# Patient Record
Sex: FEMALE | Race: WHITE | Employment: UNEMPLOYED | URBAN - METROPOLITAN AREA
[De-identification: names, ages, dates, MRNs, and addresses within clinical notes are randomized per-mention and may not be internally consistent; named-entity substitution may affect disease eponyms.]

---

## 2017-01-28 ENCOUNTER — TELEPHONE (OUTPATIENT)
Dept: OTHER | Facility: CLINIC | Age: 57
End: 2017-01-28

## 2017-04-05 DIAGNOSIS — E83.39 HYPOPHOSPHATASIA: Primary | ICD-10-CM

## 2017-04-07 DIAGNOSIS — E83.39 ADULT HYPOPHOSPHATASIA: Primary | ICD-10-CM

## 2018-08-15 ENCOUNTER — TRANSFERRED RECORDS (OUTPATIENT)
Dept: HEALTH INFORMATION MANAGEMENT | Facility: CLINIC | Age: 58
End: 2018-08-15

## 2018-10-26 DIAGNOSIS — R31.9 HEMATURIA: Primary | ICD-10-CM

## 2019-01-07 ENCOUNTER — OFFICE VISIT (OUTPATIENT)
Dept: UROLOGY | Facility: CLINIC | Age: 59
End: 2019-01-07
Payer: COMMERCIAL

## 2019-01-07 VITALS — HEART RATE: 92 BPM | BODY MASS INDEX: 28.34 KG/M2 | OXYGEN SATURATION: 96 % | HEIGHT: 64 IN | WEIGHT: 166 LBS

## 2019-01-07 DIAGNOSIS — R31.29 MICROSCOPIC HEMATURIA: Primary | ICD-10-CM

## 2019-01-07 PROCEDURE — 52000 CYSTOURETHROSCOPY: CPT | Performed by: UROLOGY

## 2019-01-07 PROCEDURE — 99243 OFF/OP CNSLTJ NEW/EST LOW 30: CPT | Mod: 25 | Performed by: UROLOGY

## 2019-01-07 RX ORDER — CIPROFLOXACIN 500 MG/1
500 TABLET, FILM COATED ORAL ONCE
Qty: 1 TABLET | Refills: 0 | Status: SHIPPED | OUTPATIENT
Start: 2019-01-07 | End: 2019-01-07

## 2019-01-07 ASSESSMENT — PAIN SCALES - GENERAL: PAINLEVEL: NO PAIN (1)

## 2019-01-07 ASSESSMENT — MIFFLIN-ST. JEOR: SCORE: 1327.97

## 2019-01-07 NOTE — LETTER
2019       RE: Rowan Leiva  [address]  [facility] [address]     Dear Colleague,    Thank you for referring your patient, Rowan Leiva, to the [facility] [place] at [facility]. Please see a copy of my visit note below.    [facility]  Main Office: [address]  [address]  [place] [address]       CHIEF COMPLAINT:   Microscopic hematuria    HISTORY:   I was asked by Naomi Cedillo to see this 58-year-old woman who presents with microscopic hematuria. For the past 2 years she has been told by her primary care physician that she has microscopic hematuria. Unfortunately, we do not have these results available. She has never had any gross hematuria. She has no urinary symptoms or complaints. She is a current smoker. A hematuria workup has been initiated and she had a CT scan performed at [facility] in August that was negative. She is here today to complete her hematuria workup with a cystoscopy. She has no family history of urologic malignancy.      PAST MEDICAL HISTORY:   Past Medical History:   Diagnosis Date     Cancer of thyroid (H)      Chest pain      Coronary artery disease     -     HX OF OVARIAN MALIGNANCY      Hyperlipidemia      Hypertension      Hypothyroidism      Malignant neoplasm of thyroid gland (H)     papillary carcinoma; resection      Mild intermittent asthma     minimal symptoms, albuterol use 2x/year     Myocardial infarction (H)     anterior     Pulmonary nodule      Thrombocytopenia (H)      Tobacco use disorder      Type II or unspecified type diabetes mellitus without mention of complication, not stated as uncontrolled        PAST SURGICAL HISTORY:     Past Surgical History:   Procedure Laterality Date     BLADDER SURGERY       C ANESTH, SECTION       C LIGATE FALLOPIAN TUBE      Tubal Ligation     C THYROIDECTOMY   2000    papillary thyroid carcinoma - Endocrine     C TOTAL ABDOM HYSTERECTOMY      ovarian cancer (low grade) - Heme/Onc     CHOLECYSTECTOMY       CYSTOSCOPY  2014    Procedure: CYSTOSCOPY;  Surgeon: Sabino Jamil MD;  Location: [facility]     HEART CATH, ANGIOPLASTY  11    2.5 X 18 mm & 2.25 X 18 mm Xience ELLIOT to Mid LAD     HEART CATH, ANGIOPLASTY  11    Staged-3.0 X 23 mm Xience ELLIOT to Mid RAC     SLING TRANSVAGINAL N/A 2014    Procedure: SLING TRANSVAGINAL;  Surgeon: Sabino Jamil MD;  Location: [facility]       FAMILY HISTORY:   Family History   Problem Relation Age of Onset     Blood Disease Sister         Hepatitis B-alive     Cancer Maternal Aunt         bronchial tubes, neck-     Cancer Maternal Uncle         glands in neck-     Cancer Maternal Grandfather         lungs-     Diabetes Father              Heart Disease Father         2 heartattacks-     Diabetes Sister              Diabetes Other         -cousins     Diabetes Paternal Grandmother              Diabetes Paternal Grandfather              Diabetes Paternal Aunt              Hypertension Sister         alive     Thyroid Disease Sister         both sisters-alive     Thyroid Disease Other         niece-alive       SOCIAL HISTORY:   Social History     Tobacco Use     Smoking status: Current Every Day Smoker     Packs/day: 2.00     Years: 24.00     Pack years: 48.00     Types: Cigarettes     Smokeless tobacco: Never Used     Tobacco comment: Strongly recommended quitting at each visit.   Substance Use Topics     Alcohol use: No     Alcohol/week: 0.0 oz        ALLERGIES:  Allergies   Allergen Reactions     No Known Drug Allergies        MEDICATIONS:     Current Outpatient Medications:      ALBUTEROL 90 MCG/ACT IN AERS, 1-2 puffs Q 2-4 hrs prn with spacer - HFA, Disp: 1, Rfl: 1     aspirin 81 MG tablet, Take 81 mg by mouth daily, Disp: , Rfl:       "ciprofloxacin (CIPRO) 500 MG tablet, Take 1 tablet (500 mg) by mouth once for 1 dose, Disp: 1 tablet, Rfl: 0     glipiZIDE (GLUCOTROL) 10 MG tablet, Take 10 mg by mouth daily., Disp: , Rfl:      insulin glargine (LANTUS SOLOSTAR) 100 UNIT/ML injection, Inject 30 Units Subcutaneous daily , Disp: , Rfl:      LANCETS REGULAR MISC, use twice a day for blood sugar, Disp: 2 boxes, Rfl: 0     levothyroxine (SYNTHROID, LEVOTHROID) 112 MCG tablet, Take 112 mcg by mouth every morning (before breakfast). Total dose = 162 mcg , Disp: , Rfl:      levothyroxine (SYNTHROID, LEVOTHROID) 50 MCG tablet, Take 50 mcg by mouth every morning (before breakfast). Total dose 162 mcg , Disp: , Rfl:      metoprolol (TOPROL-XL) 50 MG 24 hr tablet, Take 50 mg by mouth daily, Disp: , Rfl:     PHYSICAL EXAM:  VS: HR: 92    BP: Data Unavailable      WT: 166 lbs 0 oz       HT: 5' 4\"    General appearance: In NAD, conversant  HEENT: normocephalic and atraumatic, anicteric sclera  Lymph Nodes: not examined  Cardiovascular: not examined  Respiratory: normal, non-labored breathing  Abdomen: soft, non-tender, and non-distended  Back/Flank: not examined  Peripheral Vascular/extremity: no peripheral edema  Lymphatic: not examined  Skin: Normal temperature, turgor, and texture. No rash  Psychiatric: Appropriate affect. Alert and Oriented to person, place, and time    Pelvic:    External genitalia: normal   Urethra: normal   Vagina: normal vaginal mucosa      Cystoscopy: I performed flexible cystoscopy and the bladder was normal throughout.    Laboratory Studies: urinalysis today with moderate blood    Imaging Studies: CT scan with contrast at [facility] in August showed no abnormalities.    CLINICAL IMPRESSION:   Microscopic hematuria    PLAN:   Her CT scan and cystoscopy are both negative today. She was provided reassurance. A urine sample from today will be sent for cytology in order to complete her hematuria workup. She is a current smoker. I counseled " her to quit smoking as this is the leading cause of bladder cancer. If she does continue to smoke she should have another urinalysis and cytology checked next year in our clinic.    Jean Marley M.D.

## 2019-01-07 NOTE — NURSING NOTE
Prior to the start of the procedure and with procedural staff participation, I verbally confirmed the patient s identity using two indicators, relevant allergies, that the procedure was appropriate and matched the consent or emergent situation, and that the correct equipment/implants were available. Immediately prior to starting the procedure I conducted the Time Out with the procedural staff and re-confirmed the patient s name, procedure, and site/side. (The Joint Commission universal protocol was followed.)  Yes    Sedation (Moderate or Deep): None  Pt has signed the consent form stating that we will be doing a CYSTOSCOPY (with or without stent removal) today, and that it is the correct procedure. I verbally confirmed the patient s identity using two indicators, relevant allergies, and that the correct equipment was available. Post-op information given to the pt as needed at check-out. I have sent an appropriate antibiotic to the pharmacy in our building as recommended by the MD. JORI Barraza CMA

## 2019-01-07 NOTE — PROGRESS NOTES
[facility]  Main Office: [address]  [address]  [place] [address]       CHIEF COMPLAINT:   Microscopic hematuria    HISTORY:   I was asked by Naomi Cedillo to see this 58-year-old woman who presents with microscopic hematuria. For the past 2 years she has been told by her primary care physician that she has microscopic hematuria. Unfortunately, we do not have these results available. She has never had any gross hematuria. She has no urinary symptoms or complaints. She is a current smoker. A hematuria workup has been initiated and she had a CT scan performed at [facility] in August that was negative. She is here today to complete her hematuria workup with a cystoscopy. She has no family history of urologic malignancy.      PAST MEDICAL HISTORY:   Past Medical History:   Diagnosis Date     Cancer of thyroid (H)      Chest pain      Coronary artery disease     -     HX OF OVARIAN MALIGNANCY      Hyperlipidemia      Hypertension      Hypothyroidism      Malignant neoplasm of thyroid gland (H)     papillary carcinoma; resection      Mild intermittent asthma     minimal symptoms, albuterol use 2x/year     Myocardial infarction (H)     anterior     Pulmonary nodule      Thrombocytopenia (H)      Tobacco use disorder      Type II or unspecified type diabetes mellitus without mention of complication, not stated as uncontrolled        PAST SURGICAL HISTORY:     Past Surgical History:   Procedure Laterality Date     BLADDER SURGERY       C ANESTH, SECTION       C LIGATE FALLOPIAN TUBE      Tubal Ligation     C THYROIDECTOMY  2000    papillary thyroid carcinoma - Endocrine     C TOTAL ABDOM HYSTERECTOMY      ovarian cancer (low grade) - Heme/Onc     CHOLECYSTECTOMY       CYSTOSCOPY  2014    Procedure: CYSTOSCOPY;  Surgeon: Sabino Jamil MD;  Location: [facility]     HEART CATH, ANGIOPLASTY  11    2.5 X 18 mm & 2.25 X 18 mm  Xience ELLIOT to Mid LAD     HEART CATH, ANGIOPLASTY  11    Staged-3.0 X 23 mm Xience ELLIOT to Mid RAC     SLING TRANSVAGINAL N/A 2014    Procedure: SLING TRANSVAGINAL;  Surgeon: Sabino Jamil MD;  Location: [facility]       FAMILY HISTORY:   Family History   Problem Relation Age of Onset     Blood Disease Sister         Hepatitis B-alive     Cancer Maternal Aunt         bronchial tubes, neck-     Cancer Maternal Uncle         glands in neck-     Cancer Maternal Grandfather         lungs-     Diabetes Father              Heart Disease Father         2 heartattacks-     Diabetes Sister              Diabetes Other         -cousins     Diabetes Paternal Grandmother              Diabetes Paternal Grandfather              Diabetes Paternal Aunt              Hypertension Sister         alive     Thyroid Disease Sister         both sisters-alive     Thyroid Disease Other         niece-alive       SOCIAL HISTORY:   Social History     Tobacco Use     Smoking status: Current Every Day Smoker     Packs/day: 2.00     Years: 24.00     Pack years: 48.00     Types: Cigarettes     Smokeless tobacco: Never Used     Tobacco comment: Strongly recommended quitting at each visit.   Substance Use Topics     Alcohol use: No     Alcohol/week: 0.0 oz        ALLERGIES:  Allergies   Allergen Reactions     No Known Drug Allergies        MEDICATIONS:     Current Outpatient Medications:      ALBUTEROL 90 MCG/ACT IN AERS, 1-2 puffs Q 2-4 hrs prn with spacer - HFA, Disp: 1, Rfl: 1     aspirin 81 MG tablet, Take 81 mg by mouth daily, Disp: , Rfl:      ciprofloxacin (CIPRO) 500 MG tablet, Take 1 tablet (500 mg) by mouth once for 1 dose, Disp: 1 tablet, Rfl: 0     glipiZIDE (GLUCOTROL) 10 MG tablet, Take 10 mg by mouth daily., Disp: , Rfl:      insulin glargine (LANTUS SOLOSTAR) 100 UNIT/ML injection, Inject 30 Units Subcutaneous daily , Disp: , Rfl:      LANCETS  "REGULAR MISC, use twice a day for blood sugar, Disp: 2 boxes, Rfl: 0     levothyroxine (SYNTHROID, LEVOTHROID) 112 MCG tablet, Take 112 mcg by mouth every morning (before breakfast). Total dose = 162 mcg , Disp: , Rfl:      levothyroxine (SYNTHROID, LEVOTHROID) 50 MCG tablet, Take 50 mcg by mouth every morning (before breakfast). Total dose 162 mcg , Disp: , Rfl:      metoprolol (TOPROL-XL) 50 MG 24 hr tablet, Take 50 mg by mouth daily, Disp: , Rfl:     REVIEW OF SYSTEMS:  Allergic/Immunologic: negative  Constitutional Symptoms: negative   Fever: none   Weight loss: none   Other: none  Hematologic/Lymphatic: negative  Integumentary: negative   Breast: negative   Hair: negative   Skin: negative   Skin: negative  Eyes: negative  Ears/Nose/Throat: negative  Respiratory: negative  Cardiovascular: negative  Gastrointestinal: negative  Genitourinary: negative  Musculoskeletal: negative  Neurologic: negative  Psychiatric: negative  Reproductive System: negative  Endocrine: negative      PHYSICAL EXAM:  VS: HR: 92    BP: Data Unavailable      WT: 166 lbs 0 oz       HT: 5' 4\"    General appearance: In NAD, conversant  HEENT: normocephalic and atraumatic, anicteric sclera  Lymph Nodes: not examined  Cardiovascular: not examined  Respiratory: normal, non-labored breathing  Abdomen: soft, non-tender, and non-distended  Back/Flank: not examined  Peripheral Vascular/extremity: no peripheral edema  Lymphatic: not examined  Skin: Normal temperature, turgor, and texture. No rash  Psychiatric: Appropriate affect. Alert and Oriented to person, place, and time    Pelvic:    External genitalia: normal   Urethra: normal   Vagina: normal vaginal mucosa      Cystoscopy: I performed flexible cystoscopy and the bladder was normal throughout.    Laboratory Studies: urinalysis today with moderate blood    Imaging Studies: CT scan with contrast at [facility] in August showed no abnormalities.      CLINICAL IMPRESSION:   Microscopic " hematuria    PLAN:   Her CT scan and cystoscopy are both negative today. She was provided reassurance. A urine sample from today will be sent for cytology in order to complete her hematuria workup. She is a current smoker. I counseled her to quit smoking as this is the leading cause of bladder cancer. If she does continue to smoke she should have another urinalysis and cytology checked next year in our clinic.      Jean Marley M.D.

## 2019-01-07 NOTE — PATIENT INSTRUCTIONS
"     AFTER YOUR CYSTOSCOPY         You have just completed a cystoscopy, or \"cysto\", which allowed your physician to learn more about your bladder (or to remove a stent placed after surgery). We suggest that you continue to avoid caffeine, fruit juice, and alcohol for the next 24 hours, however, you are encouraged to return to your normal activities.       A few things that are considered normal after your cystoscopy:    * small amount of bleeding (or spotting) that clears within the next 24 hours    * slight burning sensation with urination    * sensation to of needing to avoid more frequently    * the feeling of \"air\" in your urine    * mild discomfort that is relieved with Tylonol        Please contact our office promptly if you:    * develop a fever above 101 degrees    * are unable to urinate    * develop bright red blood that does not stop    * severe pain or swelling        And of course, please contact our office with any concerns or questions [phone]        "

## 2019-01-08 LAB
RBC #/AREA URNS AUTO: <1 /HPF (ref 0–2)
WBC #/AREA URNS AUTO: 1 /HPF (ref 0–5)

## 2019-01-08 PROCEDURE — 81015 MICROSCOPIC EXAM OF URINE: CPT | Performed by: UROLOGY

## 2019-01-08 PROCEDURE — 88112 CYTOPATH CELL ENHANCE TECH: CPT | Performed by: UROLOGY

## 2019-01-09 LAB — COPATH REPORT: NORMAL

## 2020-04-04 ENCOUNTER — APPOINTMENT (OUTPATIENT)
Dept: CT IMAGING | Facility: CLINIC | Age: 60
End: 2020-04-04
Attending: PHYSICIAN ASSISTANT
Payer: COMMERCIAL

## 2020-04-04 ENCOUNTER — HOSPITAL ENCOUNTER (EMERGENCY)
Facility: CLINIC | Age: 60
Discharge: HOME | End: 2020-04-04
Attending: PHYSICIAN ASSISTANT | Admitting: PHYSICIAN ASSISTANT
Payer: COMMERCIAL

## 2020-04-04 VITALS
TEMPERATURE: 96.5 F | SYSTOLIC BLOOD PRESSURE: 164 MMHG | BODY MASS INDEX: 28.85 KG/M2 | WEIGHT: 169 LBS | OXYGEN SATURATION: 98 % | DIASTOLIC BLOOD PRESSURE: 73 MMHG | HEIGHT: 64 IN | RESPIRATION RATE: 16 BRPM

## 2020-04-04 DIAGNOSIS — N20.0 CALCULUS OF KIDNEY: ICD-10-CM

## 2020-04-04 DIAGNOSIS — R31.9 HEMATURIA: ICD-10-CM

## 2020-04-04 DIAGNOSIS — R10.9 FLANK PAIN: ICD-10-CM

## 2020-04-04 DIAGNOSIS — R79.89 ELEVATED SERUM CREATININE: ICD-10-CM

## 2020-04-04 LAB
ALBUMIN UR-MCNC: 300 MG/DL
AMORPH CRY #/AREA URNS HPF: ABNORMAL /HPF
ANION GAP SERPL CALCULATED.3IONS-SCNC: 6 MMOL/L (ref 3–14)
APPEARANCE UR: CLEAR
BASOPHILS # BLD AUTO: 0.1 10E9/L (ref 0–0.2)
BASOPHILS NFR BLD AUTO: 0.6 %
BILIRUB UR QL STRIP: NEGATIVE
BUN SERPL-MCNC: 31 MG/DL (ref 7–30)
CALCIUM SERPL-MCNC: 8.6 MG/DL (ref 8.5–10.1)
CHLORIDE SERPL-SCNC: 103 MMOL/L (ref 94–109)
CO2 SERPL-SCNC: 28 MMOL/L (ref 20–32)
COLOR UR AUTO: YELLOW
CREAT SERPL-MCNC: 1.33 MG/DL (ref 0.52–1.04)
DIFFERENTIAL METHOD BLD: ABNORMAL
EOSINOPHIL # BLD AUTO: 0.3 10E9/L (ref 0–0.7)
EOSINOPHIL NFR BLD AUTO: 3.1 %
ERYTHROCYTE [DISTWIDTH] IN BLOOD BY AUTOMATED COUNT: 14.5 % (ref 10–15)
GFR SERPL CREATININE-BSD FRML MDRD: 44 ML/MIN/{1.73_M2}
GLUCOSE SERPL-MCNC: 262 MG/DL (ref 70–99)
GLUCOSE UR STRIP-MCNC: 1000 MG/DL
HCT VFR BLD AUTO: 37.6 % (ref 35–47)
HGB BLD-MCNC: 12.1 G/DL (ref 11.7–15.7)
HGB UR QL STRIP: ABNORMAL
HYALINE CASTS #/AREA URNS LPF: 9 /LPF (ref 0–2)
IMM GRANULOCYTES # BLD: 0 10E9/L (ref 0–0.4)
IMM GRANULOCYTES NFR BLD: 0.4 %
KETONES UR STRIP-MCNC: NEGATIVE MG/DL
LEUKOCYTE ESTERASE UR QL STRIP: NEGATIVE
LYMPHOCYTES # BLD AUTO: 2 10E9/L (ref 0.8–5.3)
LYMPHOCYTES NFR BLD AUTO: 21.6 %
MCH RBC QN AUTO: 25.9 PG (ref 26.5–33)
MCHC RBC AUTO-ENTMCNC: 32.2 G/DL (ref 31.5–36.5)
MCV RBC AUTO: 80 FL (ref 78–100)
MONOCYTES # BLD AUTO: 0.5 10E9/L (ref 0–1.3)
MONOCYTES NFR BLD AUTO: 5.1 %
MUCOUS THREADS #/AREA URNS LPF: PRESENT /LPF
NEUTROPHILS # BLD AUTO: 6.2 10E9/L (ref 1.6–8.3)
NEUTROPHILS NFR BLD AUTO: 69.2 %
NITRATE UR QL: NEGATIVE
NRBC # BLD AUTO: 0 10*3/UL
NRBC BLD AUTO-RTO: 0 /100
PH UR STRIP: 6 PH (ref 5–7)
PLATELET # BLD AUTO: 111 10E9/L (ref 150–450)
POTASSIUM SERPL-SCNC: 4 MMOL/L (ref 3.4–5.3)
RBC # BLD AUTO: 4.68 10E12/L (ref 3.8–5.2)
RBC #/AREA URNS AUTO: 4 /HPF (ref 0–2)
SODIUM SERPL-SCNC: 137 MMOL/L (ref 133–144)
SOURCE: ABNORMAL
SP GR UR STRIP: 1.02 (ref 1–1.03)
SQUAMOUS #/AREA URNS AUTO: 1 /HPF (ref 0–1)
UROBILINOGEN UR STRIP-MCNC: NORMAL MG/DL (ref 0–2)
WBC # BLD AUTO: 9 10E9/L (ref 4–11)
WBC #/AREA URNS AUTO: 2 /HPF (ref 0–5)

## 2020-04-04 PROCEDURE — 81001 URINALYSIS AUTO W/SCOPE: CPT | Performed by: PHYSICIAN ASSISTANT

## 2020-04-04 PROCEDURE — 85025 COMPLETE CBC W/AUTO DIFF WBC: CPT | Performed by: PHYSICIAN ASSISTANT

## 2020-04-04 PROCEDURE — 74176 CT ABD & PELVIS W/O CONTRAST: CPT

## 2020-04-04 PROCEDURE — 25800030 ZZH RX IP 258 OP 636: Performed by: PHYSICIAN ASSISTANT

## 2020-04-04 PROCEDURE — 99284 EMERGENCY DEPT VISIT MOD MDM: CPT | Mod: 25

## 2020-04-04 PROCEDURE — 96360 HYDRATION IV INFUSION INIT: CPT

## 2020-04-04 PROCEDURE — 80048 BASIC METABOLIC PNL TOTAL CA: CPT | Performed by: PHYSICIAN ASSISTANT

## 2020-04-04 RX ADMIN — SODIUM CHLORIDE 1000 ML: 9 INJECTION, SOLUTION INTRAVENOUS at 13:59

## 2020-04-04 ASSESSMENT — ENCOUNTER SYMPTOMS
DIARRHEA: 0
BACK PAIN: 1
FEVER: 0
NAUSEA: 0
HEMATURIA: 1
VOMITING: 0
CONSTIPATION: 0
SHORTNESS OF BREATH: 0
ABDOMINAL PAIN: 1

## 2020-04-04 ASSESSMENT — MIFFLIN-ST. JEOR: SCORE: 1331.58

## 2020-04-04 NOTE — ED PROVIDER NOTES
History     Chief Complaint:  Back Pain    HPI   Rowan Leiva is a 60 year old female with a history of Coronary Artery Disease, hypertension, hyperlipidemia, MI, asthma/COPD, ovarian cancer, among others,  who presents with back pain. The patient reports that for the past 4-5 days she has been experiencing intermittent left sided back pain that radiates into the left lower quadrant. Yesterday, she had noticed some hematuria but this has resolved today. However, she notes that she has had foul smelling urine for the past week and she was concerned for a kidney infection. The patient has found the pain to manageable at home if she is not over exerting herself and if she keeps up on fluid intake. The patient denies fever, diarrhea, constipation, vaginal bleeding or discharge, chest pain, shortness of breath, nausea, or vomiting. She does not have a history of kidney stones.     Of note, the patient recently had lab work done at outside facility and been told over the past several months that there kidney function has been slowly declining.     Allergies:  No known drug allergies.       Medications:    Albuterol   Aspirin   Glipizide   Lantus Solostar   Lancets Regular   Synthroid/Levothroid   Toprol XL      Past Medical History:    Cancer of thyroid   Chest pain   Coronary artery disease   Ovarian malignancy    Hyperlipidemia   Hypertension   Hypothyroidism   Malignant neoplasm of thyroid gland   Mild intermittent asthma   Myocardial infarction    COPD  Pulmonary nodule   Thrombocytopenia   Tobacco use disorder   Type II diabetes mellitus     Past Surgical History:    Bladder surgery  Tubal ligation   Ovarian cancer - hysterectomy   Choleystectomy  Cystoscopy   Thyroidectomy   Heart cath, angioplasty  Sling transvaginal     Family History:    Sister: hepatitis B, diabetes, hypertension, thyroid disease   Maternal aunt: cancer  Maternal uncle: cancer  Maternal grandfather: cancer  Father: diabetes, heart disease,  "MI    Social History:  The patient was unaccompanied to the ED.  Smoking Status: Current Every Day Smoker  Smokeless Tobacco: Never Used  Alcohol Use: Negative   Drug Use: Negative  PCP: Pallas, Kenneth G   Marital Status:       Review of Systems   Constitutional: Negative for fever.   Respiratory: Negative for shortness of breath.    Cardiovascular: Negative for chest pain.   Gastrointestinal: Positive for abdominal pain. Negative for constipation, diarrhea, nausea and vomiting.   Genitourinary: Positive for hematuria (resolved). Negative for vaginal bleeding and vaginal discharge.        Foul smelling urine   Musculoskeletal: Positive for back pain.   All other systems reviewed and are negative.    Physical Exam     Patient Vitals for the past 24 hrs:   BP Temp Temp src Heart Rate Resp SpO2 Height Weight   04/04/20 1328 (!) 193/84 96.5  F (35.8  C) Temporal 101 16 100 % 1.626 m (5' 4\") 76.7 kg (169 lb)      Physical Exam  General: Alert and cooperative with exam. Resting comfortably on gurney  Head:  Scalp is NC/AT  Eyes:  No scleral icterus, PERRL  ENT:  The external nose and ears are normal.   Neck:  Normal range of motion without rigidity.  CV:  Regular rate and rhythm    No pathologic murmur, rubs, or gallops.  Resp:  Breath sounds are clear bilaterally.  No crackles, wheezes, rhonchi, stridor.    Non-labored, no retractions or accessory muscle use  GI:  Abdomen is soft, no distension, no tenderness, no masses. No peritoneal signs.  Bowel sounds present in all quadrants  :  No suprapubic or flank tenderness  MS:  No lower extremity edema or asymmetric calf swelling.    No midline cervical, thoracic, or lumbar tenderness  Skin:  Warm and dry, No rash or lesions noted.  Neuro: Oriented. No gross motor deficits.     GCS: 15.  Psych: Awake. Alert. Normal affect. Appropriate interactions.    Emergency Department Course     Imaging:  Radiology findings were communicated with the patient who voiced " understanding of the findings.    Abd/pelvis CT no contrast - Stone Protocol  1.  2 punctate nonobstructive right intrarenal calculi measuring up to  2 mm.  JENNA MONTANEZ MD  Reading per radiology      Laboratory:  Laboratory findings were communicated with the patient who voiced understanding of the findings.    UA: Glucose 1000 (A), Blood moderate (A), Protein Albumin 300 (A), RBC 4 (H), Mucous present (A), Hyaline Casts 9 (H), Amorphous Crystals few (A), o/w WNL.     CBC: WBC 9.0, HGB 12.1,  (L)  BMP: Glucose 262 (H), BUN 31 (H), GFR 44 (L), o/w WNL (Creatinine 1.33 (H))    Interventions:  1359 NS 1000 mL IV      Emergency Department Course:    1339 Nursing notes and vitals reviewed. I performed an exam of the patient as documented above.     1340 The patient provided a urine sample here in the emergency department. This was sent for laboratory testing, findings above.     1358 IV was inserted and blood was drawn for laboratory testing, results above.     1422 The patient was sent for a CT while in the emergency department, results above.      1530 Prior to discharge, I personally reviewed the results with the patient and all related questions were answered. The patient verbalized understanding and is amenable to plan.     Impression & Plan      Medical Decision Making:  Rowan Leiva is a 60 year old female who presents to the emergency department today for evaluation of left low back and flank pain.  History and records reviewed.  CT scan demonstrates no evidence of ureteral stone, but does show 2 small left-sided intrarenal stones which would not likely account for the patient's pain.  Her urine does have a small amount of blood in it and the possibility of recently passed stone seems likely given that she is feeling better at this point and quite comfortable and not requiring any pain medicine.  Her urinalysis is not suggestive of infection at this time however I will send culture.  She has no  vaginal symptoms or evidence of PID, and I doubt ovarian torsion.  She has no chest pain or shortness of breath, and I feel pulmonary embolism, ACS, dissection, or other intrathoracic process is very unlikely.  Her lab work is most notable for elevated BUN and creatinine, and while this is approximately double what her most recent reading available to our system in 2015 was, on further discussion with the patient she notes that this is been an ongoing issue for her recently likely secondary to her underlying diabetes.  There is no evidence of obstructive process on CT.  She appears well-hydrated.  We will have her follow-up with primary care provider for repeat lab work in 3 to 4 days as an outpatient.  I stressed the importance of return to the emergency department if she develops new or worsening symptoms including fever, increased pain, shortness of breath, chest pain, or other new or worsening symptoms.    Diagnosis:    ICD-10-CM    1. Flank pain  R10.9    2. Hematuria  R31.9    3. Calculus of kidney  N20.0    4. Elevated serum creatinine  R79.89      Disposition:   The patient is discharged to home.     Discharge Medications:  No discharge medications.     Scribe Disclosure:  I, Orla Severson, am serving as a scribe at 1:51 PM on 4/4/2020 to document services personally performed by Pete Miguel PA-C based on my observations and the provider's statements to me.    [facility] EMERGENCY DEPARTMENT       Pete Miguel PA-C  04/04/20 1533

## 2020-04-04 NOTE — ED TRIAGE NOTES
Pt presents for left sided back pain that radiates around to the LLQ, that has been intermittent for the last 4-5 days. Pt also noticed blood in her urine yesterday, but has since gone away and has had foul smelling urine for about a week. No hx of kidney stones. Denies any known back issues, bowel complaints, numbness or tingling.

## 2020-04-08 ENCOUNTER — PATIENT OUTREACH (OUTPATIENT)
Dept: CARE COORDINATION | Facility: CLINIC | Age: 60
End: 2020-04-08

## 2020-04-08 NOTE — PROGRESS NOTES
Clinical Product Navigator reviewed chart; patient on payer product coverage.  Review results: Obtaining further information to determine needs and next steps.    Deisy Bolanos Clinical Product Navigator    Clinic Care Coordination Contact  [facility]/Voicemail       Clinical Data: Care Coordinator Outreach  Outreach attempted x 1.  Left message on patient's voicemail with call back information and requested return call.  Plan:  Care Coordinator will try to reach patient again in 1-2 business days.      Deisy Bolanos  Clinical Product Navigator

## 2020-04-09 ENCOUNTER — PATIENT OUTREACH (OUTPATIENT)
Dept: CARE COORDINATION | Facility: CLINIC | Age: 60
End: 2020-04-09

## 2020-04-09 DIAGNOSIS — E11.9 DIABETES MELLITUS (H): Primary | ICD-10-CM

## 2020-04-09 SDOH — HEALTH STABILITY: PHYSICAL HEALTH: ON AVERAGE, HOW MANY MINUTES DO YOU ENGAGE IN EXERCISE AT THIS LEVEL?: 0 MIN

## 2020-04-09 SDOH — SOCIAL STABILITY: SOCIAL NETWORK: HOW OFTEN DO YOU GET TOGETHER WITH FRIENDS OR RELATIVES?: NEVER

## 2020-04-09 SDOH — SOCIAL STABILITY: SOCIAL INSECURITY
WITHIN THE LAST YEAR, HAVE YOU BEEN KICKED, HIT, SLAPPED, OR OTHERWISE PHYSICALLY HURT BY YOUR PARTNER OR EX-PARTNER?: NO

## 2020-04-09 SDOH — SOCIAL STABILITY: SOCIAL INSECURITY: WITHIN THE LAST YEAR, HAVE YOU BEEN HUMILIATED OR EMOTIONALLY ABUSED IN OTHER WAYS BY YOUR PARTNER OR EX-PARTNER?: NO

## 2020-04-09 SDOH — SOCIAL STABILITY: SOCIAL INSECURITY
WITHIN THE LAST YEAR, HAVE TO BEEN RAPED OR FORCED TO HAVE ANY KIND OF SEXUAL ACTIVITY BY YOUR PARTNER OR EX-PARTNER?: NO

## 2020-04-09 SDOH — SOCIAL STABILITY: SOCIAL NETWORK
DO YOU BELONG TO ANY CLUBS OR ORGANIZATIONS SUCH AS CHURCH GROUPS UNIONS, FRATERNAL OR ATHLETIC GROUPS, OR SCHOOL GROUPS?: NO

## 2020-04-09 SDOH — ECONOMIC STABILITY: FOOD INSECURITY: WITHIN THE PAST 12 MONTHS, YOU WORRIED THAT YOUR FOOD WOULD RUN OUT BEFORE YOU GOT MONEY TO BUY MORE.: NEVER TRUE

## 2020-04-09 SDOH — ECONOMIC STABILITY: INCOME INSECURITY: HOW HARD IS IT FOR YOU TO PAY FOR THE VERY BASICS LIKE FOOD, HOUSING, MEDICAL CARE, AND HEATING?: SOMEWHAT HARD

## 2020-04-09 SDOH — HEALTH STABILITY: MENTAL HEALTH
STRESS IS WHEN SOMEONE FEELS TENSE, NERVOUS, ANXIOUS, OR CAN'T SLEEP AT NIGHT BECAUSE THEIR MIND IS TROUBLED. HOW STRESSED ARE YOU?: RATHER MUCH

## 2020-04-09 SDOH — ECONOMIC STABILITY: TRANSPORTATION INSECURITY
IN THE PAST 12 MONTHS, HAS LACK OF TRANSPORTATION KEPT YOU FROM MEETINGS, WORK, OR FROM GETTING THINGS NEEDED FOR DAILY LIVING?: NO

## 2020-04-09 SDOH — HEALTH STABILITY: PHYSICAL HEALTH: ON AVERAGE, HOW MANY DAYS PER WEEK DO YOU ENGAGE IN MODERATE TO STRENUOUS EXERCISE (LIKE A BRISK WALK)?: 0 DAYS

## 2020-04-09 SDOH — SOCIAL STABILITY: SOCIAL INSECURITY: WITHIN THE LAST YEAR, HAVE YOU BEEN AFRAID OF YOUR PARTNER OR EX-PARTNER?: NO

## 2020-04-09 SDOH — SOCIAL STABILITY: SOCIAL NETWORK: HOW OFTEN DO YOU ATTENT MEETINGS OF THE CLUB OR ORGANIZATION YOU BELONG TO?: NEVER

## 2020-04-09 SDOH — SOCIAL STABILITY: SOCIAL NETWORK
IN A TYPICAL WEEK, HOW MANY TIMES DO YOU TALK ON THE PHONE WITH FAMILY, FRIENDS, OR NEIGHBORS?: MORE THAN THREE TIMES A WEEK

## 2020-04-09 SDOH — SOCIAL STABILITY: SOCIAL NETWORK: HOW OFTEN DO YOU ATTEND CHURCH OR RELIGIOUS SERVICES?: NEVER

## 2020-04-09 SDOH — ECONOMIC STABILITY: TRANSPORTATION INSECURITY
IN THE PAST 12 MONTHS, HAS THE LACK OF TRANSPORTATION KEPT YOU FROM MEDICAL APPOINTMENTS OR FROM GETTING MEDICATIONS?: NO

## 2020-04-09 SDOH — ECONOMIC STABILITY: FOOD INSECURITY: WITHIN THE PAST 12 MONTHS, THE FOOD YOU BOUGHT JUST DIDN'T LAST AND YOU DIDN'T HAVE MONEY TO GET MORE.: NEVER TRUE

## 2020-04-09 ASSESSMENT — ACTIVITIES OF DAILY LIVING (ADL): DEPENDENT_IADLS:: INDEPENDENT

## 2020-04-09 NOTE — LETTER
"[place] CARE COORDINATION  [facility]  April 9, 2020    Rowan Leiva  [address]  [facility] [address]      Dear Rowan,    I am a clinical product navigator that works on behalf of [employer] [facility]; I wanted to introduce myself and role to you, as I can help you establish care with recommended providers for ongoing care within our health care system.     Also, our Primary Care Clinics are all supported by Clinic Care Coordination:     \"The clinic care coordinator is a registered nurse and/or  who understand the health care system. The goal of clinic care coordination is to help you manage your health and improve access to the [facility] system in the most efficient manner. The registered nurse can assist you in meeting your health care goals by providing education, coordinating services, and strengthening the communication among your providers. The  can assist you with financial, behavioral, psychosocial, chemical dependency, counseling, and/or psychiatric resources.\"    I look forward to helping you connect with providers within our health care system; please let me know if you have any questions.     Sincerely,    eDisy Bolanos  Clinical Product Navigator  PH: [phone]  "

## 2020-04-09 NOTE — PROGRESS NOTES
Clinical Product Navigatorreviewed chart; patient on payer product coverage.  Review results: Met referral criteria for Care Coordinator; referral to be sent.    Deisy Bolanos, Clinical Product Navigator    Clinic Care Coordination Contact    Clinic Care Coordination Contact  OUTREACH    Referral Information:  Referral Source: Health Plan    Primary Diagnosis: Diabetes    Care Coordinator has reviewed patient's Social Determinants of Health (SDoH) on this date. Upon review, changes  were  made.   Chief Complaint   Patient presents with     Clinic Care Coordination - Initial     Clinical Product Navigator        Universal Utilization: Patient would like to establish with [facility]. CPN to assist patient to make follow up visit from her ED visit on 4/4/2020 with kidney stones.   Clinic Utilization: Will establish with [facility]   Difficulty keeping appointments:: No  Compliance Concerns: Yes  No-Show Concerns: No  No PCP office visit in Past Year: Yes  Utilization    Last refreshed: 4/9/2020  3:16 PM:  Hospital Admissions 0           Last refreshed: 4/9/2020  3:16 PM:  ED Visits 1           Last refreshed: 4/9/2020  3:16 PM:  No Show Count (past year) 0              Current as of: 4/9/2020  3:16 PM              Clinical Concerns:  Current Medical Concerns: Patient was recently in the ED for a kidney stone; she feels that she has passed one, but that one has potentially moved. She said the pain is felt in a different spot, but her kidney pain has gotten better overall. She will discuss with at her appointment with the doctor. Patient is going to call her Endocrinologist.      Patient Active Problem List   Diagnosis     Malignant neoplasm of thyroid gland (H)     Personal history of malignant neoplasm of ovary     Diabetes mellitus, type 2 (H)     Tobacco use disorder     Mixed hyperlipidemia     Mild intermittent asthma     Thrombocytopenia (H)     Advanced directives,  counseling/discussion     Hyperlipidemia     Coronary artery disease     Myocardial infarction (H)     Hypertension     Hypothyroidism     Patient is very worried about her health and getting COVID, she knows that her health puts her at greater risk of janet COVID. CPN and patient discussed the desire to quit smoking, as this put her at higher risk, patient stated she has the patches and she would like to quit,she just hasn't started yet.     Writer educated patient to [facility] and that support she would receive through a tobacco cessation program with utilizing the [facility]; patient would like to enroll once established with [facility].     Current Behavioral Concerns: Patient stated she feels stressed as she is very worried about herself,  and her mom getting COVID; they are all high risk and she is worried about their health. They are all doing their best to stay isolated; their groceries are being delivered. Patient is able to work from home and she is busy with work. She said this is adding to her stress.     Patient is having a family zoom today to help her and her family stay connected; they are all decorating cookies via zoom. Writer and patient discussed ways to decrease stress, She knows staying connected to family will help her stress so she will be scheduling more time with them.     Education Provided to patient: CPN role, CCC role in the clinics.   Pain  Pain (GOAL):: No  Health Maintenance Reviewed: Due/Overdue : Patients needs a physical.     Medication Management:  Medication reviewed and reconciled.     Functional Status:  Dependent ADLs:: Independent  Dependent IADLs:: Independent  Mobility Status: Independent  Fallen 2 or more times in the past year?: No  Any fall with injury in the past year?: No    Living Situation:  Current living arrangement:: I live in a private home with spouse  Type of residence:: Private home - stairs    Lifestyle & Psychosocial Needs:  Lifestyle     Physical activity      Days per week: 0 days     Minutes per session: 0 min     Stress: Rather much     Social Needs     Financial resource strain: Somewhat hard     Food insecurity     Worry: Never true     Inability: Never true     Transportation needs     Medical: No     Non-medical: No     Diet:: Regular  Inadequate nutrition (GOAL):: No  Tube Feeding: No  Inadequate activity/exercise (GOAL):: No  Significant changes in sleep pattern (GOAL): No  Transportation means:: Regular car     Jehovah's witness or spiritual beliefs that impact treatment:: No  Mental health DX:: No  Mental health management concern (GOAL):: No  Informal Support system:: Partner   Socioeconomic History     Marital status:      Spouse name: Clif     Number of children: 2     Years of education: 13     Highest education level: Not on file   Occupational History     Comment: [employer]   Relationships     Social connections     Talks on phone: More than three times a week     Gets together: Never     Attends [employer] service: Never     Active member of club or organization: No     Attends meetings of clubs or organizations: Never     Relationship status: Not on file     Intimate partner violence     Fear of current or ex partner: No     Emotionally abused: No     Physically abused: No     Forced sexual activity: No     Tobacco Use     Smoking status: Current Every Day Smoker     Packs/day: 2.00     Years: 24.00     Pack years: 48.00     Types: Cigarettes     Smokeless tobacco: Never Used     Tobacco comment: Strongly recommended quitting at each visit.   Substance and Sexual Activity     Alcohol use: No     Alcohol/week: 0.0 standard drinks     Drug use: No     Sexual activity: Yes     Partners: Male     Birth control/protection: Female Surgical     Comment: Hysterectomy and tubal ligation     Community Resources: None  Supplies used at home:: None  Equipment Currently Used at Home: none    Advance Care Plan/Directive  Advanced Care Plans/Directives on file::  Yes    Referrals Placed: None     Goals: Patient would like to establish with a [facility] PCP.       Patient/Caregiver understanding: Patient verbalized understanding, engaged in AIDET communication during patient encounter.    Outreach Frequency: monthly  Future Appointments              Tomorrow Lorraine Woodward MD [place], [place]          Plan: CPN will assist patient in scheduling a follow up visit per her ED discharge orders at a [facility] Location.     Patient to establish with a PCP at the  [facility].       UPDATE: 3:20 PM    CPN called Scheduling and scheduled an appointment with Dr. Peters at [facility]. CPN called patient and let her know when her appointment would be and that it will be a phone visit. Once established CPN will call patient to get her enrolled with [facility] to assist her tobacco cessation.       Deisy Bolanos  Clinical Product Navigator

## 2020-04-10 ENCOUNTER — VIRTUAL VISIT (OUTPATIENT)
Dept: INTERNAL MEDICINE | Facility: CLINIC | Age: 60
End: 2020-04-10
Payer: COMMERCIAL

## 2020-04-10 DIAGNOSIS — R82.90 FOUL SMELLING URINE: ICD-10-CM

## 2020-04-10 DIAGNOSIS — Z79.4 TYPE 2 DIABETES MELLITUS WITH HYPERGLYCEMIA, WITH LONG-TERM CURRENT USE OF INSULIN (H): ICD-10-CM

## 2020-04-10 DIAGNOSIS — N17.9 AKI (ACUTE KIDNEY INJURY) (H): ICD-10-CM

## 2020-04-10 DIAGNOSIS — Z09 HOSPITAL DISCHARGE FOLLOW-UP: Primary | ICD-10-CM

## 2020-04-10 DIAGNOSIS — R30.0 DYSURIA: ICD-10-CM

## 2020-04-10 DIAGNOSIS — N20.0 KIDNEY STONE: ICD-10-CM

## 2020-04-10 DIAGNOSIS — E11.65 TYPE 2 DIABETES MELLITUS WITH HYPERGLYCEMIA, WITH LONG-TERM CURRENT USE OF INSULIN (H): ICD-10-CM

## 2020-04-10 PROCEDURE — 99204 OFFICE O/P NEW MOD 45 MIN: CPT | Mod: 95 | Performed by: INTERNAL MEDICINE

## 2020-04-10 RX ORDER — LISINOPRIL 20 MG/1
20 TABLET ORAL DAILY
COMMUNITY
End: 2021-05-30

## 2020-04-10 RX ORDER — NITROFURANTOIN 25; 75 MG/1; MG/1
100 CAPSULE ORAL 2 TIMES DAILY
Qty: 14 CAPSULE | Refills: 0 | Status: SHIPPED | OUTPATIENT
Start: 2020-04-10 | End: 2020-06-09

## 2020-04-10 RX ORDER — ATORVASTATIN CALCIUM 20 MG/1
20 TABLET, FILM COATED ORAL DAILY
COMMUNITY
End: 2021-05-30

## 2020-04-10 RX ORDER — HYDROCODONE BITARTRATE AND ACETAMINOPHEN 5; 325 MG/1; MG/1
1 TABLET ORAL EVERY 8 HOURS PRN
Qty: 15 TABLET | Refills: 0 | Status: SHIPPED | OUTPATIENT
Start: 2020-04-10 | End: 2020-06-09

## 2020-04-10 ASSESSMENT — ENCOUNTER SYMPTOMS
LIGHT-HEADEDNESS: 0
ARTHRALGIAS: 0
COUGH: 0
HEMATURIA: 1
CHILLS: 0
NAUSEA: 0
DIZZINESS: 0
SLEEP DISTURBANCE: 0
FEVER: 0
ABDOMINAL PAIN: 1
DYSURIA: 1

## 2020-04-10 NOTE — PROGRESS NOTES
"Rowan Leiva is a 60 year old female who is being evaluated via a billable video visit.      The patient has been notified of following:     \"This video visit will be conducted via a call between you and your physician/provider. We have found that certain health care needs can be provided without the need for an in-person physical exam.  This service lets us provide the care you need with a video conversation.  If a prescription is necessary we can send it directly to your pharmacy.  If lab work is needed we can place an order for that and you can then stop by our lab to have the test done at a later time.    Video visits are billed at different rates depending on your insurance coverage.  Please reach out to your insurance provider with any questions.    If during the course of the call the physician/provider feels a video visit is not appropriate, you will not be charged for this service.\"    Patient has given verbal consent for Video visit? Yes    How would you like to obtain your AVS? E-Mail (inform patient AVS not encrypted)    Patient would like the video invitation sent by: Text to cell phone: [phone]     Subjective     Rowan Leiva is a 60 year old female who presents to clinic today for the following health issues:    HPI     Video Start Time: 10:12 AM    Patient had sudden onset of left sided low back pain.  Patient also had some urine symptoms.  Never had history of kidney stone.  Labs showed mildly elevated kidney function.  Urine showed blood in it.  CAT scan showed 2 mm kidney stones.  Advised her to follow-up with PCP.  Patient has not been taking any pain medications.  2 days ago patient had severe pain in the left lower back which radiated to the groin and patient thought she passed a stone.  But still has significant pain with any activity.  Also urine is still foul-smelling with a burning sensation.  Patient also noticed blood in the urine.  Denies fever, chills.  Denies nausea, vomiting. "  Patient is a known diabetic and her blood sugar was elevated in the lab.  Patient takes insulin and glipizide and has been following with endocrinologist.    Patient Active Problem List   Diagnosis     Malignant neoplasm of thyroid gland (H)     Personal history of malignant neoplasm of ovary     Diabetes mellitus, type 2 (H)     Tobacco use disorder     Mixed hyperlipidemia     Mild intermittent asthma     Thrombocytopenia (H)     Advanced directives, counseling/discussion     Hyperlipidemia     Coronary artery disease     Myocardial infarction (H)     Hypertension     Hypothyroidism     Type 2 diabetes mellitus with hyperglycemia, with long-term current use of insulin (H)     Past Surgical History:   Procedure Laterality Date     BLADDER SURGERY       C ANESTH, SECTION       C LIGATE FALLOPIAN TUBE      Tubal Ligation     C TOTAL ABDOM HYSTERECTOMY      ovarian cancer (low grade) - Heme/Onc     CHOLECYSTECTOMY       CYSTOSCOPY  2014    Procedure: CYSTOSCOPY;  Surgeon: Sabino Jamil MD;  Location: [facility] THYROIDECTOMY  2000    papillary thyroid carcinoma - Endocrine     HEART CATH, ANGIOPLASTY  11    2.5 X 18 mm & 2.25 X 18 mm Xience ELLIOT to Mid LAD     HEART CATH, ANGIOPLASTY  11    Staged-3.0 X 23 mm Xience ELLIOT to Mid RAC     SLING TRANSVAGINAL N/A 2014    Procedure: SLING TRANSVAGINAL;  Surgeon: Sabino Jamil MD;  [facility]       Social History     Tobacco Use     Smoking status: Current Every Day Smoker     Packs/day: 2.00     Years: 24.00     Pack years: 48.00     Types: Cigarettes     Smokeless tobacco: Never Used     Tobacco comment: Strongly recommended quitting at each visit.   Substance Use Topics     Alcohol use: No     Alcohol/week: 0.0 standard drinks     Family History   Problem Relation Age of Onset     Blood Disease Sister         Hepatitis B-alive     Cancer Maternal Aunt         bronchial tubes, neck-     Cancer Maternal Uncle          glands in neck-     Cancer Maternal Grandfather         lungs-     Diabetes Father              Heart Disease Father         2 heartattacks-     Diabetes Sister              Diabetes Other         -cousins     Diabetes Paternal Grandmother              Diabetes Paternal Grandfather              Diabetes Paternal Aunt              Hypertension Sister         alive     Thyroid Disease Sister         both sisters-alive     Thyroid Disease Other         niece-alive         Current Outpatient Medications   Medication Sig Dispense Refill     ALBUTEROL 90 MCG/ACT IN AERS 1-2 puffs Q 2-4 hrs prn with spacer - HFA 1 1     glipiZIDE (GLUCOTROL) 10 MG tablet Take 10 mg by mouth daily.       HYDROcodone-acetaminophen (NORCO) 5-325 MG tablet Take 1 tablet by mouth every 8 hours as needed for severe pain 15 tablet 0     insulin glargine (LANTUS SOLOSTAR) 100 UNIT/ML injection Inject 42 Units Subcutaneous daily        LANCETS REGULAR MISC use twice a day for blood sugar 2 boxes 0     levothyroxine (SYNTHROID, LEVOTHROID) 50 MCG tablet Take 150 mcg by mouth every morning (before breakfast) Total dose 162 mcg        nitroFURantoin macrocrystal-monohydrate (MACROBID) 100 MG capsule Take 1 capsule (100 mg) by mouth 2 times daily for 7 days 14 capsule 0     aspirin 81 MG tablet Take 81 mg by mouth daily       atorvastatin (LIPITOR) 20 MG tablet Take 20 mg by mouth daily       lisinopril (ZESTRIL) 20 MG tablet Take 20 mg by mouth daily       Allergies   Allergen Reactions     No Known Drug Allergies        Reviewed and updated as needed this visit by Provider       Review of Systems   Constitutional: Negative for chills and fever.   HENT: Negative for congestion.    Eyes: Negative for visual disturbance.   Respiratory: Negative for cough.    Gastrointestinal: Positive for abdominal pain. Negative for nausea.   Endocrine: Negative for cold intolerance.  "  Genitourinary: Positive for dysuria and hematuria.   Musculoskeletal: Negative for arthralgias.   Skin: Negative for rash.   Neurological: Negative for dizziness and light-headedness.   Psychiatric/Behavioral: Negative for sleep disturbance.          Objective    LMP 05/01/2000   Estimated body mass index is 29.01 kg/m  as calculated from the following:    Height as of 4/4/20: 1.626 m (5' 4\").    Weight as of 4/4/20: 76.7 kg (169 lb).  Physical Exam  Constitutional:       Appearance: She is obese.   Abdominal:      Comments: On inspection, patient pointed left lower back as the area of significant discomfort.  No obvious swelling or redness or fullness noted in the back.  Patient did not have pain on touching the left side of the lower back.   Neurological:      General: No focal deficit present.      Mental Status: She is alert.   Psychiatric:         Mood and Affect: Mood normal.        Assessment & Plan     Rowan was seen today for establish care.    Diagnoses and all orders for this visit:    Hospital discharge follow-up    Kidney stone  -     HYDROcodone-acetaminophen (NORCO) 5-325 MG tablet; Take 1 tablet by mouth every 8 hours as needed for severe pain  -     nitroFURantoin macrocrystal-monohydrate (MACROBID) 100 MG capsule; Take 1 capsule (100 mg) by mouth 2 times daily for 7 days    HERNESTO (acute kidney injury) (H)  -     HYDROcodone-acetaminophen (NORCO) 5-325 MG tablet; Take 1 tablet by mouth every 8 hours as needed for severe pain  -     nitroFURantoin macrocrystal-monohydrate (MACROBID) 100 MG capsule; Take 1 capsule (100 mg) by mouth 2 times daily for 7 days    Foul smelling urine  -     nitroFURantoin macrocrystal-monohydrate (MACROBID) 100 MG capsule; Take 1 capsule (100 mg) by mouth 2 times daily for 7 days    Dysuria  -     nitroFURantoin macrocrystal-monohydrate (MACROBID) 100 MG capsule; Take 1 capsule (100 mg) by mouth 2 times daily for 7 days    Type 2 diabetes mellitus with hyperglycemia, with " long-term current use of insulin (H)    Will give hydrocodone for pain and Macrobid to cover urine infection.  Advised the patient to continue to monitor her blood sugar and discuss it with her endocrinologist.    FUTURE APPOINTMENTS:       - Follow-up visit in 2 months    Lorraine Woodward MD  [facility]      Video-Visit Details    Type of service:  Video Visit    Video End Time (time video stopped): 10.24AM    Originating Location (pt. Location): Home    Distant Location (provider location):  [facility]     Mode of Communication:  Video Conference via [employer]    Lorraine Woodward MD

## 2020-04-10 NOTE — PATIENT INSTRUCTIONS
Patient Education     Kidney Stone (Urine)  Does this test have other names?  Urine stone risk profile, 24-hour collection  What is this test?  This test checks your urine for chemicals that might cause your body to form kidney stones. The test also looks for blood in your urine, which can be a symptom of kidney stones.  Kidney stones are hard masses of minerals and salts that can form in your kidneys. They can be as small as a grain of sand or more than an inch in diameter. Usually theses stones or crystals pass through your body when you urinate. But sometimes they can get stuck in your urinary tract and cause a lot of pain.  Why do I need this test?  You may need this test if your healthcare provider suspects that you have kidney stones. Symptoms of kidney stones include:    Pain in your lower belly or side    Nausea and vomiting    Sudden, strong urge to urinate    Pain when urinating    Blood in your urine  You may also have this test if you had a kidney stone or you are being treated for kidney stones. If you have had a kidney stone or any treatments for a kidney stone, you should wait 1 to 2 months, or until you have completely recovered, before having this test.  You will need to repeat the test at least twice so your healthcare provider can compare the results.  What other tests might I have along with this test?  Your healthcare provider may also order imaging tests. These include an ultrasound, CT scan and, a special type of X-ray (pyelogram) that uses a dye to look for kidney stones.  Your provider is also likely to order blood tests, to look for calcium, phosphate, uric acid, oxalate, and citrate. These are some of the chemicals that are most likely to cause your body to form kidney stones.  What do my test results mean?  Test results may vary depending on your age, gender, health history, the method used for the test, and other things. Your test results may not mean you have a problem. Ask your  healthcare provider what your test results mean for you.   The results will show whether your urine has high or low levels of the chemicals that are most likely to cause stones to form. These chemicals are calcium, phosphate, uric acid, oxalate, and citrate.  If your levels are not normal, it may mean that you have a kidney stone or stones.  Abnormal levels may also mean that you have another kidney disorder, such as a urinary tract infection.  How is this test done?  This test is done with a 24-hour urine sample. For this sample, you must collect all of your urine for 24 hours. Empty your bladder completely first in the morning without collecting it. Note the time. Then collect your urine every time you go to the bathroom over the next 24 hours.  Does this test pose any risks?  This test does not pose any known risks.  What might affect my test results?  Having this test too soon after treatment for a previous kidney stone can affect your results. You should wait several months after treatment before having this test.  How do I get ready for this test?  You don't need to prepare for this test. Be sure your healthcare provider knows about all medicines, herbs, vitamins, and supplements you are taking. This includes medicines that don't need a prescription and any illicit drugs you may use.     3392-2312 [employer]. [address] [place] [address], [place] [address]. All rights reserved. This information is not intended as a substitute for professional medical care. Always follow your healthcare professional's instructions.

## 2020-04-10 NOTE — PROGRESS NOTES
".  Subjective     Rowan Leiva is a 60 year old female who is being evaluated via a billable telephone visit.      The patient has been notified of following:     \"This telephone visit will be conducted via a call between you and your physician/provider. We have found that certain health care needs can be provided without the need for a physical exam.  This service lets us provide the care you need with a short phone conversation.  If a prescription is necessary we can send it directly to your pharmacy.  If lab work is needed we can place an order for that and you can then stop by our lab to have the test done at a later time.    Telephone visits are billed at different rates depending on your insurance coverage. During this emergency period, for some insurers they may be billed the same as an in-person visit.  Please reach out to your insurance provider with any questions.    If during the course of the call the physician/provider feels a telephone visit is not appropriate, you will not be charged for this service.\"    Patient has given verbal consent for Telephone visit?  Yes    How would you like to obtain your AVS? E-Mail (inform patient AVS not encrypted)    Rowan Leiva complains of   Chief Complaint   Patient presents with     Establish Care     pt passed a kidney stone with blood in her urine and pain and pt thinks she passed the other 2 stones        ALLERGIES  No known drug allergies    {SUPERLIST (Optional):083396}  {PEDS Chronic and Acute Problems (Optional):269427}     {additonal problems for provider to add (Optional):981942}    {HIST REVIEW/ LINKS 2 (Optional):823642}    Reviewed and updated as needed this visit by Provider         Review of Systems   {ROS COMP (Optional):549528}       Objective   Reported vitals:  LMP 05/01/2000    {GENERAL APPEARANCE:50::\"healthy\",\"alert\",\"no distress\"}  Psych: Alert and oriented times 3; coherent speech, normal   rate and volume, able to articulate logical " "thoughts, able   to abstract reason, no tangential thoughts, no hallucinations   or delusions  Her affect is ***     {Diagnostic Test Results (Optional):705593::\"Diagnostic Test Results:\",\"Labs reviewed in Epic\"}        Assessment/Plan:  {Diagnosis, Associated Orders and Comment:229832}    No follow-ups on file.      Phone call duration:  *** minutes    {signature options:848888}      "

## 2020-06-09 ENCOUNTER — VIRTUAL VISIT (OUTPATIENT)
Dept: INTERNAL MEDICINE | Facility: CLINIC | Age: 60
End: 2020-06-09
Payer: COMMERCIAL

## 2020-06-09 DIAGNOSIS — K59.00 CONSTIPATION, UNSPECIFIED CONSTIPATION TYPE: ICD-10-CM

## 2020-06-09 DIAGNOSIS — N20.0 RIGHT KIDNEY STONE: Primary | ICD-10-CM

## 2020-06-09 PROCEDURE — 99214 OFFICE O/P EST MOD 30 MIN: CPT | Mod: GT | Performed by: INTERNAL MEDICINE

## 2020-06-09 ASSESSMENT — ENCOUNTER SYMPTOMS
DYSURIA: 0
DIARRHEA: 0
HEMATOCHEZIA: 0
DIFFICULTY URINATING: 0
HEMATURIA: 0
FREQUENCY: 1
CONSTIPATION: 1

## 2020-06-09 NOTE — PROGRESS NOTES
".Rowan Leiva is a 60 year old female who is being evaluated via a billable video visit.      The patient has been notified of following:     \"This video visit will be conducted via a call between you and your physician/provider. We have found that certain health care needs can be provided without the need for an in-person physical exam.  This service lets us provide the care you need with a video conversation.  If a prescription is necessary we can send it directly to your pharmacy.  If lab work is needed we can place an order for that and you can then stop by our lab to have the test done at a later time.    Video visits are billed at different rates depending on your insurance coverage.  Please reach out to your insurance provider with any questions.    If during the course of the call the physician/provider feels a video visit is not appropriate, you will not be charged for this service.\"    Patient has given verbal consent for Video visit? Yes    How would you like to obtain your AVS? Mail a copy    Patient would like the video invitation sent by: Text to cell phone: [phone]    Will anyone else be joining your video visit? No     Subjective     Rowan Leiva is a 60 year old female who presents today via video visit for the following health issues:    Hospitals in [place]    Video Start Time: 9:12 AM    Recent ER visit for kidney stones in 4/4/2020.   Since then has ongoing pain, difficulty sleeping on the side. Less severity but constant pain. Bladder is sore. Felt like she passed one stone but still has some good and bad days.  2mm kidney stone noted in right side in CT scan on 4/4/2020.  Constipation makes the symptoms worse. Takes milk of magnesia. Any bending movement makes it feel like something fall off the place in her tummy.  Denies fever, chills.    Patient Active Problem List   Diagnosis     Malignant neoplasm of thyroid gland (H)     Personal history of malignant neoplasm of ovary     Diabetes mellitus, " type 2 (H)     Tobacco use disorder     Mixed hyperlipidemia     Mild intermittent asthma     Thrombocytopenia (H)     Advanced directives, counseling/discussion     Hyperlipidemia     Coronary artery disease     Myocardial infarction (H)     Hypertension     Hypothyroidism     Type 2 diabetes mellitus with hyperglycemia, with long-term current use of insulin (H)     Past Surgical History:   Procedure Laterality Date     BLADDER SURGERY       C ANESTH, SECTION       C LIGATE FALLOPIAN TUBE      Tubal Ligation     C TOTAL ABDOM HYSTERECTOMY      ovarian cancer (low grade) - Heme/Onc     CHOLECYSTECTOMY       CYSTOSCOPY  2014    Procedure: CYSTOSCOPY;  Surgeon: Sabino Jamil MD;  Location: [facility] THYROIDECTOMY  2000    papillary thyroid carcinoma - Endocrine     HEART CATH, ANGIOPLASTY  11    2.5 X 18 mm & 2.25 X 18 mm Xience ELLIOT to Mid LAD     HEART CATH, ANGIOPLASTY  11    Staged-3.0 X 23 mm Xience ELLIOT to Mid RAC     SLING TRANSVAGINAL N/A 2014    Procedure: SLING TRANSVAGINAL;  Surgeon: Sabino Jamil MD;  Location: [facility]       Social History     Tobacco Use     Smoking status: Current Every Day Smoker     Packs/day: 2.00     Years: 24.00     Pack years: 48.00     Types: Cigarettes     Smokeless tobacco: Never Used     Tobacco comment: Strongly recommended quitting at each visit.   Substance Use Topics     Alcohol use: No     Alcohol/week: 0.0 standard drinks     Family History   Problem Relation Age of Onset     Blood Disease Sister         Hepatitis B-alive     Cancer Maternal Aunt         bronchial tubes, neck-     Cancer Maternal Uncle         glands in neck-     Cancer Maternal Grandfather         lungs-     Diabetes Father              Heart Disease Father         2 heartattacks-     Diabetes Sister              Diabetes Other         -cousins     Diabetes Paternal Grandmother               "Diabetes Paternal Grandfather              Diabetes Paternal Aunt              Hypertension Sister         alive     Thyroid Disease Sister         both sisters-alive     Thyroid Disease Other         niece-alive         Current Outpatient Medications   Medication Sig Dispense Refill     ALBUTEROL 90 MCG/ACT IN AERS 1-2 puffs Q 2-4 hrs prn with spacer - HFA 1 1     atorvastatin (LIPITOR) 20 MG tablet Take 20 mg by mouth daily       glipiZIDE (GLUCOTROL) 10 MG tablet Take 10 mg by mouth daily.       HYDROcodone-acetaminophen (NORCO) 5-325 MG tablet Take 1 tablet by mouth every 8 hours as needed for severe pain 15 tablet 0     insulin glargine (LANTUS SOLOSTAR) 100 UNIT/ML injection Inject 42 Units Subcutaneous daily        LANCETS REGULAR MISC use twice a day for blood sugar (Patient taking differently: 3 times daily ) 2 boxes 0     levothyroxine (SYNTHROID, LEVOTHROID) 50 MCG tablet Take 150 mcg by mouth every morning (before breakfast) Total dose 162 mcg        lisinopril (ZESTRIL) 20 MG tablet Take 20 mg by mouth daily       Allergies   Allergen Reactions     No Known Drug Allergies        Reviewed and updated as needed this visit by Provider      Review of Systems   Gastrointestinal: Positive for constipation. Negative for diarrhea and hematochezia.   Genitourinary: Positive for frequency. Negative for difficulty urinating, dysuria and hematuria.          Objective    LMP 2000   Estimated body mass index is 29.01 kg/m  as calculated from the following:    Height as of 20: 1.626 m (5' 4\").    Weight as of 20: 76.7 kg (169 lb).  Physical Exam     \"GENERAL: Healthy, alert and no distress\",\"EYES: Eyes grossly normal to inspection.  No discharge or erythema, or obvious scleral/conjunctival abnormalities.\",\"RESP: No audible wheeze, cough, or visible cyanosis.  No visible retractions or increased work of breathing.  \",\"SKIN: Visible skin clear. No significant rash, abnormal pigmentation or " "lesions.\",\"NEURO: Cranial nerves grossly intact.  Mentation and speech appropriate for age.\",\"PSYCH: Mentation appears normal, affect normal/bright, judgement and insight intact, normal speech and appearance well-groomed.\"        Assessment & Plan     Rowan was seen today for kidney problem.    Diagnoses and all orders for this visit:    Right kidney stone  -     UROLOGY ADULT REFERRAL  -     **Basic metabolic panel FUTURE anytime; Future  -     UA with Microscopic reflex to Culture; Future  -     CBC with platelets; Future    Constipation, unspecified constipation type  -     UA with Microscopic reflex to Culture; Future  -     CBC with platelets; Future    Refer to urology.   Talked about constipation worsening symptoms. Advised to use MOM q3-4 days.  Will recheck labs.    Return in about 4 weeks (around 7/7/2020) for Physical Exam.    Lorraine Woodward MD  [facility]      Video-Visit Details    Type of service:  Video Visit    Video End Time:9:27 AM    Originating Location (pt. Location): Home    Distant Location (provider location):  HOME     Platform used for Video Visit: Doximity    Return in about 4 weeks (around 7/7/2020) for Physical Exam.       Lorraine Woodward MD      "

## 2020-06-09 NOTE — NURSING NOTE
"Chief Complaint   Patient presents with     Kidney Problem     pt still has pain everyday. pt unsure of what to do next.     initial LMP 05/01/2000  Estimated body mass index is 29.01 kg/m  as calculated from the following:    Height as of 4/4/20: 1.626 m (5' 4\").    Weight as of 4/4/20: 76.7 kg (169 lb)..  bp completed using cuff size NA (Not Taken)  ESA PALMER LPN  "

## 2020-06-10 ASSESSMENT — ASTHMA QUESTIONNAIRES: ACT_TOTALSCORE: 22

## 2021-05-30 ENCOUNTER — HOSPITAL ENCOUNTER (EMERGENCY)
Facility: CLINIC | Age: 61
Discharge: ANOTHER HOSPITAL | End: 2021-05-30
Attending: EMERGENCY MEDICINE | Admitting: EMERGENCY MEDICINE
Payer: COMMERCIAL

## 2021-05-30 ENCOUNTER — APPOINTMENT (OUTPATIENT)
Dept: GENERAL RADIOLOGY | Facility: CLINIC | Age: 61
End: 2021-05-30
Attending: EMERGENCY MEDICINE
Payer: COMMERCIAL

## 2021-05-30 ENCOUNTER — HOSPITAL ENCOUNTER (INPATIENT)
Facility: CLINIC | Age: 61
LOS: 5 days | Discharge: HOME | DRG: 246 | End: 2021-06-04
Attending: HOSPITALIST | Admitting: INTERNAL MEDICINE
Payer: COMMERCIAL

## 2021-05-30 VITALS
OXYGEN SATURATION: 98 % | BODY MASS INDEX: 34.4 KG/M2 | TEMPERATURE: 98.9 F | DIASTOLIC BLOOD PRESSURE: 92 MMHG | RESPIRATION RATE: 18 BRPM | WEIGHT: 200.4 LBS | SYSTOLIC BLOOD PRESSURE: 184 MMHG | HEART RATE: 85 BPM

## 2021-05-30 DIAGNOSIS — I21.4 NSTEMI (NON-ST ELEVATED MYOCARDIAL INFARCTION) (H): ICD-10-CM

## 2021-05-30 DIAGNOSIS — I82.469 ACUTE DEEP VEIN THROMBOSIS (DVT) OF CALF MUSCLE VEIN, UNSPECIFIED LATERALITY (H): ICD-10-CM

## 2021-05-30 DIAGNOSIS — E11.9 DIABETES MELLITUS, TYPE 2 (H): ICD-10-CM

## 2021-05-30 DIAGNOSIS — I21.4 NSTEMI (NON-ST ELEVATED MYOCARDIAL INFARCTION) (H): Primary | ICD-10-CM

## 2021-05-30 LAB
ANION GAP SERPL CALCULATED.3IONS-SCNC: 6 MMOL/L (ref 3–14)
BASOPHILS # BLD AUTO: 0.1 10E9/L (ref 0–0.2)
BASOPHILS NFR BLD AUTO: 0.6 %
BUN SERPL-MCNC: 31 MG/DL (ref 7–30)
CALCIUM SERPL-MCNC: 8.6 MG/DL (ref 8.5–10.1)
CHLORIDE SERPL-SCNC: 104 MMOL/L (ref 94–109)
CO2 SERPL-SCNC: 27 MMOL/L (ref 20–32)
CREAT SERPL-MCNC: 1.87 MG/DL (ref 0.52–1.04)
D DIMER PPP FEU-MCNC: 0.8 UG/ML FEU (ref 0–0.5)
DIFFERENTIAL METHOD BLD: ABNORMAL
EOSINOPHIL # BLD AUTO: 0.4 10E9/L (ref 0–0.7)
EOSINOPHIL NFR BLD AUTO: 2.8 %
ERYTHROCYTE [DISTWIDTH] IN BLOOD BY AUTOMATED COUNT: 14.5 % (ref 10–15)
GFR SERPL CREATININE-BSD FRML MDRD: 29 ML/MIN/{1.73_M2}
GLUCOSE SERPL-MCNC: 131 MG/DL (ref 70–99)
HCT VFR BLD AUTO: 37 % (ref 35–47)
HGB BLD-MCNC: 11.7 G/DL (ref 11.7–15.7)
IMM GRANULOCYTES # BLD: 0.1 10E9/L (ref 0–0.4)
IMM GRANULOCYTES NFR BLD: 0.7 %
LABORATORY COMMENT REPORT: NORMAL
LYMPHOCYTES # BLD AUTO: 2 10E9/L (ref 0.8–5.3)
LYMPHOCYTES NFR BLD AUTO: 14.6 %
MCH RBC QN AUTO: 25.1 PG (ref 26.5–33)
MCHC RBC AUTO-ENTMCNC: 31.6 G/DL (ref 31.5–36.5)
MCV RBC AUTO: 79 FL (ref 78–100)
MONOCYTES # BLD AUTO: 0.9 10E9/L (ref 0–1.3)
MONOCYTES NFR BLD AUTO: 6.6 %
NEUTROPHILS # BLD AUTO: 10.1 10E9/L (ref 1.6–8.3)
NEUTROPHILS NFR BLD AUTO: 74.7 %
NRBC # BLD AUTO: 0 10*3/UL
NRBC BLD AUTO-RTO: 0 /100
PLATELET # BLD AUTO: 90 10E9/L (ref 150–450)
POTASSIUM SERPL-SCNC: 3.4 MMOL/L (ref 3.4–5.3)
RBC # BLD AUTO: 4.66 10E12/L (ref 3.8–5.2)
SARS-COV-2 RNA RESP QL NAA+PROBE: NEGATIVE
SODIUM SERPL-SCNC: 137 MMOL/L (ref 133–144)
SPECIMEN SOURCE: NORMAL
TROPONIN I SERPL-MCNC: 10.98 UG/L (ref 0–0.04)
TROPONIN I SERPL-MCNC: 13.87 UG/L (ref 0–0.04)
WBC # BLD AUTO: 13.5 10E9/L (ref 4–11)

## 2021-05-30 PROCEDURE — 85379 FIBRIN DEGRADATION QUANT: CPT | Performed by: EMERGENCY MEDICINE

## 2021-05-30 PROCEDURE — 96366 THER/PROPH/DIAG IV INF ADDON: CPT

## 2021-05-30 PROCEDURE — 250N000011 HC RX IP 250 OP 636: Performed by: INTERNAL MEDICINE

## 2021-05-30 PROCEDURE — 96376 TX/PRO/DX INJ SAME DRUG ADON: CPT

## 2021-05-30 PROCEDURE — 99223 1ST HOSP IP/OBS HIGH 75: CPT | Mod: AI | Performed by: INTERNAL MEDICINE

## 2021-05-30 PROCEDURE — 250N000011 HC RX IP 250 OP 636: Performed by: EMERGENCY MEDICINE

## 2021-05-30 PROCEDURE — 96365 THER/PROPH/DIAG IV INF INIT: CPT

## 2021-05-30 PROCEDURE — 250N000013 HC RX MED GY IP 250 OP 250 PS 637: Performed by: EMERGENCY MEDICINE

## 2021-05-30 PROCEDURE — 36415 COLL VENOUS BLD VENIPUNCTURE: CPT | Performed by: INTERNAL MEDICINE

## 2021-05-30 PROCEDURE — 85025 COMPLETE CBC W/AUTO DIFF WBC: CPT | Performed by: EMERGENCY MEDICINE

## 2021-05-30 PROCEDURE — 99285 EMERGENCY DEPT VISIT HI MDM: CPT | Mod: 25

## 2021-05-30 PROCEDURE — 84484 ASSAY OF TROPONIN QUANT: CPT | Performed by: INTERNAL MEDICINE

## 2021-05-30 PROCEDURE — 71046 X-RAY EXAM CHEST 2 VIEWS: CPT

## 2021-05-30 PROCEDURE — 93005 ELECTROCARDIOGRAM TRACING: CPT

## 2021-05-30 PROCEDURE — 87635 SARS-COV-2 COVID-19 AMP PRB: CPT | Performed by: EMERGENCY MEDICINE

## 2021-05-30 PROCEDURE — 84484 ASSAY OF TROPONIN QUANT: CPT | Performed by: EMERGENCY MEDICINE

## 2021-05-30 PROCEDURE — 80048 BASIC METABOLIC PNL TOTAL CA: CPT | Performed by: EMERGENCY MEDICINE

## 2021-05-30 PROCEDURE — C9803 HOPD COVID-19 SPEC COLLECT: HCPCS

## 2021-05-30 PROCEDURE — 250N000013 HC RX MED GY IP 250 OP 250 PS 637: Performed by: INTERNAL MEDICINE

## 2021-05-30 PROCEDURE — 81001 URINALYSIS AUTO W/SCOPE: CPT | Performed by: INTERNAL MEDICINE

## 2021-05-30 PROCEDURE — 93005 ELECTROCARDIOGRAM TRACING: CPT | Mod: 76

## 2021-05-30 PROCEDURE — 210N000002 HC R&B HEART CARE

## 2021-05-30 RX ORDER — NALOXONE HYDROCHLORIDE 0.4 MG/ML
0.4 INJECTION, SOLUTION INTRAMUSCULAR; INTRAVENOUS; SUBCUTANEOUS
Status: DISCONTINUED | OUTPATIENT
Start: 2021-05-30 | End: 2021-06-04 | Stop reason: HOSPADM

## 2021-05-30 RX ORDER — BISACODYL 10 MG
10 SUPPOSITORY, RECTAL RECTAL DAILY PRN
Status: DISCONTINUED | OUTPATIENT
Start: 2021-05-30 | End: 2021-06-04 | Stop reason: HOSPADM

## 2021-05-30 RX ORDER — AMOXICILLIN 250 MG
2 CAPSULE ORAL 2 TIMES DAILY PRN
Status: DISCONTINUED | OUTPATIENT
Start: 2021-05-30 | End: 2021-06-04 | Stop reason: HOSPADM

## 2021-05-30 RX ORDER — OXYCODONE HYDROCHLORIDE 5 MG/1
5-10 TABLET ORAL
Status: DISCONTINUED | OUTPATIENT
Start: 2021-05-30 | End: 2021-06-04 | Stop reason: HOSPADM

## 2021-05-30 RX ORDER — HEPARIN SODIUM 10000 [USP'U]/100ML
0-5000 INJECTION, SOLUTION INTRAVENOUS CONTINUOUS
Status: DISCONTINUED | OUTPATIENT
Start: 2021-05-30 | End: 2021-05-31

## 2021-05-30 RX ORDER — ATORVASTATIN CALCIUM 40 MG/1
40 TABLET, FILM COATED ORAL DAILY
Status: DISCONTINUED | OUTPATIENT
Start: 2021-05-31 | End: 2021-06-04 | Stop reason: HOSPADM

## 2021-05-30 RX ORDER — AMOXICILLIN 250 MG
1 CAPSULE ORAL 2 TIMES DAILY PRN
Status: DISCONTINUED | OUTPATIENT
Start: 2021-05-30 | End: 2021-06-04 | Stop reason: HOSPADM

## 2021-05-30 RX ORDER — ASPIRIN 81 MG/1
81 TABLET ORAL DAILY
Status: DISCONTINUED | OUTPATIENT
Start: 2021-05-31 | End: 2021-06-02

## 2021-05-30 RX ORDER — ONDANSETRON 2 MG/ML
4 INJECTION INTRAMUSCULAR; INTRAVENOUS EVERY 6 HOURS PRN
Status: DISCONTINUED | OUTPATIENT
Start: 2021-05-30 | End: 2021-06-04 | Stop reason: HOSPADM

## 2021-05-30 RX ORDER — POLYETHYLENE GLYCOL 3350 17 G/17G
17 POWDER, FOR SOLUTION ORAL DAILY PRN
Status: DISCONTINUED | OUTPATIENT
Start: 2021-05-30 | End: 2021-06-04 | Stop reason: HOSPADM

## 2021-05-30 RX ORDER — LIDOCAINE 40 MG/G
CREAM TOPICAL
Status: DISCONTINUED | OUTPATIENT
Start: 2021-05-30 | End: 2021-06-04 | Stop reason: HOSPADM

## 2021-05-30 RX ORDER — ONDANSETRON 4 MG/1
4 TABLET, ORALLY DISINTEGRATING ORAL EVERY 6 HOURS PRN
Status: DISCONTINUED | OUTPATIENT
Start: 2021-05-30 | End: 2021-06-04 | Stop reason: HOSPADM

## 2021-05-30 RX ORDER — PROCHLORPERAZINE 25 MG
25 SUPPOSITORY, RECTAL RECTAL EVERY 12 HOURS PRN
Status: DISCONTINUED | OUTPATIENT
Start: 2021-05-30 | End: 2021-06-04 | Stop reason: HOSPADM

## 2021-05-30 RX ORDER — INSULIN LISPRO 100 [IU]/ML
5 INJECTION, SOLUTION INTRAVENOUS; SUBCUTANEOUS
Status: ON HOLD | COMMUNITY
End: 2021-06-06

## 2021-05-30 RX ORDER — HYDROMORPHONE HYDROCHLORIDE 1 MG/ML
.3-.5 INJECTION, SOLUTION INTRAMUSCULAR; INTRAVENOUS; SUBCUTANEOUS
Status: DISCONTINUED | OUTPATIENT
Start: 2021-05-30 | End: 2021-06-04 | Stop reason: HOSPADM

## 2021-05-30 RX ORDER — NALOXONE HYDROCHLORIDE 0.4 MG/ML
0.2 INJECTION, SOLUTION INTRAMUSCULAR; INTRAVENOUS; SUBCUTANEOUS
Status: DISCONTINUED | OUTPATIENT
Start: 2021-05-30 | End: 2021-06-04 | Stop reason: HOSPADM

## 2021-05-30 RX ORDER — CARVEDILOL 6.25 MG/1
6.25 TABLET ORAL 2 TIMES DAILY
Status: DISCONTINUED | OUTPATIENT
Start: 2021-05-30 | End: 2021-06-04

## 2021-05-30 RX ORDER — ACETAMINOPHEN 325 MG/1
650 TABLET ORAL EVERY 4 HOURS PRN
Status: DISCONTINUED | OUTPATIENT
Start: 2021-05-30 | End: 2021-05-31

## 2021-05-30 RX ORDER — HEPARIN SODIUM 10000 [USP'U]/100ML
0-5000 INJECTION, SOLUTION INTRAVENOUS CONTINUOUS
Status: DISCONTINUED | OUTPATIENT
Start: 2021-05-30 | End: 2021-05-30 | Stop reason: HOSPADM

## 2021-05-30 RX ORDER — ASPIRIN 81 MG/1
162 TABLET, CHEWABLE ORAL ONCE
Status: COMPLETED | OUTPATIENT
Start: 2021-05-30 | End: 2021-05-30

## 2021-05-30 RX ORDER — PROCHLORPERAZINE MALEATE 5 MG
10 TABLET ORAL EVERY 6 HOURS PRN
Status: DISCONTINUED | OUTPATIENT
Start: 2021-05-30 | End: 2021-06-04 | Stop reason: HOSPADM

## 2021-05-30 RX ORDER — INSULIN LISPRO 100 [IU]/ML
18 INJECTION, SOLUTION INTRAVENOUS; SUBCUTANEOUS
Status: ON HOLD | COMMUNITY
End: 2021-06-06

## 2021-05-30 RX ADMIN — ASPIRIN 81 MG CHEWABLE TABLET 162 MG: 81 TABLET CHEWABLE at 19:22

## 2021-05-30 RX ADMIN — HEPARIN SODIUM 1100 UNITS/HR: 10000 INJECTION, SOLUTION INTRAVENOUS at 19:35

## 2021-05-30 RX ADMIN — HEPARIN SODIUM 1100 UNITS/HR: 10000 INJECTION, SOLUTION INTRAVENOUS at 22:56

## 2021-05-30 RX ADMIN — CARVEDILOL 6.25 MG: 6.25 TABLET, FILM COATED ORAL at 23:24

## 2021-05-30 ASSESSMENT — ENCOUNTER SYMPTOMS
VOMITING: 1
COUGH: 1
FEVER: 0
SHORTNESS OF BREATH: 1

## 2021-05-30 NOTE — Clinical Note
The first balloon was inserted into the left anterior descending and distal left anterior descending.Max pressure = 14 marlys. Total duration = 20 seconds.     Max pressure = 18 marlys. Total duration = 15 seconds.    Balloon reinflated a second time: Max pressure = 18 marlys. Total duration = 15 seconds.

## 2021-05-30 NOTE — ED TRIAGE NOTES
Patient presents with complaints of cough and SOB for the past week which has been intermittent. Patient has now developed chest pain and jaw pain which started last night, stopped at 0800 this morning and has been intermittent since. Patient states she is a smoker and history of MI.  ABC intact without need for intervention at this time.

## 2021-05-30 NOTE — Clinical Note
The first balloon was inserted into the left anterior descending.Max pressure = 12 marlys. Total duration = 12 seconds.     Max pressure = 12 marlys. Total duration = 10 seconds.    Balloon reinflated a second time: Max pressure = 12 marlys. Total duration = 10 seconds.

## 2021-05-30 NOTE — Clinical Note
Stent deployed in the distal left anterior descending. Max pressure = 14 marlys. Total duration = 20 seconds.

## 2021-05-30 NOTE — ED PROVIDER NOTES
History   Chief Complaint:  Chest Pain     HPI   Rowan Leiva is a 61 year old female with history of a MI, hypertension, CAD, thyroid cancer, a pulmonary nodule, and asthma who presents with chest pain. A week ago the patient developed lung pain, shortness of breath and a cough that produces bloody phlegm. This lung pain becomes worse when she coughs, and she will occasionally experience emesis during a coughing episode. Two nights ago, she began to experience upper jaw pain with intermittent chest pain that feels like a spasm. She states she has this jaw pain when she is laying down, and it goes away when she sits up. Last night, she was unable to sleep due to this jaw pain and chest pain. Today, she experienced 15 minutes of constant chest pain following a coughing episode. She last experienced chest pain and shortness of breath today at 1630 and took two baby Aspirin. She denies any fever. Of note, she had an MI a few years ago but states this jaw pain feels different. She does smoke, and has had her COVID-19 vaccine.    Review of Systems   Constitutional: Negative for fever.   HENT:        Jaw pain   Respiratory: Positive for cough (with bloody phlegm) and shortness of breath.    Cardiovascular: Positive for chest pain.   Gastrointestinal: Positive for vomiting.   All other systems reviewed and are negative.    Allergies:  The patient has no known allergies.     Medications:  Albuterol  Lipitor  Glucotrol  Insulin glargine  Levothyroxine  Zestril    Past Medical History:    Thyroid cancer  CAD  Ovarian malignancy  Hyperlipidemia  Hypertension  Hypothyroidism  Malignant neoplasm of thyroid gland  Mild intermittent asthma  MI  Pulmonary nodule  Thrombocytopenia  Tobacco use disorder  Type 2 diabetes  Asthma    Past Surgical History:    Bladder surgery  C section  Tubal ligation  Total abdominal hysterectomy  Cholecystectomy  Cystoscopy  Thyroidectomy  Heart cath, angioplasty  Sling transvaginal    Family  History:    Father: Diabetes, Heart disease, Hyperlipidemia   Sister: Diabetes, Hypertension, Thyroid disease, Hepatitis B, Hyperlipidemia  Brother: Diabetes, Hyperlipidemia    Social History:  The patient was accompanied to the ED by her .  The patient does smoke.    Physical Exam     Patient Vitals for the past 24 hrs:   BP Temp Pulse Resp SpO2 Weight   05/30/21 1918 -- -- -- -- -- 90.9 kg (200 lb 6.4 oz)   05/30/21 1809 (!) 191/96 98.9  F (37.2  C) 98 18 100 % --       Physical Exam  General: Patient is alert and interactive when I enter the room  Head:  The scalp, face, and head appear normal  Eyes:  Conjunctivae are normal  ENT:    The nose is normal    Pinnae are normal    External acoustic canals are normal  Neck:  Trachea midline  CV:  Pulses are normal, RRR  Resp:  No respiratory distress, CTAB   Abdomen:      Soft, non-tender, non-distended  Musc:  Normal muscular tone    No major joint effusions    No asymmetric leg swelling  Skin:  No rash or lesions noted  Neuro:  Speech is normal and fluent. Face is symmetric.     Moving all extremities well.   Psych: Awake. Alert.  Normal affect.  Appropriate interactions.      Emergency Department Course   ECG:  ECG taken at 1818, ECG read at 1829  Normal sinus rhythm  Anteroseptal infarct, age undetermined  Abnormal ECG  Rate 96 bpm. OR interval 136 ms. QRS duration 78 ms. QT/QTc 372/469 ms. P-R-T axes 62 39 87.    ECG #2:  ECG taken at 1908  Sinus rhythm  Anteroseptal infarct   T wave abnormality, consider lateral ischemia  Abnormal ECG  Rate 88 bpm. OR interval 150 ms. QRS duration 70 ms. QT/QTc 410/496 ms. P-R-T axes 54 15 30.    Imaging:  XR Chest 2 Views  Heart size and pulmonary vascularity normal. The lungs are clear. Degenerative changes thoracic spine.  Reading per radiology    Laboratory:  CBC: WBC 13.5 (H), HGB 11.7, PLT 90 (L)  BMP: Glucose 131 (H), BUN 31 (H), GFR 29 (L) o/w WNL (Creatinine 1.87 (H))    Troponin (Collected 1820): 10.981 (HH)    D  Dimer (Collected 1820): 0.8 (H)    Symptomatic COVID-19 Virus (Coronavirus) by PCR Nasopharyngeal swab: Negative    Emergency Department Course:  Reviewed:  I reviewed nursing notes, vitals, past medical history and care everywhere    Assessments:  1814 I obtained history and examined the patient as noted above.     1956 I rechecked and updated the patient regarding the plan for care.    Consults:   1927 I spoke with Dr. Landeros from cardiology regarding the patient.    1941 I spoke with Dr. Carlin regarding placement for the patient.    Interventions:  1922 Aspirin 162mg PO  1934 Heparin 5,450Units IV  1935 Heparin 1,100Units/hr IV    Disposition:  The patient was transferred to [facility] via ambulance. Dr. Carlin accepted the patient for transfer.     Impression & Plan   Covid-19  Rowan Leiva was evaluated during a global COVID-19 pandemic, which necessitated consideration that the patient might be at risk for infection with the SARS-CoV-2 virus that causes COVID-19.   Applicable protocols for evaluation were followed during the patient's care.   COVID-19 was considered as part of the patient's evaluation. The plan for testing is:  a test was obtained during this visit.    Medical Decision Making:  Rowan Leiav is a 61 year old female with a history of DM, HLD and CAD s/p PCI who presents jaw pain and chest pain.  Patient is fairly asymptomatic currently.  Her EKG reveals Q waves in the anterior leads and very mild ST elevation however nothing diagnostic for STEMI.  She has a history of coronary disease status post PCI and had jaw pain with her prior MI in 2011.  Unfortunately her troponin was quite elevated at 10.  I suspect she had a cardiac event last night.  She does have low platelets at 90 however she has a history of ITP and this is improved from when she was hospitalized for ITP.  At this point I think the risk-benefit dictates starting her heparin drip.  We did complete her full dose  aspirin.  D-dimer was mildly elevated.  Chest x-ray was unremarkable.  Discussed case cardiology and recommended transfer to [facility] in case she needs a emergent cath however likely will need a cath in the following days as she likely had a cardiac event last night.  We will have them do a VQ scan over there as she had an elevated creatinine and bloody sputum.  Patient transfer to [facility] in stable condition.    Diagnosis:    ICD-10-CM    1. NSTEMI (non-ST elevated myocardial infarction) (H)  I21.4 Symptomatic SARS-CoV-2 COVID-19 Virus (Coronavirus) by PCR     D dimer quantitative       Scribe Disclosure:  Naga MATTHEWS, am serving as a scribe at 6:14 PM on 5/30/2021 to document services personally performed by Lisa Meza MD based on my observations and the provider's statements to me.      Lisa Meza MD  05/30/21 4418

## 2021-05-31 ENCOUNTER — APPOINTMENT (OUTPATIENT)
Dept: ULTRASOUND IMAGING | Facility: CLINIC | Age: 61
DRG: 246 | End: 2021-05-31
Attending: HOSPITALIST
Payer: COMMERCIAL

## 2021-05-31 ENCOUNTER — APPOINTMENT (OUTPATIENT)
Dept: CT IMAGING | Facility: CLINIC | Age: 61
DRG: 246 | End: 2021-05-31
Attending: INTERNAL MEDICINE
Payer: COMMERCIAL

## 2021-05-31 ENCOUNTER — APPOINTMENT (OUTPATIENT)
Dept: CARDIOLOGY | Facility: CLINIC | Age: 61
DRG: 246 | End: 2021-05-31
Attending: INTERNAL MEDICINE
Payer: COMMERCIAL

## 2021-05-31 LAB
ALBUMIN SERPL-MCNC: 2.5 G/DL (ref 3.4–5)
ALBUMIN UR-MCNC: 300 MG/DL
ALP SERPL-CCNC: 111 U/L (ref 40–150)
ALT SERPL W P-5'-P-CCNC: 18 U/L (ref 0–50)
ANION GAP SERPL CALCULATED.3IONS-SCNC: 4 MMOL/L (ref 3–14)
APPEARANCE UR: CLEAR
AST SERPL W P-5'-P-CCNC: 67 U/L (ref 0–45)
BASOPHILS # BLD AUTO: 0.1 10E9/L (ref 0–0.2)
BASOPHILS NFR BLD AUTO: 0.5 %
BILIRUB SERPL-MCNC: 0.2 MG/DL (ref 0.2–1.3)
BILIRUB UR QL STRIP: NEGATIVE
BUN SERPL-MCNC: 29 MG/DL (ref 7–30)
CALCIUM SERPL-MCNC: 8.3 MG/DL (ref 8.5–10.1)
CHLORIDE SERPL-SCNC: 109 MMOL/L (ref 94–109)
CHOLEST SERPL-MCNC: 191 MG/DL
CK SERPL-CCNC: 375 U/L (ref 30–225)
CO2 SERPL-SCNC: 27 MMOL/L (ref 20–32)
COLOR UR AUTO: ABNORMAL
CREAT SERPL-MCNC: 1.96 MG/DL (ref 0.52–1.04)
DIFFERENTIAL METHOD BLD: ABNORMAL
EOSINOPHIL # BLD AUTO: 0.3 10E9/L (ref 0–0.7)
EOSINOPHIL NFR BLD AUTO: 2.7 %
ERYTHROCYTE [DISTWIDTH] IN BLOOD BY AUTOMATED COUNT: 14.5 % (ref 10–15)
FERRITIN SERPL-MCNC: 233 NG/ML (ref 8–252)
GFR SERPL CREATININE-BSD FRML MDRD: 27 ML/MIN/{1.73_M2}
GLUCOSE SERPL-MCNC: 120 MG/DL (ref 70–99)
GLUCOSE UR STRIP-MCNC: 150 MG/DL
HBA1C MFR BLD: 7.9 % (ref 0–5.6)
HCT VFR BLD AUTO: 31.6 % (ref 35–47)
HDLC SERPL-MCNC: 29 MG/DL
HGB BLD-MCNC: 10.4 G/DL (ref 11.7–15.7)
HGB UR QL STRIP: ABNORMAL
IMM GRANULOCYTES # BLD: 0.1 10E9/L (ref 0–0.4)
IMM GRANULOCYTES NFR BLD: 0.6 %
IRON SATN MFR SERPL: 21 % (ref 15–46)
IRON SERPL-MCNC: 37 UG/DL (ref 35–180)
KCT BLD-ACNC: 286 SEC (ref 75–150)
KETONES UR STRIP-MCNC: NEGATIVE MG/DL
LDLC SERPL CALC-MCNC: 105 MG/DL
LEUKOCYTE ESTERASE UR QL STRIP: NEGATIVE
LYMPHOCYTES # BLD AUTO: 2.3 10E9/L (ref 0.8–5.3)
LYMPHOCYTES NFR BLD AUTO: 18.4 %
MCH RBC QN AUTO: 25.5 PG (ref 26.5–33)
MCHC RBC AUTO-ENTMCNC: 32.9 G/DL (ref 31.5–36.5)
MCV RBC AUTO: 78 FL (ref 78–100)
MONOCYTES # BLD AUTO: 0.7 10E9/L (ref 0–1.3)
MONOCYTES NFR BLD AUTO: 5.6 %
MUCOUS THREADS #/AREA URNS LPF: PRESENT /LPF
NEUTROPHILS # BLD AUTO: 9 10E9/L (ref 1.6–8.3)
NEUTROPHILS NFR BLD AUTO: 72.2 %
NITRATE UR QL: NEGATIVE
NONHDLC SERPL-MCNC: 162 MG/DL
NRBC # BLD AUTO: 0 10*3/UL
NRBC BLD AUTO-RTO: 0 /100
PH UR STRIP: 6 PH (ref 5–7)
PHOSPHATE SERPL-MCNC: 3.7 MG/DL (ref 2.5–4.5)
PLATELET # BLD AUTO: 90 10E9/L (ref 150–450)
POTASSIUM SERPL-SCNC: 3.8 MMOL/L (ref 3.4–5.3)
PROT SERPL-MCNC: 6.6 G/DL (ref 6.8–8.8)
PTH-INTACT SERPL-MCNC: 148 PG/ML (ref 12–64)
RBC # BLD AUTO: 4.08 10E12/L (ref 3.8–5.2)
RBC #/AREA URNS AUTO: 6 /HPF (ref 0–2)
SODIUM SERPL-SCNC: 140 MMOL/L (ref 133–144)
SOURCE: ABNORMAL
SP GR UR STRIP: 1.02 (ref 1–1.03)
SQUAMOUS #/AREA URNS AUTO: 1 /HPF (ref 0–1)
TIBC SERPL-MCNC: 178 UG/DL (ref 240–430)
TRIGL SERPL-MCNC: 285 MG/DL
TROPONIN I SERPL-MCNC: 11.69 UG/L (ref 0–0.04)
TROPONIN I SERPL-MCNC: 9.32 UG/L (ref 0–0.04)
TSH SERPL DL<=0.005 MIU/L-ACNC: 0.41 MU/L (ref 0.4–4)
UFH PPP CHRO-ACNC: 0.16 IU/ML
UFH PPP CHRO-ACNC: 0.23 IU/ML
UFH PPP CHRO-ACNC: 0.38 IU/ML
UFH PPP CHRO-ACNC: NORMAL IU/ML
UROBILINOGEN UR STRIP-MCNC: 0 MG/DL (ref 0–2)
WBC # BLD AUTO: 12.4 10E9/L (ref 4–11)
WBC #/AREA URNS AUTO: 1 /HPF (ref 0–5)

## 2021-05-31 PROCEDURE — C1874 STENT, COATED/COV W/DEL SYS: HCPCS | Performed by: INTERNAL MEDICINE

## 2021-05-31 PROCEDURE — 258N000003 HC RX IP 258 OP 636: Performed by: INTERNAL MEDICINE

## 2021-05-31 PROCEDURE — 85520 HEPARIN ASSAY: CPT | Performed by: HOSPITALIST

## 2021-05-31 PROCEDURE — 99152 MOD SED SAME PHYS/QHP 5/>YRS: CPT | Performed by: INTERNAL MEDICINE

## 2021-05-31 PROCEDURE — 85347 COAGULATION TIME ACTIVATED: CPT

## 2021-05-31 PROCEDURE — 999N000208 ECHOCARDIOGRAM COMPLETE

## 2021-05-31 PROCEDURE — 93970 EXTREMITY STUDY: CPT

## 2021-05-31 PROCEDURE — 36415 COLL VENOUS BLD VENIPUNCTURE: CPT | Performed by: HOSPITALIST

## 2021-05-31 PROCEDURE — C1725 CATH, TRANSLUMIN NON-LASER: HCPCS | Performed by: INTERNAL MEDICINE

## 2021-05-31 PROCEDURE — 99223 1ST HOSP IP/OBS HIGH 75: CPT | Performed by: INTERNAL MEDICINE

## 2021-05-31 PROCEDURE — 250N000011 HC RX IP 250 OP 636: Performed by: INTERNAL MEDICINE

## 2021-05-31 PROCEDURE — 93306 TTE W/DOPPLER COMPLETE: CPT | Mod: 26 | Performed by: INTERNAL MEDICINE

## 2021-05-31 PROCEDURE — 93005 ELECTROCARDIOGRAM TRACING: CPT

## 2021-05-31 PROCEDURE — 82306 VITAMIN D 25 HYDROXY: CPT | Performed by: INTERNAL MEDICINE

## 2021-05-31 PROCEDURE — 83036 HEMOGLOBIN GLYCOSYLATED A1C: CPT | Performed by: INTERNAL MEDICINE

## 2021-05-31 PROCEDURE — 80061 LIPID PANEL: CPT | Performed by: INTERNAL MEDICINE

## 2021-05-31 PROCEDURE — 82728 ASSAY OF FERRITIN: CPT | Performed by: INTERNAL MEDICINE

## 2021-05-31 PROCEDURE — C1887 CATHETER, GUIDING: HCPCS | Performed by: INTERNAL MEDICINE

## 2021-05-31 PROCEDURE — 83970 ASSAY OF PARATHORMONE: CPT | Performed by: INTERNAL MEDICINE

## 2021-05-31 PROCEDURE — 272N000001 HC OR GENERAL SUPPLY STERILE: Performed by: INTERNAL MEDICINE

## 2021-05-31 PROCEDURE — 250N000012 HC RX MED GY IP 250 OP 636 PS 637: Performed by: INTERNAL MEDICINE

## 2021-05-31 PROCEDURE — 93454 CORONARY ARTERY ANGIO S&I: CPT | Performed by: INTERNAL MEDICINE

## 2021-05-31 PROCEDURE — B2111ZZ FLUOROSCOPY OF MULTIPLE CORONARY ARTERIES USING LOW OSMOLAR CONTRAST: ICD-10-PCS | Performed by: INTERNAL MEDICINE

## 2021-05-31 PROCEDURE — C9600 PERC DRUG-EL COR STENT SING: HCPCS | Mod: LD | Performed by: INTERNAL MEDICINE

## 2021-05-31 PROCEDURE — 250N000009 HC RX 250: Performed by: INTERNAL MEDICINE

## 2021-05-31 PROCEDURE — C1769 GUIDE WIRE: HCPCS | Performed by: INTERNAL MEDICINE

## 2021-05-31 PROCEDURE — 255N000002 HC RX 255 OP 636: Performed by: HOSPITALIST

## 2021-05-31 PROCEDURE — 84484 ASSAY OF TROPONIN QUANT: CPT | Performed by: INTERNAL MEDICINE

## 2021-05-31 PROCEDURE — 85025 COMPLETE CBC W/AUTO DIFF WBC: CPT | Performed by: INTERNAL MEDICINE

## 2021-05-31 PROCEDURE — 99233 SBSQ HOSP IP/OBS HIGH 50: CPT | Performed by: HOSPITALIST

## 2021-05-31 PROCEDURE — 74176 CT ABD & PELVIS W/O CONTRAST: CPT

## 2021-05-31 PROCEDURE — 83540 ASSAY OF IRON: CPT | Performed by: INTERNAL MEDICINE

## 2021-05-31 PROCEDURE — 210N000002 HC R&B HEART CARE

## 2021-05-31 PROCEDURE — 80053 COMPREHEN METABOLIC PANEL: CPT | Performed by: INTERNAL MEDICINE

## 2021-05-31 PROCEDURE — 36415 COLL VENOUS BLD VENIPUNCTURE: CPT | Performed by: INTERNAL MEDICINE

## 2021-05-31 PROCEDURE — 250N000013 HC RX MED GY IP 250 OP 250 PS 637: Performed by: INTERNAL MEDICINE

## 2021-05-31 PROCEDURE — 250N000013 HC RX MED GY IP 250 OP 250 PS 637: Performed by: STUDENT IN AN ORGANIZED HEALTH CARE EDUCATION/TRAINING PROGRAM

## 2021-05-31 PROCEDURE — C9606 PERC D-E COR REVASC W AMI S: HCPCS | Performed by: INTERNAL MEDICINE

## 2021-05-31 PROCEDURE — 250N000013 HC RX MED GY IP 250 OP 250 PS 637: Performed by: HOSPITALIST

## 2021-05-31 PROCEDURE — 84443 ASSAY THYROID STIM HORMONE: CPT | Performed by: INTERNAL MEDICINE

## 2021-05-31 PROCEDURE — 027034Z DILATION OF CORONARY ARTERY, ONE ARTERY WITH DRUG-ELUTING INTRALUMINAL DEVICE, PERCUTANEOUS APPROACH: ICD-10-PCS | Performed by: INTERNAL MEDICINE

## 2021-05-31 PROCEDURE — 83550 IRON BINDING TEST: CPT | Performed by: INTERNAL MEDICINE

## 2021-05-31 PROCEDURE — 84100 ASSAY OF PHOSPHORUS: CPT | Performed by: INTERNAL MEDICINE

## 2021-05-31 PROCEDURE — C1894 INTRO/SHEATH, NON-LASER: HCPCS | Performed by: INTERNAL MEDICINE

## 2021-05-31 PROCEDURE — 99153 MOD SED SAME PHYS/QHP EA: CPT | Performed by: INTERNAL MEDICINE

## 2021-05-31 PROCEDURE — 82550 ASSAY OF CK (CPK): CPT | Performed by: INTERNAL MEDICINE

## 2021-05-31 DEVICE — STENT SYNERGY DRUG ELUTING 2.25X32MM  H7493926032220: Type: IMPLANTABLE DEVICE | Status: FUNCTIONAL

## 2021-05-31 RX ORDER — NITROGLYCERIN 5 MG/ML
VIAL (ML) INTRAVENOUS
Status: DISCONTINUED | OUTPATIENT
Start: 2021-05-31 | End: 2021-05-31 | Stop reason: HOSPADM

## 2021-05-31 RX ORDER — SODIUM CHLORIDE 9 MG/ML
INJECTION, SOLUTION INTRAVENOUS CONTINUOUS
Status: DISCONTINUED | OUTPATIENT
Start: 2021-05-31 | End: 2021-05-31 | Stop reason: HOSPADM

## 2021-05-31 RX ORDER — ACETAMINOPHEN 325 MG/1
650 TABLET ORAL EVERY 4 HOURS PRN
Status: DISCONTINUED | OUTPATIENT
Start: 2021-05-31 | End: 2021-06-04 | Stop reason: HOSPADM

## 2021-05-31 RX ORDER — NALOXONE HYDROCHLORIDE 0.4 MG/ML
0.2 INJECTION, SOLUTION INTRAMUSCULAR; INTRAVENOUS; SUBCUTANEOUS
Status: DISCONTINUED | OUTPATIENT
Start: 2021-05-31 | End: 2021-05-31

## 2021-05-31 RX ORDER — NITROGLYCERIN 0.4 MG/1
0.4 TABLET SUBLINGUAL EVERY 5 MIN PRN
Status: DISCONTINUED | OUTPATIENT
Start: 2021-05-31 | End: 2021-06-04 | Stop reason: HOSPADM

## 2021-05-31 RX ORDER — LIDOCAINE 40 MG/G
CREAM TOPICAL
Status: DISCONTINUED | OUTPATIENT
Start: 2021-05-31 | End: 2021-05-31 | Stop reason: HOSPADM

## 2021-05-31 RX ORDER — NALOXONE HYDROCHLORIDE 0.4 MG/ML
0.4 INJECTION, SOLUTION INTRAMUSCULAR; INTRAVENOUS; SUBCUTANEOUS
Status: DISCONTINUED | OUTPATIENT
Start: 2021-05-31 | End: 2021-05-31

## 2021-05-31 RX ORDER — LEVOTHYROXINE SODIUM 75 UG/1
150 TABLET ORAL
Status: DISCONTINUED | OUTPATIENT
Start: 2021-05-31 | End: 2021-06-04 | Stop reason: HOSPADM

## 2021-05-31 RX ORDER — OXYCODONE HYDROCHLORIDE 5 MG/1
5 TABLET ORAL EVERY 4 HOURS PRN
Status: DISCONTINUED | OUTPATIENT
Start: 2021-05-31 | End: 2021-05-31

## 2021-05-31 RX ORDER — PANTOPRAZOLE SODIUM 40 MG/1
40 TABLET, DELAYED RELEASE ORAL
Status: DISCONTINUED | OUTPATIENT
Start: 2021-05-31 | End: 2021-06-04 | Stop reason: HOSPADM

## 2021-05-31 RX ORDER — FENTANYL CITRATE 50 UG/ML
INJECTION, SOLUTION INTRAMUSCULAR; INTRAVENOUS
Status: DISCONTINUED | OUTPATIENT
Start: 2021-05-31 | End: 2021-05-31 | Stop reason: HOSPADM

## 2021-05-31 RX ORDER — DEXTROSE MONOHYDRATE 25 G/50ML
25-50 INJECTION, SOLUTION INTRAVENOUS
Status: DISCONTINUED | OUTPATIENT
Start: 2021-05-31 | End: 2021-06-04 | Stop reason: HOSPADM

## 2021-05-31 RX ORDER — ONDANSETRON 2 MG/ML
4 INJECTION INTRAMUSCULAR; INTRAVENOUS EVERY 6 HOURS PRN
Status: DISCONTINUED | OUTPATIENT
Start: 2021-05-31 | End: 2021-05-31

## 2021-05-31 RX ORDER — HEPARIN SODIUM 1000 [USP'U]/ML
INJECTION, SOLUTION INTRAVENOUS; SUBCUTANEOUS
Status: DISCONTINUED | OUTPATIENT
Start: 2021-05-31 | End: 2021-05-31 | Stop reason: HOSPADM

## 2021-05-31 RX ORDER — ALBUTEROL SULFATE 90 UG/1
2 AEROSOL, METERED RESPIRATORY (INHALATION) EVERY 6 HOURS PRN
Status: DISCONTINUED | OUTPATIENT
Start: 2021-05-31 | End: 2021-06-04 | Stop reason: HOSPADM

## 2021-05-31 RX ORDER — FENTANYL CITRATE 50 UG/ML
25-50 INJECTION, SOLUTION INTRAMUSCULAR; INTRAVENOUS
Status: ACTIVE | OUTPATIENT
Start: 2021-05-31 | End: 2021-05-31

## 2021-05-31 RX ORDER — FLUMAZENIL 0.1 MG/ML
0.2 INJECTION, SOLUTION INTRAVENOUS
Status: ACTIVE | OUTPATIENT
Start: 2021-05-31 | End: 2021-05-31

## 2021-05-31 RX ORDER — NICOTINE POLACRILEX 4 MG
15-30 LOZENGE BUCCAL
Status: DISCONTINUED | OUTPATIENT
Start: 2021-05-31 | End: 2021-06-04 | Stop reason: HOSPADM

## 2021-05-31 RX ORDER — PROCHLORPERAZINE MALEATE 5 MG
5 TABLET ORAL
Status: DISCONTINUED | OUTPATIENT
Start: 2021-05-31 | End: 2021-06-04 | Stop reason: HOSPADM

## 2021-05-31 RX ORDER — HEPARIN SODIUM 10000 [USP'U]/100ML
0-5000 INJECTION, SOLUTION INTRAVENOUS CONTINUOUS
Status: DISCONTINUED | OUTPATIENT
Start: 2021-05-31 | End: 2021-06-01

## 2021-05-31 RX ORDER — SODIUM CHLORIDE 9 MG/ML
INJECTION, SOLUTION INTRAVENOUS CONTINUOUS
Status: ACTIVE | OUTPATIENT
Start: 2021-05-31 | End: 2021-06-01

## 2021-05-31 RX ORDER — NICOTINE 21 MG/24HR
1 PATCH, TRANSDERMAL 24 HOURS TRANSDERMAL DAILY
Status: DISCONTINUED | OUTPATIENT
Start: 2021-05-31 | End: 2021-06-04 | Stop reason: HOSPADM

## 2021-05-31 RX ORDER — LORAZEPAM 2 MG/ML
0.5 INJECTION INTRAMUSCULAR
Status: DISCONTINUED | OUTPATIENT
Start: 2021-05-31 | End: 2021-05-31 | Stop reason: HOSPADM

## 2021-05-31 RX ORDER — IOPAMIDOL 755 MG/ML
INJECTION, SOLUTION INTRAVASCULAR
Status: DISCONTINUED | OUTPATIENT
Start: 2021-05-31 | End: 2021-05-31 | Stop reason: HOSPADM

## 2021-05-31 RX ORDER — SODIUM CHLORIDE 9 MG/ML
INJECTION, SOLUTION INTRAVENOUS CONTINUOUS
Status: ACTIVE | OUTPATIENT
Start: 2021-05-31 | End: 2021-05-31

## 2021-05-31 RX ORDER — VERAPAMIL HYDROCHLORIDE 2.5 MG/ML
INJECTION, SOLUTION INTRAVENOUS
Status: DISCONTINUED | OUTPATIENT
Start: 2021-05-31 | End: 2021-05-31 | Stop reason: HOSPADM

## 2021-05-31 RX ORDER — LORAZEPAM 0.5 MG/1
0.5 TABLET ORAL
Status: DISCONTINUED | OUTPATIENT
Start: 2021-05-31 | End: 2021-05-31 | Stop reason: HOSPADM

## 2021-05-31 RX ORDER — ATROPINE SULFATE 0.1 MG/ML
0.5 INJECTION INTRAVENOUS
Status: ACTIVE | OUTPATIENT
Start: 2021-05-31 | End: 2021-05-31

## 2021-05-31 RX ORDER — METOPROLOL TARTRATE 1 MG/ML
5-10 INJECTION, SOLUTION INTRAVENOUS
Status: DISCONTINUED | OUTPATIENT
Start: 2021-05-31 | End: 2021-06-04 | Stop reason: HOSPADM

## 2021-05-31 RX ORDER — POTASSIUM CHLORIDE 1500 MG/1
20 TABLET, EXTENDED RELEASE ORAL
Status: COMPLETED | OUTPATIENT
Start: 2021-05-31 | End: 2021-05-31

## 2021-05-31 RX ORDER — DIPHENHYDRAMINE HCL 25 MG
25 CAPSULE ORAL
Status: DISCONTINUED | OUTPATIENT
Start: 2021-05-31 | End: 2021-06-04 | Stop reason: HOSPADM

## 2021-05-31 RX ORDER — OXYCODONE HYDROCHLORIDE 5 MG/1
10 TABLET ORAL EVERY 4 HOURS PRN
Status: DISCONTINUED | OUTPATIENT
Start: 2021-05-31 | End: 2021-05-31

## 2021-05-31 RX ORDER — ONDANSETRON 4 MG/1
4 TABLET, ORALLY DISINTEGRATING ORAL EVERY 6 HOURS PRN
Status: DISCONTINUED | OUTPATIENT
Start: 2021-05-31 | End: 2021-05-31

## 2021-05-31 RX ORDER — HYDRALAZINE HYDROCHLORIDE 20 MG/ML
10 INJECTION INTRAMUSCULAR; INTRAVENOUS EVERY 4 HOURS PRN
Status: DISCONTINUED | OUTPATIENT
Start: 2021-05-31 | End: 2021-06-04 | Stop reason: HOSPADM

## 2021-05-31 RX ADMIN — CARVEDILOL 6.25 MG: 6.25 TABLET, FILM COATED ORAL at 08:03

## 2021-05-31 RX ADMIN — TICAGRELOR 90 MG: 90 TABLET ORAL at 21:36

## 2021-05-31 RX ADMIN — POTASSIUM CHLORIDE 20 MEQ: 1500 TABLET, EXTENDED RELEASE ORAL at 13:16

## 2021-05-31 RX ADMIN — ATORVASTATIN CALCIUM 40 MG: 40 TABLET, FILM COATED ORAL at 08:03

## 2021-05-31 RX ADMIN — LEVOTHYROXINE SODIUM 150 MCG: 75 TABLET ORAL at 10:03

## 2021-05-31 RX ADMIN — HUMAN ALBUMIN MICROSPHERES AND PERFLUTREN 3 ML: 10; .22 INJECTION, SOLUTION INTRAVENOUS at 12:16

## 2021-05-31 RX ADMIN — HEPARIN SODIUM 1250 UNITS/HR: 10000 INJECTION, SOLUTION INTRAVENOUS at 10:04

## 2021-05-31 RX ADMIN — PANTOPRAZOLE SODIUM 40 MG: 40 TABLET, DELAYED RELEASE ORAL at 17:48

## 2021-05-31 RX ADMIN — ASPIRIN 81 MG: 81 TABLET, COATED ORAL at 08:03

## 2021-05-31 RX ADMIN — SODIUM CHLORIDE: 9 INJECTION, SOLUTION INTRAVENOUS at 21:40

## 2021-05-31 RX ADMIN — CARVEDILOL 6.25 MG: 6.25 TABLET, FILM COATED ORAL at 21:36

## 2021-05-31 RX ADMIN — INSULIN GLARGINE 15 UNITS: 100 INJECTION, SOLUTION SUBCUTANEOUS at 08:03

## 2021-05-31 RX ADMIN — ACETAMINOPHEN 650 MG: 325 TABLET, FILM COATED ORAL at 14:54

## 2021-05-31 RX ADMIN — NICOTINE 1 PATCH: 21 PATCH, EXTENDED RELEASE TRANSDERMAL at 04:00

## 2021-05-31 RX ADMIN — ASPIRIN 325 MG: 325 TABLET, COATED ORAL at 13:16

## 2021-05-31 RX ADMIN — SODIUM CHLORIDE 1000 ML: 9 INJECTION, SOLUTION INTRAVENOUS at 04:31

## 2021-05-31 ASSESSMENT — ACTIVITIES OF DAILY LIVING (ADL)
ADLS_ACUITY_SCORE: 11
ADLS_ACUITY_SCORE: 12

## 2021-05-31 NOTE — PROGRESS NOTES
"MD Notification    Notified Person: MD    Notified Person Name: Ashli Barros MD    Notification Date/Time: 5/31/21 @5482    Notification Interaction: paged    Purpose of Notification: \"Writer was informed that pt will not get VQ scan today d/t the holiday. Did you want to place a diet order & order additional long acting insulin at this time? Thanks.\"    Orders Received: Will keep pt NPO for cath lab today.      "

## 2021-05-31 NOTE — CONSULTS
Consult Date: 05/31/2021    IDENTIFICATION:  A 61-year-old female who presented to the [facility] Emergency Room with chest pain.  Subsequently, transferred to [facility] for further evaluation and assessment.    REASON FOR CONSULTATION:  Evaluation and assessment with respect to apparent acute kidney injury with proteinuria.    IMPRESSION:    1.  Longstanding diabetes type 2 with poor control.  Documented A1c's greater than 10.  Probable history of diabetic retinopathy upon discussion with the patient.  2.  Significant proteinuria on urine dipstick.  3.  History of hematuria with previous negative cystoscopy per patient.  4.  Underlying chronic kidney disease (CKD).  Baseline creatinine likely 1.5 range.  Estimated GFR 35-40 mL per minute.  This represents stage IIIB CKD.  This is almost certainly secondary to diabetes.  5.  Likely chronic progressive kidney disease secondary to diabetic nephropathy.  6.  Concern for possible acute on chronic renal insufficiency versus chronic progression.  7.  Hyperlipidemia.  8.  Non-ST elevation myocardial infarction.    9.  Hypertension, moderate control.  Will require ACE inhibitor once renal baseline is established.  10.  Ongoing tobacco.    11.  Anemia with low MCV.    DISCUSSION:  A middle-aged woman with advanced chronic kidney disease and proteinuria in the setting of diabetes.  Her urinalysis shows proteinuria, hematuria and RBCs.  This is presumably consistent with diabetic nephropathy.  She needs evaluation and treatment for the complications of chronic kidney disease.  Depending on degree of proteinuria, may want to consider a value serologic evaluation for glomeruli disease.     RECOMMENDATIONS:    1.  Provide IV saline infusion preangiography and pulse.  2.  No diuretic.  3.  Document vitamin D, phosphorus, and PTH levels.  4.  Document iron studies.  5.  Quantification of urine protein.  6.  SPEP and U Pap in the setting of proteinuria, chronic kidney disease  and anemia.  7.  She will need renal followup post-discharge.      HISTORY OF PRESENT ILLNESS:  A 61-year-old female who presented to [facility] ER with chest pain.  Transferred to [facility] for ongoing cardiac evaluation and assessment.    She is a longstanding diabetic, which she dates to approximately .  Her control has been marginal at best.    COMPLICATIONS:  Appear to include nephropathy, retinopathy.  She has previously seen Ophthalmology and been told she had some issues with the blood vessels in her eyes.  She does not have a formal diagnosis of diabetic retinopathy, but this sounds consistent with said diagnosis.    Creatinines have been abnormal, dating back to probably .  Limited data available.  No data dated  through .    In 2020, 1.33 mg/dL.  Most recent value subsequent to that is on admission here at 1.87.  This is likely progressive disease.    CT scan was performed, which was nondiagnostic for obstruction.    She has seen a urologist previously in the setting of hematuria and had cystoscopy, which was nondiagnostic.    No headache, chest pain, shortness of breath at this time.  I and O reviewed, demonstrating her to be essentially nonoliguric.  Blood pressure is moderate.    PAST MEDICAL HISTORY:  Diabetes, hypertension, hyperlipidemia, hypothyroidism, atherosclerotic coronary artery disease, pulmonary nodule, tobacco abuse, coronary artery disease.    SURGICAL HISTORY:  Bladder repair, cystoscopy, , tubal ligation, hysterectomy, cholecystectomy, thyroidectomy, previous heart catheterization.    MEDICATIONS:  Albuterol, Lipitor, Glucotrol, insulin, levothyroxine and Zestril, which is lisinopril.     FAMILY AND SOCIAL HISTORY:  Reviewed in detail.    REVIEW OF SYSTEMS:  Complete review of systems undertaken.  Pertinent positives and negatives are as I noted above.    PHYSICAL EXAMINATION:    VITAL SIGNS:  She is afebrile.  Her blood pressure is in the range of 150.   Sats are adequate on room air.  GENERAL:  No acute distress, appropriate and interactive.  HEENT:  Normocephalic, atraumatic.  Pupils equal, round, reactive to light and accommodation.  Extraocular muscles are intact.  Sclerae nonicteric.  Conjunctivae not injected.  External auditory canals and nares appear to be patent.  Pharynx without lesion.  CHEST:  Symmetric and clear.  CARDIOVASCULAR:  Regular.  ABDOMEN:  Soft.  EXTREMITIES:  No edema.  NEUROLOGIC:  Grossly nonfocal.  PSYCHIATRIC:  Pleasant.    LABORATORY DATA:  Current and historical reviewed in detail.     CORRINE Hastings MD        D: 2021   T: 2021   MT: GORDON/SINCERE    Name:     EDVIN MERLOS  MRN:      [id number]        Account:      [id number]   :      1960           Consult Date: 2021     Document: [id number]    cc:  Lisa Meza MD

## 2021-05-31 NOTE — PHARMACY-ADMISSION MEDICATION HISTORY
Admission medication history interview status for this patient is complete. See [facility] admission navigator for allergy information, prior to admission medications and immunization status.     Medication history interview done, indicate source(s): Patient  Medication history resources (including written lists, pill bottles, clinic record):None  Pharmacy: -    Changes made to PTA medication list:  Added: humalog  Deleted: lipitor, lisinopril  Changed: lantus to 30 units at bedtime,     Actions taken by pharmacist (provider contacted, etc):None     Additional medication history information:None    Medication reconciliation/reorder completed by provider prior to medication history?  no   (Y/N)     For patients on insulin therapy:   Do you use sliding scale insulin based on blood sugars? no  What is your pre-meal insulin coverage?  as above  Do you typically eat three meals a day?   How many times do you check your blood glucose per day?   How many episodes of hypoglycemia do you typically have per month?   Do you have a Continuous Glucose Monitor (CGM)?      Prior to Admission medications    Medication Sig Last Dose Taking? Auth Provider   ALBUTEROL 90 MCG/ACT IN AERS 1-2 puffs Q 2-4 hrs prn with spacer - HFA  Yes Krystian Herbert MD   glipiZIDE (GLUCOTROL) 10 MG tablet Take 10 mg by mouth daily. 5/30/2021 at Unknown time Yes Reported, Patient   insulin glargine (LANTUS SOLOSTAR) 100 UNIT/ML injection Inject 30 Units Subcutaneous At Bedtime  5/29/2021 at Unknown time Yes Reported, Patient   insulin lispro (HUMALOG KWIKPEN) 100 UNIT/ML (1 unit dial) KWIKPEN Inject 18 Units Subcutaneous 2 times daily (before meals) Breakfast and Dinner 5/30/2021 at x1 Yes Unknown, Entered By History   insulin lispro (HUMALOG KWIKPEN) 100 UNIT/ML (1 unit dial) KWIKPEN Inject 5 Units Subcutaneous daily (before lunch) 5/29/2021 at Unknown time Yes Unknown, Entered By History   levothyroxine (SYNTHROID, LEVOTHROID) 50 MCG tablet Take 150 mcg by  mouth every morning (before breakfast)  5/30/2021 at Unknown time Yes Reported, Patient   LANCETS REGULAR MISC use twice a day for blood sugar

## 2021-05-31 NOTE — PLAN OF CARE
A&Ox4. VSS ex elevated BP, on RA. Tele: NSR. Denies pain. Up SBA w/IV pole. Currently NPO, BG checks. PIV infusing heparin gtt @1250, on protocol, next lab check at 1500. Trop: 11.693. Nicotine patch to R arm. Lower extremity US completed, see results. VQ scan ordered. See cardiology & nephrology notes. Hem/onc will see pt tomorrow. Need urine sample. Pt currently at cath lab. Discharge pending.

## 2021-05-31 NOTE — CONSULTS
Cardiology Consultation      Rowan Leiva MRN# [id number]          Assessment and Plan:     1. NSTEMI   Ms. Leiva has ongoing chest discomfort despite being on intravenous heparin and receiving intravenous nitroglycerin.  Echocardiography has demonstrated a hypokinetic distal septal wall motion abnormality as well as a severely hypokinetic basal inferior/inferior lateral wall motion abnormality.  Overall left ventricular systolic performance remains only mildly depressed.  Nonetheless, the patient's progressive symptoms, the evidence of anterior ischemia are consistent with significant risk in the absence of revascularization.  Coronary angiography and percutaneous intervention (including dual antiplatelet therapy) were discussed and were recommended.  The risks, benefits and alternatives were discussed and she has agreed to proceed.  The risks include (but are not limited to) renal failure or worsening of baseline renal function, stroke, heart attack, death.  She has a history of lower gastrointestinal bleeding on aspirin and well not bleeding now, she understands that there is a future risk of abnormal bleeding.  All of this was discussed and she prefers to proceed given the concerns noted above  .  2.  Kidney disease, rapidly progressive insufficiency over the last year  Renal disease has been progressive, particularly over the last year.  She has been seen by Dr. Mosher of nephrology today.  This was discussed with him and as he notes, given the urgency for angiography, intravenous saline can be infused prior to, during and following the procedure.  It was discussed with Ms. Hardwick that renal injury and even renal failure could follow the procedure.  It was further discussed that renal function would need to be followed and if renal function rapidly worsens, she would need to remain hospitalized but if renal dysfunction is mild, she could be discharged to follow-up as an outpatient.    3.  Diabetes  "mellitus.    4.  Peripheral arterial disease    5.  Tobacco use.           History of Present Illness:     Ms. Rowan Leiva is 61 years old presents with chest discomfort.  She has diabetes mellitus, renal disease which has worsened significantly over the last year, tobacco use, peripheral arterial disease and a history of prior coronary stent placement.    Over the last week, she has noted \"lung pain\".  She had severe discomfort last evening in the hospital while lying on her right side.  She presumed this was \"lung discomfort\".  It radiated to her neck.  She does not have dyspnea.  She has persistent chest discomfort.  She sought medical attention because she thought she had a \"lung issue\" and was told that was not the case in the emergency department on admission.    She denies a history of current bleeding but is not on aspirin or any other antiplatelet therapy.  Several years ago, she experienced rectal bleeding and was evaluated at [facility].  Antiplatelet therapy was discontinued and she never restarted it.  She had some gastrointestinal intolerance to aspirin.  Again, she denies specifically any gastrointestinal or urinary bleeding or other abnormal bleeding at present.    Her troponins have risen to a peak of 14 and were decreasing.  Her EKG demonstrates anterior T wave changes consistent with ischemia in the LAD territory.  She also has inverted T waves in the inferior leads.  Her echocardiogram was preliminarily reviewed by myself during performance and left ventricular systolic performance is probably overall only mildly decreased but with a distal hypokinetic septal and apical wall motion abnormality and a basal inferior lateral wall motion abnormality.      In 2011 at [place], successful drug-eluting stent placement in mid right coronary artery   3.0 x 23-mm Xience V stent dilated to 3.36 mm.   Patent previously stented area in mid left anterior descending artery.Successful drug-eluting stent " placement in mid left anterior  descending artery using a 2.5 x 18 mm (dilated to 2.75 mm) and 2.25 x  18 mm (dilated to 2.5 mm) drug-eluting Xience V stents.    EKG shows anterior T wave changes (extending to inferior leads) which are new compared to 2015.  Been seen by nephrology and the case was discussed with Dr. Eleuterio Dean.  Recommended intravenous saline prior to, during and for a period of hours after the procedure.              Past Medical History:     Past Medical History:   Diagnosis Date     Cancer of thyroid (H)      Chest pain      Coronary artery disease     -     HX OF OVARIAN MALIGNANCY      Hyperlipidemia      Hypertension      Hypothyroidism      Malignant neoplasm of thyroid gland (H)     papillary carcinoma; resection      Mild intermittent asthma     minimal symptoms, albuterol use 2x/year     Myocardial infarction (H)     anterior     Pulmonary nodule      Thrombocytopenia (H)      Tobacco use disorder      Type II or unspecified type diabetes mellitus without mention of complication, not stated as uncontrolled              Past Surgical History:     Past Surgical History:   Procedure Laterality Date     BLADDER SURGERY       C ANESTH, SECTION       C LIGATE FALLOPIAN TUBE      Tubal Ligation     C TOTAL ABDOM HYSTERECTOMY      ovarian cancer (low grade) - Heme/Onc     CHOLECYSTECTOMY       CYSTOSCOPY  2014    Procedure: CYSTOSCOPY;  Surgeon: Sabino Jamil MD;  Location: [facility] THYROIDECTOMY  2000    papillary thyroid carcinoma - Endocrine     HEART CATH, ANGIOPLASTY  11    2.5 X 18 mm & 2.25 X 18 mm Xience ELLIOT to Mid LAD     HEART CATH, ANGIOPLASTY  11    Staged-3.0 X 23 mm Xience ELLIOT to Mid RAC     SLING TRANSVAGINAL N/A 2014    Procedure: SLING TRANSVAGINAL;  Surgeon: Sabino Jamil MD;  Location: [facility]               Social History:     Social History     Tobacco Use     Smoking status: Current  Every Day Smoker     Packs/day: 2.00     Years: 24.00     Pack years: 48.00     Types: Cigarettes     Smokeless tobacco: Never Used     Tobacco comment: Strongly recommended quitting at each visit.   Substance Use Topics     Alcohol use: No     Alcohol/week: 0.0 standard drinks             Family History:     Family History   Problem Relation Age of Onset     Blood Disease Sister         Hepatitis B-alive     Cancer Maternal Aunt         bronchial tubes, neck-     Cancer Maternal Uncle         glands in neck-     Cancer Maternal Grandfather         lungs-     Diabetes Father              Heart Disease Father         2 heartattacks-     Diabetes Sister              Diabetes Other         -cousins     Diabetes Paternal Grandmother              Diabetes Paternal Grandfather              Diabetes Paternal Aunt              Hypertension Sister         alive     Thyroid Disease Sister         both sisters-alive     Thyroid Disease Other         niece-alive             Allergies:     Allergies   Allergen Reactions     No Known Drug Allergies              Medications:     Medications Prior to Admission   Medication Sig Dispense Refill Last Dose     ALBUTEROL 90 MCG/ACT IN AERS 1-2 puffs Q 2-4 hrs prn with spacer - HFA 1 1      glipiZIDE (GLUCOTROL) 10 MG tablet Take 10 mg by mouth daily.   2021 at Unknown time     insulin glargine (LANTUS SOLOSTAR) 100 UNIT/ML injection Inject 30 Units Subcutaneous At Bedtime    2021 at Unknown time     insulin lispro (HUMALOG KWIKPEN) 100 UNIT/ML (1 unit dial) KWIKPEN Inject 18 Units Subcutaneous 2 times daily (before meals) Breakfast and Dinner   2021 at am     insulin lispro (HUMALOG KWIKPEN) 100 UNIT/ML (1 unit dial) KWIKPEN Inject 5 Units Subcutaneous daily (before lunch)   2021 at Unknown time     levothyroxine (SYNTHROID, LEVOTHROID) 50 MCG tablet Take 150 mcg by mouth every morning  (before breakfast)    5/30/2021 at Unknown time     LANCETS REGULAR MISC use twice a day for blood sugar 2 boxes 0              Review of Systems:   The 10 point Review of Systems is negative other than noted in the HPI            Physical Exam:   Vitals were reviewed  Blood pressure (!) 144/75, pulse 81, temperature 98.4  F (36.9  C), temperature source Oral, resp. rate 17, weight 84.9 kg (187 lb 3.2 oz), last menstrual period 05/01/2000, SpO2 92 %, not currently breastfeeding.  187 lbs 3.2 oz    Constitutional: awake, alert, cooperative, no apparent distress, and appears stated age  Eyes: Nonicteric.  Neck: No thyromegaly.  Lungs: No increased work of breathing, good air exchange, clear to auscultation bilaterally, no crackles or wheezing but there is a diffuse decrease in breath sounds.  Cardiovascular: Soft S1 and S2 without extra sounds or murmur.  The rhythm was regular.  There is no rub.  Musculoskeletal: no lower extremity pitting edema present  Neurologic: Facies were symmetric and she moves all extremities without focal limitation.  Skin: normal skin color, texture, turgor and no jaundice         Data:        Lab Results   Component Value Date     05/31/2021    Lab Results   Component Value Date    CHLORIDE 109 05/31/2021    Lab Results   Component Value Date    BUN 29 05/31/2021      Lab Results   Component Value Date    POTASSIUM 3.8 05/31/2021    Lab Results   Component Value Date    CO2 27 05/31/2021    Lab Results   Component Value Date    CR 1.96 05/31/2021        Lab Results   Component Value Date    WBC 12.4 (H) 05/31/2021    HGB 10.4 (L) 05/31/2021    HCT 31.6 (L) 05/31/2021    MCV 78 05/31/2021    PLT 90 (L) 05/31/2021     Lab Results   Component Value Date    INR 0.93 04/10/2015     Lab Results   Component Value Date    TSH 0.41 05/31/2021     Lab Results   Component Value Date    TROPI 9.323 (HH) 05/31/2021     Lab Results   Component Value Date    TROPI 9.323 (HH) 05/31/2021    TROPI  11.693 () 05/31/2021    TROPI 13.871 () 05/30/2021    TROPI 10.981 () 05/30/2021

## 2021-05-31 NOTE — PLAN OF CARE
Tx from [facility] @ 3419, VSS occasional HTN, on RA, A/Ox4, up SBA, on heparin protocol infusing through PIV, low sat fat low sodium diet, NPO @ 5am d/t any potential procedure, trops 10.981, 13.871, 11.693, plan for cardiology to see, Lexiscan in AM, continue to monitor

## 2021-05-31 NOTE — PROCEDURES
[facility]    Procedure: *Cath with PCI    Date/Time: 5/31/2021 2:13 PM  Performed by: Wolf Brumfield MD  Authorized by: Wolf Brumfield MD     UNIVERSAL PROTOCOL   Site Marked: Yes  Prior Images Obtained and Reviewed:  Yes  Required items: Required blood products, implants, devices and special equipment available    Patient identity confirmed:  Verbally with patient  Patient was reevaluated immediately before administering moderate or deep sedation or anesthesia  Confirmation Checklist:  Patient's identity using two indicators  Time out: Immediately prior to the procedure a time out was called    Universal Protocol: the [facility] Universal Protocol was followed           ANESTHESIA    Local Anesthetic: Lidocaine 1% without epinephrine      SEDATION    Patient Sedated: Yes    Sedation:  Fentanyl and midazolam  Vital signs: Vital signs monitored during sedation    PROCEDURE   Patient Tolerance:  Patient tolerated the procedure well with no immediate complications  Describe Procedure: Procedure  1 CAG  2) PCI distal LAD : 32 x 2.25 mm everolimus eluting Synergy stent, dilated to  2.5 mm proximally    Approach RTR  Complications none  Indication post infarction unstable angina, NSTEMI   CK DZ    Findings   Length of time physician/provider present for 1:1 monitoring during sedation: 30    Findings    LMCA no significant stenosis  LAD proximal vessel atheromatous changes with no focal high grade stenosis. Mid LAD previously stented with mild diffuse in stent restenosis. Focal distal subtotal stenosis before apex. Very diffuse distal disease.  CX proximal and mid vessel athermatous changes with no focal stenosis. Distal CX M3 small and diffusely diseased. M2 focal 50 to 50% lesion.  RCA previously stented proximal into mid vessel. Very diffuse in stent restenosis 90% the distal RCA is extremely small and diffusely diseased    WE placed a 32 x 2.25 mm everolimus-eluting  Synergy stent, post dilated to 2.5 mm proximally with good angiographic results.    Unfortunately she has very diffuse distal disease in all vessels, consistent with longstanding diabetes. I am doubtful the RCA would warrant PCI given small caliber of distal vessel. CX does not have high grade focal disease distally although vessels small caliber.     We used 125 ml contrast total, limiting views as much as possible to spare contrast.     [place]

## 2021-05-31 NOTE — PROGRESS NOTES
"[facility]  Medicine Progress Note - Hospitalist Service       Date of Admission:  5/30/2021  Assessment & Plan       Rowan Leiva is a 61 year old female admitted on 5/30/2021. She presents as a direct admission from [facility] emergency department with several days of chest pain, and what she describes as 2 weeks of \"lung pain\"     Non-ST elevation myocardial infarction: Patient with known coronary artery disease status post stenting x3 approximately 2 years ago in [place].  She is not on aspirin or Plavix.  She had bleeding historically as well as GI upset with aspirin.  2 to 3 days of central chest discomfort radiating to her jaw.  Worse with laying flat, improves with sitting up.  Troponin elevated at 10.981.  Not on aspirin, statin, or antihypertensive therapy, ongoing tobacco use.  -Heparin drip started on admission, cardiology consulted, patient getting coronary angiogram currently (report noted, Coronary angiogram report reviewed, shows 90% occluded distal LAD, treated with ELLIOT x1.  Non flow-limiting otherwise in other vessels  -Aspirin 81 mg enteric-coated re initiated, Brillinta added after angiogram, monitor for bleeding  -Troponin peaked at 13, and downtrending now, TTE report pending, per cardiology, has hypokinetic distal septal wall motion abnormality as well as severely hypokinetic basal inferior/inferior lateral wall motion abnormality.  LV systolic function mildly depressed.  -Atorvastatin 40 mg daily initiated, fasting lipid panel noted.  -Carvedilol 6.25 mg twice daily initiated.  -No ACEI given CKD with possible HERNESTO     Cough, hemoptysis, suspected PE  Bilateral posterior tibial vein occlusive DVT   : Patient describes cough with sputum production. Has a chronic cough associated with her ongoing tobacco use, though she believes in the past 2 weeks this is worsened. She finds herself coughing violently at times and this has led to  some streaks of blood on " mucus. No gross hemoptysis despite starting heparin. ? bronchitis, pulmonary edema from cardiac disease resulting in cough. Mildly elevated D-dimer.   - denies leg pain or swelling. Sedentary habit and reports she sits in chair for prolonged period of time.   - US showed bilateral DVT as above.  -On heparin drip as above for NSTEMI.  -Given thrombocytopenia and anemia, I will leave her on heparin drip (change to high intensity) if cardiology is okay after angiogram.  Will complete VQ scan as well to look for possible PE, will be done tomorrow only. If negative, and unable to explain hemoptysis, plan non contrast CT chest at some point to rule out any lung mass (CXR is negative).  If tolerates anticoagulation and stable DAPT as well, transition to DOAC in next 24 hours.  If does not tolerate it, possible IVC filter.  Chronic thrombocytopenia, hematology as well consulted.      Uncontrolled type 2 diabetes: Blood glucose in the 300 range at presentation.  Takes glipizide, Metformin, and insulin at home  -Lantus 15 units given this morning( PTA is on 30 units at bedtime)   -Will resume her PTA Lantus starting tonight once no longer n.p.o.  -Will add Premeal NovoLog 1 unit per 10 g CHO  -Change sliding scale to high insulin resistance once diet resumed  -Continue to hold Metformin, discontinue at discharge given CKD and her elevated baseline creatinine  -Resume glipizide from morning if tolerates p.o. and no further procedure planned  -Hemoglobin A1c 7.9     Hypertension: Systolic blood pressures in the 180, 190s range at presentation.  Historically on metoprolol following her heart disease and stenting 2 years ago, though was no longer on any antihypertensives  -Initiating carvedilol 6.25 mg twice daily on admission, titrate as tolerated, BP better now but not optimum  -Holding on initiation of ACE inhibitor given elevated creatinine at 1.8     Stage III chronic kidney disease  Microscopic hematuria and  proteinuria  Possible acute kidney injury, though this is unclear currently given no recent baseline: Likely some chronic kidney disease associated with uncontrolled hypertension and diabetes.  Creatinine of 1.87, was 1.33 4/4/2020. UA with microscopy shows proteinuria and microscopic hematuria.  -Noncontrast CT of abdomen and pelvis given elevated creatinine, microscopic hematuria, left flank pain and left lower quadrant pain which might represent nephrolithiasis, potentially obstructive.  Patient does have a history of nephrolithiasis in the past  -Intake and output, daily weights  - Received IV fluid bolus at presentation.  Continue with maintenance IVF since patient is getting contrast with angiogram  -Nephrology consulted, appreciate assistance.  -Patient reports that she had cystoscopy as part of work-up for hematuria and was normal.  -Daily BMP     Anemia and thrombocytopenia  Mild leukocytosis  Noted platelet count of 32 and 40 4K in 2012 and was evaluated by oncology, suspected it was due to acute infection.  Recovered to 159 in 2015 but since then it is running slightly lower than normal, 1 .  Presented with a platelet count of 90 K and is a stable.  -Also hemoglobin 11.7 at presentation, trended down to 10.4 this AM.  Patient has only streaks of blood with sputum.  Prior history of GI bleeding but denies any hematochezia or melena currently.  -Iron panel, B12 folate and peripheral smear ordered  -PPI twice daily  -Monitor for signs of bleeding  -Hematology consulted, discussed with Dr. Miller.  -Leukocytosis could be due to stress.  Afebrile.  CXR negative.  Monitor.    Hyperlipidemia: Untreated.  -Atorvastatin, lipid panel for a.m.     Ongoing tobacco use disorder with nicotine dependence: Early contemplative in terms of cessation.  Previously a 2 pack/day smoker since age 15, over the past year has been smoking 1 pack/day.  -Nicotine patch in place  -Continue to encourage tobacco cessation.   Discussed importance of this with patient      Hypothyroidism:  -TSH normal, PTA levothyroxine resumed       Diet: NPO per Anesthesia Guidelines for Procedure/Surgery Except for: Meds    DVT Prophylaxis: Heparin drip  Perez Catheter: not present  Code Status: Full Code           Disposition Plan   Expected discharge: 2-3 days, recommended to prior living arrangement once Work-up as above complete, and creatinine stable/improves  Entered: Ashli Barros MD 05/31/2021, 9:01 AM     The patient's care was discussed with the Bedside Nurse and Patient.    Ashli Barros MD  Hospitalist Service  [facility]  Contact information available via [facility] Paging/Directory    ______________________________________________________________________    Interval History   Sitting up in the chair, reports worsening pleuritic chest pain on lying down on sides, central retrosternal to slightly to the left of the chest.  Ongoing cough reports streaks of blood in the sputum.    Data reviewed today: I reviewed all medications, new labs and imaging results over the last 24 hours. I personally reviewed US legs as above.  Coronary angiogram report reviewed, shows 90% occluded distal LAD, treated with ELLIOT x1.  Residual non flow-limiting lesions otherwise in other vessels.    Physical Exam   Vital Signs: Temp: 98.4  F (36.9  C) Temp src: Oral BP: (!) 144/75 Pulse: 81   Resp: 17 SpO2: 92 % O2 Device: None (Room air)    Weight: 187 lbs 3.2 oz    General: AAOx3, appears comfortable.  HEENT: PERRLA EOMI. Mucosa moist.   Lungs: Bilateral equal air entry. Clear to auscultation, normal work of breathing.   CVS: S1S2 regular, no tachycardia or murmur.   Abdomen: Soft, NT, ND. BS heard.  MSK: No edema or deformities.  Neuro: AAOX3. CN 2-12 normal. Strength symmetrical.  Skin: No rash.       Data   Recent Labs   Lab 05/31/21  0626 05/31/21  0326 05/30/21  2315 05/30/21  1820   WBC  --  12.4*  --  13.5*   HGB  --  10.4*  --   11.7   MCV  --  78  --  79   PLT  --  90*  --  90*     --   --  137   POTASSIUM 3.8  --   --  3.4   CHLORIDE 109  --   --  104   CO2 27  --   --  27   BUN 29  --   --  31*   CR 1.96*  --   --  1.87*   ANIONGAP 4  --   --  6   BERNY 8.3*  --   --  8.6   *  --   --  131*   ALBUMIN 2.5*  --   --   --    PROTTOTAL 6.6*  --   --   --    BILITOTAL 0.2  --   --   --    ALKPHOS 111  --   --   --    ALT 18  --   --   --    AST 67*  --   --   --    TROPI 9.323* 11.693* 13.871* 10.981*     Recent Results (from the past 24 hour(s))   XR Chest 2 Views    Narrative    EXAM: XR CHEST 2 VW  LOCATION: [facility]  DATE/TIME: 5/30/2021 6:38 PM    INDICATION: Chest pain  COMPARISON: 01/17/2017      Impression    IMPRESSION: Heart size and pulmonary vascularity normal. The lungs are clear. Degenerative changes thoracic spine.   CT Abdomen Pelvis w/o Contrast    Narrative    EXAM: CT ABDOMEN PELVIS W/O CONTRAST  LOCATION: [facility]  DATE/TIME: 5/31/2021 1:36 AM    INDICATION: Flank pain, kidney stone suspected  COMPARISON: 04/04/2020  TECHNIQUE: CT scan of the abdomen and pelvis was performed without IV contrast. Multiplanar reformats were obtained. Dose reduction techniques were used.  CONTRAST: None.    FINDINGS:   LOWER CHEST: No basilar infiltrates.    HEPATOBILIARY: Cholecystectomy.    PANCREAS: Unremarkable    SPLEEN: Unremarkable    ADRENAL GLANDS: Unremarkable    KIDNEYS/BLADDER: Numerous tiny bilateral calcifications in the 1 mm size range. The majority are favored to represent vascular calcifications. No evidence of ureterolithiasis or bladder stones. Multiple pelvic phleboliths.    BOWEL: No bowel obstruction.    LYMPH NODES: 1.2 x 1.0 cm aortocaval node, image 108, unchanged..    VASCULATURE: Extensive vascular calcification. No abdominal aortic aneurysm    PELVIC ORGANS: Hysterectomy. No free fluid    MUSCULOSKELETAL: Subcutaneous stranding in the lower abdomen bilaterally, unchanged.  No fluid collections. Advanced lower lumbar spine facet arthropathy.      Impression    IMPRESSION:   1.  Extensive bilateral renal vascular calcifications without evidence of obstruction, ureterolithiasis, or bladder stones.     US Lower Extremity Venous Duplex Bilateral    Narrative    US LOWER EXTREMITY VENOUS DUPLEX BILATERAL 5/31/2021 9:34 AM    CLINICAL HISTORY: Rule out deep venous thrombosis. Elevated D-dimer.     TECHNIQUE: Venous Duplex ultrasound of bilateral lower extremities  with and without compression, augmentation and duplex. Color flow and  spectral Doppler with waveform analysis performed.    COMPARISON: None.    FINDINGS: Exam includes the common femoral, femoral, popliteal veins  as well as segmentally visualized deep calf veins and greater  saphenous vein.     RIGHT: Occlusive deep venous thrombosis posterior tibial vein is  noted. Remaining deep veins right lower extremity are free of  intraluminal thrombus. No superficial thrombophlebitis. No popliteal  cyst.    LEFT: Occlusive deep venous thrombosis posterior tibial vein is noted.  Remaining deep veins in the left lower extremity are free of  intraluminal thrombus. No superficial thrombophlebitis. No popliteal  cyst.      Impression    IMPRESSION:  1.  Bilateral posterior tibial vein deep venous thromboses. No  evidence of superficial thrombophlebitis.    PAULINE RAMIREZ MD   Cardiac Catheterization    Narrative      3rd Mrg lesion is 55% stenosed.    Dist Cx lesion is 50% stenosed.    Ost Cx to Prox Cx lesion is 30% stenosed.    Mid LAD to Dist LAD lesion is 20% stenosed.    Dist LAD-1 lesion is 90% stenosed.    Dist LAD-2 lesion is 60% stenosed.    Prox LAD lesion is 30% stenosed.    Prox RCA lesion is 50% stenosed.     1. NSTEMI due to severe distal LAD disease s/p treatment with 1 drug   eluting stent (Synergy [id number], postdilated with 2.5mm NC)  2. Moderate diffuse coronary atherosclerosis involving the distal LAD,   distal LCx, and  entire RCA  3. Patent overlapping stents in the mid-distal LAD with mild ISR  4. Patent stent in the proximal RCA with moderate ISR       Medications     - MEDICATION INSTRUCTIONS -       - MEDICATION INSTRUCTIONS -       - MEDICATION INSTRUCTIONS -       heparin Stopped (05/31/21 1330)     - MEDICATION INSTRUCTIONS -       - MEDICATION INSTRUCTIONS -       Percutaneous Coronary Intervention orders placed (this is information for BPA alerting)       ACE/ARB/ARNI NOT PRESCRIBED       ACE/ARB/ARNI NOT PRESCRIBED       ASPIRIN NOT PRESCRIBED       sodium chloride         aspirin  81 mg Oral Daily     atorvastatin  40 mg Oral Daily     carvedilol  6.25 mg Oral BID     insulin aspart  1-6 Units Subcutaneous Q4H     insulin glargine  15 Units Subcutaneous QAM AC     levothyroxine  150 mcg Oral QAM AC     nicotine  1 patch Transdermal Daily     nicotine   Transdermal Q8H     sodium chloride (PF)  3 mL Intracatheter Q8H     ticagrelor  90 mg Oral Q12H

## 2021-05-31 NOTE — H&P
"[facility]    History and Physical - Hospitalist Service       Date of Admission:  5/30/2021    Assessment & Plan   Rowan Leiva is a 61 year old female admitted on 5/30/2021. She presents as a direct admission from [facility] emergency department with several days of chest pain, and what she describes as 2 weeks of \"lung pain\"      Non-ST elevation myocardial infarction: Patient with known coronary artery disease status post stenting x3 approximately 2 years ago in [place].  She is not on aspirin or Plavix.  She had bleeding historically as well as GI upset with aspirin.  2 to 3 days of central chest discomfort radiating to her jaw.  Worse with laying flat, improves with sitting up.  Troponin elevated at 10.981.  Not on aspirin, statin, or antihypertensive therapy, ongoing tobacco use.  -Heparin drip  -Reinitiating aspirin 81 mg enteric-coated; monitor for bleeding  -TTE  -Cardiology consulted, anticipate need for coronary angiogram either over the holiday or Tuesday pending ability to control discomfort  -Atorvastatin 40 mg daily with lipid panel pending for a.m.  -Initiating carvedilol 6.25 mg twice daily.  Holding on lisinopril given elevated creatinine with unclear current baseline    Cough, chest pain: Patient describes cough with sputum production.  Has a chronic cough associated with her ongoing tobacco use, though she believes in the past 2 weeks this is worsened.  Tells me that she finds herself coughing violently, and with this, has started to note some streaks of blood on mucus, though not gross hemoptysis.  Consider bronchitis, pulmonary edema from cardiac disease resulting in cough or worsened cough associated with decreased tobacco use, though this has been over the past year or so.  Mildly elevated D-dimer.  No lower extremity swelling or clear precipitant for possible DVT or PE such as immobilization or recent surgery, trauma.  -Nuclear medicine VQ scan in a.m. " low suspicion for pulmonary embolism  -Low-dose heparin for NSTEMI as above    Uncontrolled type 2 diabetes: Blood glucose in the 300 range currently.  Takes glipizide, Metformin, and insulin at home  -Medium dose sliding scale insulin every 4 hours while n.p.o.  -Lantus 15 units every morning; typically on 30 units at bedtime and will likely need to increase dose  -Hemoglobin A1c pending    Hypertension: Systolic blood pressures in the 180, 190s range.  Historically on metoprolol following her heart disease and stenting 2 years ago, though was no longer on any antihypertensives  -Initiating carvedilol 6.25 mg twice daily; heart rates are in the 80s and blood pressures are in the 170s.  Likely will tolerate additional beta-blockade  -Holding on initiation of ACE inhibitor given elevated creatinine at 1.8    Stage III chronic kidney disease.  Possible acute kidney injury, though this is unclear currently given no recent baseline: Likely some chronic kidney disease associated with uncontrolled hypertension and diabetes.  Creatinine of 1.87, was 1.33 4/4/2020  -UA with microscopy pending  -Noncontrast CT of abdomen and pelvis given elevated creatinine, microscopic hematuria, left flank pain and left lower quadrant pain which might represent nephrolithiasis, potentially obstructive.  Patient does have a history of nephrolithiasis in the past  -Intake and output, daily weights  -1 L normal saline bolus over 2 hours to reassess for improvement in creatinine in a.m; anticipate patient will require coronary angiography for NSTEMI as above    Hyperlipidemia: Untreated.  -Atorvastatin, lipid panel for a.m.    Ongoing tobacco use disorder with nicotine dependence: Early contemplative in terms of cessation.  Previously a 2 pack/day smoker since age 15, over the past year has been smoking 1 pack/day.  -Nicotine patch in place  -Continue to encourage tobacco cessation.  Discussed importance of this with patient as well as   at bedside    Hypothyroidism:  -TSH pending, anticipate resumption of prior to admission levothyroxine       Diet:   regular adult diet, NPO in AM  DVT Prophylaxis: heparin ggt  Perez Catheter: in place, indication:    Code Status:  Full code         Disposition Plan   Expected discharge: 2 - 3 days, recommended to prior living arrangement once Cardiac work-up complete.  Entered: Michael Xiao MD 05/30/2021, 10:39 PM     The patient's care was discussed with the Patient and Patient's  at bedside, accepting physician, Dr. Carlin, bedside nurse.    Michael Xiao MD  [facility]  Contact information available via [facility] Paging/Directory      ______________________________________________________________________    Chief Complaint   Cough, jaw pain    History is obtained from patient, chart review, review of outside records including Cardiology report from 2011 where patient had a mid right RCA stent placed at [facility] in [place], prior to this in a staged intervention with LAD stenting    History of Present Illness   Rowan Leiva is a 61 year old female who presents as a direct admission from [facility]note 3[facility] emergency department with 2 weeks of cough, chest pain radiating to her jaw when laying flat, and is found to have an elevated troponin.    Patient has a history of type 2 diabetes, longstanding tobacco use history, history of early coronary artery disease with her father having a heart attack in his early 50s.  She has a history of coronary artery disease with RCA and mid LAD stenting in 2011 in [place] in a staged procedure.  She is on medications for her diabetes, hypothyroidism, though is not on any antihypertensive agents, statin therapies, or aspirin or Plavix.  She tells me that historically she had poor follow-up and was on aspirin and Plavix for 1 year after her stent.  Discussed that this is actually typical treatment, though she was under the  "impression that it should have been stopped sooner and her failure to follow-up led her to remain on these medications.  Patient subsequently was on aspirin alone, she tells me that she was having stomach upset, was switched to enteric-coated aspirin and subsequently had issues with diarrhea.  She also had an episode of rectal bleeding for which she was seen at [facility].  She tells me that she stopped aspirin after this.  It appears that in 2015, she had an episode of abdominal pain and hematochezia.  CT at that time with bowel wall thickening including transverse, descending, and sigmoid colon.  It was thought most likely infectious or inflammatory etiology, though with patient's vascular calcifications and CAD history, some question that this might have actually represented ischemic colitis.  Patient tells me that she was not recommended to have a colonoscopy, and when her care team later suggested that she have one, she did not receive a referral for colonoscopy so never had evaluation of her GI bleeding, and did not resume aspirin.  Patient tells me that she is not on a statin because she has friends who have had poor outcomes or side effects associated with statin therapy in the past.  She tells me that historically she was on metoprolol, though it is unclear as to why she is no longer on blood pressure or rate controlling medications    Patient tells me that for the past 2 weeks, she has had a cough.  She has a chronic cough associated with her smoking, though has had a more pronounced cough which she describes as more harsh and forced.  Is productive for some yellow sputum, though recently has had some streaks of blood in this.  She does not report any gross hemoptysis, only blood with mucus which is likely epithelial/small vessel injury associated with her coughing.  With this, she tells me that she has \"lung pain.\"  She describes pain deep paraspinal left flank.  With CVA percussion she had some " tenderness, though not significant.  Not reproducible with palpation of chest wall.  Some pleuritic component to her deep inspiration.  D-dimer was mildly elevated at outside emergency department.    Cardiology was made aware of patient at outside emergency department.  In the setting of elevated D-dimer and report of cough with chest/back discomfort in the weeks prior to onset of chest pain, a stat VQ scan was requested as patient's creatinine is elevated in the 1.8 range.  Unfortunately, these are not available overnight, and will need to be obtained in the morning.    Patient has a history of microscopic hematuria in the past for which she tells me she has undergone cystoscopy, it appears at least in 2014.  She also has a history of nephrolithiasis.  She does not recall passing any stones recently, though has creatinine elevated at 1.8.  No clear baseline, though most recently was 1.33 1-year ago.  Does have diabetes and hypertension which do not appear adequately controlled.  Does have some left lower quadrant abdominal tenderness which might be related to obstructive uropathy/renal stone is also with some left flank discomfort.    For the past 2 to 3 days, patient tells me that when she lays down to sleep at night, she has had some anterior chest pain into her jaw.  Similar discomfort associated with her coronary artery disease resulting in stenting of RCA and mid LAD in 2011.  EKG has some biphasic T waves in V3-5 which is concerning for recurrence of LAD disease, especially with troponins elevated in the 10 range.  Possible that patient has some heart failure from late presentation myocardial infarction resulting in orthopnea or hypoxia associated with her laying flat as this appears to be the primary precipitant for her onset of jaw pain episodes.     Patient has no recent viral illness other than her cough for 2 weeks, nothing preceding this to suggest pleuritis or myopericarditis, though possible.  Lives  with her , he is also not had a recent URI or viral illness.  No fevers or chills.    Review of Systems    The 10 point Review of Systems is negative other than noted in the HPI or here.  No fevers or chills  No gross hematuria  No diarrhea    Past Medical History    I have reviewed this patient's medical history and updated it with pertinent information if needed.   Past Medical History:   Diagnosis Date     Cancer of thyroid (H)      Chest pain      Coronary artery disease     Stents-     HX OF OVARIAN MALIGNANCY      Hyperlipidemia      Hypertension      Hypothyroidism      Malignant neoplasm of thyroid gland (H)     papillary carcinoma; resection      Mild intermittent asthma     minimal symptoms, albuterol use 2x/year     Myocardial infarction (H)     anterior     Pulmonary nodule      Thrombocytopenia (H)      Tobacco use disorder      Type II or unspecified type diabetes mellitus without mention of complication, not stated as uncontrolled        Past Surgical History   I have reviewed this patient's surgical history and updated it with pertinent information if needed.  Past Surgical History:   Procedure Laterality Date     BLADDER SURGERY       C ANESTH, SECTION       C LIGATE FALLOPIAN TUBE      Tubal Ligation     C TOTAL ABDOM HYSTERECTOMY      ovarian cancer (low grade) - Heme/Onc     CHOLECYSTECTOMY       CYSTOSCOPY  2014    Procedure: CYSTOSCOPY;  Surgeon: Sabino Jamil MD;  Location: [facility] THYROIDECTOMY  2000    papillary thyroid carcinoma - Endocrine     HEART CATH, ANGIOPLASTY  11    2.5 X 18 mm & 2.25 X 18 mm Xience ELLIOT to Mid LAD     HEART CATH, ANGIOPLASTY  11    Staged-3.0 X 23 mm Xience ELLIOT to Mid RAC     SLING TRANSVAGINAL N/A 2014    Procedure: SLING TRANSVAGINAL;  Surgeon: Sabino Jamil MD;  Location: [facility]       Social History   I have reviewed this patient's social history and updated it with  pertinent information if needed.  Social History     Tobacco Use     Smoking status: Current Every Day Smoker     Packs/day: 2.00     Years: 24.00     Pack years: 48.00     Types: Cigarettes     Smokeless tobacco: Never Used     Tobacco comment: Strongly recommended quitting at each visit.   Substance Use Topics     Alcohol use: No     Alcohol/week: 0.0 standard drinks     Drug use: No       Family History   I have reviewed this patient's family history and updated it with pertinent information if needed.  Family History   Problem Relation Age of Onset     Blood Disease Sister         Hepatitis B-alive     Cancer Maternal Aunt         bronchial tubes, neck-     Cancer Maternal Uncle         glands in neck-     Cancer Maternal Grandfather         lungs-     Diabetes Father              Heart Disease Father         2 heartattacks-     Diabetes Sister              Diabetes Other         -cousins     Diabetes Paternal Grandmother              Diabetes Paternal Grandfather              Diabetes Paternal Aunt              Hypertension Sister         alive     Thyroid Disease Sister         both sisters-alive     Thyroid Disease Other         niece-alive       Prior to Admission Medications   Prior to Admission Medications   Prescriptions Last Dose Informant Patient Reported? Taking?   ALBUTEROL 90 MCG/ACT IN AERS   No No   Si-2 puffs Q 2-4 hrs prn with spacer - HFA   LANCETS REGULAR MISC   No No   Sig: use twice a day for blood sugar   glipiZIDE (GLUCOTROL) 10 MG tablet   Yes No   Sig: Take 10 mg by mouth daily.   insulin glargine (LANTUS SOLOSTAR) 100 UNIT/ML injection   Yes No   Sig: Inject 30 Units Subcutaneous At Bedtime    insulin lispro (HUMALOG KWIKPEN) 100 UNIT/ML (1 unit dial) KWIKPEN   Yes No   Sig: Inject 18 Units Subcutaneous 2 times daily (before meals) Breakfast and Dinner   insulin lispro (HUMALOG KWIKPEN) 100 UNIT/ML (1  unit dial) KWIKPEN   Yes No   Sig: Inject 5 Units Subcutaneous daily (before lunch)   levothyroxine (SYNTHROID, LEVOTHROID) 50 MCG tablet   Yes No   Sig: Take 150 mcg by mouth every morning (before breakfast)       Facility-Administered Medications: None     Allergies   Allergies   Allergen Reactions     No Known Drug Allergies        Physical Exam   Vital Signs: Temp: 98.7  F (37.1  C) Temp src: Oral BP: (!) 170/94 Pulse: 87   Resp: 16 SpO2: 99 % O2 Device: None (Room air)      General Appearance: Obese 61-year-old female resting comfortably in bed.  She does not appear toxic or in acute distress  Eyes: No scleral icterus or injection  HEENT: Raspy voice, normocephalic, atraumatic  Respiratory: Breath sounds are clear bilaterally to auscultation.  No wheezes, no rub, no crackles.  No overt splinting with good effort  Cardiovascular: Regular rate and rhythm.  Patient does not have a murmur that is appreciable, either diastolic or systolic  GI: Abdomen obese, soft, nontender to palpation other than mild discomfort to palpation of left lower quadrant.  No guarding, no palpable mass  Lymph/Hematologic: No lower extremity edema  Genitourinary: Patient with mild tenderness to percussion of left CVA  Skin: No rashes or petechiae.  Several tattoos  Musculoskeletal: Muscular tone bulk intact and appropriate for age  Neurologic: Alert, conversant, appropriate in conversation.  Mental status grossly intact.  Psychiatric: Pleasant, normal affect    Data   Data reviewed today: I reviewed all medications, new labs and imaging results over the last 24 hours. I personally reviewed the Abdomen/pelvis CT, EKG image(s) showing No evidence of hydronephrosis or renal stone on abdomen pelvis CT, prior cholecystectomy clips.  EKG with biphasic T waves in V3, 4, 5..    Recent Labs   Lab 05/31/21  0326 05/30/21  2315 05/30/21  1820   WBC  --   --  13.5*   HGB  --   --  11.7   MCV  --   --  79   PLT  --   --  90*   NA  --   --  137    POTASSIUM  --   --  3.4   CHLORIDE  --   --  104   CO2  --   --  27   BUN  --   --  31*   CR  --   --  1.87*   ANIONGAP  --   --  6   BERNY  --   --  8.6   GLC  --   --  131*   TROPI 11.693* 13.871* 10.981*

## 2021-06-01 ENCOUNTER — APPOINTMENT (OUTPATIENT)
Dept: OCCUPATIONAL THERAPY | Facility: CLINIC | Age: 61
DRG: 246 | End: 2021-06-01
Attending: INTERNAL MEDICINE
Payer: COMMERCIAL

## 2021-06-01 ENCOUNTER — APPOINTMENT (OUTPATIENT)
Dept: NUCLEAR MEDICINE | Facility: CLINIC | Age: 61
DRG: 246 | End: 2021-06-01
Attending: INTERNAL MEDICINE
Payer: COMMERCIAL

## 2021-06-01 ENCOUNTER — APPOINTMENT (OUTPATIENT)
Dept: GENERAL RADIOLOGY | Facility: CLINIC | Age: 61
DRG: 246 | End: 2021-06-01
Attending: INTERNAL MEDICINE
Payer: COMMERCIAL

## 2021-06-01 LAB
ALBUMIN SERPL-MCNC: 2.5 G/DL (ref 3.4–5)
ALP SERPL-CCNC: 113 U/L (ref 40–150)
ALT SERPL W P-5'-P-CCNC: 16 U/L (ref 0–50)
ANION GAP SERPL CALCULATED.3IONS-SCNC: 6 MMOL/L (ref 3–14)
APTT PPP: 170 SEC (ref 22–37)
APTT PPP: 86 SEC (ref 22–37)
APTT PPP: 97 SEC (ref 22–37)
AST SERPL W P-5'-P-CCNC: 54 U/L (ref 0–45)
BASOPHILS # BLD AUTO: 0 10E9/L (ref 0–0.2)
BASOPHILS # BLD AUTO: 0 10E9/L (ref 0–0.2)
BASOPHILS NFR BLD AUTO: 0.3 %
BASOPHILS NFR BLD AUTO: 0.3 %
BILIRUB DIRECT SERPL-MCNC: <0.1 MG/DL (ref 0–0.2)
BILIRUB SERPL-MCNC: 0.3 MG/DL (ref 0.2–1.3)
BUN SERPL-MCNC: 30 MG/DL (ref 7–30)
CALCIUM SERPL-MCNC: 7.9 MG/DL (ref 8.5–10.1)
CHLORIDE SERPL-SCNC: 107 MMOL/L (ref 94–109)
CO2 SERPL-SCNC: 24 MMOL/L (ref 20–32)
CREAT SERPL-MCNC: 2.03 MG/DL (ref 0.52–1.04)
CREAT UR-MCNC: 60 MG/DL
DEPRECATED CALCIDIOL+CALCIFEROL SERPL-MC: 12 UG/L (ref 20–75)
DIFFERENTIAL METHOD BLD: ABNORMAL
DIFFERENTIAL METHOD BLD: ABNORMAL
EOSINOPHIL # BLD AUTO: 0.2 10E9/L (ref 0–0.7)
EOSINOPHIL # BLD AUTO: 0.2 10E9/L (ref 0–0.7)
EOSINOPHIL NFR BLD AUTO: 1.8 %
EOSINOPHIL NFR BLD AUTO: 1.8 %
ERYTHROCYTE [DISTWIDTH] IN BLOOD BY AUTOMATED COUNT: 14.6 % (ref 10–15)
ERYTHROCYTE [DISTWIDTH] IN BLOOD BY AUTOMATED COUNT: 14.7 % (ref 10–15)
FOLATE SERPL-MCNC: 7.1 NG/ML
GFR SERPL CREATININE-BSD FRML MDRD: 26 ML/MIN/{1.73_M2}
GLUCOSE BLDC GLUCOMTR-MCNC: 144 MG/DL (ref 70–99)
GLUCOSE BLDC GLUCOMTR-MCNC: 213 MG/DL (ref 70–99)
GLUCOSE SERPL-MCNC: 166 MG/DL (ref 70–99)
HCT VFR BLD AUTO: 30.2 % (ref 35–47)
HCT VFR BLD AUTO: 30.3 % (ref 35–47)
HGB BLD-MCNC: 9.7 G/DL (ref 11.7–15.7)
HGB BLD-MCNC: 9.8 G/DL (ref 11.7–15.7)
IMM GRANULOCYTES # BLD: 0.1 10E9/L (ref 0–0.4)
IMM GRANULOCYTES # BLD: 0.1 10E9/L (ref 0–0.4)
IMM GRANULOCYTES NFR BLD: 0.6 %
IMM GRANULOCYTES NFR BLD: 0.7 %
INTERPRETATION ECG - MUSE: NORMAL
INTERPRETATION ECG - MUSE: NORMAL
LYMPHOCYTES # BLD AUTO: 1.2 10E9/L (ref 0.8–5.3)
LYMPHOCYTES # BLD AUTO: 1.5 10E9/L (ref 0.8–5.3)
LYMPHOCYTES NFR BLD AUTO: 10.5 %
LYMPHOCYTES NFR BLD AUTO: 12.3 %
MCH RBC QN AUTO: 25.3 PG (ref 26.5–33)
MCH RBC QN AUTO: 25.3 PG (ref 26.5–33)
MCHC RBC AUTO-ENTMCNC: 32.1 G/DL (ref 31.5–36.5)
MCHC RBC AUTO-ENTMCNC: 32.3 G/DL (ref 31.5–36.5)
MCV RBC AUTO: 78 FL (ref 78–100)
MCV RBC AUTO: 79 FL (ref 78–100)
MONOCYTES # BLD AUTO: 0.6 10E9/L (ref 0–1.3)
MONOCYTES # BLD AUTO: 0.6 10E9/L (ref 0–1.3)
MONOCYTES NFR BLD AUTO: 5.2 %
MONOCYTES NFR BLD AUTO: 5.3 %
NEUTROPHILS # BLD AUTO: 9.3 10E9/L (ref 1.6–8.3)
NEUTROPHILS # BLD AUTO: 9.5 10E9/L (ref 1.6–8.3)
NEUTROPHILS NFR BLD AUTO: 79.7 %
NEUTROPHILS NFR BLD AUTO: 81.5 %
NRBC # BLD AUTO: 0 10*3/UL
NRBC # BLD AUTO: 0 10*3/UL
NRBC BLD AUTO-RTO: 0 /100
NRBC BLD AUTO-RTO: 0 /100
PLATELET # BLD AUTO: 55 10E9/L (ref 150–450)
PLATELET # BLD AUTO: 71 10E9/L (ref 150–450)
POTASSIUM SERPL-SCNC: 3.9 MMOL/L (ref 3.4–5.3)
PROT SERPL-MCNC: 6.7 G/DL (ref 6.8–8.8)
PROT UR-MCNC: 2.35 G/L
PROT/CREAT 24H UR: 3.9 G/G CR (ref 0–0.2)
RBC # BLD AUTO: 3.84 10E12/L (ref 3.8–5.2)
RBC # BLD AUTO: 3.87 10E12/L (ref 3.8–5.2)
RETICS # AUTO: 78.3 10E9/L (ref 25–95)
RETICS/RBC NFR AUTO: 2 % (ref 0.5–2)
SODIUM SERPL-SCNC: 137 MMOL/L (ref 133–144)
UFH PPP CHRO-ACNC: 0.44 IU/ML
UFH PPP CHRO-ACNC: 0.63 IU/ML
UFH PPP CHRO-ACNC: NORMAL IU/ML
VIT B12 SERPL-MCNC: 499 PG/ML (ref 193–986)
WBC # BLD AUTO: 11.4 10E9/L (ref 4–11)
WBC # BLD AUTO: 12 10E9/L (ref 4–11)

## 2021-06-01 PROCEDURE — 85730 THROMBOPLASTIN TIME PARTIAL: CPT | Performed by: HOSPITALIST

## 2021-06-01 PROCEDURE — 93005 ELECTROCARDIOGRAM TRACING: CPT

## 2021-06-01 PROCEDURE — 36415 COLL VENOUS BLD VENIPUNCTURE: CPT | Performed by: HOSPITALIST

## 2021-06-01 PROCEDURE — 85045 AUTOMATED RETICULOCYTE COUNT: CPT | Performed by: INTERNAL MEDICINE

## 2021-06-01 PROCEDURE — 84165 PROTEIN E-PHORESIS SERUM: CPT | Mod: 26 | Performed by: PATHOLOGY

## 2021-06-01 PROCEDURE — 99223 1ST HOSP IP/OBS HIGH 75: CPT | Performed by: INTERNAL MEDICINE

## 2021-06-01 PROCEDURE — 85730 THROMBOPLASTIN TIME PARTIAL: CPT | Performed by: INTERNAL MEDICINE

## 2021-06-01 PROCEDURE — 99233 SBSQ HOSP IP/OBS HIGH 50: CPT | Performed by: HOSPITALIST

## 2021-06-01 PROCEDURE — 86335 IMMUNFIX E-PHORSIS/URINE/CSF: CPT | Mod: TC | Performed by: INTERNAL MEDICINE

## 2021-06-01 PROCEDURE — 97165 OT EVAL LOW COMPLEX 30 MIN: CPT | Mod: GO | Performed by: OCCUPATIONAL THERAPIST

## 2021-06-01 PROCEDURE — 36415 COLL VENOUS BLD VENIPUNCTURE: CPT | Performed by: INTERNAL MEDICINE

## 2021-06-01 PROCEDURE — 84166 PROTEIN E-PHORESIS/URINE/CSF: CPT | Mod: 26 | Performed by: PATHOLOGY

## 2021-06-01 PROCEDURE — 250N000011 HC RX IP 250 OP 636: Performed by: INTERNAL MEDICINE

## 2021-06-01 PROCEDURE — 82784 ASSAY IGA/IGD/IGG/IGM EACH: CPT | Performed by: INTERNAL MEDICINE

## 2021-06-01 PROCEDURE — 250N000012 HC RX MED GY IP 250 OP 636 PS 637: Performed by: HOSPITALIST

## 2021-06-01 PROCEDURE — 99232 SBSQ HOSP IP/OBS MODERATE 35: CPT | Performed by: INTERNAL MEDICINE

## 2021-06-01 PROCEDURE — 84166 PROTEIN E-PHORESIS/URINE/CSF: CPT | Mod: TC | Performed by: INTERNAL MEDICINE

## 2021-06-01 PROCEDURE — 80076 HEPATIC FUNCTION PANEL: CPT | Performed by: INTERNAL MEDICINE

## 2021-06-01 PROCEDURE — 999N001109 HC STATISTIC MORPHOLOGY W/INTERP HISTOLOGY TC 85060: Performed by: INTERNAL MEDICINE

## 2021-06-01 PROCEDURE — 99232 SBSQ HOSP IP/OBS MODERATE 35: CPT | Mod: 25 | Performed by: INTERNAL MEDICINE

## 2021-06-01 PROCEDURE — 85025 COMPLETE CBC W/AUTO DIFF WBC: CPT | Performed by: INTERNAL MEDICINE

## 2021-06-01 PROCEDURE — 210N000002 HC R&B HEART CARE

## 2021-06-01 PROCEDURE — 343N000001 HC RX 343: Performed by: HOSPITALIST

## 2021-06-01 PROCEDURE — 250N000013 HC RX MED GY IP 250 OP 250 PS 637: Performed by: STUDENT IN AN ORGANIZED HEALTH CARE EDUCATION/TRAINING PROGRAM

## 2021-06-01 PROCEDURE — 86334 IMMUNOFIX E-PHORESIS SERUM: CPT | Mod: TC | Performed by: INTERNAL MEDICINE

## 2021-06-01 PROCEDURE — 85520 HEPARIN ASSAY: CPT | Performed by: INTERNAL MEDICINE

## 2021-06-01 PROCEDURE — 250N000013 HC RX MED GY IP 250 OP 250 PS 637: Performed by: HOSPITALIST

## 2021-06-01 PROCEDURE — 80048 BASIC METABOLIC PNL TOTAL CA: CPT | Performed by: INTERNAL MEDICINE

## 2021-06-01 PROCEDURE — 86335 IMMUNFIX E-PHORSIS/URINE/CSF: CPT | Mod: 26 | Performed by: PATHOLOGY

## 2021-06-01 PROCEDURE — 86334 IMMUNOFIX E-PHORESIS SERUM: CPT | Mod: 26 | Performed by: PATHOLOGY

## 2021-06-01 PROCEDURE — 71046 X-RAY EXAM CHEST 2 VIEWS: CPT

## 2021-06-01 PROCEDURE — 250N000013 HC RX MED GY IP 250 OP 250 PS 637: Performed by: INTERNAL MEDICINE

## 2021-06-01 PROCEDURE — 97535 SELF CARE MNGMENT TRAINING: CPT | Mod: GO | Performed by: OCCUPATIONAL THERAPIST

## 2021-06-01 PROCEDURE — 82746 ASSAY OF FOLIC ACID SERUM: CPT | Performed by: HOSPITALIST

## 2021-06-01 PROCEDURE — 78580 LUNG PERFUSION IMAGING: CPT

## 2021-06-01 PROCEDURE — 82607 VITAMIN B-12: CPT | Performed by: INTERNAL MEDICINE

## 2021-06-01 PROCEDURE — 999N001017 HC STATISTIC GLUCOSE BY METER IP

## 2021-06-01 PROCEDURE — A9540 TC99M MAA: HCPCS | Performed by: HOSPITALIST

## 2021-06-01 PROCEDURE — 999N001036 HC STATISTIC TOTAL PROTEIN: Performed by: INTERNAL MEDICINE

## 2021-06-01 PROCEDURE — 84156 ASSAY OF PROTEIN URINE: CPT | Performed by: INTERNAL MEDICINE

## 2021-06-01 PROCEDURE — 84165 PROTEIN E-PHORESIS SERUM: CPT | Mod: TC | Performed by: INTERNAL MEDICINE

## 2021-06-01 PROCEDURE — 97110 THERAPEUTIC EXERCISES: CPT | Mod: GO | Performed by: OCCUPATIONAL THERAPIST

## 2021-06-01 PROCEDURE — 86022 PLATELET ANTIBODIES: CPT | Performed by: INTERNAL MEDICINE

## 2021-06-01 PROCEDURE — 85060 BLOOD SMEAR INTERPRETATION: CPT | Performed by: PATHOLOGY

## 2021-06-01 RX ORDER — CLOPIDOGREL BISULFATE 75 MG/1
75 TABLET ORAL DAILY
Status: DISCONTINUED | OUTPATIENT
Start: 2021-06-03 | End: 2021-06-04 | Stop reason: HOSPADM

## 2021-06-01 RX ORDER — CLOPIDOGREL BISULFATE 75 MG/1
300 TABLET ORAL ONCE
Status: COMPLETED | OUTPATIENT
Start: 2021-06-02 | End: 2021-06-02

## 2021-06-01 RX ORDER — CALCIUM CARBONATE 500 MG/1
500 TABLET, CHEWABLE ORAL 2 TIMES DAILY PRN
Status: DISCONTINUED | OUTPATIENT
Start: 2021-06-01 | End: 2021-06-04 | Stop reason: HOSPADM

## 2021-06-01 RX ADMIN — ATORVASTATIN CALCIUM 40 MG: 40 TABLET, FILM COATED ORAL at 08:47

## 2021-06-01 RX ADMIN — ARGATROBAN 1 MCG/KG/MIN: 50 INJECTION INTRAVENOUS at 13:43

## 2021-06-01 RX ADMIN — PANTOPRAZOLE SODIUM 40 MG: 40 TABLET, DELAYED RELEASE ORAL at 16:10

## 2021-06-01 RX ADMIN — PANTOPRAZOLE SODIUM 40 MG: 40 TABLET, DELAYED RELEASE ORAL at 08:47

## 2021-06-01 RX ADMIN — ASPIRIN 81 MG: 81 TABLET, COATED ORAL at 08:47

## 2021-06-01 RX ADMIN — CARVEDILOL 6.25 MG: 6.25 TABLET, FILM COATED ORAL at 08:47

## 2021-06-01 RX ADMIN — TICAGRELOR 90 MG: 90 TABLET ORAL at 08:47

## 2021-06-01 RX ADMIN — INSULIN ASPART 1 UNITS: 100 INJECTION, SOLUTION INTRAVENOUS; SUBCUTANEOUS at 12:42

## 2021-06-01 RX ADMIN — ONDANSETRON 4 MG: 4 TABLET, ORALLY DISINTEGRATING ORAL at 22:38

## 2021-06-01 RX ADMIN — INSULIN ASPART 1 UNITS: 100 INJECTION, SOLUTION INTRAVENOUS; SUBCUTANEOUS at 17:31

## 2021-06-01 RX ADMIN — NICOTINE 1 PATCH: 21 PATCH, EXTENDED RELEASE TRANSDERMAL at 08:47

## 2021-06-01 RX ADMIN — INSULIN GLARGINE 30 UNITS: 100 INJECTION, SOLUTION SUBCUTANEOUS at 22:00

## 2021-06-01 RX ADMIN — LEVOTHYROXINE SODIUM 150 MCG: 75 TABLET ORAL at 08:47

## 2021-06-01 RX ADMIN — KIT FOR THE PREPARATION OF TECHNETIUM TC 99M ALBUMIN AGGREGATED 3.5 MILLICURIE: 2.5 INJECTION, POWDER, FOR SOLUTION INTRAVENOUS at 10:41

## 2021-06-01 RX ADMIN — CARVEDILOL 6.25 MG: 6.25 TABLET, FILM COATED ORAL at 22:00

## 2021-06-01 ASSESSMENT — ACTIVITIES OF DAILY LIVING (ADL)
ADLS_ACUITY_SCORE: 11

## 2021-06-01 NOTE — PROVIDER NOTIFICATION
MD Notification    Notified Person: MD    Notified Person Name:  Salliejai    Notification Date/Time:2135    Notification Interaction:amcom web page    Purpose of Notification:pt room 246-1 L.V.: can pts fluids resume, pt does not have adequate intake post cath procedure. please advise. thank you     Orders Received:    Comments:

## 2021-06-01 NOTE — PLAN OF CARE
Neuro- A/O x4 CMS intact, numbness in thumb resolved   Most Recent Vitals- Temp: 97.8  F (36.6  C) Temp src: Oral BP: (!) 152/76 Pulse: 80   Resp: 16 SpO2: 95 % O2 Device: Nasal cannula Oxygen Delivery: 2 LPM  Tele/Cardiac- SR w/ inverted T waves   Resp- room air, but 2L NC applied for C/o SOB when after coughing   Activity- SBA, independent   Pain- C/o mild pain to chest, but able to sleep, PRN pain meds offered for comfort but declined   Drips- IV infusing NS @75 until 0700 this am to assist w/ Cr improvement, also heparin restarted @2100 infusing 1550 units, next hep10A 1045  Skin- WDL ex R. Wrist puncture, CDI w/ bandaid covering   GI/- voiding via BR adequately   Aggression Color- Green  Plan- possible VQ scan this am, monitor BG, and Hemoc.Onc consult today   Misc- scant blood tinged sputum intermittently throughout night.     NATHAN JACOBO, RN

## 2021-06-01 NOTE — PLAN OF CARE
OT/CR: attempted x2, pt declined initially as getting break fast soon and would like to wait, 2nd attempt, pt out of room for a scan.

## 2021-06-01 NOTE — PROGRESS NOTES
Consult dictated. Patient with CAD, DVT and thrombocytopenia. Patient oh heparin.    Plan:  -HIT screen.  -Stop heparin.  -Start argatroban.

## 2021-06-01 NOTE — PROGRESS NOTES
"[facility]    Medicine Progress Note - Hospitalist Service       Date of Admission:  5/30/2021  Assessment & Plan       Rowan Leiva is a 61 year old female admitted on 5/30/2021. She presents as a direct admission from [facility] emergency department with several days of chest pain, and what she describes as 2 weeks of \"lung pain\"     NSTEMI s/p stenting to LAD 5/31/21  Patient with known coronary artery disease status post stenting x3 approximately 2 years ago in [place].  She is not on aspirin or Plavix.  She had bleeding historically as well as GI upset with aspirin.  2 to 3 days of central chest discomfort radiating to her jaw.  Worse with laying flat, improves with sitting up.  Troponin elevated at 10.981.  Not on aspirin, statin, or antihypertensive therapy, ongoing tobacco use.  - Heparin drip started on admission, cardiology consulted, patient getting coronary angiogram currently (report noted, Coronary angiogram report reviewed, shows 90% occluded distal LAD, treated with ELLIOT x1.  Non flow-limiting otherwise in other vessels  - Aspirin 81 mg enteric-coated re initiated, Brillinta added after angiogram, monitor for bleeding  - Troponin peaked at 13, and downtrending now, TTE report pending, per cardiology, has hypokinetic distal septal wall motion abnormality as well as severely hypokinetic basal inferior/inferior lateral wall motion abnormality.  LV systolic function mildly depressed.  - Atorvastatin 40 mg daily initiated, fasting lipid panel noted.  - Carvedilol 6.25 mg twice daily initiated.  - No ACEI given CKD with possible HERNESTO  - Cardiology and Hem/Onc determining ideal antiplatelet/anticoag regimen given the complex case with DVT, likely PE, and probable HIT      Cough, hemoptysis, suspected PE  Bilateral posterior tibial vein occlusive DVT   Patient describes cough with sputum production. Has a chronic cough associated with her ongoing tobacco use, though she " believes in the past 2 weeks this is worsened. She finds herself coughing violently at times and this has led to  some streaks of blood on mucus. No gross hemoptysis despite starting heparin. ? bronchitis, pulmonary edema from cardiac disease resulting in cough. Mildly elevated D-dimer.   - denies leg pain or swelling. Sedentary habit and reports she sits in chair for prolonged period of time.   - US showed bilateral DVT as above.  - Hematology following and appreciated - HIT panel pending, plt 55k down from 90k and has been on heparin.   - Per Hem, transitioned from heparin to argatroban.      Uncontrolled type 2 diabetes  Blood glucose in the 300 range at presentation.  Takes glipizide, Metformin, and insulin at home  - continue PTA Lantus 30 units at bedtime)   - Will add Premeal NovoLog 1 unit per 10 g CHO  - Change sliding scale to high insulin resistance once diet resumed  - Continue to hold Metformin, discontinue at discharge given CKD and her elevated baseline creatinine  - Resume glipizide from morning if tolerates p.o. and no further procedure planned  - Hemoglobin A1c 7.9%     Hypertension  Systolic blood pressures in the 180, 190s range at presentation.  Historically on metoprolol following her heart disease and stenting 2 years ago, though was no longer on any antihypertensives  - Initiating carvedilol 6.25 mg twice daily on admission, titrate as tolerated, BP better now but not optimal.  - Holding on initiation of ACE inhibitor given elevated creatinine at 1.8 - 2.0     CKD III  Microscopic hematuria and proteinuria  Possible acute kidney injury, though this is unclear currently given no recent baseline: Likely some chronic kidney disease associated with uncontrolled hypertension and diabetes.  Creatinine of 1.87, was 1.33 4/4/2020. UA with microscopy shows proteinuria and microscopic hematuria.  - Noncontrast CT of abdomen and pelvis given elevated creatinine, microscopic hematuria, left flank pain  and left lower quadrant pain which might represent nephrolithiasis, potentially obstructive.  Patient does have a history of nephrolithiasis in the past  - Intake and output, daily weights  - Received IV fluid bolus at presentation.  Continue with maintenance IVF since patient is getting contrast with angiogram  - Nephrology consulted, appreciate assistance.  - Patient reports that she had cystoscopy as part of work-up for hematuria and was normal.  - Daily BMP     Anemia and thrombocytopenia  Mild leukocytosis  Noted platelet count of 32 and 40 4K in 2012 and was evaluated by oncology, suspected it was due to acute infection.  Recovered to 159 in 2015 but since then it is running slightly lower than normal, 1 .  Presented with a platelet count of 90 K and is a stable.  - Also hemoglobin 11.7 at presentation, trended down to 10.4 this AM.  Patient has only streaks of blood with sputum.  Prior history of GI bleeding but denies any hematochezia or melena currently.  - Iron panel, B12 folate and peripheral smear ordered  - PPI twice daily  - Monitor for signs of bleeding  - Hematology consulted, discussed with Dr. Miller - HIT pending  - Leukocytosis could be due to stress.  Afebrile.  CXR negative.   Monitor.     Hyperlipidemia  Previously not on statin.  - Atorvastatin, lipid panel for a.m.     Ongoing tobacco use disorder with nicotine dependence  Early contemplative in terms of cessation.  Previously a 2 pack/day smoker since age 15, over the past year has been smoking 1 pack/day.  - Nicotine patch in place  - Continue to encourage tobacco cessation.  Discussed importance of this with patient      Hypothyroidism  - TSH normal, PTA levothyroxine resumed    COVID 19 screening   - PCR negative 5/30      Diet: Moderate Consistent CHO Diet    DVT Prophylaxis: Pneumatic Compression Devices  Perez Catheter: not present  Code Status: Full Code           Disposition Plan   Expected discharge: likely another couple days  to monitor Plt, and solidify plans with antiplatelet/anticoag per Hematology and Cardiology  Entered: Kemar Alvarez MD 06/01/2021, 12:26 PM       The patient's care was discussed with the Bedside Nurse, Patient, Patient's Family and Hem and Cardio Consultant.    Kemar Alvarez MD  Hospitalist Service  [facility]  Contact information available via [facility] Paging/Directory    ______________________________________________________________________    Interval History   Care assumed today.  Patient seen and examined.  Spouse at bedside.  Overall no new complaint-no new shortness of breath or chest pain.  Still with some coughing overnight with some red tinge on paper.  Hematology and cardiology appreciated.  Possible HIT and now switched to argatroban.  We will continue to monitor closely.    Data reviewed today: I reviewed all medications, new labs and imaging results over the last 24 hours. I personally reviewed no images or EKG's today.    Physical Exam   Vital Signs: Temp: 98.3  F (36.8  C) Temp src: Oral BP: (!) 153/75 Pulse: 80   Resp: 16 SpO2: 92 % O2 Device: None (Room air) Oxygen Delivery: 2 LPM  Weight: 187 lbs 3.2 oz    Gen: NAD, pleasant  HEENT: Normocephalic, EOMI, MMM  Resp: no crackles,  no wheezes, no increased work of resp  CV: S1S2 heard, reg rhythm, reg rate, no pedal edema  Abdo: soft, nontender, nondistended, bowel sounds present  Ext: calves nontender, well perfused  Neuro: AAOx3, CN grossly intact, no facial asymmetry      Data   Recent Labs   Lab 06/01/21  1230 06/01/21  1227 06/01/21  0345 05/31/21  0626 05/31/21  0326 05/30/21  2315 05/30/21  1820   WBC 12.0*  --  11.4*  --  12.4*  --  13.5*   HGB 9.7*  --  9.8*  --  10.4*  --  11.7   MCV 79  --  78  --  78  --  79   PLT 71*  --  55*  --  90*  --  90*   NA  --   --  137 140  --   --  137   POTASSIUM  --   --  3.9 3.8  --   --  3.4   CHLORIDE  --   --  107 109  --   --  104   CO2  --   --  24 27  --   --  27   BUN   --   --  30 29  --   --  31*   CR  --   --  2.03* 1.96*  --   --  1.87*   ANIONGAP  --   --  6 4  --   --  6   BERNY  --   --  7.9* 8.3*  --   --  8.6   GLC  --   --  166* 120*  --   --  131*   ALBUMIN  --  2.5*  --  2.5*  --   --   --    PROTTOTAL  --  6.7*  --  6.6*  --   --   --    BILITOTAL  --  0.3  --  0.2  --   --   --    ALKPHOS  --  113  --  111  --   --   --    ALT  --  16  --  18  --   --   --    AST  --  54*  --  67*  --   --   --    TROPI  --   --   --  9.323* 11.693* 13.871* 10.981*     Recent Results (from the past 24 hour(s))   XR Chest 2 Views    Narrative    XR CHEST 2 VW 6/1/2021 10:25 AM    HISTORY: PE suspected, low/intermediate prob, positive D-dimer; chest  pain, sob    COMPARISON: 5/30/2021      Impression    IMPRESSION: Slight increase in linear and patchy opacities in the left  upper lobe and linear interstitial opacities in the lung bases which  could represent infection, edema or scarring. No pleural effusion or  pneumothorax. Normal heart size. Tortuous aorta with atherosclerotic  calcifications. Right upper quadrant surgical clips.    KERRY NOWAK MD   NM Lung Scan Perfusion Particulate    Narrative    EXAM: NM LUNG SCAN PERFUSION PARTICULATE  LOCATION: [facility]  DATE/TIME: 6/1/2021 10:38 AM    INDICATION: PE suspected, low/intermediate prob, positive D-dimer. DVT.  COMPARISON: CXR 06/01/2021.  TECHNIQUE: 3.5 mCi technetium-99m MAA, IV. Standard lung perfusion imaging.    FINDINGS: Few small-to-moderate size bilateral perfusion defects in the peripheral mid lungs without radiographic correlate. Findings meet nondiagnostic perfusion-only criteria for assessment of pulmonary embolism.      Impression    IMPRESSION:   Study nondiagnostic for pulmonary embolism.

## 2021-06-01 NOTE — PROGRESS NOTES
06/01/21 1342   Quick Adds   Type of Visit Initial Occupational Therapy Evaluation   Living Environment   People in home spouse   Current Living Arrangements house   Home Accessibility stairs to enter home;stairs within home   Number of Stairs, Main Entrance 8   Number of Stairs, Within Home, Primary   (12-15 stairs to basement doesn't gp dpwm/ )   Transportation Anticipated car, drives self   Self-Care   Regular Exercise No   General Information   Onset of Illness/Injury or Date of Surgery 05/30/21   Referring Physician Himanshu Lui MD   Patient/Family Therapy Goal Statement (OT) home   Additional Occupational Profile Info/Pertinent History of Current Problem Ms. Rowna Leiva is a 61-year-old female with multiple medical problems including hypertension, hyperlipidemia and coronary artery disease.  The patient presented to the Emergency Room on 05/30/2021 because of chest pain, shortness of breath, cough and mild hemoptysis, which has been going on for about 10-14 days.  NSTEMI, s/p PCI to severe distal LAD lesion on 5/31/21. pt has Bilateral DVT, (-) for PE's   Existing Precautions/Restrictions cardiac  (MI precautions, B LE DVT's, pt on anticougulants)   Heart Disease Risk Factors Smoking;Diabetes;High blood pressure;Lack of physical activity;Dislipidemia;Overweight;Stress;Family history;Medical history   Cognitive Status Examination   Orientation Status orientation to person, place and time   Affect/Mental Status (Cognitive) WNL   Follows Commands WNL   Sensory   Sensory Quick Adds No deficits were identified   Pain Assessment   Patient Currently in Pain No   Transfers   Transfer Comments Pt independent with ADl's and functional mobility   Instrumental Activities of Daily Living (IADL)   Previous Responsibilities work;driving;meal prep;housekeeping;shopping;yardwork;medication management;finances   IADL Comments pt has been working at home since COVID, was supposed to be back into work starting today, pt  reports work is stressful, 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   Clinical Impression   Criteria for Skilled Therapeutic Interventions Met (OT) yes   OT Diagnosis impaired safety with strenuous IADL's.    OT Problem List-Impairments impacting ADL problems related to;activity tolerance impaired;post-surgical precautions   Assessment of Occupational Performance 1-3 Performance Deficits   Identified Performance Deficits impaired activity tolerance and MI precautions.   Planned Therapy Interventions (OT) risk factor education;progressive activity/exercise;home program guidelines   Clinical Decision Making Complexity (OT) low complexity   Therapy Frequency (OT) 2x/day   Predicted Duration of Therapy 3 days   Risk & Benefits of therapy have been explained evaluation/treatment results reviewed;care plan/treatment goals reviewed;risks/benefits reviewed;current/potential barriers reviewed;participants voiced agreement with care plan;participants included;patient   OT Discharge Planning    OT Discharge Recommendation (DC Rec) Home with assist;home with outpatient cardiac rehab   OT Rationale for DC Rec REcommend A with strenuous IADl's, ie yard work, vaccuming, heavier laundry, etc and OP CR at [facility] for monitored progressive exercise and risk factor education and modification.    Total Evaluation Time (Minutes)   Total Evaluation Time (Minutes) 10

## 2021-06-01 NOTE — CONSULTS
Consult Date: 06/01/2021    This consult has been requested by Dr. Barros for thrombocytopenia.      Ms. Rowan Leiva is a 61-year-old female with multiple medical problems including hypertension, hyperlipidemia and coronary artery disease.  The patient presented to the Emergency Room on 05/30/2021 with chest pain, shortness of breath, cough and mild hemoptysis, which has been going on for about 10-14 days.  The patient had multiple investigations done.  -WBC of 13.5, hemoglobin of 11.7 and platelet of 90.  -Creatinine of 1.87.  -Elevated troponin.  -Elevated D-dimer.  -COVID-19 negative.  -Chest x-ray was normal.    The patient admitted to the hospital.  The patient was evaluated by Cardiology.  The patient had coronary angiogram done on 05/31/2021.  There was a stenosis of multiple vessels.  Stents were placed.    Multiple other imaging studies done in the hospital.  CT abdomen and pelvis reveals extensive bilateral renal vascular calcification.  Lower extremity ultrasound on 05/31/2021 reveals bilateral posterior tibial vein DVT.  Because of chest pain and hemoptysis, CT chest angiogram was planned, but could not be done because of elevated creatinine.  The patient had a V/Q scan done today, which is nondiagnostic.    The patient denies any previous history of blood clot.  The patient did mention that in the past she had been on blood thinners for cardiac indication.    I discussed regarding thrombocytopenia.  She denies any history of blood disorder.  Labs through Care Everywhere reviewed.  In November 2012 she had severe thrombocytopenia.  She likely had some cardiac procedure done at that time.  On 04/10/2015, platelet mildly low at 139.  On 04/04/2020 it was 111.    Overall, she does not feel good.  She feels weak.  No severe headache.  No severe dizziness.  No visual problem.  No sore throat.  No neck pain.  She has pain in the chest space mainly on the left side.  Pain has improved only mildly after stent  placement.  Some abdominal discomfort.  Some nausea.  No vomiting.  No urinary complaints.  No bowel problem.  Denies blood in the urine or stool.    I discussed regarding hemoptysis.  The patient says that for about last 2 weeks, intermittently she coughs up tiny amount of blood. It happens 2-3 times a day.  She is a chronic smoker.    All other review of systems negative.    ALLERGIES:  REVIEWED.    MEDICATIONS:  Reviewed.    PAST MEDICAL HISTORY:   1.  Thyroid cancer, status post thyroidectomy in .  She also had radioiodine treatment.  Details not available.  2.  History of ovarian cancer. ( ?The patient said that she was told to have precancerous condition).  3.  Coronary artery disease, status post stent placement.  4.  Hypertension.  5.  Hyperlipidemia.  6.  Hypothyroidism.  7.  Asthma.  8.  Lung nodule.  9.  Thrombocytopenia.  10.  Diabetes mellitus.  11.  .   12.  Hysterectomy.  13.  Cholecystectomy.  14.  Total thyroidectomy.  15.  Bladder surgery.    SOCIAL HISTORY:    -She is a smoker of 1-1/2 a pack per day for almost 45 years.  -No alcohol abuse.    PHYSICAL EXAMINATION:    GENERAL:  The patient is alert and oriented x3.  VITAL SIGNS:  Reviewed.  Not in acute distress.  The rest of the systems not examined.    LABORATORY DATA:  Reviewed.    ASSESSMENT:   1.  A 61-year-old female with thrombocytopenia.  Platelet have decreased by about 50% during this hospitalization.  2.  Bilateral lower extremity deep venous thrombosis.  3.  Coronary artery disease, status post NSTEMI, status post stent placement.  4.  Chronic kidney disease.  5.  History of thyroid cancer.  6.  Normocytic anemia.  7.  Multiple other medical problems including hypertension, diabetes mellitus and hypothyroidism.    RECOMMENDATIONS:   1.  Discussed with the patient regarding her thrombocytopenia. She has chronic thrombocytopenia.  In 2020, platelet was 111.  On admission, it was 90.    I discussed with her regarding  different causes of thrombocytopenia.  It can be due to decreased production or increased destruction.  In last 24 hours, platelet has decreased from 90 to 55.  This is typically seen with consumption or increased destruction. For workup of thrombocytopenia, multiple workups has been done.  Vitamin B12 and folate are normal.  SPEP is pending.    I explained to the patient that I am concerned regarding heparin-induced thrombocytopenia.  The patient previously has been exposed to heparin.  Also, she was started on heparin on admission on 2021.  We need to rule out heparin-induced thrombocytopenia.    Labs for heparin-induced thrombocytopenia will be sent.      2. I am going to treat her for heparin-induced thrombocytopenia and thrombosis for now. The patient does have confirmed bilateral lower extremity DVT.  She likely has pulmonary embolism.  She is on heparin drip.  We will discontinue that we will start her on argatroban drip.  She is agreeable for it.    3.  The patient has anemia.  Hemoglobin has decreased since admission.  She has multiple reasons for anemia including chronic kidney disease, anemia of chronic disease and mild hemoptysis.  We will monitor her. PRBC will be transfused if hemoglobin goes below 8.  She had iron studies checked.  She is not iron deficient.    4.  The patient had few questions, which were all answered.  Hematology/Oncology will continue to follow. Case discussed with Dr. Kemar Alvarez.    Thanks for the consult.    Total time spent 90 minutes.    Lexa Miller MD        D: 2021   T: 2021   MT: [id number]    Name:     EDVIN MERLOS  MRN:      [id number]        Account:      [id number]   :      1960           Consult Date: 2021     Document: [id number]

## 2021-06-01 NOTE — PROGRESS NOTES
Nephrology Progress Note  06/01/2021         Assessment & Recommendations:   Adm 5/30 with NSTEMI     1 CKD 3B - baseline S.Cr o f~1.5 . Presumed diabetic nephropathy / HTN. 3.9 gm/g on UPCR.  UA with mod blood and 6 RBC / hpf, which is chronic. CT abd without suspicious lesions other than vascular calcifications.    - urologic w/u with Dr Dos Santos in 2019 for hematuria was neg. Passed some kidney stones last year.   - low suspicion for active GN. Given proteinuria, with longstanding hematuria - will get limited vasculitis panel (GABE, ANCA, C3, C4, hepatitis serology, SIFE) .   - will need to establish nephrology follow up on a routine basis post discharge for CKD care. Will benefit from RASi and SGLT2i therapy down the line once acute issues are over.     2 T2 Dm with poor control   3 HPL  4 NSTEMI . EF 55%, mod LVH  5 HTN   6 Presumed HERNESTO - adm with Cr of 1.87  7 CAD/ NSTEMI - s/p PCI with severe distal LAD lesion on 5/31/21. On DAPT, betablocker, statin  8 B/L LE DVT - V/Q scan results pending. Hem/onc on board.   9 Thrombocytopenia  - chronic. Now worse on heparin/ DAPT      Lauren Anglin MD  [facility] - Nephrology   [phone]      Interval History :   Seen / examined.   Feels well. Eager to go home  On heparin drip   No chest pain, dyspnea. Comfortable.     Review of Systems:   A 4 point review of systems was negative except as noted above.  Notably: fair appetite.  no nausea or vomiting or diarrhea.  no confusion,  no fever or chills    Physical Exam:   I/O last 3 completed shifts:  In: 1090 [P.O.:240; I.V.:850]  Out: 200 [Urine:200]    GENERAL APPEARANCE: alert and no distress  EYES:  no scleral icterus, pupils equal  PULM: lungs clear to auscultation, equal air movement, no cyanosis or clubbing  CV: regular rhythm, normal rate, no rub     -JVP -     -edema -   GI: soft, non tender,   NEURO: mentation intact and speech normal, no asterixis       Labs:   All labs reviewed by me  Electrolytes/Renal  -   Recent Labs   Lab Test 06/01/21  0345 05/31/21  1446 05/31/21  0626 05/30/21  1820     --  140 137   POTASSIUM 3.9  --  3.8 3.4   CHLORIDE 107  --  109 104   CO2 24  --  27 27   BUN 30  --  29 31*   CR 2.03*  --  1.96* 1.87*   *  --  120* 131*   BERNY 7.9*  --  8.3* 8.6   PHOS  --  3.7  --   --        CBC -   Recent Labs   Lab Test 06/01/21  0345 05/31/21  0326 05/30/21  1820   WBC 11.4* 12.4* 13.5*   HGB 9.8* 10.4* 11.7   PLT 55* 90* 90*       LFTs -   Recent Labs   Lab Test 05/31/21  0626 04/10/15  1023 01/08/15   ALKPHOS 111 198* 169   BILITOTAL 0.2 0.2 0.28   ALT 18 14 19   AST 67* 16 11   PROTTOTAL 6.6* 7.4 7.8   ALBUMIN 2.5* 3.2* 3.2       Iron Panel -   Recent Labs   Lab Test 05/31/21  1446   IRON 37   IRONSAT 21   NORBERTO 233         Current Medications:    aspirin  81 mg Oral Daily     atorvastatin  40 mg Oral Daily     carvedilol  6.25 mg Oral BID     insulin aspart   Subcutaneous TID AC     insulin aspart  1-10 Units Subcutaneous TID AC     insulin aspart  1-7 Units Subcutaneous At Bedtime     insulin glargine  30 Units Subcutaneous At Bedtime     levothyroxine  150 mcg Oral QAM AC     nicotine  1 patch Transdermal Daily     nicotine   Transdermal Q8H     pantoprazole  40 mg Oral BID AC     sodium chloride (PF)  3 mL Intracatheter Q8H     ticagrelor  90 mg Oral Q12H       - MEDICATION INSTRUCTIONS -       - MEDICATION INSTRUCTIONS -       - MEDICATION INSTRUCTIONS -       heparin 1,550 Units/hr (06/01/21 0728)     - MEDICATION INSTRUCTIONS -       Percutaneous Coronary Intervention orders placed (this is information for BPA alerting)       ACE/ARB/ARNI NOT PRESCRIBED       ACE/ARB/ARNI NOT PRESCRIBED       ASPIRIN NOT PRESCRIBED       Lauren Anglin MD

## 2021-06-01 NOTE — PROGRESS NOTES
"[facility] Cardiology Progress Note  Date of Service: 06/01/2021  Primary Cardiologist: Was Dr. Bradford    Rowan Leiva is a 61 year old female with CAD s/p PCI, progressive diabetic renal disease, poorly-controlled DMII, PAD and ongoing tobacco use, who was admitted on 5/30/2021 with NSTEMI.    Subjective: Worsening hemoptysis and \"lung pain.\"     Assessment:  1. NSTEMI, s/p PCI to severe distal LAD lesion on 5/31/21. Moderate diffuse coronary atherosclerosis involving the distal LAD, distal LCx, and entire RCA, Patent overlapping stents in the mid-distal LAD with mild ISR, and Patent stent in the proximal RCA with moderate ISR.   -- Placed on DAPT with Brilinta.    -- Statin, beta blocker initiated.   2. Bilateral DVT, suspected PE, Chronic thrombocytopenia. Hematology consulted. V/Q scan planned for today. On heparin.   3. Advanced renal disease/diabetic nephropathy, creat fairly stable today at 2. Dr. Dean is following.  4. Vasculopathy, poorly controlled diabetes.   5. Ongoing tobacco use.   6. HLP,  on no meds.     Plan:   1. Will need to discuss with hematology which anticoagulation/antiplatelet regimen will be most appropriate for Rowan. Very likely, we will need to switch her from Brilinta to Plavix and stop aspirin.   2. Continue statin, beta blocker. Ultimate ACE-I at nephrology's direction.  3. Cardiac rehab.   4. Smoking cessation advised.  5. Will arrange cardiology follow-up in [place].     Viktoria Mcdonough PA-C  [facility]  Pager: [phone]  Text Page  (7:30am - 4pm M-F)   __________________________________________________________________________    Physical Exam   Temp: 98.3  F (36.8  C) Temp src: Oral BP: (!) 153/75 Pulse: 80   Resp: 16 SpO2: 92 % O2 Device: Nasal cannula Oxygen Delivery: 2 LPM  Vitals:    05/31/21 0600   Weight: 84.9 kg (187 lb 3.2 oz)       GENERAL:  The patient is in no apparent distress.   NECK: CVP " appears normal, no masses or thyromegaly.  PULMONARY:  There is a normal respiratory effort. Clear lungs to auscultation bilaterally.   CARDIOVASCULAR:  RRR, normal S1 S2, no m/r/g.  GI:  Non tender abdomen with normoactive bowel sounds and no hepatosplenomegaly. There are no masses palpable.   EXTREMITIES:  No clubbing, cyanosis or edema.  VASCULAR: 2+ Pulses bilaterally in upper and lower extremities.    Medications     - MEDICATION INSTRUCTIONS -       - MEDICATION INSTRUCTIONS -       - MEDICATION INSTRUCTIONS -       heparin 1,550 Units/hr (06/01/21 0728)     - MEDICATION INSTRUCTIONS -       Percutaneous Coronary Intervention orders placed (this is information for BPA alerting)       ACE/ARB/ARNI NOT PRESCRIBED       ACE/ARB/ARNI NOT PRESCRIBED       ASPIRIN NOT PRESCRIBED         aspirin  81 mg Oral Daily     atorvastatin  40 mg Oral Daily     carvedilol  6.25 mg Oral BID     insulin aspart   Subcutaneous TID AC     insulin aspart  1-10 Units Subcutaneous TID AC     insulin aspart  1-7 Units Subcutaneous At Bedtime     insulin glargine  30 Units Subcutaneous At Bedtime     levothyroxine  150 mcg Oral QAM AC     nicotine  1 patch Transdermal Daily     nicotine   Transdermal Q8H     pantoprazole  40 mg Oral BID AC     sodium chloride (PF)  3 mL Intracatheter Q8H     ticagrelor  90 mg Oral Q12H       Data   Most Recent 3 CBC's:  Recent Labs   Lab Test 06/01/21  0345 05/31/21  0326 05/30/21  1820   WBC 11.4* 12.4* 13.5*   HGB 9.8* 10.4* 11.7   MCV 78 78 79   PLT 55* 90* 90*     Most Recent 3 BMP's:  Recent Labs   Lab Test 06/01/21  0345 05/31/21  0626 05/30/21  1820    140 137   POTASSIUM 3.9 3.8 3.4   CHLORIDE 107 109 104   CO2 24 27 27   BUN 30 29 31*   CR 2.03* 1.96* 1.87*   ANIONGAP 6 4 6   BERNY 7.9* 8.3* 8.6   * 120* 131*     Most Recent 3 Troponin's:  Recent Labs   Lab Test 05/31/21  0626 05/31/21  0326 05/30/21  2315   TROPI 9.323* 11.693* 13.871*

## 2021-06-01 NOTE — CONSULTS
"NUTRITION EDUCATION    REASON FOR ASSESSMENT:  Nutrition education on American Heart Association (AHA) Heart Healthy Diet.    NUTRITION HISTORY:  Information obtained from patient.   - Rowan attempts to count carbs, then dose insulin accordingly. She she had not followed a specifically \"heart healthy\" diet in the past. Notes that she cares for her mother who is going through chemo, and her  who has a respiratory illness. She finds it is difficult to manage the nuances involved in cooking for all three of them. She states she would like to attend a nutrition class with her mother and .     CURRENT DIET ORDER:  Moderate consistent CHO diet     NUTRITION DIAGNOSIS:  Food- and nutrition-related knowledge deficit R/t heart healthy diet + diabetic diet AEB patient states she tries to count carbs, but does not actively follow a heart healthy diet.      INTERVENTIONS:  Nutrition Prescription:    Recommended AHA Heart Healthy Diet    Implementation:     Nutrition Education (Content):  a) reviewed Heart Healthy Diet guidelines  b) provided heart healthy diet handout    Nutrition Education (Application):  a) Discussed current eating habits and recommended alternative food choices    Anticipate good compliance    Diet Education - refer to Education flowsheet    Goals:    Patient verbalizes understanding of diet    All of the above goals met during education session    Follow Up/Monitoring:    Provided RD contact information for future questions    Entered OP referral to Certified Diabetic Educator at Rowan's request.       Randa Ray RD,   Heart [place], 68, 57, [facility]   Pager: [phone]  Weekend Pager: [phone]    "

## 2021-06-01 NOTE — CONSULTS
Antiplatelet coverage check.  Patient has CRAVE through an employer.    Brilinta $309.  Patient appears to have a deductible but details are unclear.  Upon receipt of RX, [facility] can provide 1 mo free, and copay savings card to decrease copay by $200 per month.   Prasugrel $9/mo.   Clopidogrel $3/mo.       -ANN High, Pharmacy Technician/Liaison, [facility] [phone]

## 2021-06-01 NOTE — PLAN OF CARE
A&O, VSS on room air. Tele: SR, HR 70's. Denies CP and SOB. Pt taken to cath lab today, x1 stent to LAD. R radial site, TR band removed, good pulses. C/o numbness in left thumb, improving with removal of TR band and IV switched to LUE. IVF running, pt tolerating oral intake. Due to void. Bilateral lower extremity DVTs found on ultrasound today, plan to restart heparin gtt @ 2115, 4 hours after hemostasis achieved per cardiology. No bolus. Possible still plan for VQ scan to r/o PE tomorrow. Hematology/Oncology to see patient tomorrow.

## 2021-06-01 NOTE — PROGRESS NOTES
MD Notification    Notified Person: MD    Notified Person Name: Dr. Walker     Notification Date/Time: 6/1/2021 6:01 PM    Notification Interaction: Paged     Purpose of Notification: Pt requesting TUMs if able, thank you.     Orders Received:     Comments:

## 2021-06-02 ENCOUNTER — APPOINTMENT (OUTPATIENT)
Dept: OCCUPATIONAL THERAPY | Facility: CLINIC | Age: 61
DRG: 246 | End: 2021-06-02
Attending: HOSPITALIST
Payer: COMMERCIAL

## 2021-06-02 LAB
ALBUMIN MFR UR ELPH: 68.3 %
ALBUMIN SERPL ELPH-MCNC: 2.7 G/DL (ref 3.7–5.1)
ALBUMIN SERPL-MCNC: 2.6 G/DL (ref 3.4–5)
ALPHA1 GLOB MFR UR ELPH: 6.2 %
ALPHA1 GLOB SERPL ELPH-MCNC: 0.4 G/DL (ref 0.2–0.4)
ALPHA2 GLOB MFR UR ELPH: 5.4 %
ALPHA2 GLOB SERPL ELPH-MCNC: 1 G/DL (ref 0.5–0.9)
ANION GAP SERPL CALCULATED.3IONS-SCNC: 8 MMOL/L (ref 3–14)
APTT PPP: 75 SEC (ref 22–37)
APTT PPP: 90 SEC (ref 22–37)
B-GLOBULIN MFR UR ELPH: 4.6 %
B-GLOBULIN SERPL ELPH-MCNC: 0.7 G/DL (ref 0.6–1)
BUN SERPL-MCNC: 30 MG/DL (ref 7–30)
CALCIUM SERPL-MCNC: 8.5 MG/DL (ref 8.5–10.1)
CHLORIDE SERPL-SCNC: 106 MMOL/L (ref 94–109)
CO2 SERPL-SCNC: 23 MMOL/L (ref 20–32)
CREAT SERPL-MCNC: 2.51 MG/DL (ref 0.52–1.04)
ERYTHROCYTE [DISTWIDTH] IN BLOOD BY AUTOMATED COUNT: 14.7 % (ref 10–15)
GAMMA GLOB MFR UR ELPH: 15.5 %
GAMMA GLOB SERPL ELPH-MCNC: 1 G/DL (ref 0.7–1.6)
GFR SERPL CREATININE-BSD FRML MDRD: 20 ML/MIN/{1.73_M2}
GLUCOSE BLDC GLUCOMTR-MCNC: 127 MG/DL (ref 70–99)
GLUCOSE BLDC GLUCOMTR-MCNC: 142 MG/DL (ref 70–99)
GLUCOSE BLDC GLUCOMTR-MCNC: 166 MG/DL (ref 70–99)
GLUCOSE BLDC GLUCOMTR-MCNC: 190 MG/DL (ref 70–99)
GLUCOSE SERPL-MCNC: 129 MG/DL (ref 70–99)
HCT VFR BLD AUTO: 28.6 % (ref 35–47)
HGB BLD-MCNC: 8.7 G/DL (ref 11.7–15.7)
HGB BLD-MCNC: 9 G/DL (ref 11.7–15.7)
IGA SERPL-MCNC: 283 MG/DL (ref 84–499)
IGG SERPL-MCNC: 905 MG/DL (ref 610–1616)
IGM SERPL-MCNC: 287 MG/DL (ref 35–242)
M PROTEIN MFR UR ELPH: 0 %
M PROTEIN SERPL ELPH-MCNC: 0 G/DL
MCH RBC QN AUTO: 24.9 PG (ref 26.5–33)
MCHC RBC AUTO-ENTMCNC: 31.5 G/DL (ref 31.5–36.5)
MCV RBC AUTO: 79 FL (ref 78–100)
PF4 HEPARIN CMPLX AB SER QL: NEGATIVE
PHOSPHATE SERPL-MCNC: 4.3 MG/DL (ref 2.5–4.5)
PLATELET # BLD AUTO: 61 10E9/L (ref 150–450)
POTASSIUM SERPL-SCNC: 3.9 MMOL/L (ref 3.4–5.3)
PROT ELPH PNL UR ELPH: NORMAL
PROT PATTERN SERPL ELPH-IMP: ABNORMAL
PROT PATTERN SERPL IFE-IMP: ABNORMAL
PROT PATTERN UR ELPH-IMP: ABNORMAL
RBC # BLD AUTO: 3.62 10E12/L (ref 3.8–5.2)
SODIUM SERPL-SCNC: 137 MMOL/L (ref 133–144)
WBC # BLD AUTO: 11.1 10E9/L (ref 4–11)

## 2021-06-02 PROCEDURE — 250N000013 HC RX MED GY IP 250 OP 250 PS 637: Performed by: INTERNAL MEDICINE

## 2021-06-02 PROCEDURE — 86255 FLUORESCENT ANTIBODY SCREEN: CPT | Performed by: INTERNAL MEDICINE

## 2021-06-02 PROCEDURE — 250N000012 HC RX MED GY IP 250 OP 636 PS 637: Performed by: HOSPITALIST

## 2021-06-02 PROCEDURE — 85027 COMPLETE CBC AUTOMATED: CPT | Performed by: INTERNAL MEDICINE

## 2021-06-02 PROCEDURE — 99232 SBSQ HOSP IP/OBS MODERATE 35: CPT | Performed by: INTERNAL MEDICINE

## 2021-06-02 PROCEDURE — 250N000013 HC RX MED GY IP 250 OP 250 PS 637: Performed by: PHYSICIAN ASSISTANT

## 2021-06-02 PROCEDURE — 999N001017 HC STATISTIC GLUCOSE BY METER IP

## 2021-06-02 PROCEDURE — 250N000011 HC RX IP 250 OP 636: Performed by: HOSPITALIST

## 2021-06-02 PROCEDURE — 86803 HEPATITIS C AB TEST: CPT | Performed by: INTERNAL MEDICINE

## 2021-06-02 PROCEDURE — 87340 HEPATITIS B SURFACE AG IA: CPT | Performed by: INTERNAL MEDICINE

## 2021-06-02 PROCEDURE — 80069 RENAL FUNCTION PANEL: CPT | Performed by: INTERNAL MEDICINE

## 2021-06-02 PROCEDURE — 86160 COMPLEMENT ANTIGEN: CPT | Performed by: INTERNAL MEDICINE

## 2021-06-02 PROCEDURE — 85730 THROMBOPLASTIN TIME PARTIAL: CPT | Performed by: INTERNAL MEDICINE

## 2021-06-02 PROCEDURE — 86038 ANTINUCLEAR ANTIBODIES: CPT | Performed by: INTERNAL MEDICINE

## 2021-06-02 PROCEDURE — 85730 THROMBOPLASTIN TIME PARTIAL: CPT | Performed by: HOSPITALIST

## 2021-06-02 PROCEDURE — 99233 SBSQ HOSP IP/OBS HIGH 50: CPT | Performed by: HOSPITALIST

## 2021-06-02 PROCEDURE — 85018 HEMOGLOBIN: CPT | Performed by: HOSPITALIST

## 2021-06-02 PROCEDURE — 36415 COLL VENOUS BLD VENIPUNCTURE: CPT | Performed by: INTERNAL MEDICINE

## 2021-06-02 PROCEDURE — 36415 COLL VENOUS BLD VENIPUNCTURE: CPT | Performed by: HOSPITALIST

## 2021-06-02 PROCEDURE — 97110 THERAPEUTIC EXERCISES: CPT | Mod: GO | Performed by: OCCUPATIONAL THERAPIST

## 2021-06-02 PROCEDURE — 250N000013 HC RX MED GY IP 250 OP 250 PS 637: Performed by: HOSPITALIST

## 2021-06-02 PROCEDURE — 99231 SBSQ HOSP IP/OBS SF/LOW 25: CPT | Performed by: INTERNAL MEDICINE

## 2021-06-02 PROCEDURE — 210N000002 HC R&B HEART CARE

## 2021-06-02 RX ADMIN — LEVOTHYROXINE SODIUM 150 MCG: 75 TABLET ORAL at 07:12

## 2021-06-02 RX ADMIN — INSULIN ASPART 3 UNITS: 100 INJECTION, SOLUTION INTRAVENOUS; SUBCUTANEOUS at 13:41

## 2021-06-02 RX ADMIN — PANTOPRAZOLE SODIUM 40 MG: 40 TABLET, DELAYED RELEASE ORAL at 16:20

## 2021-06-02 RX ADMIN — CLOPIDOGREL BISULFATE 300 MG: 75 TABLET ORAL at 08:22

## 2021-06-02 RX ADMIN — INSULIN GLARGINE 30 UNITS: 100 INJECTION, SOLUTION SUBCUTANEOUS at 09:47

## 2021-06-02 RX ADMIN — PANTOPRAZOLE SODIUM 40 MG: 40 TABLET, DELAYED RELEASE ORAL at 07:12

## 2021-06-02 RX ADMIN — ARGATROBAN 0.75 MCG/KG/MIN: 50 INJECTION INTRAVENOUS at 01:02

## 2021-06-02 RX ADMIN — INSULIN ASPART 1 UNITS: 100 INJECTION, SOLUTION INTRAVENOUS; SUBCUTANEOUS at 09:48

## 2021-06-02 RX ADMIN — CARVEDILOL 6.25 MG: 6.25 TABLET, FILM COATED ORAL at 08:21

## 2021-06-02 RX ADMIN — NICOTINE 1 PATCH: 21 PATCH, EXTENDED RELEASE TRANSDERMAL at 08:23

## 2021-06-02 RX ADMIN — ASPIRIN 81 MG: 81 TABLET, COATED ORAL at 08:20

## 2021-06-02 RX ADMIN — ATORVASTATIN CALCIUM 40 MG: 40 TABLET, FILM COATED ORAL at 08:20

## 2021-06-02 RX ADMIN — ARGATROBAN 0.5 MCG/KG/MIN: 50 INJECTION INTRAVENOUS at 17:11

## 2021-06-02 RX ADMIN — ALBUTEROL SULFATE 2 PUFF: 90 AEROSOL, METERED RESPIRATORY (INHALATION) at 04:12

## 2021-06-02 RX ADMIN — INSULIN ASPART 2 UNITS: 100 INJECTION, SOLUTION INTRAVENOUS; SUBCUTANEOUS at 16:49

## 2021-06-02 RX ADMIN — INSULIN GLARGINE 30 UNITS: 100 INJECTION, SOLUTION SUBCUTANEOUS at 21:32

## 2021-06-02 RX ADMIN — CARVEDILOL 6.25 MG: 6.25 TABLET, FILM COATED ORAL at 21:32

## 2021-06-02 ASSESSMENT — ACTIVITIES OF DAILY LIVING (ADL)
ADLS_ACUITY_SCORE: 11
ADLS_ACUITY_SCORE: 11
DEPENDENT_IADLS:: INDEPENDENT
ADLS_ACUITY_SCORE: 11

## 2021-06-02 ASSESSMENT — MIFFLIN-ST. JEOR: SCORE: 1396.23

## 2021-06-02 NOTE — PROGRESS NOTES
Nephrology Progress Note  06/02/2021         Assessment & Recommendations:   Adm 5/30 with NSTEMI     1 CKD 3B - baseline S.Cr o f~1.5 . Presumed diabetic nephropathy / HTN. 3.9 gm/g on UPCR.  UA with mod blood and 6 RBC / hpf, which is chronic. CT abd without suspicious lesions other than vascular calcifications.    - urologic w/u with Dr Dos Santos in 2019 for hematuria was neg. Passed some kidney stones last year.   - low suspicion for active GN. Given proteinuria, with longstanding hematuria - will get limited vasculitis panel (GABE, ANCA, C3, C4, hepatitis serology, SIFE) .   - will need to establish nephrology follow up on a routine basis post discharge for CKD care. Will benefit from RASi and SGLT2i therapy down the line once acute issues are over.     2 acute kidney injury-adm with Cr of 1.  Now worse, likely secondary to contrast nephropathy.  No clinical features of atheroembolic disease.  Nonoliguric.    2 T2 Dm with poor control   3 HPL  4 NSTEMI . EF 55%, mod LVH  5 HTN   7 CAD/ NSTEMI - s/p PCI with severe distal LAD lesion on 5/31/21. On DAPT, betablocker, statin  8 B/L LE DVT - V/Q scan results pending. Hem/onc on board.   9 Thrombocytopenia  - chronic. Now worse on heparin.  Heme-onc on board.  On argatroban drip.  Work-up for HIT.      Lauren Anglin MD  [facility] - Nephrology   [phone]      Interval History :   Seen / examined.   Denies any new complaints.  Frustrated that she has to stay longer.  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  Spotty epistaxis.  Creatinine bumped up to 2.5.  Nonoliguric.  Electrolytes okay.    Review of Systems:   A 4 point review of systems was negative except as noted above.  Notably: fair appetite.  no nausea or vomiting or diarrhea.  no confusion,  no fever or chills    Physical Exam:   I/O last 3 completed shifts:  In: 177.66 [I.V.:177.66]  Out: 250 [Urine:250]    GENERAL APPEARANCE: alert and no distress  EYES:   no scleral icterus, pupils equal  PULM: lungs clear to auscultation, equal air movement, no cyanosis or clubbing  CV: regular rhythm, normal rate, no rub     -JVP -     -edema -   GI: soft, non tender,   NEURO: mentation intact and speech normal, no asterixis       Labs:   All labs reviewed by me  Electrolytes/Renal -   Recent Labs   Lab Test 06/02/21  0919 06/01/21  0345 05/31/21  1446 05/31/21  0626    137  --  140   POTASSIUM 3.9 3.9  --  3.8   CHLORIDE 106 107  --  109   CO2 23 24  --  27   BUN 30 30  --  29   CR 2.51* 2.03*  --  1.96*   * 166*  --  120*   BERNY 8.5 7.9*  --  8.3*   PHOS 4.3  --  3.7  --        CBC -   Recent Labs   Lab Test 06/02/21  0530 06/01/21  1230 06/01/21  0345   WBC 11.1* 12.0* 11.4*   HGB 9.0* 9.7* 9.8*   PLT 61* 71* 55*       LFTs -   Recent Labs   Lab Test 06/02/21  0919 06/01/21  1227 05/31/21  0626 04/10/15  1023   ALKPHOS  --  113 111 198*   BILITOTAL  --  0.3 0.2 0.2   ALT  --  16 18 14   AST  --  54* 67* 16   PROTTOTAL  --  6.7* 6.6* 7.4   ALBUMIN 2.6* 2.5* 2.5* 3.2*       Iron Panel -   Recent Labs   Lab Test 05/31/21  1446   IRON 37   IRONSAT 21   NORBERTO 233         Current Medications:    atorvastatin  40 mg Oral Daily     carvedilol  6.25 mg Oral BID     [START ON 6/3/2021] clopidogrel  75 mg Oral Daily     insulin aspart   Subcutaneous TID AC     insulin aspart  1-10 Units Subcutaneous TID AC     insulin aspart  1-7 Units Subcutaneous At Bedtime     insulin glargine  30 Units Subcutaneous At Bedtime     levothyroxine  150 mcg Oral QAM AC     nicotine  1 patch Transdermal Daily     nicotine   Transdermal Q8H     pantoprazole  40 mg Oral BID AC     sodium chloride (PF)  3 mL Intracatheter Q8H       argatroban 0.5 mcg/kg/min (06/02/21 0812)     - MEDICATION INSTRUCTIONS -       - MEDICATION INSTRUCTIONS -       - MEDICATION INSTRUCTIONS -       - MEDICATION INSTRUCTIONS -       Percutaneous Coronary Intervention orders placed (this is information for BPA alerting)        ACE/ARB/ARNI NOT PRESCRIBED       ACE/ARB/ARNI NOT PRESCRIBED       ASPIRIN NOT PRESCRIBED       Lauren Anglin MD

## 2021-06-02 NOTE — PLAN OF CARE
[id number]: Patient alert and oriented, VSS on room air, denies pain. Up SBA, ambulated fuentes this evening. Argatroban infusing. Tolerating mod CHO diet, BG corrected with sliding scale insulin. Pt still having bloody nose/ bloody sputum, but not increasing. Discharge plan pending.

## 2021-06-02 NOTE — PLAN OF CARE
"Neuro- A&Ox4. Flat affect.   Most Recent Vitals- BP (!) 169/76 (BP Location: Left arm)   Pulse 82   Temp 98.7  F (37.1  C) (Oral)   Resp 16   Ht 1.626 m (5' 4.02\")   Wt 83.6 kg (184 lb 4.8 oz)   LMP 05/01/2000   SpO2 96%   BMI 31.62 kg/m      Tele/Cardiac- SR  Resp- 2L NC. Patient desat to 86% on room air. PRN albuterol inhaler given for shortness of breath. Coughing intermittently. Bloody phlegm coughed up.   Activity- Up with SBA due to IV pole. Steady  Pain- Chest discomfort associated with cough. Declined Tylenol this AM  Drips- Agatroban infuisng at 0.75 mcg/kg/min  Drains/Tubes- PIV infusing  Skin- R radial site from angio CDI  GI/- Voiding. No BM this shift.   Aggression Color- green  Plan- Plan for continued anticoagulation.      Jammie Encarnacion RN   "

## 2021-06-02 NOTE — PLAN OF CARE
Rowan is alert, oriented, pleasant. She denies chest pain, but does reports some discomfort with her cough and deep breathing. Small amount of bloody sputum. She had one episode of emesis, received Zofran. She vomited about 30 minutes after Coreg administration, but has PRN meds available for hypertension. Up independently. Argatroban infusing.

## 2021-06-02 NOTE — CONSULTS
Care Management Initial Consult    General Information  Assessment completed with: Patient.  Patient resides in a house with her spouse and her mother who has lived with them for a couple years and is currently receiving chemotherapy.   Patient has 5-6 steps to enter home and works full time.  Patient requesting assistance with establishing with new PCP due to her's leaving clinic.  Hospital follow up and new PCP scheduled:    Jun14 SHORT  with Brian Ruiz MD  Monday Jun 14, 2021 5:00 PM [facility] [place] [address] [place] [id number] [phone]     2:56 PM  Patient aware of above appointment and agreeable.    Type of CM/SW Visit: Initial Assessment    Primary Care Provider verified and updated as needed: Yes   Readmission within the last 30 days: no previous admission in last 30 days      Reason for Consult: discharge planning  Advance Care Planning: Advance Care Planning Reviewed: no concerns identified          Communication Assessment  Patient's communication style: spoken language (English or Bilingual)    Hearing Difficulty or Deaf: no   Wear Glasses or Blind: no    Cognitive  Cognitive/Neuro/Behavioral: WDL  Level of Consciousness: alert, lethargic  Arousal Level: opens eyes spontaneously  Orientation: oriented x 4  Mood/Behavior: calm, cooperative  Best Language: 0 - No aphasia  Speech: clear    Living Environment:   People in home: parent(s), spouse     Current living Arrangements: house      Able to return to prior arrangements: yes       Family/Social Support:  Care provided by: self  Provides care for: parent(s)  Marital Status:             Description of Support System: Supportive, Involved         Current Resources:   Patient receiving home care services: No     Community Resources: None  Equipment currently used at home: none  Supplies currently used at home: Diabetic Supplies    Employment/Financial:  Employment Status: employed  full-time        Financial Concerns: No concerns identified   Referral to Financial Counselor: No       Lifestyle & Psychosocial Needs:  Lifestyle     Physical activity     Days per week: 0 days     Minutes per session: 0 min     Stress: Rather much     Social Needs     Financial resource strain: Somewhat hard     Food insecurity     Worry: Never true     Inability: Never true     Transportation needs     Medical: No     Non-medical: No     Socioeconomic History     Marital status:      Spouse name: Clif     Number of children: 2     Years of education: 13     Highest education level: Not on file   Occupational History     Comment: [employer]   Relationships     Social connections     Talks on phone: More than three times a week     Gets together: Never     Attends Restorationist service: Never     Active member of club or organization: No     Attends meetings of clubs or organizations: Never     Relationship status: Not on file     Intimate partner violence     Fear of current or ex partner: No     Emotionally abused: No     Physically abused: No     Forced sexual activity: No     Tobacco Use     Smoking status: Current Every Day Smoker     Packs/day: 2.00     Years: 24.00     Pack years: 48.00     Types: Cigarettes     Smokeless tobacco: Never Used     Tobacco comment: Strongly recommended quitting at each visit.   Substance and Sexual Activity     Alcohol use: No     Alcohol/week: 0.0 standard drinks     Drug use: No     Sexual activity: Yes     Partners: Male     Birth control/protection: Female Surgical     Comment: Hysterectomy and tubal ligation       Functional Status:  Prior to admission patient needed assistance:   Dependent ADLs:: Independent  Dependent IADLs:: Independent       Mental Health Status:  Mental Health Status: No Current Concerns       Chemical Dependency Status:  Chemical Dependency Status: No Current Concerns             Values/Beliefs:  Spiritual, Cultural Beliefs, Moravian  Practices, Values that affect care: mayo Lopez RN  Care Coordinator  [facility]  [phone] (text or call)

## 2021-06-02 NOTE — PROGRESS NOTES
Cards quick note / chart-check:  Heme/onc managing thrombocytopenia and DVT/probable PE.  Pt is s/p PCI to distal LAD on 5/31. Switched from Brilinta to Plavix (with 1/2 loading dose) on 6/1 due to bleeding risk. From our standpoint, would be OK to stop aspirin now and continue Plavix + anticoagulation going forward.  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   Cardiology will sign off. Please call with questions/concerns.   Follow up has been arranged on 6/29 in [place].     Viktoria Mcdonough PA-C  [facility]  Pager: [phone]  Text Page  (7:30am - 4pm M-F)    Discussed with Dr. Alston.

## 2021-06-02 NOTE — PROGRESS NOTES
"[facility]    Medicine Progress Note - Hospitalist Service       Date of Admission:  5/30/2021  Assessment & Plan       Rowan Leiva is a 61 year old female admitted on 5/30/2021. She presents as a direct admission from [facility] emergency department with several days of chest pain, and what she describes as 2 weeks of \"lung pain\"     NSTEMI s/p stenting to LAD 5/31/21  Patient with known coronary artery disease status post stenting x3 approximately 2 years ago in [place].  She is not on aspirin or Plavix.  She had bleeding historically as well as GI upset with aspirin.  2 to 3 days of central chest discomfort radiating to her jaw.  Worse with laying flat, improves with sitting up.  Troponin elevated at 10.981.  Not on aspirin, statin, or antihypertensive therapy, ongoing tobacco use.  - Heparin drip started on admission, cardiology consulted, patient getting coronary angiogram currently (report noted, Coronary angiogram report reviewed, shows 90% occluded distal LAD, treated with ELLIOT x1.  Non flow-limiting otherwise in other vessels  - Aspirin 81 mg enteric-coated re initiated, Brillinta added after angiogram, monitor for bleeding  - Troponin peaked at 13, and downtrending now, TTE report pending, per cardiology, has hypokinetic distal septal wall motion abnormality as well as severely hypokinetic basal inferior/inferior lateral wall motion abnormality.  LV systolic function mildly depressed.  - Atorvastatin 40 mg daily initiated, fasting lipid panel noted.  - Carvedilol 6.25 mg twice daily initiated.  - No ACEI given CKD with possible HERNESTO  - Cardiology and Hem/Onc determining ideal antiplatelet/anticoag regimen given the complex case with DVT, likely PE, and probable HIT - now on Clopidogrel and argatroban  - Cardiology ok with stopping ASA (done)     Cough, hemoptysis, suspected PE  Bilateral posterior tibial vein occlusive DVT   Patient describes cough with sputum " production. Has a chronic cough associated with her ongoing tobacco use, though she believes in the past 2 weeks this is worsened. She finds herself coughing violently at times and this has led to  some streaks of blood on mucus. No gross hemoptysis despite starting heparin. ? bronchitis, pulmonary edema from cardiac disease resulting in cough. Mildly elevated D-dimer.   - denies leg pain or swelling. Sedentary habit and reports she sits in chair for prolonged period of time.   - US showed bilateral DVT as above.  - Hematology following and appreciated - HIT panel pending, plt 55k down from 90k and has been on heparin.   - Per Hem, transitioned from heparin to argatroban.      Anemia and thrombocytopenia  Mild leukocytosis  Noted platelet count of 32 and 40 4K in 2012 and was evaluated by oncology, suspected it was due to acute infection.  Recovered to 159 in 2015 but since then it is running slightly lower than normal, 1 .  Presented with a platelet count of 90 K and is a stable.  - Also hemoglobin 11.7 at presentation, trended down to 10.4 this AM.  Patient has only streaks of blood with sputum.  Prior history of GI bleeding but denies any hematochezia or melena currently.  - Iron panel, B12 folate and peripheral smear ordered  - PPI twice daily  - Monitor for signs of bleeding  - Hematology consulted, discussed with Dr. Miller - HIT pending  - Leukocytosis could be due to stress.  Afebrile.  CXR negative.   Monitor.  - some anemia of acute blood loss suspected with mild epistaxis and some flecks of BRB in sputum   - follow hgb this afternoon and in AM    Uncontrolled type 2 diabetes  Blood glucose in the 300 range at presentation.  Takes glipizide, Metformin, and insulin at home  - continue PTA Lantus 30 units at bedtime)   - Will add Premeal NovoLog 1 unit per 10 g CHO  - Change sliding scale to high insulin resistance once diet resumed  - Continue to hold Metformin, discontinue at discharge given CKD and  her elevated baseline creatinine  - Resume glipizide from morning if tolerates p.o. and no further procedure planned  - Hemoglobin A1c 7.9%     Hypertension  Systolic blood pressures in the 180, 190s range at presentation.  Historically on metoprolol following her heart disease and stenting 2 years ago, though was no longer on any antihypertensives  - Initiating carvedilol 6.25 mg twice daily on admission, titrate as tolerated, BP better now but not optimal.  - Holding on initiation of ACE inhibitor given elevated creatinine at 1.8 - 2.0     CKD III with HERNESTO, possibly ROCIO  Microscopic hematuria and proteinuria  Possible acute kidney injury, though this is unclear currently given no recent baseline: Likely some chronic kidney disease associated with uncontrolled hypertension and diabetes.  Creatinine of 1.87, was 1.33 4/4/2020. UA with microscopy shows proteinuria and microscopic hematuria.  - Noncontrast CT of abdomen and pelvis given elevated creatinine, microscopic hematuria, left flank pain and left lower quadrant pain which might represent nephrolithiasis, potentially obstructive.  Patient does have a history of nephrolithiasis in the past  - Intake and output, daily weights  - Received IV fluid bolus at presentation.  Continue with maintenance IVF since patient is getting contrast with angiogram  - Nephrology consulted, appreciate assistance.  - Patient reports that she had cystoscopy as part of work-up for hematuria and was normal.  - 6/2 creat up to 2.5, felt to be likely ROCIO related, nephrology following, good urine out put noted    Hyperlipidemia  Previously not on statin.  - Atorvastatin, lipid panel for a.m.     Ongoing tobacco use disorder with nicotine dependence  Early contemplative in terms of cessation.  Previously a 2 pack/day smoker since age 15, over the past year has been smoking 1 pack/day.  - Nicotine patch in place  - Continue to encourage tobacco cessation.  Discussed importance of this with  patient      Hypothyroidism  - TSH normal, PTA levothyroxine resumed    COVID 19 screening   - PCR negative 5/30      Diet: Moderate Consistent CHO Diet    DVT Prophylaxis: argatroban  Perez Catheter: not present  Code Status: Full Code           Disposition Plan   Expected discharge: another 2 days or so likely, awaiting stable hgb, plt, improved renal function  Entered: Kemar Alvarez MD 06/02/2021, 12:01 PM       Greater than 35 min spent with >50% of time spent in direct patient care and coordination of care.      The patient's care was discussed with the Bedside Nurse, Patient and hematology Consultant.    Kemar Alvarez MD  Hospitalist Service  [facility]  Contact information available via [facility] Paging/Directory    ______________________________________________________________________    Interval History   Patient seen and examined.  No new complaints, however she continues to have some flecks of blood in sputum when coughing and some mild epistaxis.  Denies fevers chills or shortness of breath.  No chest pain.  Continue to monitor closely with hemoglobin, platelet, and creatinine level.  Continues on Plavix and argatroban.  HIT pending    Data reviewed today: I reviewed all medications, new labs and imaging results over the last 24 hours. I personally reviewed no images or EKG's today.    Physical Exam   Vital Signs: Temp: 98.7  F (37.1  C) Temp src: Oral BP: 136/59 Pulse: 82   Resp: 18 SpO2: 97 % O2 Device: Nasal cannula Oxygen Delivery: 2 LPM  Weight: 184 lbs 4.8 oz    Gen: NAD, pleasant  HEENT: Normocephalic, EOMI, MMM  Resp: no focal crackles,  no wheezes, no increased work of resp  CV: S1S2 heard, reg rhythm, reg rate, no pedal edema  Abdo: soft, nontender, nondistended, bowel sounds present  Ext: calves nontender, well perfused  Neuro: AAOx3, CN grossly intact, no facial asymmetry      Data   Recent Labs   Lab 06/02/21  0919 06/02/21  0530 06/01/21  1230 06/01/21  1221  06/01/21 0345 05/31/21 0626 05/31/21 0326 05/31/21 0326 05/30/21  2315   WBC  --  11.1* 12.0*  --  11.4*  --   --  12.4*  --    HGB  --  9.0* 9.7*  --  9.8*  --   --  10.4*  --    MCV  --  79 79  --  78  --   --  78  --    PLT  --  61* 71*  --  55*  --   --  90*  --      --   --   --  137 140  --   --   --    POTASSIUM 3.9  --   --   --  3.9 3.8  --   --   --    CHLORIDE 106  --   --   --  107 109  --   --   --    CO2 23  --   --   --  24 27  --   --   --    BUN 30  --   --   --  30 29  --   --   --    CR 2.51*  --   --   --  2.03* 1.96*  --   --   --    ANIONGAP 8  --   --   --  6 4  --   --   --    BERNY 8.5  --   --   --  7.9* 8.3*  --   --   --    *  --   --   --  166* 120*  --   --   --    ALBUMIN 2.6*  --   --  2.5*  --  2.5*   < >  --   --    PROTTOTAL  --   --   --  6.7*  --  6.6*  --   --   --    BILITOTAL  --   --   --  0.3  --  0.2  --   --   --    ALKPHOS  --   --   --  113  --  111  --   --   --    ALT  --   --   --  16  --  18  --   --   --    AST  --   --   --  54*  --  67*  --   --   --    TROPI  --   --   --   --   --  9.323*  --  11.693* 13.871*    < > = values in this interval not displayed.     No results found for this or any previous visit (from the past 24 hour(s)).

## 2021-06-02 NOTE — PROGRESS NOTES
Service Date: 06/02/2021    SUBJECTIVE:  Ms. Leiva is a 61-year-old female with multiple medical problems including coronary artery disease.  The patient admitted with NSTEMI.  The patient had coronary angiogram and stent placement.    The patient has thrombocytopenia.  In the hospital, her platelet has decreased by almost 50%.  The patient also has bilateral lower extremity DVT.  She likely has pulmonary embolism.  On and off, she has been having hemoptysis.  CT chest angiogram could not be done because of elevated creatinine.    Her thrombocytopenia is likely from heparin-induced thrombocytopenia.  Heparin was discontinued.  She is on argatroban.  She is tolerating it well.    The patient had minor epistaxis. She continues to have some hemoptysis.  The patient says that sometimes she coughs up tiny amount of bright red blood.  No jennie hemoptysis.    Some headache.  No dizziness.  She still complains of pain in the chest. This has not resolved after angiogram and stent placement.  No abdominal pain, nausea or vomiting.  Denies any blood in the urine or stool.  No vomiting blood.  All other review of systems negative.    PHYSICAL EXAMINATION:    GENERAL:  She is alert and oriented x 3.  VITAL SIGNS:  Reviewed.  Not in acute distress.  Rest of the system not examined.    LABS:  Reviewed.    ASSESSMENT:   1.  A 61-year-old female with thrombocytopenia.  This is suspicious for heparin-induced thrombocytopenia.  2.  Normocytic anemia.  3.  Bilateral lower extremity deep venous thrombosis.  4.  Coronary artery disease.  5.  Chronic kidney disease.    PLAN:   1.  CBC was reviewed with the patient.  I explained to her that her platelet is low, but slightly better than yesterday. HIT screen is pending.  It should be back later today.  2.  The patient has bilateral lower extremity DVT.  She likely has pulmonary embolism.  She needs anticoagulation.  She is currently on argatroban. If HIT is negative, she can be  transitioned to Eliquis or Xarelto (depending on renal function).  If creatinine continues to get worse, she might need to be on warfarin.  3.  The patient is anemic.  Anemia decreased in the hospital.  This is due to chronic kidney disease, anemia of chronic disease and some bleeding (hemoptysis and epistaxis).  She should be transfused for hemoglobin below 8.  4. She had few questions, which were all answered.  Hematology/Oncology will continue to follow.  Case discussed with Dr. Kemar Alvarez.    ADDENDUM:  HIT screen has come back negative.  The patient can be transitioned to oral anticoagulant.  If creatinine improves, she can be on Eliquis or Xarelto. Otherwise, warfarin.    TOTAL TIME SPENT:  25 minutes.    Lexa Miller MD        D: 2021   T: 2021   MT: roge    Name:     EDVIN MERLOSElia  MRN:      [id number]        Account:      [id number]   :      1960           Service Date: 2021       Document: [id number]

## 2021-06-02 NOTE — PLAN OF CARE
OT/CR: pt reports has had nose bleed and coughing up some blood, declined to walk at this time. Nursing notified and reports pt can walk.

## 2021-06-02 NOTE — PLAN OF CARE
Vital signs stable, lung sounds diminished and crackles, denies any chest pain small amount of hemoptysis. Cough infrequently , hemoglobin 9.0, thrombocytopenia , oncologist aware.  Bilateral dvt on lower extremities she is on Plavix and argatroban. Diabetic, blood sugar 142 and 190. Telemetry NSR

## 2021-06-03 ENCOUNTER — TELEPHONE (OUTPATIENT)
Facility: CLINIC | Age: 61
End: 2021-06-03

## 2021-06-03 LAB
ANA SER QL IF: NEGATIVE
ANCA AB PATTERN SER IF-IMP: NORMAL
ANION GAP SERPL CALCULATED.3IONS-SCNC: 9 MMOL/L (ref 3–14)
APTT PPP: 81 SEC (ref 22–37)
BUN SERPL-MCNC: 35 MG/DL (ref 7–30)
C-ANCA TITR SER IF: NORMAL {TITER}
C3 SERPL-MCNC: 122 MG/DL (ref 81–157)
C4 SERPL-MCNC: 27 MG/DL (ref 13–39)
CALCIUM SERPL-MCNC: 8.1 MG/DL (ref 8.5–10.1)
CHLORIDE SERPL-SCNC: 107 MMOL/L (ref 94–109)
CO2 SERPL-SCNC: 21 MMOL/L (ref 20–32)
COPATH REPORT: NORMAL
CREAT SERPL-MCNC: 2.66 MG/DL (ref 0.52–1.04)
ERYTHROCYTE [DISTWIDTH] IN BLOOD BY AUTOMATED COUNT: 14.6 % (ref 10–15)
GFR SERPL CREATININE-BSD FRML MDRD: 19 ML/MIN/{1.73_M2}
GLUCOSE BLDC GLUCOMTR-MCNC: 104 MG/DL (ref 70–99)
GLUCOSE BLDC GLUCOMTR-MCNC: 141 MG/DL (ref 70–99)
GLUCOSE BLDC GLUCOMTR-MCNC: 260 MG/DL (ref 70–99)
GLUCOSE SERPL-MCNC: 138 MG/DL (ref 70–99)
HBV SURFACE AG SERPL QL IA: NONREACTIVE
HCT VFR BLD AUTO: 26.3 % (ref 35–47)
HCV AB SERPL QL IA: NONREACTIVE
HGB BLD-MCNC: 8.4 G/DL (ref 11.7–15.7)
INTERPRETATION ECG - MUSE: NORMAL
MCH RBC QN AUTO: 25.3 PG (ref 26.5–33)
MCHC RBC AUTO-ENTMCNC: 31.9 G/DL (ref 31.5–36.5)
MCV RBC AUTO: 79 FL (ref 78–100)
PLATELET # BLD AUTO: 48 10E9/L (ref 150–450)
POTASSIUM SERPL-SCNC: 3.6 MMOL/L (ref 3.4–5.3)
RBC # BLD AUTO: 3.32 10E12/L (ref 3.8–5.2)
SODIUM SERPL-SCNC: 137 MMOL/L (ref 133–144)
WBC # BLD AUTO: 9.4 10E9/L (ref 4–11)

## 2021-06-03 PROCEDURE — 99232 SBSQ HOSP IP/OBS MODERATE 35: CPT | Performed by: INTERNAL MEDICINE

## 2021-06-03 PROCEDURE — 250N000011 HC RX IP 250 OP 636: Performed by: HOSPITALIST

## 2021-06-03 PROCEDURE — 250N000013 HC RX MED GY IP 250 OP 250 PS 637: Performed by: INTERNAL MEDICINE

## 2021-06-03 PROCEDURE — 250N000013 HC RX MED GY IP 250 OP 250 PS 637: Performed by: PHYSICIAN ASSISTANT

## 2021-06-03 PROCEDURE — 250N000012 HC RX MED GY IP 250 OP 636 PS 637: Performed by: HOSPITALIST

## 2021-06-03 PROCEDURE — 36415 COLL VENOUS BLD VENIPUNCTURE: CPT | Performed by: INTERNAL MEDICINE

## 2021-06-03 PROCEDURE — 250N000013 HC RX MED GY IP 250 OP 250 PS 637: Performed by: HOSPITALIST

## 2021-06-03 PROCEDURE — 85730 THROMBOPLASTIN TIME PARTIAL: CPT | Performed by: INTERNAL MEDICINE

## 2021-06-03 PROCEDURE — 85027 COMPLETE CBC AUTOMATED: CPT | Performed by: INTERNAL MEDICINE

## 2021-06-03 PROCEDURE — 80048 BASIC METABOLIC PNL TOTAL CA: CPT | Performed by: INTERNAL MEDICINE

## 2021-06-03 PROCEDURE — 99232 SBSQ HOSP IP/OBS MODERATE 35: CPT | Performed by: HOSPITALIST

## 2021-06-03 PROCEDURE — 210N000002 HC R&B HEART CARE

## 2021-06-03 PROCEDURE — 999N001017 HC STATISTIC GLUCOSE BY METER IP

## 2021-06-03 RX ADMIN — PANTOPRAZOLE SODIUM 40 MG: 40 TABLET, DELAYED RELEASE ORAL at 08:30

## 2021-06-03 RX ADMIN — CLOPIDOGREL BISULFATE 75 MG: 75 TABLET ORAL at 08:30

## 2021-06-03 RX ADMIN — ATORVASTATIN CALCIUM 40 MG: 40 TABLET, FILM COATED ORAL at 08:30

## 2021-06-03 RX ADMIN — NICOTINE 1 PATCH: 21 PATCH, EXTENDED RELEASE TRANSDERMAL at 08:32

## 2021-06-03 RX ADMIN — PANTOPRAZOLE SODIUM 40 MG: 40 TABLET, DELAYED RELEASE ORAL at 18:30

## 2021-06-03 RX ADMIN — LEVOTHYROXINE SODIUM 150 MCG: 75 TABLET ORAL at 08:30

## 2021-06-03 RX ADMIN — CARVEDILOL 6.25 MG: 6.25 TABLET, FILM COATED ORAL at 08:30

## 2021-06-03 RX ADMIN — INSULIN GLARGINE 30 UNITS: 100 INJECTION, SOLUTION SUBCUTANEOUS at 22:09

## 2021-06-03 RX ADMIN — INSULIN ASPART 1 UNITS: 100 INJECTION, SOLUTION INTRAVENOUS; SUBCUTANEOUS at 13:13

## 2021-06-03 RX ADMIN — ARGATROBAN 0.5 MCG/KG/MIN: 50 INJECTION INTRAVENOUS at 11:47

## 2021-06-03 RX ADMIN — CARVEDILOL 6.25 MG: 6.25 TABLET, FILM COATED ORAL at 22:09

## 2021-06-03 ASSESSMENT — ACTIVITIES OF DAILY LIVING (ADL)
ADLS_ACUITY_SCORE: 11

## 2021-06-03 ASSESSMENT — MIFFLIN-ST. JEOR: SCORE: 1394.87

## 2021-06-03 NOTE — PLAN OF CARE
No events overnight. Argatroban infusing. VSS on room air. Small amount of hemoptysis, no jennie blood. Denies discomfort, dyspnea. Up independently or calls for assist as needed.

## 2021-06-03 NOTE — PROGRESS NOTES
[facility]    Hematology/Oncology Follow-up Note      Today's Date: 06/03/21  Date of Admission:  5/30/2021  Reason for Consult: thrombocytopenia      ASSESSMENT/PLAN:  Rowan Leiva is a 61 year old female with:    1) Thrombocytopenia: She has had chronic thrombocytopenia.  HIT Ab was negative.  Spleen and liver looked okay on CT abdomen without contrast.  Could be from medications, consumption from acute illness.  -monitor CBC  -we will continue to follow    2) DVT: on argatroban drip due to concern for HIT, now has come back negative.    -can transition to oral anticoagulant.  If creatinine improves, can use DOAC.  Otherwise, she may need to be no warfarin.      3) Normocytic anemia: may be due to renal disease, anemia of chronic disease, some degree of bleeding (hemoptysis).    -transfuse as needed for hemoglobin <8, with recent NSTEMI    4) CKD:  -nephrology following    5) DM, HTN: as per hospitalist    6) NSTEMI: s/p LAD stenting on 5/31/21  -as per cardiology      INTERIM HISTORY: Patient was seen in her room.  She says that she feels fine, hoping to go home soon.       MEDICATIONS:  Current Facility-Administered Medications   Medication     acetaminophen (TYLENOL) tablet 650 mg     albuterol (PROAIR HFA/PROVENTIL HFA/VENTOLIN HFA) 108 (90 Base) MCG/ACT inhaler 2 puff     argatroban (ACOVA) 1 mg/mL ANTICOAGULANT infusion     atorvastatin (LIPITOR) tablet 40 mg     bisacodyl (DULCOLAX) Suppository 10 mg     calcium carbonate (TUMS) chewable tablet 500 mg     carvedilol (COREG) tablet 6.25 mg     clopidogrel (PLAVIX) tablet 75 mg     Continuing aspirin from home medication list OR daily aspirin order already placed during this visit     Continuing beta blocker from home medication list OR beta blocker order already placed during this visit     Continuing statin from home medication list OR statin order already placed during this visit     glucose gel 15-30 g    Or     dextrose 50 %  injection 25-50 mL    Or     glucagon injection 1 mg     diphenhydrAMINE (BENADRYL) capsule 25 mg     HOLD: Metformin and metformin containing medications on day of procedure and 48 hours after IV contrast given for patients with acute kidney injury or severe chronic kidney disease (stage IV or stage V; i.e., eGFR less than 30)     hydrALAZINE (APRESOLINE) injection 10 mg     HYDROmorphone (PF) (DILAUDID) injection 0.3-0.5 mg     insulin aspart (NovoLOG) injection (RAPID ACTING)     insulin aspart (NovoLOG) injection (RAPID ACTING)     insulin aspart (NovoLOG) injection (RAPID ACTING)     insulin glargine (LANTUS PEN) injection 30 Units     levothyroxine (SYNTHROID/LEVOTHROID) tablet 150 mcg     lidocaine (LMX4) cream     lidocaine 1 % 0.1-1 mL     medication instruction     melatonin tablet 3 mg     metoprolol (LOPRESSOR) injection 5-10 mg     midazolam (VERSED) injection 0.5-1 mg     naloxone (NARCAN) injection 0.2 mg    Or     naloxone (NARCAN) injection 0.4 mg    Or     naloxone (NARCAN) injection 0.2 mg    Or     naloxone (NARCAN) injection 0.4 mg     nicotine (NICODERM CQ) 21 MG/24HR 24 hr patch 1 patch     nicotine Patch in Place     nitroGLYcerin (NITROSTAT) sublingual tablet 0.4 mg     ondansetron (ZOFRAN-ODT) ODT tab 4 mg    Or     ondansetron (ZOFRAN) injection 4 mg     oxyCODONE (ROXICODONE) tablet 5-10 mg     pantoprazole (PROTONIX) EC tablet 40 mg     Percutaneous Coronary Intervention orders placed (this is information for BPA alerting)     polyethylene glycol (MIRALAX) Packet 17 g     prochlorperazine (COMPAZINE) injection 10 mg    Or     prochlorperazine (COMPAZINE) tablet 10 mg    Or     prochlorperazine (COMPAZINE) suppository 25 mg     prochlorperazine (COMPAZINE) tablet 5 mg     Reason ACE/ARB/ARNI order not selected     Reason ACE/ARB/ARNI order not selected     reason aspirin not prescribed (intentional)     senna-docusate (SENOKOT-S/PERICOLACE) 8.6-50 MG per tablet 1 tablet    Or      "senna-docusate (SENOKOT-S/PERICOLACE) 8.6-50 MG per tablet 2 tablet     sodium chloride (PF) 0.9% PF flush 3 mL     sodium chloride (PF) 0.9% PF flush 3 mL           ALLERGIES:  Allergies   Allergen Reactions     Heparin Heparin Induced Thrombocytopenia     HIT panel pending     No Known Drug Allergies          PHYSICAL EXAM:  Vital signs:  Temp: 98.3  F (36.8  C) Temp src: Oral BP: (!) 146/74 Pulse: 74   Resp: 16 SpO2: 95 % O2 Device: None (Room air) Oxygen Delivery: 2 LPM Height: 162.6 cm (5' 4.02\") Weight: 83.5 kg (184 lb)  Estimated body mass index is 31.57 kg/m  as calculated from the following:    Height as of this encounter: 1.626 m (5' 4.02\").    Weight as of this encounter: 83.5 kg (184 lb).    GENERAL/CONSTITUTIONAL: No acute distress.  EYES: No scleral icterus.  NEUROLOGIC: Alert, oriented, answers questions appropriately.  INTEGUMENTARY: No jaundice.      LABS:  CBC RESULTS:   Recent Labs   Lab Test 06/03/21  0530   WBC 9.4   RBC 3.32*   HGB 8.4*   HCT 26.3*   MCV 79   MCH 25.3*   MCHC 31.9   RDW 14.6   PLT 48*       Recent Labs   Lab Test 06/03/21  0530 06/02/21  0919    137   POTASSIUM 3.6 3.9   CHLORIDE 107 106   CO2 21 23   ANIONGAP 9 8   * 129*   BUN 35* 30   CR 2.66* 2.51*   BERNY 8.1* 8.5         Hanh Silver MD  Hematology/Oncology  [facility]    Total time spent on day of visit, including review of tests, obtaining/reviewing separately obtained history, ordering medications/tests/procedures, communicating with PCP/consultants, and documenting in electronic medical record: 25 minutes      "

## 2021-06-03 NOTE — PLAN OF CARE
A&O x4. Pt denied pain. VSS, on ra. Up IND. Tele: RA. Agatroban @ 2.55mls an hour. Bg corrected w/ sliding scale and carb count.  Nicotine patch in place. Plan for continued monitoring of creatinine, Hgb and platelets tomorrow. Continue to monitor.

## 2021-06-03 NOTE — PROGRESS NOTES
[facility] Cardiology Progress Note          Assessment and Plan:     1. NSTEMI   S/P mid-distal LAD stenting.  On clopidogrel, argatroban (ASA stopped after 3 days).  On statin, metoprolol.  Echocardiography has demonstrated a hypokinetic distal septal wall motion abnormality as well as a severely hypokinetic basal inferior/inferior lateral wall motion abnormality.  Overall left ventricular systolic performance was only mildly depressed.   .  2.  Kidney disease/diabetic nephropathy  Renal disease has been progressive, particularly over the last year.    Cr therese again today but rate of rise is slowing; discussed with Ms Leiva and her .  Urine output is good.    3. DVT's, bilateral/suspected PE(s)  HIT antibodies pending, on argatroban and will need AC on discharge.  V/Q inconclusive but had hemoptysis.  Low platelets precede this presentation.  Appreciate Dr. Miller's assistance.     5. Diabetes mellitus.     6. Peripheral arterial disease     7. Tobacco use.         Significant Problems:     Patient Active Problem List    Diagnosis Date Noted     NSTEMI (non-ST elevated myocardial infarction) (H) 05/30/2021     Priority: Medium     Hyperlipidemia      Priority: Medium     Coronary artery disease      Priority: Medium     Stents-2011       Myocardial infarction (H)      Priority: Medium     anterior       Hypertension      Priority: Medium     Hypothyroidism      Priority: Medium     Advanced directives, counseling/discussion 11/05/2012     Priority: Medium     Patient states has Advance Directive and will bring in a copy to clinic. 11/5/2012        Thrombocytopenia (H) 11/04/2012     Priority: Medium     Mild intermittent asthma      Priority: Medium     minimal symptoms, albuterol use 2x/year       Mixed hyperlipidemia 06/04/2003     Priority: Medium     Tobacco use disorder 08/22/2002     Priority: Medium     Personal history of malignant neoplasm of ovary      Priority: Medium     Malignant  neoplasm of thyroid gland (H)      Priority: Medium     papillary carcinoma; resection 6/00       Diabetes mellitus, type 2 (H) 01/01/2000     Priority: Medium     Problem list name updated by automated process. Provider to review       Type 2 diabetes mellitus with hyperglycemia, with long-term current use of insulin (H) 2000     Priority: Medium             Subjective:     No chest pain; feels much better today.          Medications:   Scheduled:    atorvastatin  40 mg Oral Daily     carvedilol  6.25 mg Oral BID     clopidogrel  75 mg Oral Daily     insulin aspart   Subcutaneous TID AC     insulin aspart  1-10 Units Subcutaneous TID AC     insulin aspart  1-7 Units Subcutaneous At Bedtime     insulin glargine  30 Units Subcutaneous At Bedtime     levothyroxine  150 mcg Oral QAM AC     nicotine  1 patch Transdermal Daily     nicotine   Transdermal Q8H     pantoprazole  40 mg Oral BID AC     sodium chloride (PF)  3 mL Intracatheter Q8H             Physical Exam:   All vitals have been reviewed  Temp:  [98.1  F (36.7  C)-98.3  F (36.8  C)] 98.3  F (36.8  C)  Pulse:  [73-79] 74  Resp:  [16-18] 16  BP: (125-146)/(61-74) 146/74  SpO2:  [95 %] 95 %  Vitals:    05/31/21 0600 06/02/21 0400 06/03/21 0411   Weight: 84.9 kg (187 lb 3.2 oz) 83.6 kg (184 lb 4.8 oz) 83.5 kg (184 lb)     I/O last 3 completed shifts:  In: -   Out: 450 [Urine:450]  NAD, alert and oriented.    Ext's: no edema.          Data:   Last Basic Metabolic Panel:  Lab Results   Component Value Date     06/03/2021      Lab Results   Component Value Date    POTASSIUM 3.6 06/03/2021     Lab Results   Component Value Date    CHLORIDE 107 06/03/2021     Lab Results   Component Value Date    BERNY 8.1 06/03/2021     Lab Results   Component Value Date    CO2 21 06/03/2021     Lab Results   Component Value Date    BUN 35 06/03/2021     Lab Results   Component Value Date    CR 2.66 06/03/2021     Lab Results   Component Value Date     06/03/2021           Lab Results   Component Value Date    WBC 9.4 06/03/2021    HGB 8.4 (L) 06/03/2021    HCT 26.3 (L) 06/03/2021    MCV 79 06/03/2021    PLT 48 (LL) 06/03/2021     Lab Results   Component Value Date    INR 0.93 04/10/2015     Lab Results   Component Value Date    TROPI 9.323 () 05/31/2021    TROPI 11.693 () 05/31/2021    TROPI 13.871 () 05/30/2021        Erin Alston MD  6/3/2021

## 2021-06-03 NOTE — PLAN OF CARE
OT/CR: attempted OT/CR, pt reports she just woke up and would like to wake up more before walking.   Attempted pm OT/CR, pt sleeping, woke pt and encouraged pt to participate in therapy, pt declined and reports later, educated that this therapist will be leaving at 4pm and encouraged to walk in approx 15 mins. Pt cont'd to decline, stating she is too tired. Pt educated in walking 1-2 times today, pt reports she will.

## 2021-06-03 NOTE — PROGRESS NOTES
"SPIRITUAL HEALTH SERVICES  SPIRITUAL ASSESSMENT Progress Note  [facility]     REFERRAL SOURCE: Length of Stay    Reviewed documentation. Reflective conversation shared with Rowan which integrated elements of illness and family narratives.     Rowan was tearful as she shared regarding her fear about her current health situation which  and the isolation of not being able to see her two adult children.  Rowan expressed her gratitude that she \"feels close to God in moments like this\" but also named that it's \"hard to hold onto hope.\" Reflected with her about what it means for others to carry hope on our behalf.  Rowan named her frustration around \"trying to keep everyone updated about what's happening.\" Encouraged her decision to name boundaries and to ask support from a family member regarding communication. \"I'm grateful for the messages of support, but it's hard to keep up.\"    I offered spiritual and emotional support through reflective listening that affirmed emotions, experience, and meaning. Offered assurance with scripture (Ps 46) and through prayer which incorporated conversational themes.    PLAN: I will follow up in 1-2 days. [facility] remains available for support as requested.    Stephanie Tello  Associate   Pager [phone]  Phone [phone]    "

## 2021-06-03 NOTE — PROGRESS NOTES
"[facility]    Medicine Progress Note - Hospitalist Service       Date of Admission:  5/30/2021  Assessment & Plan       Rowan Leiva is a 61 year old female admitted on 5/30/2021. She presents as a direct admission from [facility] emergency department with several days of chest pain, and what she describes as 2 weeks of \"lung pain\"     NSTEMI s/p stenting to LAD 5/31/21  Patient with known coronary artery disease status post stenting x3 approximately 2 years ago in [place].  She is not on aspirin or Plavix.  She had bleeding historically as well as GI upset with aspirin.  2 to 3 days of central chest discomfort radiating to her jaw.  Worse with laying flat, improves with sitting up.  Troponin elevated at 10.981.  Not on aspirin, statin, or antihypertensive therapy, ongoing tobacco use.  - Heparin drip started on admission, cardiology consulted, patient getting coronary angiogram currently (report noted, Coronary angiogram report reviewed, shows 90% occluded distal LAD, treated with ELLIOT x1.  Non flow-limiting otherwise in other vessels  - Aspirin 81 mg enteric-coated re initiated, Brillinta added after angiogram, monitor for bleeding  - Troponin peaked at 13, and downtrending now, TTE report pending, per cardiology, has hypokinetic distal septal wall motion abnormality as well as severely hypokinetic basal inferior/inferior lateral wall motion abnormality.  LV systolic function mildly depressed.  - Atorvastatin 40 mg daily initiated, fasting lipid panel noted.  - Carvedilol 6.25 mg twice daily initiated.  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 Has a chronic cough associated with her ongoing tobacco use, though she believes in the past 2 weeks this is worsened. She finds herself coughing violently at times and this has led to  some streaks of blood on mucus. No gross hemoptysis despite starting heparin. ? bronchitis, pulmonary edema from cardiac disease resulting in cough. Mildly elevated D-dimer.   - denies leg pain or swelling. Sedentary habit and reports she sits in chair for prolonged period of time.   - US showed bilateral DVT as above.  - Hematology following and appreciated - HIT panel negative, plt 55k down from 90k and has been on heparin.   - Per Hem, transitioned from heparin to argatroban.   - still with some cough/epistaxis though improving     Anemia and thrombocytopenia  Mild leukocytosis  Noted platelet count of 32 and 40 4K in 2012 and was evaluated by oncology, suspected it was due to acute infection.  Recovered to 159 in 2015 but since then it is running slightly lower than normal, 1 .  Presented with a platelet count of 90 K and is a stable.  - Also hemoglobin 11.7 at presentation, trended down to 10.4 this AM.  Patient has only streaks of blood with sputum.  Prior history of GI bleeding but denies any hematochezia or melena currently.  - Iron panel, B12 folate and peripheral smear ordered  - PPI twice daily  - Monitor for signs of bleeding  - Hematology consulted, discussed with Dr. Miller - HIT negative  - Leukocytosis could be due to stress.  Afebrile.  CXR negative.   Monitor.  - some anemia of acute blood loss suspected with mild epistaxis and some flecks of BRB in sputum   - hgb and platelets slightly lower - Hematology following - pending creat trend, DOAC vs warfarin in the coming days    CKD III with HERNESTO, possibly ROCIO  Microscopic hematuria and proteinuria  Possible acute kidney injury, though this is unclear currently given no recent baseline: Likely some chronic kidney disease associated with uncontrolled  hypertension and diabetes.  Creatinine of 1.87, was 1.33 4/4/2020. UA with microscopy shows proteinuria and microscopic hematuria.  - Noncontrast CT of abdomen and pelvis given elevated creatinine, microscopic hematuria, left flank pain and left lower quadrant pain which might represent nephrolithiasis, potentially obstructive.  Patient does have a history of nephrolithiasis in the past  - Intake and output, daily weights  - Received IV fluid bolus at presentation.  Continue with maintenance IVF since patient is getting contrast with angiogram  - Nephrology consulted, appreciate assistance.  - Patient reports that she had cystoscopy as part of work-up for hematuria and was normal.  - 6/2 creat up to 2.5, felt to be likely ROCIO related, nephrology following, good urine out put noted  - 6/3 creat 2.6, rate of rise decreasing, urine OP still good. Continue following.    Uncontrolled type 2 diabetes  Blood glucose in the 300 range at presentation.  Takes glipizide, Metformin, and insulin at home  - continue PTA Lantus 30 units at bedtime  - Will add Premeal NovoLog 1 unit per 10 g CHO  - Change sliding scale to high insulin resistance once diet resumed  - Continue to hold Metformin, discontinue at discharge given CKD and her elevated baseline creatinine  - Resume glipizide from morning if tolerates p.o. and no further procedure planned  - Hemoglobin A1c 7.9%     Hypertension  Systolic blood pressures in the 180, 190s range at presentation.  Historically on metoprolol following her heart disease and stenting 2 years ago, though was no longer on any antihypertensives  - Initiating carvedilol 6.25 mg twice daily on admission, titrate as tolerated, BP better now but not optimal.  - Holding on initiation of ACE inhibitor given elevated creatinine     Hyperlipidemia  Previously not on statin.  - Atorvastatin, lipid panel for a.m.     Ongoing tobacco use disorder with nicotine dependence  Early contemplative in terms of  cessation.  Previously a 2 pack/day smoker since age 15, over the past year has been smoking 1 pack/day.  - Nicotine patch in place  - Continue to encourage tobacco cessation.  Discussed importance of this with patient      Hypothyroidism  - TSH normal, PTA levothyroxine resumed    COVID 19 screening   - PCR negative 5/30      Diet: Moderate Consistent CHO Diet    DVT Prophylaxis: argatroban  Perez Catheter: not present  Code Status: Full Code                Diet: Moderate Consistent CHO Diet    DVT Prophylaxis: argatroban  Perez Catheter: not present  Code Status: Full Code           Disposition Plan   Expected discharge: 2+ days pending creat, CBC, plan for anticoag/antiplatlt  Entered: Kemar Alvarez MD 06/03/2021, 10:23 AM       The patient's care was discussed with the Bedside Nurse and Patient.    Kemar Alvarez MD  Hospitalist Service  [facility]  Contact information available via [facility] Paging/Directory    ______________________________________________________________________    Interval History   Patient seen and examined this afternoon.  Overall about the same as yesterday-somewhat less epistaxis/blood flecks on coughing.  Hemoglobin and platelets slightly lower.  Creatinine increased slightly but rate of rise is decreasing.  Good urine output.  Cardiology and hematology following for now continuing with previous plan of Plavix and argatroban.  Pending creatinine course DOAC versus warfarin.  Encourage patient to remain optimistic and hopeful that creatinine will likely plateau and improve in coming days.    Data reviewed today: I reviewed all medications, new labs and imaging results over the last 24 hours. I personally reviewed no images or EKG's today.    Physical Exam   Vital Signs: Temp: 98.3  F (36.8  C) Temp src: Oral BP: (!) 146/74 Pulse: 74   Resp: 16 SpO2: 95 % O2 Device: None (Room air)    Weight: 184 lbs 0 oz    Gen: NAD, pleasant  HEENT: Normocephalic, EOMI,  MMM  Resp: no crackles,  no wheezes, no increased work of resp  CV: S1S2 heard, reg rhythm, reg rate, no pedal edema  Abdo: soft, nontender, nondistended, bowel sounds present  Ext: calves nontender, well perfused  Neuro: AAOx3, CN grossly intact, no facial asymmetry      Data   Recent Labs   Lab 06/03/21  0530 06/02/21  1614 06/02/21  0919 06/02/21  0530 06/01/21  1230 06/01/21  1227 06/01/21  0345 05/31/21  0626 05/31/21  0326 05/31/21  0326 05/30/21  2315   WBC 9.4  --   --  11.1* 12.0*  --  11.4*  --   --  12.4*  --    HGB 8.4* 8.7*  --  9.0* 9.7*  --  9.8*  --   --  10.4*  --    MCV 79  --   --  79 79  --  78  --   --  78  --    PLT 48*  --   --  61* 71*  --  55*  --   --  90*  --      --  137  --   --   --  137 140  --   --   --    POTASSIUM 3.6  --  3.9  --   --   --  3.9 3.8  --   --   --    CHLORIDE 107  --  106  --   --   --  107 109  --   --   --    CO2 21  --  23  --   --   --  24 27  --   --   --    BUN 35*  --  30  --   --   --  30 29  --   --   --    CR 2.66*  --  2.51*  --   --   --  2.03* 1.96*  --   --   --    ANIONGAP 9  --  8  --   --   --  6 4  --   --   --    BERNY 8.1*  --  8.5  --   --   --  7.9* 8.3*  --   --   --    *  --  129*  --   --   --  166* 120*  --   --   --    ALBUMIN  --   --  2.6*  --   --  2.5*  --  2.5*   < >  --   --    PROTTOTAL  --   --   --   --   --  6.7*  --  6.6*  --   --   --    BILITOTAL  --   --   --   --   --  0.3  --  0.2  --   --   --    ALKPHOS  --   --   --   --   --  113  --  111  --   --   --    ALT  --   --   --   --   --  16  --  18  --   --   --    AST  --   --   --   --   --  54*  --  67*  --   --   --    TROPI  --   --   --   --   --   --   --  9.323*  --  11.693* 13.871*    < > = values in this interval not displayed.     No results found for this or any previous visit (from the past 24 hour(s)).

## 2021-06-03 NOTE — TELEPHONE ENCOUNTER
Nutrition Education Scheduling Outreach #1:    Call to patient to schedule. Voicemail full.    Plan for 2nd outreach attempt within 1 week.    Ezio Pedersen OnCall  Diabetes and Nutrition Scheduling

## 2021-06-03 NOTE — PROGRESS NOTES
Nephrology Progress Note  06/03/2021         Assessment & Recommendations:   Adm 5/30 with NSTEMI     1 CKD 3B - baseline S.Cr o f~1.5 . Presumed diabetic nephropathy / HTN. 3.9 gm/g on UPCR.  UA with mod blood and 6 RBC / hpf, which is chronic. CT abd without suspicious lesions other than vascular calcifications.    - urologic w/u with Dr Dos Santos in 2019 for hematuria was neg. Passed some kidney stones last year.   - low suspicion for active GN. Given proteinuria, with longstanding hematuria - will get limited vasculitis panel (GABE, ANCA, C3, C4, hepatitis serology, SIFE) .   - Possible very small monoclonal IgM immunoglobulin of kappa light   chain type on UIFE. SIFE neg. C3,C4 normal. GAEB, ANCA pending.   - will need to establish nephrology follow up on a routine basis post discharge for CKD care. Will benefit from RASi and SGLT2i therapy down the line once acute issues are over.     2 acute kidney injury-adm with Cr of 1.  Now worse, likely secondary to contrast nephropathy.  No clinical features of atheroembolic disease.  Nonoliguric.    2 T2 Dm with poor control   3 HPL  4 NSTEMI . EF 55%, mod LVH  5 HTN   7 CAD/ NSTEMI - s/p PCI with severe distal LAD lesion on 5/31/21. On DAPT, betablocker, statin  8 B/L LE DVT - V/Q scan results pending. Hem/onc on board.   9 Thrombocytopenia  - chronic. Now worse on heparin.  Heme-onc on board.  On argatroban drip.  Work-up for HIT.    Discussion -   Cr seems to be leveling off. Hope to see it start to improve in next day or so.   Daily RFP.     Lauren Anglin MD  [facility] - Nephrology   [phone]      Interval History :   Seen / examined.   Denies any new complaints. On argatroban drip for suspicion of HIT. HIT screening negative??  Platelets down to 48K .   Cr seems to be leveling off. 2.5 -> 2.6    Nonoliguric.  Electrolytes okay.    Review of Systems:   A 4 point review of systems was negative except as noted above.  Notably: fair appetite.  no nausea or  vomiting or diarrhea.  no confusion,  no fever or chills    Physical Exam:   I/O last 3 completed shifts:  In: 360 [P.O.:360]  Out: -     GENERAL APPEARANCE: alert and no distress  EYES:  no scleral icterus, pupils equal  PULM: lungs clear to auscultation, equal air movement, no cyanosis or clubbing  CV: regular rhythm, normal rate, no rub     -JVP -     -edema -   GI: soft, non tender,   NEURO: mentation intact and speech normal, no asterixis       Labs:   All labs reviewed by me  Electrolytes/Renal -   Recent Labs   Lab Test 06/03/21  0530 06/02/21  0919 06/01/21  0345 05/31/21  1446    137 137  --    POTASSIUM 3.6 3.9 3.9  --    CHLORIDE 107 106 107  --    CO2 21 23 24  --    BUN 35* 30 30  --    CR 2.66* 2.51* 2.03*  --    * 129* 166*  --    BERNY 8.1* 8.5 7.9*  --    PHOS  --  4.3  --  3.7       CBC -   Recent Labs   Lab Test 06/03/21  0530 06/02/21  1614 06/02/21  0530 06/01/21  1230   WBC 9.4  --  11.1* 12.0*   HGB 8.4* 8.7* 9.0* 9.7*   PLT 48*  --  61* 71*       LFTs -   Recent Labs   Lab Test 06/02/21  0919 06/01/21  1227 05/31/21  0626 04/10/15  1023   ALKPHOS  --  113 111 198*   BILITOTAL  --  0.3 0.2 0.2   ALT  --  16 18 14   AST  --  54* 67* 16   PROTTOTAL  --  6.7* 6.6* 7.4   ALBUMIN 2.6* 2.5* 2.5* 3.2*       Iron Panel -   Recent Labs   Lab Test 05/31/21  1446   IRON 37   IRONSAT 21   NORBERTO 233         Current Medications:    atorvastatin  40 mg Oral Daily     carvedilol  6.25 mg Oral BID     clopidogrel  75 mg Oral Daily     insulin aspart   Subcutaneous TID AC     insulin aspart  1-10 Units Subcutaneous TID AC     insulin aspart  1-7 Units Subcutaneous At Bedtime     insulin glargine  30 Units Subcutaneous At Bedtime     levothyroxine  150 mcg Oral QAM AC     nicotine  1 patch Transdermal Daily     nicotine   Transdermal Q8H     pantoprazole  40 mg Oral BID AC     sodium chloride (PF)  3 mL Intracatheter Q8H       argatroban 0.5 mcg/kg/min (06/03/21 3697)     - MEDICATION INSTRUCTIONS -        - MEDICATION INSTRUCTIONS -       - MEDICATION INSTRUCTIONS -       - MEDICATION INSTRUCTIONS -       Percutaneous Coronary Intervention orders placed (this is information for [facility]PA alerting)       ACE/ARB/ARNI NOT PRESCRIBED       ACE/ARB/ARNI NOT PRESCRIBED       ASPIRIN NOT PRESCRIBED       Lauren Anglin MD

## 2021-06-03 NOTE — PROVIDER NOTIFICATION
"The following text message was sent to the [facility] hospitalist at 0618 on 6/3/21:      \"FYI: critical lab value: platelets are 48 this morning. 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 On argatroban for DVT. Scant hemoptysis, no other bleeding. ADRIANE.    Yulissa maxwell RN\"  "

## 2021-06-04 ENCOUNTER — APPOINTMENT (OUTPATIENT)
Dept: OCCUPATIONAL THERAPY | Facility: CLINIC | Age: 61
DRG: 246 | End: 2021-06-04
Attending: HOSPITALIST
Payer: COMMERCIAL

## 2021-06-04 VITALS
WEIGHT: 185.2 LBS | BODY MASS INDEX: 31.62 KG/M2 | TEMPERATURE: 98.5 F | SYSTOLIC BLOOD PRESSURE: 150 MMHG | RESPIRATION RATE: 16 BRPM | OXYGEN SATURATION: 95 % | HEIGHT: 64 IN | HEART RATE: 66 BPM | DIASTOLIC BLOOD PRESSURE: 63 MMHG

## 2021-06-04 LAB
ALBUMIN SERPL-MCNC: 2.6 G/DL (ref 3.4–5)
ANION GAP SERPL CALCULATED.3IONS-SCNC: 11 MMOL/L (ref 3–14)
APTT PPP: 81 SEC (ref 22–37)
BUN SERPL-MCNC: 37 MG/DL (ref 7–30)
CALCIUM SERPL-MCNC: 8.3 MG/DL (ref 8.5–10.1)
CHLORIDE SERPL-SCNC: 106 MMOL/L (ref 94–109)
CO2 SERPL-SCNC: 19 MMOL/L (ref 20–32)
CREAT SERPL-MCNC: 2.47 MG/DL (ref 0.52–1.04)
ERYTHROCYTE [DISTWIDTH] IN BLOOD BY AUTOMATED COUNT: 14.8 % (ref 10–15)
GFR SERPL CREATININE-BSD FRML MDRD: 21 ML/MIN/{1.73_M2}
GLUCOSE SERPL-MCNC: 174 MG/DL (ref 70–99)
HCT VFR BLD AUTO: 25.9 % (ref 35–47)
HGB BLD-MCNC: 8.3 G/DL (ref 11.7–15.7)
MCH RBC QN AUTO: 25.3 PG (ref 26.5–33)
MCHC RBC AUTO-ENTMCNC: 32 G/DL (ref 31.5–36.5)
MCV RBC AUTO: 79 FL (ref 78–100)
PHOSPHATE SERPL-MCNC: 4.6 MG/DL (ref 2.5–4.5)
PLATELET # BLD AUTO: 60 10E9/L (ref 150–450)
POTASSIUM SERPL-SCNC: 3.8 MMOL/L (ref 3.4–5.3)
RBC # BLD AUTO: 3.28 10E12/L (ref 3.8–5.2)
SODIUM SERPL-SCNC: 136 MMOL/L (ref 133–144)
WBC # BLD AUTO: 9.3 10E9/L (ref 4–11)

## 2021-06-04 PROCEDURE — 85027 COMPLETE CBC AUTOMATED: CPT | Performed by: INTERNAL MEDICINE

## 2021-06-04 PROCEDURE — 250N000013 HC RX MED GY IP 250 OP 250 PS 637: Performed by: PHYSICIAN ASSISTANT

## 2021-06-04 PROCEDURE — 99239 HOSP IP/OBS DSCHRG MGMT >30: CPT | Performed by: HOSPITALIST

## 2021-06-04 PROCEDURE — 36415 COLL VENOUS BLD VENIPUNCTURE: CPT | Performed by: INTERNAL MEDICINE

## 2021-06-04 PROCEDURE — 250N000013 HC RX MED GY IP 250 OP 250 PS 637: Performed by: HOSPITALIST

## 2021-06-04 PROCEDURE — 99231 SBSQ HOSP IP/OBS SF/LOW 25: CPT | Performed by: INTERNAL MEDICINE

## 2021-06-04 PROCEDURE — 250N000011 HC RX IP 250 OP 636: Performed by: HOSPITALIST

## 2021-06-04 PROCEDURE — 80069 RENAL FUNCTION PANEL: CPT | Performed by: INTERNAL MEDICINE

## 2021-06-04 PROCEDURE — 85730 THROMBOPLASTIN TIME PARTIAL: CPT | Performed by: INTERNAL MEDICINE

## 2021-06-04 PROCEDURE — 97110 THERAPEUTIC EXERCISES: CPT | Mod: GO | Performed by: OCCUPATIONAL THERAPIST

## 2021-06-04 PROCEDURE — 250N000013 HC RX MED GY IP 250 OP 250 PS 637: Performed by: INTERNAL MEDICINE

## 2021-06-04 RX ORDER — ATORVASTATIN CALCIUM 40 MG/1
40 TABLET, FILM COATED ORAL DAILY
Qty: 30 TABLET | Refills: 0 | Status: SHIPPED | OUTPATIENT
Start: 2021-06-05 | End: 2021-06-28

## 2021-06-04 RX ORDER — CARVEDILOL 12.5 MG/1
12.5 TABLET ORAL 2 TIMES DAILY
Status: DISCONTINUED | OUTPATIENT
Start: 2021-06-04 | End: 2021-06-04 | Stop reason: HOSPADM

## 2021-06-04 RX ORDER — CLOPIDOGREL BISULFATE 75 MG/1
75 TABLET ORAL DAILY
Qty: 30 TABLET | Refills: 0 | Status: SHIPPED | OUTPATIENT
Start: 2021-06-05 | End: 2021-06-28

## 2021-06-04 RX ORDER — CARVEDILOL 12.5 MG/1
12.5 TABLET ORAL 2 TIMES DAILY
Qty: 60 TABLET | Refills: 0 | Status: SHIPPED | OUTPATIENT
Start: 2021-06-04 | End: 2021-06-28

## 2021-06-04 RX ADMIN — ATORVASTATIN CALCIUM 40 MG: 40 TABLET, FILM COATED ORAL at 08:25

## 2021-06-04 RX ADMIN — ARGATROBAN 0.5 MCG/KG/MIN: 50 INJECTION INTRAVENOUS at 08:24

## 2021-06-04 RX ADMIN — CLOPIDOGREL BISULFATE 75 MG: 75 TABLET ORAL at 08:25

## 2021-06-04 RX ADMIN — LEVOTHYROXINE SODIUM 150 MCG: 75 TABLET ORAL at 08:25

## 2021-06-04 RX ADMIN — PANTOPRAZOLE SODIUM 40 MG: 40 TABLET, DELAYED RELEASE ORAL at 08:25

## 2021-06-04 RX ADMIN — CARVEDILOL 6.25 MG: 6.25 TABLET, FILM COATED ORAL at 08:25

## 2021-06-04 RX ADMIN — APIXABAN 10 MG: 5 TABLET, FILM COATED ORAL at 12:59

## 2021-06-04 RX ADMIN — NICOTINE 1 PATCH: 21 PATCH, EXTENDED RELEASE TRANSDERMAL at 08:25

## 2021-06-04 ASSESSMENT — ACTIVITIES OF DAILY LIVING (ADL)
ADLS_ACUITY_SCORE: 11

## 2021-06-04 ASSESSMENT — MIFFLIN-ST. JEOR: SCORE: 1400.31

## 2021-06-04 NOTE — DISCHARGE SUMMARY
"Discharge Summary  Hospitalist    Date of Admission:  5/30/2021  Date of Discharge:  6/4/2021  Discharging Provider: Hanh Mccurdy MD  Date of Service (when I saw the patient): 06/04/21    Discharge Diagnoses   NSTEMI s/p stenting to LAD 5/31/21  Ischemic cardiomyopathy  Cough, hemoptysis, suspected PE  Bilateral posterior tibial vein occlusive DVT   Anemia and thrombocytopenia  Mild leukocytosis  CKD III with HERNESTO, possibly ROCIO  Microscopic hematuria and proteinuria  Uncontrolled type 2 diabetes with hyperglycemia    History of Present Illness   Please refer H & P for details.      Hospital Course   Rowan Leiva is a 61 year old female admitted on 5/30/2021. She presents as a direct admission from [facility] emergency department with several days of chest pain, and what she describes as 2 weeks of \"lung pain\"     NSTEMI s/p stenting to LAD 5/31/21  Ischemic cardiomyopathy  Patient with known coronary artery disease status post stenting x3 approximately 2 years ago in [place].  She is not on aspirin or Plavix.  She had bleeding historically as well as GI upset with aspirin.  2 to 3 days of central chest discomfort radiating to her jaw.  Worse with laying flat, improves with sitting up.  Troponin elevated at 10.981.  Not on aspirin, statin, or antihypertensive therapy, ongoing tobacco use.  - Heparin drip started on admission, cardiology consulted, patient underwent angiogram.  Coronary angiogram report reviewed, shows 90% occluded distal LAD, treated with ELLIOT x1.  Non flow-limiting otherwise in other vessels  - Aspirin 81 mg enteric-coated re initiated, Brillinta added after angiogram, monitor for bleeding  - Troponin peaked at 13, and downtrending now, TTE  per cardiology, has hypokinetic distal septal wall motion abnormality as well as severely hypokinetic basal inferior/inferior lateral wall motion abnormality.  LV systolic function mildly depressed.  - Atorvastatin 40 mg daily initiated, fasting " lipid panel noted.  - Carvedilol 6.25 mg twice daily initiated.  - No ACEI given CKD with possible HERNESTO  - Cardiology and Hem/Onc assisted in determining ideal antiplatelet/anticoag regimen given the complex case with DVT, likely PE, and probable HIT -was placed on argatroban and Plavix.  HIT ruled out.  - Cardiology ok with stopping ASA (done) 6/2  -After HIT was ruled out and with improvement in kidney function [creatinine downtrending to 2.4 on day of discharge], patient was transitioned from argatroban infusion to apixaban twice daily for acute DVT/PE treatment.     Cough, hemoptysis, suspected PE  Bilateral posterior tibial vein occlusive DVT   Patient describes cough with sputum production. Has a chronic cough associated with her ongoing tobacco use, though she believes in the past 2 weeks this is worsened. She finds herself coughing violently at times and this has led to  some streaks of blood on mucus. No gross hemoptysis despite starting heparin. ? bronchitis, pulmonary edema from cardiac disease resulting in cough. Mildly elevated D-dimer.   - denies leg pain or swelling. Sedentary habit and reports she sits in chair for prolonged period of time.   - US showed bilateral DVT as above.  - Hematology following and appreciated - HIT panel negative, plt 55k down from 90k and has been on heparin.   - Per Hem, transitioned from heparin to argatroban.   - still with some cough/epistaxis though improving  -Argatroban stopped and initiated on Eliquis on day of discharge.  Creatinine had improved to 2.4.  [Per up-to-date, no dose adjustment needed for acute PE/DVT treatment with apixaban with creatinine less than 2.5.  No data for creatinine above 2.5.]     Anemia and thrombocytopenia  Mild leukocytosis  Noted platelet count of 32 and 40 4K in 2012 and was evaluated by oncology, suspected it was due to acute infection.  Recovered to 159 in 2015 but since then it is running slightly lower than normal, 1  .  Presented with a platelet count of 90 K and is a stable.  - Also hemoglobin 11.7 at presentation, trended down to 8.3 on day of discharge.  Stable in this range over last few days.  Patient has only streaks of blood with sputum.  Prior history of GI bleeding but denies any hematochezia or melena currently.  - Iron panel, B12 folate appeared adequate.  - PPI twice daily while hospitalized.  Discontinued at discharge.  - Hematology consulted, discussed with Dr. Miller - HIT negative  - Leukocytosis could be due to stress.  Afebrile.  CXR negative.   Monitor.  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  Creatinine of 1.87, was 1.33 4/4/2020. UA with microscopy shows proteinuria and microscopic hematuria.  - Noncontrast CT of abdomen and pelvis given elevated creatinine, microscopic hematuria, left flank pain and left lower quadrant pain which might represent nephrolithiasis, potentially obstructive.  Patient does have a history of nephrolithiasis in the past  - Nephrology consulted, appreciate assistance.  - Patient reports that she had cystoscopy as part of work-up for hematuria and was normal.  - 6/2 creat up to 2.5, felt to be likely ROCIO related, nephrology following, good urine out put noted  -Creatinine peaked at 2.6 and trended down to 2.4 on day of discharge.  Seen by nephrology who cleared her for discharge.  Recommended outpatient nephrology follow-up.     Uncontrolled type 2 diabetes with hyperglycemia  Blood glucose in the 300 range at presentation.  Takes glipizide, Metformin, and insulin at home  - continue PTA Lantus 30 units at bedtime  - Will add Premeal NovoLog 1 unit per 10 g CHO  - Change  sliding scale to high insulin resistance once diet resumed  -Metformin discontinued at discharge, rest of home regimen continued at discharge.  - Hemoglobin A1c 7.9%     Hypertension  Systolic blood pressures in the 180, 190s range at presentation.  Historically on metoprolol following her heart disease and stenting 2 years ago, though was no longer on any antihypertensives  - Initiating carvedilol 6.25 mg twice daily on admission, increased to 12.5 mg twice daily at discharge.  - Holding on initiation of ACE inhibitor given elevated creatinine      Hyperlipidemia  Previously not on statin.  - Atorvastatin started.     Ongoing tobacco use disorder with nicotine dependence  Early contemplative in terms of cessation.  Previously a 2 pack/day smoker since age 15, over the past year has been smoking 1 pack/day.  - Nicotine patch in place  - Continue to encourage tobacco cessation.  Discussed importance of this with patient      Hypothyroidism  - TSH normal, PTA levothyroxine resumed     COVID 19 screening   - PCR negative 5/30     Patient stable at discharge home.    Hanh Mccurdy MD, MD      Pending Results   These results will be followed up by Hospitalist team.  Unresulted Labs Ordered in the Past 30 Days of this Admission     Date and Time Order Name Status Description    6/1/2021 1230 Partial thromboplastin time In process           Code Status   Full Code       Primary Care Physician   [facility]    Follow-ups Needed After Discharge   Follow-up Appointments     Follow-up and recommended labs and tests       Follow up with primary care provider, [facility], within   7 days for hospital follow- up.  The following labs/tests are recommended:   BMP in a week prior to PCP visit.  Follow-up with nephrology in 2 to 3 weeks in the clinic with CHRISTOPHER.  Follow-up with cardiac rehab.  Follow-up with hematology oncology in few weeks.             Physical Exam   Temp: 98.5  F (36.9  C) Temp src:  Oral BP: (!) 150/63 Pulse: 66   Resp: 16 SpO2: 95 % O2 Device: None (Room air)    Vitals:    06/02/21 0400 06/03/21 0411 06/04/21 0558   Weight: 83.6 kg (184 lb 4.8 oz) 83.5 kg (184 lb) 84 kg (185 lb 3.2 oz)     Vital Signs with Ranges  Temp:  [97.7  F (36.5  C)-98.5  F (36.9  C)] 98.5  F (36.9  C)  Pulse:  [66-79] 66  Resp:  [16-18] 16  BP: (126-150)/(56-71) 150/63  SpO2:  [92 %-100 %] 95 %  I/O last 3 completed shifts:  In: 129.22 [I.V.:129.22]  Out: -     Constitutional: Alert, cooperative, no apparent distress  Respiratory: Non labored breathing, clear to auscultation bilaterally, no crackles or wheezing  Cardiovascular: Regular rate and rhythm, no murmurs, no edema  GI: Normal bowel sounds, soft, non-distended, non-tender  Skin: No obvious rash, RLE more swollen than LLE  Neuro: Alert, engages in appropriate conversation, fluent speech, moving all extremities, no facial asymmetry  Psych: Calm and pleasant, no obvious anxiety/ depression      Discharge Disposition   Discharged to home  Condition at discharge: Stable    Consultations This Hospital Stay   CARDIAC REHAB IP CONSULT  CARDIOLOGY IP CONSULT  PHARMACY IP CONSULT  PHARMACY IP CONSULT  NEPHROLOGY IP CONSULT  HEMATOLOGY & ONCOLOGY IP CONSULT  NUTRITION SERVICES ADULT IP CONSULT  CARDIAC REHAB IP CONSULT  PHARMACY IP CONSULT  PHARMACY IP CONSULT  PHARMACY IP CONSULT  PHARMACY IP CONSULT  PHARMACY LIAISON FOR MEDICATION COVERAGE CONSULT  PHARMACY TO DOSE ARGATROBAN  PHARMACY IP CONSULT  CARE MANAGEMENT / SOCIAL WORK IP CONSULT  PHARMACY LIAISON FOR MEDICATION COVERAGE CONSULT  SMOKING CESSATION PROGRAM IP CONSULT  SMOKING CESSATION PROGRAM IP CONSULT    Time Spent on this Encounter   IHanh MD, personally saw the patient today and spent greater than 30 minutes discharging this patient.    Discharge Orders      CARDIAC REHAB REFERRAL      Discharge Order: F/U with Cardiac  [facility]STOP[facility]      NUTRITION REFERRAL      Reason for your hospital stay    You were  hospitalized with     Follow-up and recommended labs and tests     Follow up with primary care provider, [facility], within 7 days for hospital follow- up.  The following labs/tests are recommended: BMP in a week prior to PCP visit.  Follow-up with nephrology in 2 to 3 weeks in the clinic with CHRISTOPHER.  Follow-up with cardiac rehab.  Follow-up with hematology oncology in few weeks.     Activity    Your activity upon discharge: activity as tolerated     When to contact your care team    Call your primary doctor if you have any of the following: Worsening chest pain, shortness of breath, leg swelling, bleeding issues from anywhere     Full Code     Diet    Follow this diet upon discharge: Orders Placed This Encounter      Moderate Consistent CHO Diet     Discharge Medications   Discharge Medication List as of 6/4/2021  1:07 PM      START taking these medications    Details   !! apixaban ANTICOAGULANT (ELIQUIS) 5 MG tablet Take 2 tablets (10 mg) by mouth 2 times daily for 13 doses, Disp-26 tablet, R-0, E-Prescribe      !! apixaban ANTICOAGULANT (ELIQUIS) 5 MG tablet Take 1 tablet (5 mg) by mouth 2 times daily, Disp-60 tablet, R-0, E-Prescribe      atorvastatin (LIPITOR) 40 MG tablet Take 1 tablet (40 mg) by mouth daily, Disp-30 tablet, R-0, E-Prescribe      carvedilol (COREG) 12.5 MG tablet Take 1 tablet (12.5 mg) by mouth 2 times daily, Disp-60 tablet, R-0, E-Prescribe      clopidogrel (PLAVIX) 75 MG tablet Take 1 tablet (75 mg) by mouth daily, Disp-30 tablet, R-0, E-Prescribe       !! - Potential duplicate medications found. Please discuss with provider.      CONTINUE these medications which have NOT CHANGED    Details   ALBUTEROL 90 MCG/ACT IN AERS 1-2 puffs Q 2-4 hrs prn with spacer - HFA, Disp-1, R-1, Fax      glipiZIDE (GLUCOTROL) 10 MG tablet Take 10 mg by mouth daily., Historical      insulin glargine (LANTUS SOLOSTAR) 100 UNIT/ML injection Inject 30 Units Subcutaneous At Bedtime , Historical       !! insulin lispro (HUMALOG KWIKPEN) 100 UNIT/ML (1 unit dial) KWIKPEN Inject 18 Units Subcutaneous 2 times daily (before meals) Breakfast and Dinner, Historical      !! insulin lispro (HUMALOG KWIKPEN) 100 UNIT/ML (1 unit dial) KWIKPEN Inject 5 Units Subcutaneous daily (before lunch), Historical      levothyroxine (SYNTHROID, LEVOTHROID) 50 MCG tablet Take 150 mcg by mouth every morning (before breakfast) , Historical      LANCETS REGULAR MISC use twice a day for blood sugar, Disp-2 boxes, R-0, Fax       !! - Potential duplicate medications found. Please discuss with provider.        Allergies   Allergies   Allergen Reactions     Heparin Heparin Induced Thrombocytopenia     HIT panel pending     No Known Drug Allergies      Data   Most Recent 3 CBC's:  Recent Labs   Lab Test 06/04/21  0544 06/03/21  0530 06/02/21  1614 06/02/21  0530   WBC 9.3 9.4  --  11.1*   HGB 8.3* 8.4* 8.7* 9.0*   MCV 79 79  --  79   PLT 60* 48*  --  61*      Most Recent 3 BMP's:  Recent Labs   Lab Test 06/04/21  0858 06/03/21  0530 06/02/21  0919    137 137   POTASSIUM 3.8 3.6 3.9   CHLORIDE 106 107 106   CO2 19* 21 23   BUN 37* 35* 30   CR 2.47* 2.66* 2.51*   ANIONGAP 11 9 8   EBRNY 8.3* 8.1* 8.5   * 138* 129*     Most Recent 2 LFT's:  Recent Labs   Lab Test 06/01/21  1227 05/31/21  0626   AST 54* 67*   ALT 16 18   ALKPHOS 113 111   BILITOTAL 0.3 0.2     Most Recent INR's and Anticoagulation Dosing History:  Anticoagulation Dose History     Recent Dosing and Labs Latest Ref Rng & Units 11/4/2012 4/10/2015    INR 0.86 - 1.14 1.10 0.93        Most Recent 3 Troponin's:  Recent Labs   Lab Test 05/31/21  0626 05/31/21  0326 05/30/21  2315   TROPI 9.323* 11.693* 13.871*     Most Recent Cholesterol Panel:  Recent Labs   Lab Test 05/31/21  0626   CHOL 191   *   HDL 29*   TRIG 285*     Most Recent 6 Bacteria Isolates From Any Culture (See EPIC Reports for Culture Details):  Recent Labs   Lab Test 04/10/15  1402 04/10/15  1108    CULT No Salmonella, Shigella, Campylobacter, E. coli O157, Aeromonas, or Plesiomonas   isolated.   Charge credited Quantity not sufficient     Most Recent TSH, T4 and A1c Labs:  Recent Labs   Lab Test 05/31/21  0326 01/08/15   TSH 0.41 0.020   T4  --  2.04   A1C 7.9*  --        Results for orders placed or performed during the hospital encounter of 05/30/21   CT Abdomen Pelvis w/o Contrast    Narrative    EXAM: CT ABDOMEN PELVIS W/O CONTRAST  LOCATION: [facility]  DATE/TIME: 5/31/2021 1:36 AM    INDICATION: Flank pain, kidney stone suspected  COMPARISON: 04/04/2020  TECHNIQUE: CT scan of the abdomen and pelvis was performed without IV contrast. Multiplanar reformats were obtained. Dose reduction techniques were used.  CONTRAST: None.    FINDINGS:   LOWER CHEST: No basilar infiltrates.    HEPATOBILIARY: Cholecystectomy.    PANCREAS: Unremarkable    SPLEEN: Unremarkable    ADRENAL GLANDS: Unremarkable    KIDNEYS/BLADDER: Numerous tiny bilateral calcifications in the 1 mm size range. The majority are favored to represent vascular calcifications. No evidence of ureterolithiasis or bladder stones. Multiple pelvic phleboliths.    BOWEL: No bowel obstruction.    LYMPH NODES: 1.2 x 1.0 cm aortocaval node, image 108, unchanged..    VASCULATURE: Extensive vascular calcification. No abdominal aortic aneurysm    PELVIC ORGANS: Hysterectomy. No free fluid    MUSCULOSKELETAL: Subcutaneous stranding in the lower abdomen bilaterally, unchanged. No fluid collections. Advanced lower lumbar spine facet arthropathy.      Impression    IMPRESSION:   1.  Extensive bilateral renal vascular calcifications without evidence of obstruction, ureterolithiasis, or bladder stones.     US Lower Extremity Venous Duplex Bilateral    Narrative    US LOWER EXTREMITY VENOUS DUPLEX BILATERAL 5/31/2021 9:34 AM    CLINICAL HISTORY: Rule out deep venous thrombosis. Elevated D-dimer.     TECHNIQUE: Venous Duplex ultrasound of bilateral lower  extremities  with and without compression, augmentation and duplex. Color flow and  spectral Doppler with waveform analysis performed.    COMPARISON: None.    FINDINGS: Exam includes the common femoral, femoral, popliteal veins  as well as segmentally visualized deep calf veins and greater  saphenous vein.     RIGHT: Occlusive deep venous thrombosis posterior tibial vein is  noted. Remaining deep veins right lower extremity are free of  intraluminal thrombus. No superficial thrombophlebitis. No popliteal  cyst.    LEFT: Occlusive deep venous thrombosis posterior tibial vein is noted.  Remaining deep veins in the left lower extremity are free of  intraluminal thrombus. No superficial thrombophlebitis. No popliteal  cyst.      Impression    IMPRESSION:  1.  Bilateral posterior tibial vein deep venous thromboses. No  evidence of superficial thrombophlebitis.    PAULINE RAMIREZ MD   XR Chest 2 Views    Narrative    XR CHEST 2 VW 6/1/2021 10:25 AM    HISTORY: PE suspected, low/intermediate prob, positive D-dimer; chest  pain, sob    COMPARISON: 5/30/2021      Impression    IMPRESSION: Slight increase in linear and patchy opacities in the left  upper lobe and linear interstitial opacities in the lung bases which  could represent infection, edema or scarring. No pleural effusion or  pneumothorax. Normal heart size. Tortuous aorta with atherosclerotic  calcifications. Right upper quadrant surgical clips.    KERRY NOWAK MD   NM Lung Scan Perfusion Particulate    Narrative    EXAM: NM LUNG SCAN PERFUSION PARTICULATE  LOCATION: [facility]  DATE/TIME: 6/1/2021 10:38 AM    INDICATION: PE suspected, low/intermediate prob, positive D-dimer. DVT.  COMPARISON: CXR 06/01/2021.  TECHNIQUE: 3.5 mCi technetium-99m MAA, IV. Standard lung perfusion imaging.    FINDINGS: Few small-to-moderate size bilateral perfusion defects in the peripheral mid lungs without radiographic correlate. Findings meet nondiagnostic perfusion-only  criteria for assessment of pulmonary embolism.      Impression    IMPRESSION:   Study nondiagnostic for pulmonary embolism.   Echocardiogram Complete    Narrative    [id number]  [id number]  [id number]^SUZY^AMBER^BRITTANY     [facility]  Echocardiography Laboratory  [address] [place] [id number]     Name: EDVIN MERLOS  MRN: [id number]  : 1960  Study Date: 2021 11:48 AM  Age: 61 yrs  Gender: Female  Patient Location: [facility]  Reason For Study: Chest Pain, Chest Tightness, Chest Pressure  Ordering Physician: AMBER ALMONTE  Referring Physician: MARCUS PARKS [facility]  Performed By: Aleah Irwin RDCS     BSA: 1.9 m2  Height: 64 in  Weight: 187 lb  HR: 74  BP: 144/75 mmHg  ______________________________________________________________________________  Procedure  Complete Portable Echo Adult. Optison (NDC #[id number]) given intravenously.  ______________________________________________________________________________  Interpretation Summary     1. The left ventricle is normal in size. The visual ejection fraction is  estimated at 55%. Very distal anterior and apical hypokinesis.  2. There is mild to moderate concentric left ventricular hypertrophy.  3. The right ventricle is normal in structure, function and size.  4. No valve disease.     Echo from  at [facility] reported EF 65%, no mention of WMA.  ______________________________________________________________________________  Left Ventricle  The left ventricle is normal in size. There is mild to moderate concentric  left ventricular hypertrophy. The visual ejection fraction is estimated at  55%. Grade I or early diastolic dysfunction. Very distal anterior and apical  hypokinesis.     Right Ventricle  The right ventricle is normal in structure, function and size.     Atria  Normal left atrial size. Right atrial size is normal. There is no atrial shunt  seen.     Mitral Valve  There is mild (1+) mitral  regurgitation.     Tricuspid Valve  No tricuspid regurgitation. Right ventricular systolic pressure could not be  approximated due to inadequate tricuspid regurgitation.     Aortic Valve  The aortic valve is normal in structure and function.     Pulmonic Valve  The pulmonic valve is not well seen, but is grossly normal.     Vessels  Normal ascending, transverse (arch), and descending aorta. The inferior vena  cava was normal in size with preserved respiratory variability.     Pericardium  There is no pericardial effusion.     Rhythm  Sinus rhythm was noted.  ______________________________________________________________________________  MMode/2D Measurements & Calculations  IVSd: 1.4 cm     LVIDd: 5.1 cm  LVIDs: 3.3 cm  LVPWd: 1.5 cm  FS: 34.3 %  LV mass(C)d: 316.4 grams  LV mass(C)dI: 166.4 grams/m2  Ao root diam: 2.7 cm  LA dimension: 4.0 cm  asc Aorta Diam: 2.9 cm  LA/Ao: 1.5  LA Volume (BP): 29.8 ml  LA Volume Index (BP): 15.7 ml/m2  RWT: 0.59     Doppler Measurements & Calculations  MV E max edis: 103.0 cm/sec  MV A max edis: 122.2 cm/sec  MV E/A: 0.84  MV dec time: 0.23 sec  PA acc time: 0.10 sec  E/E' av.4  Lateral E/e': 21.8  Medial E/e': 25.0     ______________________________________________________________________________  Report approved by: Bebe العلي 2021 02:37 PM         Cardiac Catheterization    Narrative      3rd Mrg lesion is 55% stenosed.    Dist Cx lesion is 50% stenosed.    Ost Cx to Prox Cx lesion is 30% stenosed.    Mid LAD to Dist LAD lesion is 20% stenosed.    Dist LAD-1 lesion is 90% stenosed.    Dist LAD-2 lesion is 60% stenosed.    Prox LAD lesion is 30% stenosed.    Prox RCA lesion is 50% stenosed.     1. NSTEMI due to severe distal LAD disease s/p treatment with 1 drug   eluting stent (Synergy [id number], postdilated with 2.5mm NC)  2. Moderate diffuse coronary atherosclerosis involving the distal LAD,   distal LCx, and entire RCA  3. Patent overlapping stents in  the mid-distal LAD with mild ISR  4. Patent stent in the proximal RCA with moderate ISR

## 2021-06-04 NOTE — PROGRESS NOTES
Pt is alert and oriented x 4. VSS on RA. Denied pain/SOB. Up an independent. Nicotine patch on left deltoid. Tele SR, Argatroban running at 2.55 ml/hr for DVT. No sign of bleeding noted. Call light with in reach. Continue to monitor.

## 2021-06-04 NOTE — PLAN OF CARE
Occupational Therapy Discharge Summary    Reason for therapy discharge:    Discharged to home with outpatient therapy.    Progress towards therapy goal(s). See goals on Care Plan in [facility] electronic health record for goal details.  Goals partially met.  Barriers to achieving goals:   discharge from facility.    Therapy recommendation(s):    Continued therapy is recommended.  Rationale/Recommendations:  REcommend A with strenuous IADl's, ie yard work, vaccuming, heavier laundry, etc and OP CR at [facility] for monitored progressive exercise and risk factor education and modification. .

## 2021-06-04 NOTE — PROGRESS NOTES
Nephrology Progress Note  06/04/2021         Assessment & Recommendations:   Adm 5/30 with NSTEMI     1 CKD 3B - baseline S.Cr o f~1.5 . Presumed diabetic nephropathy / HTN. 3.9 gm/g on UPCR.  UA with mod blood and 6 RBC / hpf, which is chronic. CT abd without suspicious lesions other than vascular calcifications.    - urologic w/u with Dr Dos Santos in 2019 for hematuria was neg. Passed some kidney stones last year.   - low suspicion for active GN. Given proteinuria, with longstanding hematuria - vasculitis panel (GABE, ANCA, C3, C4, hepatitis serology, SIFE - > all neg .   - Possible very small monoclonal IgM immunoglobulin of kappa light   chain type on UIFE. SIFE neg. C3,C4 normal. GABE, ANCA pending.   - will need to establish nephrology follow up on a routine basis post discharge for CKD care. Will benefit from RASi and SGLT2i therapy down the line once acute issues are over.     2 acute kidney injury-adm with Cr of 1.  Now worse, likely secondary to contrast nephropathy.  No clinical features of atheroembolic disease.  Nonoliguric.  - improving     2 T2 Dm with poor control   3 HPL  4 NSTEMI . EF 55%, mod LVH  5 HTN   7 CAD/ NSTEMI - s/p PCI with severe distal LAD lesion on 5/31/21. On DAPT, betablocker, statin  8 B/L LE DVT - V/Q scan results pending. Hem/onc on board.   9 Thrombocytopenia  - chronic. Now worse on heparin.  Heme-onc on board.  On argatroban drip. HIT screen neg    Discussion -   Cr plateaued and improving. Non oliguric. Expect Cr to continue to improve. She is eager to go home.   D/w Dr Mccurdy. OKay to discharge from renal standpoint after her anticoag plans are defined. Labs and visit with PCP early next week.   F/u in our clinic in 2-3 wks with CHRISTOPHER for renal care.     Lauren Anglin MD  [facility] - Nephrology   [phone]      Interval History :   Seen / examined.   Denies any new complaints. Continues on Argatroban. HIT test neg.   Platelets improved to 60 k.   Cr improving  2.4  Reports good urine output.   Nonoliguric.  Electrolytes okay.    Review of Systems:   A 4 point review of systems was negative except as noted above.  Notably: fair appetite.  no nausea or vomiting or diarrhea.  no confusion,  no fever or chills    Physical Exam:   I/O last 3 completed shifts:  In: 740 [P.O.:740]  Out: 450 [Urine:450]    GENERAL APPEARANCE: alert and no distress  EYES:  no scleral icterus, pupils equal  PULM: diminished at bases   CV: regular rhythm, normal rate, no rub     -JVP -     -edema +, trace   GI: soft, non tender,   NEURO: mentation intact and speech normal, no asterixis       Labs:   All labs reviewed by me  Electrolytes/Renal -   Recent Labs   Lab Test 06/04/21  0858 06/03/21  0530 06/02/21  0919 05/31/21  1446 05/31/21  1446    137 137   < >  --    POTASSIUM 3.8 3.6 3.9   < >  --    CHLORIDE 106 107 106   < >  --    CO2 19* 21 23   < >  --    BUN 37* 35* 30   < >  --    CR 2.47* 2.66* 2.51*   < >  --    * 138* 129*   < >  --    BERNY 8.3* 8.1* 8.5   < >  --    PHOS 4.6*  --  4.3  --  3.7    < > = values in this interval not displayed.       CBC -   Recent Labs   Lab Test 06/04/21  0544 06/03/21  0530 06/02/21  1614 06/02/21  0530   WBC 9.3 9.4  --  11.1*   HGB 8.3* 8.4* 8.7* 9.0*   PLT 60* 48*  --  61*       LFTs -   Recent Labs   Lab Test 06/04/21  0858 06/02/21  0919 06/01/21  1227 05/31/21  0626 04/10/15  1023   ALKPHOS  --   --  113 111 198*   BILITOTAL  --   --  0.3 0.2 0.2   ALT  --   --  16 18 14   AST  --   --  54* 67* 16   PROTTOTAL  --   --  6.7* 6.6* 7.4   ALBUMIN 2.6* 2.6* 2.5* 2.5* 3.2*       Iron Panel -   Recent Labs   Lab Test 05/31/21  1446   IRON 37   IRONSAT 21   NORBERTO 233         Current Medications:    atorvastatin  40 mg Oral Daily     carvedilol  12.5 mg Oral BID     clopidogrel  75 mg Oral Daily     insulin aspart   Subcutaneous TID AC     insulin aspart  1-10 Units Subcutaneous TID AC     insulin aspart  1-7 Units Subcutaneous At Bedtime      insulin glargine  30 Units Subcutaneous At Bedtime     levothyroxine  150 mcg Oral QAM AC     nicotine  1 patch Transdermal Daily     nicotine   Transdermal Q8H     pantoprazole  40 mg Oral BID AC     sodium chloride (PF)  3 mL Intracatheter Q8H       argatroban 0.5 mcg/kg/min (06/04/21 0900)     - MEDICATION INSTRUCTIONS -       - MEDICATION INSTRUCTIONS -       - MEDICATION INSTRUCTIONS -       - MEDICATION INSTRUCTIONS -       Percutaneous Coronary Intervention orders placed (this is information for BPA alerting)       ACE/ARB/ARNI NOT PRESCRIBED       ASPIRIN NOT PRESCRIBED       Lauren Anglin MD

## 2021-06-04 NOTE — PROGRESS NOTES
Pt VSS on RA. Tele SR. A&Ox4.. Up ad nicholas, using bathroom. Argatroban discontinued and po eloquis started. Denies pain. Discharge instructions reviewed with pt, all questions answered. Medications filled at [facility] pharmacy and sent home with pt. Pt sent home with all of belongings in ,  to drive home. Pt discharged safely.

## 2021-06-04 NOTE — PROGRESS NOTES
"SPIRITUAL HEALTH SERVICES  SPIRITUAL ASSESSMENT Progress Note  [facility]     REFERRAL SOURCE: Follow up visit    Rowan reflected on \"what I've been learning from all that's happened in the last year\" with health issues in her family and the days she's spent in the hospital. She's named her plans to collaborate with her  and her mom, to eat a healthier diet, get exercise, and stop smoking.  She shared that today she's \"feeling better and more hopeful\" and expressing her gratitude for the \"amazingly caring staff\" that she has encountered during her hospitalization.     I offered spiritual and emotional support through reflective listening that affirmed emotions, experience, and meaning. Offered assurance through prayer which incorporated conversational themes.    PLAN: [facility] remains available for support.    Stephanie Tello  Associate   Pager [phone]  Phone [phone]    "

## 2021-06-04 NOTE — CONSULTS
Anticoagulation coverage check.  Patient has [employer] Re[employer].  It appears there may be an unmet deductible but it is unclear.     Xarelto:  $277. Upon receipt of RX Discharge Pharmacy can provide copay savings card to reduce this to $10/mo for the first two fills, and then $77/mo thereafter.       Eliquis:  $340. Upon receipt of RX Discharge Pharmacy can provide copay savings card to reduce this to $10 per month.  The card covers a maximum of $3,800 per year.       --ANN High, Pharmacy Technician/Liaison, Discharge Pharmacy [phone]

## 2021-06-04 NOTE — PROGRESS NOTES
Note renal function improving now with creatinine of 2.47.  Have increased carvedilol to 12.5 mg BID as blood pressures are still mildly elevated.  Will sign off.  Cardiology follow-up ordered at [facility].  Please call if questions.  Thank you.

## 2021-06-05 ENCOUNTER — APPOINTMENT (OUTPATIENT)
Dept: CT IMAGING | Facility: CLINIC | Age: 61
DRG: 064 | End: 2021-06-05
Attending: EMERGENCY MEDICINE
Payer: COMMERCIAL

## 2021-06-05 ENCOUNTER — HOSPITAL ENCOUNTER (INPATIENT)
Facility: CLINIC | Age: 61
LOS: 1 days | Discharge: HOME | DRG: 064 | End: 2021-06-06
Attending: EMERGENCY MEDICINE | Admitting: INTERNAL MEDICINE
Payer: COMMERCIAL

## 2021-06-05 ENCOUNTER — APPOINTMENT (OUTPATIENT)
Dept: MRI IMAGING | Facility: CLINIC | Age: 61
DRG: 064 | End: 2021-06-05
Attending: EMERGENCY MEDICINE
Payer: COMMERCIAL

## 2021-06-05 DIAGNOSIS — I63.9 CEREBELLAR STROKE, ACUTE (H): ICD-10-CM

## 2021-06-05 DIAGNOSIS — H53.8 BLURRED VISION: ICD-10-CM

## 2021-06-05 DIAGNOSIS — I82.469 ACUTE DEEP VEIN THROMBOSIS (DVT) OF CALF MUSCLE VEIN, UNSPECIFIED LATERALITY (H): ICD-10-CM

## 2021-06-05 DIAGNOSIS — E11.65 TYPE 2 DIABETES MELLITUS WITH HYPERGLYCEMIA, WITH LONG-TERM CURRENT USE OF INSULIN (H): ICD-10-CM

## 2021-06-05 DIAGNOSIS — Z72.0 TOBACCO ABUSE: Primary | ICD-10-CM

## 2021-06-05 DIAGNOSIS — Z79.4 TYPE 2 DIABETES MELLITUS WITH HYPERGLYCEMIA, WITH LONG-TERM CURRENT USE OF INSULIN (H): ICD-10-CM

## 2021-06-05 LAB
ANION GAP SERPL CALCULATED.3IONS-SCNC: 9 MMOL/L (ref 3–14)
APTT PPP: 74 SEC (ref 22–37)
BASOPHILS # BLD AUTO: 0.1 10E9/L (ref 0–0.2)
BASOPHILS NFR BLD AUTO: 1 %
BUN SERPL-MCNC: 40 MG/DL (ref 7–30)
CALCIUM SERPL-MCNC: 8.3 MG/DL (ref 8.5–10.1)
CHLORIDE SERPL-SCNC: 109 MMOL/L (ref 94–109)
CO2 SERPL-SCNC: 22 MMOL/L (ref 20–32)
CREAT SERPL-MCNC: 2.38 MG/DL (ref 0.52–1.04)
DIFFERENTIAL METHOD BLD: ABNORMAL
EOSINOPHIL # BLD AUTO: 0.3 10E9/L (ref 0–0.7)
EOSINOPHIL NFR BLD AUTO: 3 %
ERYTHROCYTE [DISTWIDTH] IN BLOOD BY AUTOMATED COUNT: 14.8 % (ref 10–15)
GFR SERPL CREATININE-BSD FRML MDRD: 22 ML/MIN/{1.73_M2}
GLUCOSE BLDC GLUCOMTR-MCNC: 80 MG/DL (ref 70–99)
GLUCOSE BLDC GLUCOMTR-MCNC: 83 MG/DL (ref 70–99)
GLUCOSE SERPL-MCNC: 90 MG/DL (ref 70–99)
HCT VFR BLD AUTO: 26.2 % (ref 35–47)
HGB BLD-MCNC: 8.5 G/DL (ref 11.7–15.7)
INR PPP: 1.8 (ref 0.86–1.14)
LABORATORY COMMENT REPORT: NORMAL
LYMPHOCYTES # BLD AUTO: 1 10E9/L (ref 0.8–5.3)
LYMPHOCYTES NFR BLD AUTO: 10 %
MCH RBC QN AUTO: 25.5 PG (ref 26.5–33)
MCHC RBC AUTO-ENTMCNC: 32.4 G/DL (ref 31.5–36.5)
MCV RBC AUTO: 79 FL (ref 78–100)
MONOCYTES # BLD AUTO: 0.5 10E9/L (ref 0–1.3)
MONOCYTES NFR BLD AUTO: 5 %
NEUTROPHILS # BLD AUTO: 8.3 10E9/L (ref 1.6–8.3)
NEUTROPHILS NFR BLD AUTO: 81 %
PLATELET # BLD AUTO: 53 10E9/L (ref 150–450)
PLATELET # BLD EST: ABNORMAL 10*3/UL
POTASSIUM SERPL-SCNC: 3.8 MMOL/L (ref 3.4–5.3)
RBC # BLD AUTO: 3.33 10E12/L (ref 3.8–5.2)
RBC MORPH BLD: ABNORMAL
SARS-COV-2 RNA RESP QL NAA+PROBE: NEGATIVE
SODIUM SERPL-SCNC: 140 MMOL/L (ref 133–144)
SPECIMEN SOURCE: NORMAL
WBC # BLD AUTO: 10.3 10E9/L (ref 4–11)

## 2021-06-05 PROCEDURE — 250N000013 HC RX MED GY IP 250 OP 250 PS 637: Performed by: EMERGENCY MEDICINE

## 2021-06-05 PROCEDURE — 120N000001 HC R&B MED SURG/OB

## 2021-06-05 PROCEDURE — 93005 ELECTROCARDIOGRAM TRACING: CPT

## 2021-06-05 PROCEDURE — 70450 CT HEAD/BRAIN W/O DYE: CPT

## 2021-06-05 PROCEDURE — 80048 BASIC METABOLIC PNL TOTAL CA: CPT | Performed by: EMERGENCY MEDICINE

## 2021-06-05 PROCEDURE — 87635 SARS-COV-2 COVID-19 AMP PRB: CPT | Performed by: EMERGENCY MEDICINE

## 2021-06-05 PROCEDURE — 99285 EMERGENCY DEPT VISIT HI MDM: CPT | Mod: 25

## 2021-06-05 PROCEDURE — 999N001017 HC STATISTIC GLUCOSE BY METER IP

## 2021-06-05 PROCEDURE — 250N000013 HC RX MED GY IP 250 OP 250 PS 637: Performed by: INTERNAL MEDICINE

## 2021-06-05 PROCEDURE — 85610 PROTHROMBIN TIME: CPT | Performed by: EMERGENCY MEDICINE

## 2021-06-05 PROCEDURE — 99223 1ST HOSP IP/OBS HIGH 75: CPT | Mod: AI | Performed by: INTERNAL MEDICINE

## 2021-06-05 PROCEDURE — 70551 MRI BRAIN STEM W/O DYE: CPT

## 2021-06-05 PROCEDURE — 250N000012 HC RX MED GY IP 250 OP 636 PS 637: Performed by: INTERNAL MEDICINE

## 2021-06-05 PROCEDURE — C9803 HOPD COVID-19 SPEC COLLECT: HCPCS

## 2021-06-05 PROCEDURE — 85025 COMPLETE CBC W/AUTO DIFF WBC: CPT | Performed by: EMERGENCY MEDICINE

## 2021-06-05 PROCEDURE — 85730 THROMBOPLASTIN TIME PARTIAL: CPT | Performed by: EMERGENCY MEDICINE

## 2021-06-05 RX ORDER — LABETALOL HYDROCHLORIDE 5 MG/ML
10-20 INJECTION, SOLUTION INTRAVENOUS EVERY 10 MIN PRN
Status: DISCONTINUED | OUTPATIENT
Start: 2021-06-05 | End: 2021-06-06 | Stop reason: HOSPADM

## 2021-06-05 RX ORDER — ALBUTEROL SULFATE 90 UG/1
2 AEROSOL, METERED RESPIRATORY (INHALATION) EVERY 4 HOURS PRN
Status: DISCONTINUED | OUTPATIENT
Start: 2021-06-05 | End: 2021-06-06 | Stop reason: HOSPADM

## 2021-06-05 RX ORDER — NICOTINE POLACRILEX 4 MG
15-30 LOZENGE BUCCAL
Status: DISCONTINUED | OUTPATIENT
Start: 2021-06-05 | End: 2021-06-06 | Stop reason: HOSPADM

## 2021-06-05 RX ORDER — CLOPIDOGREL BISULFATE 75 MG/1
75 TABLET ORAL DAILY
Status: DISCONTINUED | OUTPATIENT
Start: 2021-06-06 | End: 2021-06-06 | Stop reason: HOSPADM

## 2021-06-05 RX ORDER — ONDANSETRON 4 MG/1
4 TABLET, ORALLY DISINTEGRATING ORAL EVERY 6 HOURS PRN
Status: DISCONTINUED | OUTPATIENT
Start: 2021-06-05 | End: 2021-06-06 | Stop reason: HOSPADM

## 2021-06-05 RX ORDER — LEVOTHYROXINE SODIUM 150 UG/1
150 TABLET ORAL
Status: DISCONTINUED | OUTPATIENT
Start: 2021-06-06 | End: 2021-06-06 | Stop reason: HOSPADM

## 2021-06-05 RX ORDER — LIDOCAINE 40 MG/G
CREAM TOPICAL
Status: DISCONTINUED | OUTPATIENT
Start: 2021-06-05 | End: 2021-06-06 | Stop reason: HOSPADM

## 2021-06-05 RX ORDER — DEXTROSE MONOHYDRATE 25 G/50ML
25-50 INJECTION, SOLUTION INTRAVENOUS
Status: DISCONTINUED | OUTPATIENT
Start: 2021-06-05 | End: 2021-06-06 | Stop reason: HOSPADM

## 2021-06-05 RX ORDER — ATORVASTATIN CALCIUM 40 MG/1
40 TABLET, FILM COATED ORAL DAILY
Status: DISCONTINUED | OUTPATIENT
Start: 2021-06-06 | End: 2021-06-06 | Stop reason: HOSPADM

## 2021-06-05 RX ORDER — IOPAMIDOL 755 MG/ML
120 INJECTION, SOLUTION INTRAVASCULAR ONCE
Status: DISCONTINUED | OUTPATIENT
Start: 2021-06-05 | End: 2021-06-05

## 2021-06-05 RX ORDER — ACETAMINOPHEN 325 MG/10.15ML
650 LIQUID ORAL EVERY 4 HOURS PRN
Status: DISCONTINUED | OUTPATIENT
Start: 2021-06-05 | End: 2021-06-06 | Stop reason: HOSPADM

## 2021-06-05 RX ORDER — CLOPIDOGREL BISULFATE 75 MG/1
75 TABLET ORAL ONCE
Status: DISCONTINUED | OUTPATIENT
Start: 2021-06-05 | End: 2021-06-05

## 2021-06-05 RX ORDER — ONDANSETRON 2 MG/ML
4 INJECTION INTRAMUSCULAR; INTRAVENOUS EVERY 6 HOURS PRN
Status: DISCONTINUED | OUTPATIENT
Start: 2021-06-05 | End: 2021-06-06 | Stop reason: HOSPADM

## 2021-06-05 RX ORDER — HYDRALAZINE HYDROCHLORIDE 20 MG/ML
10-20 INJECTION INTRAMUSCULAR; INTRAVENOUS
Status: DISCONTINUED | OUTPATIENT
Start: 2021-06-05 | End: 2021-06-06 | Stop reason: HOSPADM

## 2021-06-05 RX ORDER — NICOTINE 21 MG/24HR
1 PATCH, TRANSDERMAL 24 HOURS TRANSDERMAL DAILY
Status: DISCONTINUED | OUTPATIENT
Start: 2021-06-05 | End: 2021-06-06 | Stop reason: HOSPADM

## 2021-06-05 RX ADMIN — NICOTINE 1 PATCH: 21 PATCH, EXTENDED RELEASE TRANSDERMAL at 18:52

## 2021-06-05 RX ADMIN — APIXABAN 10 MG: 5 TABLET, FILM COATED ORAL at 17:26

## 2021-06-05 RX ADMIN — INSULIN GLARGINE 15 UNITS: 100 INJECTION, SOLUTION SUBCUTANEOUS at 22:44

## 2021-06-05 ASSESSMENT — ENCOUNTER SYMPTOMS
EYE PAIN: 0
ABDOMINAL PAIN: 1
SPEECH DIFFICULTY: 0
HEADACHES: 1
DIZZINESS: 0

## 2021-06-05 ASSESSMENT — ACTIVITIES OF DAILY LIVING (ADL): ADLS_ACUITY_SCORE: 10

## 2021-06-05 ASSESSMENT — MIFFLIN-ST. JEOR: SCORE: 1400.97

## 2021-06-05 NOTE — ED TRIAGE NOTES
States since last night she couldn't read things and since she woke up this am vision is blurry. discharged home yesterday after 6 days here for MI, angioplasty, dvts, and PEs.

## 2021-06-05 NOTE — PROGRESS NOTES
RECEIVING UNIT ED HANDOFF REVIEW    ED Nurse Handoff Report was reviewed by: SUZANNE NEWELL RN on June 5, 2021 at 5:10 PM

## 2021-06-05 NOTE — CONSULTS
[facility]    Stroke Telephone Note    I was called by Noris Cortez on 06/05/21 regarding patient Rowan Leiva. The patient is a 61 year old female with PMH of HTN, HLD, CAD with a recent NSTEMI s/p PCI (5/31/21) and recent  DVT on eliquis/ASA and thrombocytopenia -seen as a stroke alert for blurred vision.    The patient was brought into the ER for evaluation of blurry vision in both eyes starting 4 PM on 6/4/2021.  The patient came into the ER today, since her symptoms were persistent.  On arrival  mmHg.  Vitals otherwise stable.  She was examined by the ED physician who noted no focal motor or sensory deficits.  No aphasia, dysarthria, facial droop or focal motor weakness or sensory deficits were appreciated.  No ataxia, nystagmus or gait instability were observed.  Head CT without contrast was unremarkable for any acute parenchymal pathology.  CTA of the head and neck was deferred in view of the patient's symptomatology not suggestive of a large vessel occlusion and known chronic kidney disease.    Stroke Code Data (for stroke code without tele)  Stroke code activated 06/05/21   1404   Stroke provider first response      1405        1405   Last known normal 06/04/21   1600        Time of discovery   (or onset of symptoms) 06/05/21       Head CT read by Stroke Neuro Dr/Provider 06/05/21       Was stroke code de-escalated? Yes 06/05/21 1412  patient is outside emergent treatment time parameters       Thrombolytic Treatment   Not given due to Already anticoagulated, out of the treatment window.    Endovascular Treatment  As detailed above    Impression  Blurred vision of undetermined etiology    Recommendations   MRI brain without contrast, which if it does not show any acute DWI/flair/ADC/GRE signal change concerning for acute ischemia, then no further cerebrovascular work-up would be needed.  Continue with PTA medications as appropriate given the patient's recent coronary  "PCI.  Disposition per primary team.    My recommendations are based on the information provided over the phone by Rowan Leiva's in-person providers. They are not intended to replace the clinical judgment of her in-person providers. I was not requested to personally see or examine the patient at this time.    The Stroke Staff is Dr. Real.    JANY HUNT MD  Vascular Neurology Fellow  To page me or covering stroke neurology team member, click here: AMCOM   Choose \"On Call\" tab at top, then search dropdown box for \"Neurology Adult\", select location, press Enter, then look for stroke/neuro ICU/telestroke.           "

## 2021-06-05 NOTE — ED PROVIDER NOTES
History   Chief Complaint:  Eye Problem       The history is provided by the patient.      Rowan Leiva is a 61 year old female on Eliquis, with recent history of NSTEMI status post stent to the LAD, bilateral DVT, diabetes who presents with blurry vision starting yesterday. The patient reports she was unable to read yesterday and when she woke up today she her vision was blurry on both eyes. She also notes her legs were swollen which went down a little bit today. She is experiencing abdominal pain and headaches on the left side. Her symptoms have worsened since yesterday. She denies dizziness, eye pain, chest pain, trouble balancing, trouble speaking. She denies any recent injury, chiropractor visits, or car accidents.  She mentions she wear glasses and using them did not clear her vision.     Of note, the patient was just discharged yesterday with diagnosis of NSTEMI, and had a stent placed to the LAD 5/31/2021.  After HIT was ruled out, the patient was transitioned from argatroban to apixaban, and aspirin was stopped.  Plavix was continued.  The patient can't recall if she took Plavix this morning.      Review of Systems   Eyes: Positive for visual disturbance (blurry vision ). Negative for pain.   Cardiovascular: Positive for leg swelling. Negative for chest pain.   Gastrointestinal: Positive for abdominal pain.   Neurological: Positive for headaches. Negative for dizziness and speech difficulty.   All other systems reviewed and are negative.      Allergies:  Heparin      Medications:  Albuterol  apixaban   eliquis  atorvastatin   carvedilol   clopidogrel   glipizide  insulin glargine   insulin lispro   Lancets   Levothyroxine  Metformin  Hydrocodone-acetaminophen  Trimethoprim-sulfamethoxazole  Nitroglycerin  Pepcid      Past Medical History:    Cancer of thyroid   Chest pain  CAD  Ovarian malignancy   Hyperlipidemia   Hypertension  Hypothyroidism  Malignant neoplasm of thyroid gland   Mild intermittent  "asthma   myocardial infarction  Pulmonary nodule   Thrombocytopenia  Tobacco use disorder  Diabetes   NSTEMI      Past Surgical History:    Bladder surgery  C anesth C section  C ligate fallopian tube   C total abdom hysterectomy  Cholecystectomy   CV heart catheterization with possible intervention  CV PCU Stent drug eluting  Cystoscopy  HC thyroidectomy  Heart Cath, angioplasty x 2  Sling Transvaginal      Family History:    Blood disease  Diabetes  Heart attack  Hypertension   Thyroid disease     Social History:  Patient presents with .   Patient was recently discharged from the hospital.    Physical Exam     Patient Vitals for the past 24 hrs:   BP Temp Temp src Pulse Resp SpO2 Height Weight   06/05/21 1428 -- -- -- 68 24 -- -- --   06/05/21 1417 138/58 -- -- 71 -- -- -- --   06/05/21 1400 (!) 156/73 -- -- 71 23 -- -- --   06/05/21 1340 (!) 161/65 98.2  F (36.8  C) Oral 72 20 98 % 1.626 m (5' 4\") 84.1 kg (185 lb 6.4 oz)       Physical Exam  Gen: Alert, pleasant, accompanied by .    Eye:   Pupils are equal, round, and reactive.     Sclera non-injected.    ENT:   Moist mucus membranes.     Normal tongue.     Cardiac:     Normal rate and regular rhythm.    No murmurs, gallops, or rubs.    Pulmonary:     Clear to auscultation bilaterally.    No wheezes, rales, or rhonchi.    Abdomen:     Normal active bowel sounds.     Abdomen is soft and non-distended, without focal tenderness.    Musculoskeletal:     Normal movement of all extremities without evidence for deficit.    Extremities:    No edema.    Skin:   Warm and dry.    Neurologic:      Speech is fluent, cognition is normal.     EOMI, symmetric grimace.     Str 5/5 in RUE, LUE, RLE, LLE.     Fine touch sensation intact in BUE/BLE.   No pronator drift.   FTN intact bilaterally.    Psychiatric:     Normal affect with appropriate interaction with examiner.    Emergency Department Course   ECG  ECG taken at 1400, ECG read at 1620 by Dr. Noris Cortez, " MD  Normal sinus rhythm  Septal infarct, age undetermined  Abnormal ECG   No significant change as compared to prior, dated 6/1/21.  Rate 71 bpm. WV interval 156 ms. QRS duration 80 ms. QT/QTc 448/486 ms. P-R-T axes 51 60 89.     Imaging:  MR Brain w/o Contrast  1. Several scattered acute-to-subacute ischemic infarcts involving the  supratentorial brain and right cerebellar hemisphere, as described.  The pattern raises concern for embolic disease from a central source.  2. No intracranial hemorrhage or mass effect.  3. Brain atrophy and presumed chronic small vessel ischemic changes.  Reading per radiology    CT Head w/o Contrast  1. No CT findings of acute intracranial abnormality. Aspect score =  10.  2. Mild generalized brain atrophy and redemonstrated nonspecific  scattered foci of hypoattenuation in the cerebral white matter,  presumably representing chronic small vessel ischemic disease.    The findings were discussed with Dr. Pepper by myself at approximately  2:25 PM on 6/5/2021.  Reading per radiology    Laboratory:  CBC: WBC 10.3, HGB 8.5 (L), PLT 53 (L)   BMP: Urea Nitrogen 40 (H), Creatinine 2.38 (H), GFR 22 (L), Calcium 8.3 (L) o/w WNL  PTT: 74 (H)  INT: 1.8 (H)    Asymptomatic COVID19 Virus PCR: Pending    Emergency Department Course:    Reviewed:  I reviewed nursing notes, vitals, past medical history and care everywhere    Assessments:  1355 I obtained history and examined the patient as noted above.     Consults:   1407 I spoke with Dr. Mejia of stroke neurology about patient's history and presentation.   1604 I consulted Dr. Mejia regarding the patient's imaging results.  He recommends continuing previous anticoagulation (eliquis and plavix).    1634 I spoke with Dr. Lopez of the Hospitalist service regarding patient's presentation, findings, and plan of care, who agrees to accept patient for further care, evaluation and monitoring.     Disposition:  The patient was admitted to the hospital  under the care of Dr. Lopez.       Impression & Plan     Medical Decision Making:  Rowan Leiva is a 61 year old female on Eliquis, with recent history of NSTEMI status post stent to the LAD, bilateral DVT, diabetes, CKD with baseline creatinine of 2.5, who presents with blurry vision starting yesterday.  On exam, the patient has normal vitals.  She has a nonfocal neurological exam, but complained of acute onset of bilateral blurry vision.  In the setting of recent angiography, as well as hypercoagulability work-up, this was concerning for acute stroke.  She was a tier 2 stroke activation.  Given known chronic kidney disease, Noncon CT of the head was performed, which was negative for intracranial hemorrhage.  This was followed quickly by MRI of the brain, which demonstrating findings above.  I discussed the case with Dr. Mejia, who is on-call for stroke neurology.  He is recommending admission for work-up including echocardiogram, but no change to current anticoagulation strategy.  Labs demonstrate stable creatinine and hemoglobin. It is unclear if she took her plavix today.  She will be admitted to hospitalist service for continued work-up, and neurological evaluation.    Covid-19  Rowan Leiva was evaluated during a global COVID-19 pandemic, which necessitated consideration that the patient might be at risk for infection with the SARS-CoV-2 virus that causes COVID-19.   Applicable protocols for evaluation were followed during the patient's care.   COVID-19 was considered as part of the patient's evaluation. The plan for testing is:  a test was obtained during this visit.      Diagnosis:    ICD-10-CM    1. Cerebellar stroke, acute (H)  I63.9    2. Blurred vision  H53.8        Scribe Disclosure:  Jessica MATTHEWS, am serving as a scribe at 1:55 PM on 6/5/2021 to document services personally performed by Noris Cortez MD based on my observations and the provider's statements to me.     Scribe  Disclosure:  I, Estee Glenn, am serving as a scribe at 2:34 PM on 6/5/2021 to document services personally performed by Noris Cortez MD based on my observations and the provider's statements to me.    Scribe Disclosure:  I, Erika Tobin, am serving as a scribe at 4:26 PM on 6/5/2021 to document services personally performed by Noris Cortez MD based on my observations and the provider's statements to me.            Noris Cortez MD  06/05/21 2028

## 2021-06-05 NOTE — PHARMACY-ADMISSION MEDICATION HISTORY
Pharmacy Medication History  Admission medication history interview status for the 6/5/2021  admission is complete. See EPIC admission navigator for prior to admission medications     Location of Interview: Patient room  Medication history sources: Patient, Patient's family/friend (), Surescripts, Patient's home med list and Care Everywhere    Significant changes made to the medication list:  None    In the past week, patient estimated taking medication this percent of the time: greater than 90%    Additional medication history information:   Patient was discharge from [facility] yesterday, updated last doses of medications.  Per patient she had her morning insulin, but it dropped her blood sugars too low so she skipped her lunch time dose.     Medication reconciliation completed by provider prior to medication history? No    Time spent in this activity: 10 mins    Prior to Admission medications    Medication Sig Last Dose Taking? Auth Provider   ALBUTEROL 90 MCG/ACT IN AERS 1-2 puffs Q 2-4 hrs prn with spacer - HFA  Yes Krystian Herbert MD   apixaban ANTICOAGULANT (ELIQUIS) 5 MG tablet Take 2 tablets (10 mg) by mouth 2 times daily for 13 doses 6/5/2021 at 0800 Yes Hanh Mccurdy MD   atorvastatin (LIPITOR) 40 MG tablet Take 1 tablet (40 mg) by mouth daily 6/5/2021 at am Yes Hanh Mccurdy MD   carvedilol (COREG) 12.5 MG tablet Take 1 tablet (12.5 mg) by mouth 2 times daily 6/5/2021 at x1 Yes Hanh Mccurdy MD   clopidogrel (PLAVIX) 75 MG tablet Take 1 tablet (75 mg) by mouth daily 6/5/2021 at am Yes Hanh Mccurdy MD   glipiZIDE (GLUCOTROL) 10 MG tablet Take 10 mg by mouth daily. 6/5/2021 at am Yes Reported, Patient   insulin glargine (LANTUS SOLOSTAR) 100 UNIT/ML injection Inject 30 Units Subcutaneous At Bedtime  6/4/2021 at pm Yes Reported, Patient   insulin lispro (HUMALOG KWIKPEN) 100 UNIT/ML (1 unit dial) KWIKPEN Inject 18 Units Subcutaneous 2 times daily (before meals) Breakfast and  Dinner 6/5/2021 at am Yes Unknown, Entered By History   insulin lispro (HUMALOG KWIKPEN) 100 UNIT/ML (1 unit dial) KWIKPEN Inject 5 Units Subcutaneous daily (before lunch) 6/4/2021 at Unknown time Yes Unknown, Entered By History   levothyroxine (SYNTHROID, LEVOTHROID) 50 MCG tablet Take 150 mcg by mouth every morning (before breakfast)  6/5/2021 at am Yes Reported, Patient   apixaban ANTICOAGULANT (ELIQUIS) 5 MG tablet Take 1 tablet (5 mg) by mouth 2 times daily has not started  Hanh Mccurdy MD   LANCETS REGULAR MISC use twice a day for blood sugar          The information provided in this note is only as accurate as the sources available at the time of update(s)

## 2021-06-05 NOTE — ED NOTES
"[facility]  ED Nurse Handoff Report    ED Chief complaint: Eye Problem      ED Diagnosis:   Final diagnoses:   None       Code Status: Full Code    Allergies:   Allergies   Allergen Reactions     Heparin Heparin Induced Thrombocytopenia     HIT panel pending     No Known Drug Allergies        Patient Story: Rowan Leiva is a 61 year old female who presents to the Emergency Department for an evaluaion of blurry vision.  Focused Assessment:  Cardiac: LE edema  Resp: SOB  Neuro: A&Ox4, HA, vision changes, blurry vision  Gastro: Abdominal pain    Treatments and/or interventions provided: na  Patient's response to treatments and/or interventions: na    To be done/followed up on inpatient unit:  na    Does this patient have any cognitive concerns?: na    Activity level - Baseline/Home:  Independent  Activity Level - Current:   Stand with Assist    Patient's Preferred language: English   Needed?: No    Isolation: COVID r/o and special precautions  Infection: COVID r/o and special precautions  Patient tested for COVID 19 prior to admission: YES  Bariatric?: No    Vital Signs:   Vitals:    06/05/21 1340 06/05/21 1400 06/05/21 1417 06/05/21 1428   BP: (!) 161/65 (!) 156/73 138/58    Pulse: 72 71 71 68   Resp: 20 23  24   Temp: 98.2  F (36.8  C)      TempSrc: Oral      SpO2: 98%      Weight: 84.1 kg (185 lb 6.4 oz)      Height: 1.626 m (5' 4\")          Cardiac Rhythm:     Was the PSS-3 completed:   Yes  What interventions are required if any?               Family Comments: SO at bedside  OBS brochure/video discussed/provided to patient/family: No              Name of person given brochure if not patient: na              Relationship to patient: na    For the majority of the shift this patient's behavior was Green.   Behavioral interventions performed were na.    ED NURSE PHONE NUMBER: *[phone]         "

## 2021-06-06 ENCOUNTER — APPOINTMENT (OUTPATIENT)
Dept: CARDIOLOGY | Facility: CLINIC | Age: 61
DRG: 064 | End: 2021-06-06
Attending: INTERNAL MEDICINE
Payer: COMMERCIAL

## 2021-06-06 ENCOUNTER — APPOINTMENT (OUTPATIENT)
Dept: MRI IMAGING | Facility: CLINIC | Age: 61
DRG: 064 | End: 2021-06-06
Attending: NURSE PRACTITIONER
Payer: COMMERCIAL

## 2021-06-06 ENCOUNTER — APPOINTMENT (OUTPATIENT)
Dept: OCCUPATIONAL THERAPY | Facility: CLINIC | Age: 61
DRG: 064 | End: 2021-06-06
Attending: INTERNAL MEDICINE
Payer: COMMERCIAL

## 2021-06-06 ENCOUNTER — APPOINTMENT (OUTPATIENT)
Dept: CT IMAGING | Facility: CLINIC | Age: 61
DRG: 064 | End: 2021-06-06
Attending: NURSE PRACTITIONER
Payer: COMMERCIAL

## 2021-06-06 ENCOUNTER — APPOINTMENT (OUTPATIENT)
Dept: ULTRASOUND IMAGING | Facility: CLINIC | Age: 61
DRG: 064 | End: 2021-06-06
Attending: STUDENT IN AN ORGANIZED HEALTH CARE EDUCATION/TRAINING PROGRAM
Payer: COMMERCIAL

## 2021-06-06 VITALS
SYSTOLIC BLOOD PRESSURE: 158 MMHG | HEIGHT: 64 IN | DIASTOLIC BLOOD PRESSURE: 68 MMHG | BODY MASS INDEX: 31.65 KG/M2 | RESPIRATION RATE: 20 BRPM | WEIGHT: 185.4 LBS | OXYGEN SATURATION: 94 % | HEART RATE: 71 BPM | TEMPERATURE: 98.3 F

## 2021-06-06 LAB
ANION GAP SERPL CALCULATED.3IONS-SCNC: 9 MMOL/L (ref 3–14)
BUN SERPL-MCNC: 39 MG/DL (ref 7–30)
CALCIUM SERPL-MCNC: 8.3 MG/DL (ref 8.5–10.1)
CHLORIDE SERPL-SCNC: 109 MMOL/L (ref 94–109)
CO2 SERPL-SCNC: 21 MMOL/L (ref 20–32)
CREAT SERPL-MCNC: 2.18 MG/DL (ref 0.52–1.04)
ERYTHROCYTE [DISTWIDTH] IN BLOOD BY AUTOMATED COUNT: 14.8 % (ref 10–15)
GFR SERPL CREATININE-BSD FRML MDRD: 24 ML/MIN/{1.73_M2}
GLUCOSE BLDC GLUCOMTR-MCNC: 139 MG/DL (ref 70–99)
GLUCOSE BLDC GLUCOMTR-MCNC: 170 MG/DL (ref 70–99)
GLUCOSE BLDC GLUCOMTR-MCNC: 85 MG/DL (ref 70–99)
GLUCOSE BLDC GLUCOMTR-MCNC: 96 MG/DL (ref 70–99)
GLUCOSE SERPL-MCNC: 93 MG/DL (ref 70–99)
HCT VFR BLD AUTO: 25.8 % (ref 35–47)
HGB BLD-MCNC: 8.4 G/DL (ref 11.7–15.7)
INTERPRETATION ECG - MUSE: NORMAL
MCH RBC QN AUTO: 25.4 PG (ref 26.5–33)
MCHC RBC AUTO-ENTMCNC: 32.6 G/DL (ref 31.5–36.5)
MCV RBC AUTO: 78 FL (ref 78–100)
PLATELET # BLD AUTO: 55 10E9/L (ref 150–450)
POTASSIUM SERPL-SCNC: 3.6 MMOL/L (ref 3.4–5.3)
RBC # BLD AUTO: 3.31 10E12/L (ref 3.8–5.2)
SODIUM SERPL-SCNC: 139 MMOL/L (ref 133–144)
WBC # BLD AUTO: 10.5 10E9/L (ref 4–11)

## 2021-06-06 PROCEDURE — 999N000226 HC STATISTIC SLP IP EVAL DEFER: Performed by: SPEECH-LANGUAGE PATHOLOGIST

## 2021-06-06 PROCEDURE — 99239 HOSP IP/OBS DSCHRG MGMT >30: CPT | Performed by: HOSPITALIST

## 2021-06-06 PROCEDURE — 97165 OT EVAL LOW COMPLEX 30 MIN: CPT | Mod: GO

## 2021-06-06 PROCEDURE — 999N000208 ECHOCARDIOGRAM LIMITED

## 2021-06-06 PROCEDURE — 99222 1ST HOSP IP/OBS MODERATE 55: CPT | Performed by: PSYCHIATRY & NEUROLOGY

## 2021-06-06 PROCEDURE — 99207 PR CONSULT E&M CHANGED TO INITIAL LEVEL: CPT | Performed by: PSYCHIATRY & NEUROLOGY

## 2021-06-06 PROCEDURE — 36415 COLL VENOUS BLD VENIPUNCTURE: CPT | Performed by: INTERNAL MEDICINE

## 2021-06-06 PROCEDURE — 255N000002 HC RX 255 OP 636: Performed by: INTERNAL MEDICINE

## 2021-06-06 PROCEDURE — 250N000013 HC RX MED GY IP 250 OP 250 PS 637: Performed by: INTERNAL MEDICINE

## 2021-06-06 PROCEDURE — 250N000012 HC RX MED GY IP 250 OP 636 PS 637: Performed by: INTERNAL MEDICINE

## 2021-06-06 PROCEDURE — 70547 MR ANGIOGRAPHY NECK W/O DYE: CPT

## 2021-06-06 PROCEDURE — 93971 EXTREMITY STUDY: CPT | Mod: RT

## 2021-06-06 PROCEDURE — 93308 TTE F-UP OR LMTD: CPT | Mod: 26 | Performed by: INTERNAL MEDICINE

## 2021-06-06 PROCEDURE — 93325 DOPPLER ECHO COLOR FLOW MAPG: CPT | Mod: 26 | Performed by: INTERNAL MEDICINE

## 2021-06-06 PROCEDURE — 71250 CT THORAX DX C-: CPT

## 2021-06-06 PROCEDURE — 999N001017 HC STATISTIC GLUCOSE BY METER IP

## 2021-06-06 PROCEDURE — 93321 DOPPLER ECHO F-UP/LMTD STD: CPT | Mod: 26 | Performed by: INTERNAL MEDICINE

## 2021-06-06 PROCEDURE — 85027 COMPLETE CBC AUTOMATED: CPT | Performed by: INTERNAL MEDICINE

## 2021-06-06 PROCEDURE — 70544 MR ANGIOGRAPHY HEAD W/O DYE: CPT

## 2021-06-06 PROCEDURE — 80048 BASIC METABOLIC PNL TOTAL CA: CPT | Performed by: INTERNAL MEDICINE

## 2021-06-06 PROCEDURE — 250N000013 HC RX MED GY IP 250 OP 250 PS 637: Performed by: HOSPITALIST

## 2021-06-06 RX ORDER — INSULIN LISPRO 100 [IU]/ML
5 INJECTION, SOLUTION INTRAVENOUS; SUBCUTANEOUS
Start: 2021-06-06 | End: 2021-06-14

## 2021-06-06 RX ORDER — NICOTINE 21 MG/24HR
1 PATCH, TRANSDERMAL 24 HOURS TRANSDERMAL DAILY
Qty: 15 PATCH | Refills: 0 | Status: SHIPPED | OUTPATIENT
Start: 2021-06-07 | End: 2021-07-21

## 2021-06-06 RX ADMIN — CLOPIDOGREL BISULFATE 75 MG: 75 TABLET ORAL at 10:03

## 2021-06-06 RX ADMIN — ATORVASTATIN CALCIUM 40 MG: 40 TABLET, FILM COATED ORAL at 10:03

## 2021-06-06 RX ADMIN — INSULIN ASPART 1 UNITS: 100 INJECTION, SOLUTION INTRAVENOUS; SUBCUTANEOUS at 18:33

## 2021-06-06 RX ADMIN — LEVOTHYROXINE SODIUM 150 MCG: 150 TABLET ORAL at 07:37

## 2021-06-06 RX ADMIN — HUMAN ALBUMIN MICROSPHERES AND PERFLUTREN 3 ML: 10; .22 INJECTION, SOLUTION INTRAVENOUS at 10:50

## 2021-06-06 RX ADMIN — APIXABAN 10 MG: 5 TABLET, FILM COATED ORAL at 10:02

## 2021-06-06 RX ADMIN — NICOTINE 1 PATCH: 21 PATCH, EXTENDED RELEASE TRANSDERMAL at 10:03

## 2021-06-06 ASSESSMENT — ACTIVITIES OF DAILY LIVING (ADL)
ADLS_ACUITY_SCORE: 10

## 2021-06-06 NOTE — PLAN OF CARE
Pt admitted with blurry vision. MRI positive for bilateral cerebellar strokes. Alert and oriented x4. VSS on RA. Tele NSR. Blurry vision - unable to read small print, neuros otherwise intact. NIH = 0. CMS intact. Lung sounds clear except for fine crackles in LLL. Denies pain. Ambulating independently. Mod carb diet. BG well controlled. Had CT of C/A/P. Results pending. Per Dr. Mccurdy pt OK to discharge home and follow-up with PCP in one week. NIH  = 0. Plan to discharge home with evening after fininshing dinner and reviewing discharge paperwork with RN.

## 2021-06-06 NOTE — DISCHARGE INSTRUCTIONS
Neurology would like you to follow up with an ophthalmologist for your blurry vision.     Your risk factors for stroke or TIA (transient ischemic attack):    Your Risk Factors Your Results Normal Ranges   High blood pressure BP Readings from Last 1 Encounters:   06/06/21 (!) 158/68    Less than 120/80   Cholesterol              Total Lab Results   Component Value Date    CHOL 191 05/31/2021      Less than 150    Triglycerides   Lab Results   Component Value Date    TRIG 285 05/31/2021    Less than 150   LDL Lab Results   Component Value Date     05/31/2021       Less than 70   HDL Lab Results   Component Value Date    HDL 29 05/31/2021            Greater than 40 (men)  Greater than 50 (women)   Diabetes Recent Labs   Lab 06/06/21  0705   GLC 93    Fasting blood glucose    Smoking/tobacco use  Quit smoking and tobacco   Overweight  Lose 1-2 pounds a week   Lack of exercise  30 minutes moderate activity each day   Other risk factors include carotid (neck) artery disease, atrial fibrillation and stress. You may be on new medicine to treat high blood pressure, cholesterol, diabetes or atrial fibrillation.    Understanding Stroke Booklet given to patient. Please refer to booklet for further information.    Stroke warning signs and symptoms - CALL 911 right away for:  - Sudden numbness or weakness in the face, arm or leg (often on one side of the body).  - Sudden confusion or trouble understanding what is going on.  - Sudden blurred or decreased vision in one or both eyes.  - Sudden trouble speaking, loss of balance, dizziness or problems with coordination.  - Sudden, severe headache for no reason.  - Fainting or seizures.  - Symptoms may go away then come back suddenly.

## 2021-06-06 NOTE — PLAN OF CARE
Pt here for blurred vision and scattered right cerebellar strokes. A&Ox4. Neuro's intact except for blurred vision. Denies headache, dizziness or pain. VSS on RA. BG 80 and 85. LS clear except RLL fine crackles, infrequent cough. C/o pain in right behind the knee, slightly swollen, US done and negative for DVT. Nicotine patch left upper arm. Voiding and had 1 BM during shift. Rell mod cho diet, ambulates SBA. Plan: Echo and neuro workup. Tele: HERMES

## 2021-06-06 NOTE — PLAN OF CARE
SLP: Orders received. Chart reviewed and discussed pt status with RN.  RN reports pt has no speech-language/cognitive deficits or swallowing difficulty at this time.  Pt passed the nursing swallow screen.  SLP is not indicated due to no current speech-language/cognitive or swallowing needs.  Defer discharge recommendations to medical care team.  Will complete orders. Please reorder SLP if increased concerns arise.

## 2021-06-06 NOTE — PROVIDER NOTIFICATION
"Text page sent to hospitalist: \"FYI Neurology saw pt and ordered CT C/A/P. Stated that pt can discharge from their perspective after test results. She is at test now. Pt would like to discharge this evening if possible.\"  "

## 2021-06-06 NOTE — PROVIDER NOTIFICATION
MD Notification    Notified Person: MD    Notified Person Name:  Karla    Notification Date/Time: 06/05/2020 @ 23:00    Notification Interaction:    Purpose of Notification: pt c/o right behind knee pain, slightly swollen. Any intervention, like US?    Orders Received:    Comments:    Paged again @ 12:10 am

## 2021-06-06 NOTE — H&P
"Admitted: 06/05/2021    DATE OF SERVICE: 06/05/2021    CHIEF COMPLAINT:  Blurry vision.    HISTORY OF PRESENT ILLNESS:  Has been obtained from the patient, who is a good historian.  I did discuss with the ER attending, Dr. Cortez, and I reviewed her chart as well.     Mrs. Rowan Leiva is an extremely pleasant 61-year-old-female with a recent admission to [facility] from 05/30 to 06/04/2021 for a non-ST elevation MI status post stent to LAD, also a history of hypertension, diabetes mellitus type 2, uncontrolled; chronic kidney disease stage III, dyslipidemia, hypothyroidism, and tobacco use disorder who presented to [facility] ER for evaluation of blurry vision.    As mentioned above, the patient had a recent complicated hospitalization at [facility] from 05/30 to 06/04/2021.  She was actually discharged yesterday.  She was initially admitted for evaluation of what she described as \"lung pain\" and she was found to have a non-ST elevation MI with the troponin elevated at 10.981.   Prior to her recent admission, she was not on aspirin, statin or antihypertensive therapy.  She was initially started on a heparin drip, transferred from [facility] to [facility].  She underwent a coronary angiogram which showed a 90% occluded distal LAD that was treated with a drug eluting stent.  She was initially started on aspirin and Plavix as well as Coreg and atorvastatin.  The patient was reporting some chronic cough with hemoptysis.  Ultrasound of her lower extremities showed bilateral posterior tibial vein occlusive DVT.  VQ scan was performed but was nondiagnostic.  She could not have a CT of the chest with IV contrast given acute kidney injury.  It was suspected that she has a pulmonary embolism as well.  As mentioned above, she was initially on a heparin drip.  Unfortunately she had a drop of her platelets from 90,000 on admission to 48 on 06/03.  She was switched from " "heparin drip to argatroban drip.  She was seen by Hematology.  HIT was negative.  After discussing with Cardiology and Hematology, she was switched to Eliquis and continued on Plavix.  The aspirin was stopped.    During her prior hospitalization she also was noted to have acute kidney injury on chronic kidney disease.  Her creatinine on admission was 1.87 and it peaked at 2.66.  She had been followed by Nephrology.  Her creatinine improved to 2.47 at the time of discharge and she was supposed to follow up with Nephrology as an outpatient.    As mentioned above, she was discharged home yesterday, on 06/04/2021, on Plavix, Eliquis, Lipitor, and Coreg along with her prior medications of levothyroxine, Lantus and insulin Lispro.  She stated that she felt okay at the time of the discharge.  Last night she noticed that her vision was a little bit blurry as she could not read the labels on her medication bottles and also she could not read the paper.  She did go to sleep and when she woke up in the morning she noted that her vision was even more blurry, affecting both her eyes, so she decided to come to the ER for further evaluation.    Upon further questioning, the patient reports that earlier today she was having some headache located on the left temporal area.  She denies any weakness in her arms or legs.  She does report having some numbness in her feet which is chronic, related to diabetic neuropathy.  She denies having chest pain.  She feels that she is a little bit fluid overloaded and her abdomen is bloated, which puts some \"pressure on her lungs\".  She reported her legs were a little bit swollen when she went home yesterday but eventually got better today.  She denies any nausea.  No vomiting.  No diarrhea.  Last bowel movement was earlier today.  She denies dysuria.  She did report that her blood sugars in the morning were 46.    In the ER she was seen by Dr. Cortez.  Her initial vitals showed a blood pressure of " 161/65, respiratory rate 20, oxygen saturation 98% on room air, heart rate 72, temperature 98.2.  She did have basic blood work which showed a BMP with sodium 140, potassium 3.8, chloride 109, bicarbonate 22, BUN 40, creatinine 2.38.   Calcium was 8.3.  Anion gap of 9.  Her glucose is 90.  White blood cells of 10.3, hemoglobin 8.5, hematocrit 26.2, platelet count 53.  INR 1.8.  She also tested negative for COVID-19.  She had a CT of the head without contrast which did not show any acute intracranial abnormality, but an MRI of the brain without contrast showed several scattered acute to subacute ischemic infarcts involving the supratentorial brain in the right cerebellar hemisphere with PET raising concern for embolic disease from a central source.  No evidence of intracranial hemorrhage or mass effect.    Code Stroke was called in the ER.  Neurology is aware.  They recommended to continue with her prior to admission medications given her recent cardiac stent.  Hospitalist Service was called regarding the admission.    In the ER she was given the evening dose of Eliquis 10 mg p.o.    PAST MEDICAL HISTORY:  1.  Coronary artery disease/recent non-ST elevation MI status post stenting to LAD on 05/31/2021.  On Plavix.  2.  Ischemic cardiomyopathy.  Echocardiogram on 05/31 showed an EF of 55% with very distal anterior and apical hypokinesis.  3.  Hypertension.  4.  Dyslipidemia.  5.  Recent bilateral posterior tibial vein occlusive DVT and suspected PE.  Please refer to the recent discharge summary from yesterday, 06/04/2021.  She was recently started on Eliquis 10 mg p.o. twice daily for 2 weeks, followed by Eliquis 5 mg p.o. twice daily.  6.  Thrombocytopenia.  HIT was negative during her prior hospitalization.  7.  Acute kidney injury on chronic kidney disease stage III.  It seems that the creatinine was 1.33 in 04/2020, 1.87 on 05/30/2021.  It did peak at 2.6 and then creatinine was 2.4 at the time of discharge  2021.  She was supposed to follow up with Nephrology as outpatient.  8.  Diabetes mellitus type 2, uncontrolled.  Last hemoglobin A1c was 7.9 on 2021..  9.  Hypothyroidism.  10.  Tobacco use disorder.  11.  Anemia.  12. Tobacco use disorder    PAST SURGICAL HISTORY:   Past Surgical History:   Procedure Laterality Date     BLADDER SURGERY       C ANESTH, SECTION       C LIGATE FALLOPIAN TUBE      Tubal Ligation     C TOTAL ABDOM HYSTERECTOMY      ovarian cancer (low grade) - Heme/Onc     CHOLECYSTECTOMY       CV HEART CATHETERIZATION WITH POSSIBLE INTERVENTION N/A 2021    Procedure: Heart Catheterization with Possible Intervention;  Surgeon: Wolf Brumfield MD;  Location:  HEART CARDIAC CATH LAB     CV PCI STENT DRUG ELUTING N/A 2021    Procedure: Percutaneous Coronary Intervention Stent Drug Eluting;  Surgeon: Wolf Brumfield MD;  Location:  HEART CARDIAC CATH LAB     CYSTOSCOPY  2014    Procedure: CYSTOSCOPY;  Surgeon: Sabino Jamil MD;  Location: [facility] THYROIDECTOMY  2000    papillary thyroid carcinoma - Endocrine     HEART CATH, ANGIOPLASTY  11    2.5 X 18 mm & 2.25 X 18 mm Xience ELLIOT to Mid LAD     HEART CATH, ANGIOPLASTY  11    Staged-3.0 X 23 mm Xience ELLIOT to Mid RAC     SLING TRANSVAGINAL N/A 2014    Procedure: SLING TRANSVAGINAL;  Surgeon: Sabino Jamil MD;  Location: [facility]       SOCIAL HISTORY:  She denies alcohol drinking and she denies illicit drug abuse.  She has a long history of tobacco use disorder.  She used to smoke 1 pack and a half of cigarettes daily.  She did not smoke during her prior hospitalization but she did smoke a few cigarettes last night after she went home.    FAMILY HISTORY:    Family History     Problem (# of Occurrences) Relation (Name,Age of Onset)    Blood Disease (1) Sister: Hepatitis B-alive    Cancer (3) Maternal Aunt: bronchial tubes, neck-, Maternal Uncle: glands in  neck-, Maternal Grandfather: lungs-    Diabetes (6) Father: , Sister: , Other: -cousins, Paternal Grandmother: , Paternal Grandfather: , Paternal Aunt:     Heart Disease (1) Father: 2 heartattacks-    Hypertension (1) Sister: alive    Thyroid Disease (2) Sister: both sisters-alive, Other: niece-alive          PRIOR TO ADMISSION MEDICATIONS:  ALBUTEROL 90 MCG/ACT IN AERS 1-2 puffs Q 2-4 hrs prn with spacer - HFA   Yes Krystian Herbert MD   apixaban ANTICOAGULANT (ELIQUIS) 5 MG tablet Take 2 tablets (10 mg) by mouth 2 times daily for 13 doses 2021 at 0800 Yes Hanh Mccurdy MD   atorvastatin (LIPITOR) 40 MG tablet Take 1 tablet (40 mg) by mouth daily 2021 at am Yes Hanh Mccurdy MD   carvedilol (COREG) 12.5 MG tablet Take 1 tablet (12.5 mg) by mouth 2 times daily 2021 at x1 Yes Hanh Mccurdy MD   clopidogrel (PLAVIX) 75 MG tablet Take 1 tablet (75 mg) by mouth daily 2021 at am Yes Hanh Mccurdy MD   glipiZIDE (GLUCOTROL) 10 MG tablet Take 10 mg by mouth daily. 2021 at am Yes Reported, Patient   insulin glargine (LANTUS SOLOSTAR) 100 UNIT/ML injection Inject 30 Units Subcutaneous At Bedtime  2021 at pm Yes Reported, Patient   insulin lispro (HUMALOG KWIKPEN) 100 UNIT/ML (1 unit dial) KWIKPEN Inject 18 Units Subcutaneous 2 times daily (before meals) Breakfast and Dinner 2021 at am Yes Unknown, Entered By History   insulin lispro (HUMALOG KWIKPEN) 100 UNIT/ML (1 unit dial) KWIKPEN Inject 5 Units Subcutaneous daily (before lunch) 2021 at Unknown time Yes Unknown, Entered By History   levothyroxine (SYNTHROID, LEVOTHROID) 50 MCG tablet Take 150 mcg by mouth every morning (before breakfast)  2021 at am Yes Reported, Patient   apixaban ANTICOAGULANT (ELIQUIS) 5 MG tablet Take 1 tablet (5 mg) by mouth 2 times daily has not started   Hanh Mccurdy MD   LANCETS REGULAR MISC use twice a  day for blood sugar               ALLERGIES:   Allergies   Allergen Reactions     Heparin Heparin Induced Thrombocytopenia     HIT panel pending     No Known Drug Allergies        REVIEW OF SYSTEMS:  A 10-point review of systems was conducted and it was negative except for pertinent positives mentioned in history of present illness.    PHYSICAL EXAMINATION:  VITAL SIGNS:  Blood pressure is 152/66, heart rate 70, respiratory rate 14, oxygen saturation 98% on room air, temperature 98.2.  GENERAL:  Patient is awake, alert, in no acute distress at the time of my examination.  HEENT:  Head is normocephalic, atraumatic.  Pupils are equally round and reactive to light.  Oral mucosa is moist.  NECK:  Supple.  No cervical lymphadenopathy.  No thyromegaly.  CHEST:  There is bilateral air entry.  No wheezing, no rales, no crackles.  CARDIOVASCULAR:  There is normal S1 and S2, regular rate and rhythm.  No murmurs.  No rubs.  GASTROINTESTINAL:  Abdomen is soft, nontender, nondistended.  Bowel sounds are present.  EXTREMITIES:  There is trace edema bilateral lower extremities.  No calf tenderness, 2+ peripheral pulses are palpable.  INTEGUMENTARY:  Skin is intact.  No rash.  No cyanosis.  NEUROLOGIC:  Patient is awake, alert, oriented to self, place, and time.  Motor strength is 5/5 intensity both upper and lower extremities.  Sensorial is grossly intact.  Cranial nerves II-XII are grossly intact.  PSYCHIATRIC EVALUATION:  Normal mood.  Normal affect.  MUSCULOSKELETAL:  She moves all extremities freely.  There are no obvious joint deformities.    LABORATORY DATA:  Labs were reviewed and dictated above.    ASSESSMENT AND PLAN:  Mrs. Rowan Leiva is an extremely pleasant 61-year-old-female with a recent hospitalization at [facility] for non-ST elevation MI status post stenting to LAD on 05/31/2021.  Also, recent bilateral posterior tibial vein occlusive DVT with suspected PE, on Eliquis; ischemic cardiomyopathy,  thrombocytopenia, acute kidney injury on chronic kidney disease stage III, hypothyroidism, and diabetes mellitus type 2 who presented for evaluation of blurry vision.  MRI of the brain showed several scattered acute to subacute ischemic infarcts concerning for embolic stroke.    PLAN:  1.  Multiple acute to subacute ischemic infarcts, concern for embolic stroke.  This is possibly related to her recent cardiac catheterization.  She does not have a history of atrial fibrillation.  She did have a recent hospitalization from 05/30 to 06/04/2021 when she presented with chest pain and was found to have a non-ST elevation MI and underwent stenting to LAD.  She was initially on aspirin and Plavix but because she was also found to have bilateral occlusive DVT and needed to be on anticoagulation in the setting of low platelet count, she was discharged home only on Plavix and Eliquis.  She stated that she did take her medications as recommended.  She reported last evening, before going to bed, she noticed her vision was blurry and in the morning this got worse, reason for which she decided to come to the ER.  MRI of the brain with findings as above.  She denies weakness in her upper or lower extremities.  No slurred speech.  ER attending discussed with Neurology on-call who recommended to continue her prior to admission medications of Eliquis and Plavix at this time.  She is admitted to Neuro Floor.  We will continue to monitor her on telemetry.  We will continue to monitor frequent neuro checks.  An echocardiogram had been ordered.  Recent fasting lipid profile on 05/31 showed total cholesterol of 191, HDLof 29, and LDL of 102.  She was started on atorvastatin 40 mg p.o. daily, which will be continued at this time.  PT/OT and speech pathology had been ordered.    2.  Coronary artery disease/non-ST elevation MI status post recent drug eluting stent placed to LAD on 05/31/2021.  3.  Hypertension.  4.  Dyslipidemia.  5.   Ischemic cardiomyopathy.  She has a known history of coronary artery disease, apparently with multiple stents placed 2 years ago in [place].  She had this recent hospitalization from 05/30 to 06/4/2021 when she presented with chest pain and found to have a non-ST elevation MI with elevated troponin of 10.98.  She was taken to cardiac cath lab and coronary angiogram showed 90% occluded distal LAD and had a drug eluting stent placed.  She was initially on aspirin and Plavix but because of the need for anticoagulation after discussing with Cardiology and Oncology she was discharged home only on Eliquis and Plavix, and aspirin was stopped.  The patient denies any chest pain at this time.  We will plan to continue with her prior to admission Plavix and Lipitor.  At home she was also on Coreg 12.5 mg p.o. twice daily, which I am going to hold for now to allow for permission hypertension.  This beta blocker can be resumed in the next 24-36 hours.    6.  Recent bilateral posterior tibial vein occlusive DVT with suspected PE.  Her recent lower extremity ultrasound showed bilateral DVT as above.  She had been on a heparin drip initially.  Unfortunately her platelets dropped to 55.  She was switched from a heparin drip to argatroban during her recent hospitalization.  Hematology was consulted.  HIT panel returned back negative.  She eventually was switched to Eliquis to be taking 10 mg p.o. twice daily for 2 weeks followed by 5 mg p.o. twice daily.  We will continue her Eliquis at this time.  She is not hypoxic.    7.  Thrombocytopenia.  It seems that she had a low platelet count back in 2012 and was evaluated by Oncology at that time and it was felt that that was due to an acute infection.  Her platelet count did improve to 159 in 2015 but then she had mild chronic thrombocytopenia.  Her platelet count was 90 at the time of the admission.  It did drop to 55.  HIT panel was negative.  She was discharged on Plavix and  Eliquis.  Her platelet count today is 53 from 60 yesterday.  We will continue with Plavix and Eliquis as she requires these medications given her recent medical problems and we will closely monitor her platelet count.    8.  Chronic kidney disease stage III.  9.  Recent acute kidney injury.  Possible contrast-induced nephropathy.   Her baseline creatinine seems to be around 1.5 but she did have acute kidney injury during her prior hospitalization, suspected due to contrast-induced nephropathy.  Her creatinine bumped to 2.6.  She was seen by Nephrology.  Her creatinine slowly trended down with a creatinine of 2.47 at the time of discharge.  Today creatinine is 2.38.  We will avoid nephrotoxic drugs.  She will follow up with Nephrology as an outpatient as initially recommended.    10.  Diabetes mellitus type 2, uncontrolled.  Her hemoglobin A1c was 7.9 on 05/31/2021.  At home she is on glipizide 10 mg p.o. daily, Lantus 30 units at bedtime, insulin Lispro 18 units twice daily with breakfast and dinner and 5 units with lunch.  Of note, her metformin was discontinued during her prior hospitalization due to acute kidney injury.  Her blood sugars now are 90.  She reported blood sugars in the morning were 46.  We will plan to resume her Lantus at the decreased dose of 15 units at bedtime and hold her glipizide and insulin sliding scale has been ordered.  She will likely need further adjustment of her insulin doses.    11.  Hypothyroidism.  Resume her prior to admission levothyroxine.    12.  Tobacco use disorder with dependence.  She reports smoking 1 pack and a half of cigarettes daily.  Nicotine patch was used during her prior hospitalization and she was educated regarding the benefits of tobacco use cessation.  She did smoke a few cigarettes last night after she went home.  We will order a nicotine patch at this time and I strongly encouraged her to strongly consider complete tobacco use cessation.    13.  DVT  prophylaxis.  She is fully anticoagulated with Eliquis.  14.  Code status was discussed with the patient.  The patient is FULL CODE.    DISPOSITION:  Admit to inpatient.  I anticipate a couple of days of hospitalization.      Faye Lopez MD        D: 2021   T: 2021   MT: [facility]    Name:     EDVIN MERLOS  MRN:      [id number]        Account:     [id number]   :      1960           Admitted:    2021       Document: [id number]

## 2021-06-06 NOTE — CONSULTS
[facility]    Stroke Consult Note    Reason for Consult:  stroke    Chief Complaint: Eye Problem       HPI  Rowan Leiva is a 61 year old female with past medical history significant for recent NSTEMI (LAD stent placed 5/31/21), bilateral LE DVT, DM, and tobacco abuse who presents for evaluation of blurred vision. She is on Eliquis + Plavix for CAD and DVT. She was discharged 6/4. Later that evening, she noticed bilateral blurred vision, which persisted into 6/5, prompting admission. On further reflection, she believes the blurred vision may have been present prior to her discharge but cannot say with certainty. She denies associated focal weakness, numbness, speech difficulty, headache, or gait instability. Initial CTH was negative for acute pathology. CTA head/neck was deferred due to poor renal function. A follow-up MRI brain demonstrated scattered infarcts in both cerebral hemispheres and the cerebellum.     Today she feels well, with the exception of blurred vision that is worse when looking at items up close.     Stroke Evaluation summarized:  MRI/Head CT: MRI brain with scattered infarcts in both cerebral hemispheres and the cerebellum.   Intracranial Vascular Imaging: MRA head with concern for bilateral moderate distal ICA stenosis, although study is motion degraded.   Cervical Carotid and Vertebral Artery Vascular Imaging: MRA neck unremarkable  Echocardiogram: EF 50-55%, moderate/severe anterior and apical wall hypokinesis  EKG/Telemetry: Sinus  LDL: 105  A1c: 7.9  Troponin: Not drawn on admission   Other testing: Not Applicable    Impression  Ischemic Stroke due to cardioembolism - Multifocal punctate infarcts in the setting of recent cardiac catheterization. Given history of DVT/PE, hypercoagulable state remains a consideration, although felt to be less likely. Do not suspect that vision changes are secondary to new punctate infarcts.    Recommendations  - Continue PTA  "Eliquis + Plavix 75 mg daily  - , continue PTA Lipitor 40 mg daily  - A1c 7.9, tighter long term glucose control needed to achieve goal <7.0  - Goal BP <140/90 with tighter control associated with decreased overall CV risk, if tolerated  - Therapies  - Smoking cessation  - CT chest/abdomen/pelvis without contrast to evaluate for occult malignancy    Patient Follow-up    - Follow-up with ophthalmology     Thank you for this consult. No further stroke evaluation is recommended, so we will sign off. Please contact us with any additional questions.    AKIL Gómez, CNP  Neurology  To page me or covering stroke neurology team member, click here: [facility]COM   Choose \"On Call\" tab at top, then search dropdown box for \"Neurology Adult\", select location, press Enter, then look for stroke/neuro ICU/telestroke.    _____________________________________________________    Past Medical History   Past Medical History:   Diagnosis Date     Cancer of thyroid (H)      Chest pain      Coronary artery disease     Stents-     HX OF OVARIAN MALIGNANCY      Hyperlipidemia      Hypertension      Hypothyroidism      Malignant neoplasm of thyroid gland (H)     papillary carcinoma; resection      Mild intermittent asthma     minimal symptoms, albuterol use 2x/year     Myocardial infarction (H)     anterior     Pulmonary nodule      Thrombocytopenia (H)      Tobacco use disorder      Type II or unspecified type diabetes mellitus without mention of complication, not stated as uncontrolled      Past Surgical History   Past Surgical History:   Procedure Laterality Date     BLADDER SURGERY       C ANESTH, SECTION       C LIGATE FALLOPIAN TUBE      Tubal Ligation     C TOTAL ABDOM HYSTERECTOMY      ovarian cancer (low grade) - Heme/Onc     CHOLECYSTECTOMY       CV HEART CATHETERIZATION WITH POSSIBLE INTERVENTION N/A 2021    Procedure: Heart Catheterization with Possible Intervention;  " Surgeon: Wolf Brumfield MD;  Location:  HEART CARDIAC CATH LAB     CV PCI STENT DRUG ELUTING N/A 5/31/2021    Procedure: Percutaneous Coronary Intervention Stent Drug Eluting;  Surgeon: Wolf Brumfield MD;  Location:  HEART CARDIAC CATH LAB     CYSTOSCOPY  12/30/2014    Procedure: CYSTOSCOPY;  Surgeon: Sabino Jamil MD;  Location: [facility]     HC THYROIDECTOMY  6/2000    papillary thyroid carcinoma - Endocrine     HEART CATH, ANGIOPLASTY  7/13/11    2.5 X 18 mm & 2.25 X 18 mm Xience ELLIOT to Mid LAD     HEART CATH, ANGIOPLASTY  7/29/11    Staged-3.0 X 23 mm Xience ELLIOT to Mid RAC     SLING TRANSVAGINAL N/A 12/30/2014    Procedure: SLING TRANSVAGINAL;  Surgeon: Sabino Jamil MD;  Location: [facility]     Medications   Home Meds  Prior to Admission medications    Medication Sig Start Date End Date Taking? Authorizing Provider   ALBUTEROL 90 MCG/ACT IN AERS 1-2 puffs Q 2-4 hrs prn with spacer - HFA 11/15/07  Yes Krystian Herbert MD   apixaban ANTICOAGULANT (ELIQUIS) 5 MG tablet Take 2 tablets (10 mg) by mouth 2 times daily for 13 doses 6/4/21 6/11/21 Yes Hanh Mccurdy MD   atorvastatin (LIPITOR) 40 MG tablet Take 1 tablet (40 mg) by mouth daily 6/5/21  Yes Hanh Mccurdy MD   carvedilol (COREG) 12.5 MG tablet Take 1 tablet (12.5 mg) by mouth 2 times daily 6/4/21  Yes Hanh Mccurdy MD   clopidogrel (PLAVIX) 75 MG tablet Take 1 tablet (75 mg) by mouth daily 6/5/21  Yes Hanh Mccurdy MD   glipiZIDE (GLUCOTROL) 10 MG tablet Take 10 mg by mouth daily.   Yes Reported, Patient   insulin glargine (LANTUS SOLOSTAR) 100 UNIT/ML injection Inject 30 Units Subcutaneous At Bedtime    Yes Reported, Patient   insulin lispro (HUMALOG KWIKPEN) 100 UNIT/ML (1 unit dial) KWIKPEN Inject 18 Units Subcutaneous 2 times daily (before meals) Breakfast and Dinner   Yes Unknown, Entered By History   insulin lispro (HUMALOG KWIKPEN) 100 UNIT/ML (1 unit dial) KWIKPEN Inject 5 Units Subcutaneous daily (before  lunch)   Yes Unknown, Entered By History   levothyroxine (SYNTHROID, LEVOTHROID) 50 MCG tablet Take 150 mcg by mouth every morning (before breakfast)    Yes Reported, Patient   apixaban ANTICOAGULANT (ELIQUIS) 5 MG tablet Take 1 tablet (5 mg) by mouth 2 times daily 21   Hanh Mccurdy MD   LANCETS REGULAR MISC use twice a day for blood sugar 02          Scheduled Meds    apixaban ANTICOAGULANT  10 mg Oral BID     atorvastatin  40 mg Oral Daily     clopidogrel  75 mg Oral Daily     insulin aspart  1-7 Units Subcutaneous TID AC     insulin aspart  1-5 Units Subcutaneous At Bedtime     insulin glargine  15 Units Subcutaneous At Bedtime     levothyroxine  150 mcg Oral QAM AC     nicotine  1 patch Transdermal Daily     nicotine   Transdermal Q8H     sodium chloride (PF)  3 mL Intracatheter Q8H       Infusion Meds    - MEDICATION INSTRUCTIONS -       - MEDICATION INSTRUCTIONS -       - MEDICATION INSTRUCTIONS -         PRN Meds  acetaminophen, albuterol, glucose **OR** dextrose **OR** glucagon, labetalol **OR** hydrALAZINE, lidocaine 4%, lidocaine (buffered or not buffered), - MEDICATION INSTRUCTIONS -, - MEDICATION INSTRUCTIONS -, ondansetron **OR** ondansetron, - MEDICATION INSTRUCTIONS -, sodium chloride (PF)    Allergies   Allergies   Allergen Reactions     Heparin Heparin Induced Thrombocytopenia     HIT panel pending     No Known Drug Allergies      Family History   Family History   Problem Relation Age of Onset     Blood Disease Sister         Hepatitis B-alive     Cancer Maternal Aunt         bronchial tubes, neck-     Cancer Maternal Uncle         glands in neck-     Cancer Maternal Grandfather         lungs-     Diabetes Father              Heart Disease Father         2 heartattacks-     Diabetes Sister              Diabetes Other         -cousins     Diabetes Paternal Grandmother              Diabetes Paternal Grandfather               Diabetes Paternal Aunt              Hypertension Sister         alive     Thyroid Disease Sister         both sisters-alive     Thyroid Disease Other         niece-alive     Social History   Social History     Tobacco Use     Smoking status: Current Every Day Smoker     Packs/day: 2.00     Years: 24.00     Pack years: 48.00     Types: Cigarettes     Smokeless tobacco: Never Used     Tobacco comment: Strongly recommended quitting at each visit.   Substance Use Topics     Alcohol use: No     Alcohol/week: 0.0 standard drinks     Drug use: No       Review of Systems   The 10 point Review of Systems is negative other than noted in the HPI or here.        PHYSICAL EXAMINATION   Temp:  [98  F (36.7  C)-98.8  F (37.1  C)] 98.7  F (37.1  C)  Pulse:  [68-75] 72  Resp:  [8-24] 20  BP: (136-168)/(51-73) 156/68  SpO2:  [92 %-100 %] 92 %    Neurologic  Mental Status:  alert, oriented x 3, follows commands, speech clear and fluent, naming and repetition normal  Cranial Nerves:  visual fields intact, PERRL, EOMI with normal smooth pursuit, facial movements symmetric, hearing not formally tested but intact to conversation, no dysarthria, shoulder shrug strong bilaterally, tongue protrusion midline  Motor:  normal muscle tone and bulk, no abnormal movements, able to move all limbs spontaneously, no pronator drift  Reflexes:  toes down-going  Sensory:  light touch sensation intact and symmetric throughout upper and lower extremities, no extinction on double simultaneous stimulation   Coordination:  normal finger-to-nose and heel-to-shin bilaterally without dysmetria  Station/Gait:  deferred    Dysphagia Screen  Per Nursing    Stroke Scales    NIHSS  Interval (ADMISSION) (21 180)   Interval Comments     1a. Level of Consciousness 0-->Alert, keenly responsive   1b. LOC Questions 0-->Answers both questions correctly   1c. LOC Commands 0-->Performs both tasks correctly   2.   Best Gaze 0-->Normal   3.   Visual  0-->No visual loss   4.   Facial Palsy 0-->Normal symmetrical movements   5a. Motor Arm, Left 0-->No drift, limb holds 90 (or 45) degrees for full 10 secs   5b. Motor Arm, Right 0-->No drift, limb holds 90 (or 45) degrees for full 10 secs   6a. Motor Leg, Left 0-->No drift, leg holds 30 degree position for full 5 secs   6b. Motor Leg, right 0-->No drift, leg holds 30 degree position for full 5 secs   7.   Limb Ataxia 0-->Absent   8.   Sensory 0-->Normal, no sensory loss   9.   Best Language 0-->No aphasia, normal   10. Dysarthria 0-->Normal   11. Extinction and Inattention  0-->No abnormality   Total 0 (06/06/21 1412)       Imaging  I personally reviewed all imaging; relevant findings per HPI.    Labs Data   CBC  Recent Labs   Lab 06/05/21  1354 06/04/21  0544 06/03/21  0530   WBC 10.3 9.3 9.4   RBC 3.33* 3.28* 3.32*   HGB 8.5* 8.3* 8.4*   HCT 26.2* 25.9* 26.3*   PLT 53* 60* 48*     Basic Metabolic Panel   Recent Labs   Lab 06/05/21  1354 06/04/21  0858 06/03/21  0530    136 137   POTASSIUM 3.8 3.8 3.6   CHLORIDE 109 106 107   CO2 22 19* 21   BUN 40* 37* 35*   CR 2.38* 2.47* 2.66*   GLC 90 174* 138*   BERNY 8.3* 8.3* 8.1*     Liver Panel  Recent Labs   Lab 06/04/21  0858 06/02/21  0919 06/01/21  1227 05/31/21  0626   PROTTOTAL  --   --  6.7* 6.6*   ALBUMIN 2.6* 2.6* 2.5* 2.5*   BILITOTAL  --   --  0.3 0.2   ALKPHOS  --   --  113 111   AST  --   --  54* 67*   ALT  --   --  16 18     INR    Recent Labs   Lab Test 06/05/21  1354 04/10/15  1023   INR 1.80* 0.93      Lipid Profile    Recent Labs   Lab Test 05/31/21  0626 01/08/15   CHOL 191 193   HDL 29* 29   * 90.2   TRIG 285* 369   CHOLHDLRATIO  --  6.7     A1C    Recent Labs   Lab Test 05/31/21  0326   A1C 7.9*     Troponin I    Recent Labs   Lab 05/31/21  0626 05/31/21  0326 05/30/21  2315   TROPI 9.323* 11.693* 13.871*          Stroke Code / Stroke Consult Data Data This was a non-emergent, non-tele stroke consult.

## 2021-06-06 NOTE — PROGRESS NOTES
Occupational Therapy Discharge Summary    Reason for therapy discharge:    All goals and outcomes met, no further needs identified.    Progress towards therapy goal(s). See goals on Care Plan in [facility] electronic health record for goal details.  No IP OT goals    Therapy recommendation(s):    Continued therapy is recommended.  Rationale/Recommendations:  For cardiac rehab from previous hospital stay. No OT orders needed for stroke. .               06/06/21 1110   Quick Adds   Type of Visit Initial Occupational Therapy Evaluation   Living Environment   People in home spouse   Current Living Arrangements house   Home Accessibility stairs to enter home;stairs within home   Number of Stairs, Main Entrance 8   Transportation Anticipated car, drives self   Self-Care   Regular Exercise No   Equipment Currently Used at Home none   Activity/Exercise/Self-Care Comment Pt reports indep with all ADLs and funcitonal mobility prior to recent hospitalization for heart attach. Returned home for 1 night and returned the next day with blurry vision. Pt states while she was home she was ambulating indep but  and her mother were home.    Disability/Function   Wear Glasses or Blind yes   Fall history within last six months no   General Information   Onset of Illness/Injury or Date of Surgery 06/05/21   Referring Physician Faye Lopez MD   Patient/Family Therapy Goal Statement (OT) return home   Additional Occupational Profile Info/Pertinent History of Current Problem Mrs. Rowan Leiva is an extremely pleasant 61-year-old-female with a recent admission to [facility] from 05/30 to 06/04/2021 for a non-ST elevation MI status post stent to LAD, also a history of hypertension, diabetes mellitus type 2, uncontrolled; chronic kidney disease stage III, dyslipidemia, hypothyroidism, and tobacco use disorder who presented to [facility] ER for evaluation of blurry vision. Found to have multiple R cerebellar  infacrts   Existing Precautions/Restrictions cardiac  (MI precautions, B LE DVT's, pt on anticougulants)   Cognitive Status Examination   Orientation Status orientation to person, place and time   Visual Perception   Visual Impairment/Limitations blurry vision   Visual Acuity Impaired   Visual Field Deficit   (Intact)   Visual Attention Intact   Visual Motor Impairment   (Intact)   Impact of Vision Impairment on Function (Vision) Pt demonstrates limited to no pupil constriction with light. Refer to neuro op.   Sensory   Sensory Quick Adds No deficits were identified   Integumentary/Edema   Integumentary/Edema no deficits were identifed   Posture   Posture not impaired   Range of Motion Comprehensive   General Range of Motion no range of motion deficits identified   Strength Comprehensive (MMT)   General Manual Muscle Testing (MMT) Assessment no strength deficits identified   Coordination   Upper Extremity Coordination No deficits were identified   Bed Mobility   Bed Mobility supine-sit;sit-supine   Supine-Sit [place] (Bed Mobility) independent   Transfers   Transfers toilet transfer;sit-stand transfer   Transfer Comments Indep with mobility   Activities of Daily Living   BADL Assessment/Intervention lower body dressing   Lower Body Dressing Assessment/Training   [place] Level (Lower Body Dressing) independent   Clinical Impression   Criteria for Skilled Therapeutic Interventions Met (OT) no   Comment-Clinical Impression No need for further occupational therapy at this time. Refer visual deficits to neuro op. Pt in agreeance to this plan.    OT Discharge Planning    OT Discharge Recommendation (DC Rec) home;Home with assist;home with outpatient cardiac rehab   OT Rationale for DC Rec No further OT needs for stroke rehab. Rcommend A with strenuous IADl's, ie yard work, vaccuming, heavier laundry, etc and OP CR at [facility] for monitored progressive exercise and risk factor education and modification for recent  cardiac concerns   Total Evaluation Time (Minutes)   Total Evaluation Time (Minutes) 13

## 2021-06-06 NOTE — PLAN OF CARE
Pt admitted through the ED after experiencing blurred vision for 24 hrs. MRI confirmed scattered right cerebellar strokes. Alert and oriented x4. VSS on RA. Reports blurred vision - able to read large print on NIH pages, but states that she can not read print on her cell phone. Neuros otherwise intact, NIH = 0. CMS intact. Lung sounds with fine crackles in bilateral bases of LLs. Denies pain. Reports BM earlier today, voiding without difficulty. Passed bedside swallow eval - mod carb diet ordered. Preprandial BG 81. Ambulating with SBA. Plan for echocardiogram and neuro workup.

## 2021-06-06 NOTE — PLAN OF CARE
PT: Order received; per chart review and conversation with OT, patient has no current PT needs; still some blurry vision; Will complete PT order; See OT note for further recommendations.

## 2021-06-06 NOTE — DISCHARGE SUMMARY
Discharge Summary  Hospitalist    Date of Admission:  6/5/2021  Date of Discharge:  6/6/2021  Discharging Provider: Hanh Mccurdy MD  Date of Service (when I saw the patient): 06/06/21    Discharge Diagnoses    Multiple acute to subacute ischemic infarcts, concern for embolic stroke in setting of recent cardiac catheterization and stent placement    History of Present Illness   Please refer H & P for details.      Hospital Course   Mrs. Rowan Leiva is an extremely pleasant 61-year-old-female with a recent hospitalization at [facility] for non-ST elevation MI status post stenting to LAD on 05/31/2021.  Also, recent bilateral posterior tibial vein occlusive DVT with suspected PE, on Eliquis; ischemic cardiomyopathy, thrombocytopenia, acute kidney injury on chronic kidney disease stage III, hypothyroidism, and diabetes mellitus type 2 who presented for evaluation of blurry vision.  MRI of the brain showed several scattered acute to subacute ischemic infarcts concerning for embolic stroke.     PLAN:  1.  Multiple acute to subacute ischemic infarcts, concern for embolic stroke.  Blurred vision not felt likely to be due to these infarcts  This is possibly related to her recent cardiac catheterization.  She does not have a history of atrial fibrillation.  She did have a recent hospitalization from 05/30 to 06/04/2021 when she presented with chest pain and was found to have a non-ST elevation MI and underwent stenting to LAD.  She was initially on aspirin and Plavix but because she was also found to have bilateral occlusive DVT and needed to be on anticoagulation in the setting of low platelet count, she was discharged home only on Plavix and Eliquis.  She stated that she did take her medications as recommended.  She reported last evening, before going to bed, she noticed her vision was blurry and in the morning this got worse, reason for which she decided to come to the ER.  MRI of the brain with  findings as above.  She denies weakness in her upper or lower extremities.  No slurred speech.  ER attending discussed with Neurology on-call who recommended to continue her prior to admission medications of Eliquis and Plavix at this time.  She is admitted to Neuro Floor.     Recent fasting lipid profile on 05/31 showed total cholesterol of 191, HDLof 29, and LDL of 102.  She was started on atorvastatin 40 mg p.o. daily, which will be continued at this time.  PT/OT and speech pathology had been ordered.  TTE showed EF 50 to 55%.  Moderate to severe distal anterior and apical wall hypokinesis.  Bubble study was negative.  Neurology recommended continuing PTA Eliquis and Plavix.  Strongly recommended smoking cessation.  CT chest abdomen pelvis [noncontrast given elevated creatinine] was done that did not show any evidence of occult malignancy.  Patient was cleared for discharge home.  Recommended to follow-up with ophthalmology.  Neurology did not feel that her blurred vision was secondary to the new punctate infarcts.    2.  Coronary artery disease/non-ST elevation MI status post recent drug eluting stent placed to LAD on 05/31/2021.  3.  Hypertension.  4.  Dyslipidemia.  5.  Ischemic cardiomyopathy.  She has a known history of coronary artery disease, apparently with multiple stents placed 2 years ago in [place].  She had this recent hospitalization from 05/30 to 06/4/2021 when she presented with chest pain and found to have a non-ST elevation MI with elevated troponin of 10.98.  She was taken to cardiac cath lab and coronary angiogram showed 90% occluded distal LAD and had a drug eluting stent placed.  She was initially on aspirin and Plavix but because of the need for anticoagulation after discussing with Cardiology and Oncology she was discharged home only on Eliquis and Plavix, and aspirin was stopped.  The patient denies any chest pain at this time. Plan to continue with her prior to admission Plavix and  Lipitor.    Coreg held during hospitalization.  Resumed at discharge.    7.  Recent bilateral posterior tibial vein occlusive DVT with suspected PE.  Her recent lower extremity ultrasound showed bilateral DVT as above.  She had been on a heparin drip initially.  Unfortunately her platelets dropped to 55.  She was switched from a heparin drip to argatroban during her recent hospitalization.  Hematology was consulted.  HIT panel returned back negative.  She eventually was switched to Eliquis to be taking 10 mg p.o. twice daily for 1 week followed by 5 mg p.o. twice daily.  We will continue her Eliquis at this time.  She is not hypoxic.    8.  Anemia and Thrombocytopenia.  It seems that she had a low platelet count back in 2012 and was evaluated by Oncology at that time and it was felt that that was due to an acute infection.  Her platelet count did improve to 159 in 2015 but then she had mild chronic thrombocytopenia.  Her platelet count was 90 at the time of the admission.  It did drop to 55.  HIT panel was negative.  She was discharged on Plavix and Eliquis.  Her platelet count today is 53 from 60 yesterday.  We will continue with Plavix and Eliquis as she requires these medications given her recent medical problems and we will closely monitor her platelet count.  Hemoglobin has been in the 8-10 range during last hospitalization.  Currently at 8.4.  No evidence of current active blood loss.  Hemoglobin remaining stable.  Did have some hemoptysis during last hospitalization.    9.  Chronic kidney disease stage III.  Recent acute kidney injury.  Possible contrast-induced nephropathy.  Her baseline creatinine seems to be around 1.5 but she did have acute kidney injury during her prior hospitalization, suspected due to contrast-induced nephropathy.  Her creatinine bumped to 2.6.  She was seen by Nephrology.  Her creatinine slowly trended down with a creatinine of 2.47 at the time of discharge.    Currently creatinine is  2.38.  We will avoid nephrotoxic drugs.  She will follow up with Nephrology as an outpatient as initially recommended.    11.  Diabetes mellitus type 2, uncontrolled.  Her hemoglobin A1c was 7.9 on 05/31/2021.  At home she is on glipizide 10 mg p.o. daily, Lantus 30 units at bedtime, insulin Lispro 18 units twice daily with breakfast and dinner and 5 units with lunch.  Of note, her metformin was discontinued during her prior hospitalization due to acute kidney injury.  Her blood sugars now are 90.  She reported blood sugars in the morning were 46.  Continued at decreased dose insulin during hospitalization.  At discharge will continue on 20 units Lantus at bedtime, 5 units 3 times daily AC of lispro and continue glipizide.  Insulin doses will need further titration as outpatient.      12.  Hypothyroidism.  Resume her prior to admission levothyroxine.    13.  Tobacco use disorder with dependence.  She reports smoking 1 pack and a half of cigarettes daily.  Nicotine patch was used during her prior hospitalization and she was educated regarding the benefits of tobacco use cessation.  She did smoke a few cigarettes last night after she went home.    Nicotine patch ordered and prescribed at discharge.  Strongly encouraged to quit smoking.    Patient is stable at discharge home.      Hanh Mccurdy MD, MD      Pending Results   These results will be followed up by Hospitalist team.  Unresulted Labs Ordered in the Past 30 Days of this Admission     Date and Time Order Name Status Description    6/1/2021 1230 Partial thromboplastin time In process           Code Status   Full Code       Primary Care Physician   [facility]    Follow-ups Needed After Discharge   Follow-up Appointments     Follow-up and recommended labs and tests       Follow up with primary care provider, [facility], within   7 days for hospital follow- up.  No follow up labs or test are needed.  Follow-up with ophthalmologist  at next available appointment.             Physical Exam   Temp: 98.5  F (36.9  C) Temp src: Oral BP: (!) 154/65 Pulse: 74   Resp: 20 SpO2: 94 % O2 Device: None (Room air)    Vitals:    06/05/21 1340   Weight: 84.1 kg (185 lb 6.4 oz)     Vital Signs with Ranges  Temp:  [98  F (36.7  C)-98.8  F (37.1  C)] 98.5  F (36.9  C)  Pulse:  [68-75] 74  Resp:  [8-23] 20  BP: (136-168)/(51-68) 154/65  SpO2:  [92 %-100 %] 94 %  I/O last 3 completed shifts:  In: 200 [P.O.:200]  Out: -     Constitutional: Alert, cooperative, no apparent distress  Respiratory: Non labored breathing, clear to auscultation bilaterally, no crackles or wheezing  Cardiovascular: Regular rate and rhythm, no murmurs, trace lower extremity edema, right greater than left  GI: Normal bowel sounds, soft, non-distended, non-tender  Skin: No obvious rash  Neuro: Alert, engages in appropriate conversation, fluent speech, moving all extremities, no facial asymmetry  Psych: Calm and pleasant, no obvious anxiety/ depression      Discharge Disposition   Discharged to home  Condition at discharge: Stable    Consultations This Hospital Stay   PATIENT LEARNING CENTER IP CONSULT  SWALLOW EVAL SPEECH PATH AT BEDSIDE IP CONSULT  SPEECH LANGUAGE PATH ADULT IP CONSULT  PHARMACY IP CONSULT  PHARMACY IP CONSULT  PHARMACY IP CONSULT  PHYSICAL THERAPY ADULT IP CONSULT  OCCUPATIONAL THERAPY ADULT IP CONSULT  CARE MANAGEMENT / SOCIAL WORK IP CONSULT  NEUROLOGY IP CONSULT  SMOKING CESSATION PROGRAM IP CONSULT    Time Spent on this Encounter   IHanh MD, personally saw the patient today and spent greater than 30 minutes discharging this patient.    Discharge Orders      Reason for your hospital stay    You were hospitalized with acute stroke and blurred vision.     Follow-up and recommended labs and tests     Follow up with primary care provider, [facility], within 7 days for hospital follow- up.  No follow up labs or test are needed.  Follow-up with  ophthalmologist at next available appointment.     Activity    Your activity upon discharge: activity as tolerated     When to contact your care team    Call your primary doctor if you have any of the following: Worsening headache, vision changes, numbness, weakness, fever     Full Code     Diet    Follow this diet upon discharge: Orders Placed This Encounter      Combination Diet 4649-4149 Calories: Moderate Consistent CHO (4-6 CHO units/meal)     Discharge Medications   Current Discharge Medication List      START taking these medications    Details   nicotine (NICODERM CQ) 21 MG/24HR 24 hr patch Place 1 patch onto the skin daily  Qty: 15 patch, Refills: 0    Associated Diagnoses: Tobacco abuse         CONTINUE these medications which have CHANGED    Details   !! apixaban ANTICOAGULANT (ELIQUIS) 5 MG tablet Take 2 tablets (10 mg) by mouth 2 times daily for 5 days  Refills: 0    Associated Diagnoses: Acute deep vein thrombosis (DVT) of calf muscle vein, unspecified laterality (H)      insulin glargine (LANTUS PEN) 100 UNIT/ML pen Inject 20 Units Subcutaneous At Bedtime    Comments: If Lantus is not covered by insurance, may substitute Basaglar at same dose and frequency.    Associated Diagnoses: Type 2 diabetes mellitus with hyperglycemia, with long-term current use of insulin (H)      insulin lispro (HUMALOG KWIKPEN) 100 UNIT/ML (1 unit dial) KWIKPEN Inject 5 Units Subcutaneous 3 times daily (before meals) Breakfast and Dinner    Associated Diagnoses: Type 2 diabetes mellitus with hyperglycemia, with long-term current use of insulin (H)       !! - Potential duplicate medications found. Please discuss with provider.      CONTINUE these medications which have NOT CHANGED    Details   ALBUTEROL 90 MCG/ACT IN AERS 1-2 puffs Q 2-4 hrs prn with spacer - HFA  Qty: 1, Refills: 1    Associated Diagnoses: Mild intermittent asthma      atorvastatin (LIPITOR) 40 MG tablet Take 1 tablet (40 mg) by mouth daily  Qty: 30 tablet,  Refills: 0    Associated Diagnoses: NSTEMI (non-ST elevated myocardial infarction) (H)      carvedilol (COREG) 12.5 MG tablet Take 1 tablet (12.5 mg) by mouth 2 times daily  Qty: 60 tablet, Refills: 0    Associated Diagnoses: NSTEMI (non-ST elevated myocardial infarction) (H)      clopidogrel (PLAVIX) 75 MG tablet Take 1 tablet (75 mg) by mouth daily  Qty: 30 tablet, Refills: 0    Associated Diagnoses: NSTEMI (non-ST elevated myocardial infarction) (H)      glipiZIDE (GLUCOTROL) 10 MG tablet Take 10 mg by mouth daily.      levothyroxine (SYNTHROID, LEVOTHROID) 50 MCG tablet Take 150 mcg by mouth every morning (before breakfast)       !! apixaban ANTICOAGULANT (ELIQUIS) 5 MG tablet Take 1 tablet (5 mg) by mouth 2 times daily  Qty: 60 tablet, Refills: 0    Associated Diagnoses: Acute deep vein thrombosis (DVT) of calf muscle vein, unspecified laterality (H)      LANCETS REGULAR MISC use twice a day for blood sugar  Qty: 2 boxes, Refills: 0       !! - Potential duplicate medications found. Please discuss with provider.        Allergies   Allergies   Allergen Reactions     Heparin Heparin Induced Thrombocytopenia     HIT panel pending     No Known Drug Allergies      Data   Most Recent 3 CBC's:  Recent Labs   Lab Test 06/06/21  0705 06/05/21  1354 06/04/21  0544   WBC 10.5 10.3 9.3   HGB 8.4* 8.5* 8.3*   MCV 78 79 79   PLT 55* 53* 60*      Most Recent 3 BMP's:  Recent Labs   Lab Test 06/06/21  0705 06/05/21  1354 06/04/21  0858    140 136   POTASSIUM 3.6 3.8 3.8   CHLORIDE 109 109 106   CO2 21 22 19*   BUN 39* 40* 37*   CR 2.18* 2.38* 2.47*   ANIONGAP 9 9 11   BERNY 8.3* 8.3* 8.3*   GLC 93 90 174*     Most Recent 2 LFT's:  Recent Labs   Lab Test 06/01/21  1227 05/31/21  0626   AST 54* 67*   ALT 16 18   ALKPHOS 113 111   BILITOTAL 0.3 0.2     Most Recent INR's and Anticoagulation Dosing History:  Anticoagulation Dose History     Recent Dosing and Labs Latest Ref Rng & Units 11/4/2012 4/10/2015 6/5/2021    INR 0.86 -  1.14 1.10 0.93 1.80(H)        Most Recent 3 Troponin's:  Recent Labs   Lab Test 05/31/21  0626 05/31/21  0326 05/30/21  2315   TROPI 9.323* 11.693* 13.871*     Most Recent Cholesterol Panel:  Recent Labs   Lab Test 05/31/21  0626   CHOL 191   *   HDL 29*   TRIG 285*     Most Recent 6 Bacteria Isolates From Any Culture (See EPIC Reports for Culture Details):  Recent Labs   Lab Test 04/10/15  1402 04/10/15  1108   CULT No Salmonella, Shigella, Campylobacter, E. coli O157, Aeromonas, or Plesiomonas   isolated.   Charge credited Quantity not sufficient     Most Recent TSH, T4 and A1c Labs:  Recent Labs   Lab Test 05/31/21  0326 01/08/15   TSH 0.41 0.020   T4  --  2.04   A1C 7.9*  --        Results for orders placed or performed during the hospital encounter of 06/05/21   CT Head w/o Contrast    Narrative    CT SCAN OF THE HEAD WITHOUT CONTRAST   6/5/2021 2:16 PM     HISTORY: Code stroke. Blurred vision. Headache.    TECHNIQUE:  Axial images of the head and coronal reformations without  IV contrast material. Radiation dose for this scan was reduced using  automated exposure control, adjustment of the mA and/or kV according  to patient size, or iterative reconstruction technique.    COMPARISON: MRI of the brain dated 11/4/2012.    FINDINGS: There is no evidence of intracranial hemorrhage, mass, acute  infarct or anomaly. Cavum septum pellucidum et verge, a normal  variant, unchanged. The ventricles are within normal limits in size  and configuration. Mild diffuse parenchymal volume loss. Mild patchy  periventricular white matter hypodensities which are nonspecific, but  likely related to chronic microvascular ischemic disease. Intracranial  atherosclerotic calcifications primarily involving the carotid  siphons.    Partially visualized presumed mucus retention cyst in the left  maxillary sinus. Visualized aspects of the mastoid and middle ear  cavities are clear. The bony calvarium and bones of the skull  base  appear intact.       Impression    IMPRESSION:   1. No CT findings of acute intracranial abnormality. Aspect score =  10.  2. Mild generalized brain atrophy and redemonstrated nonspecific  scattered foci of hypoattenuation in the cerebral white matter,  presumably representing chronic small vessel ischemic disease.    The findings were discussed with Dr. Pepper by myself at approximately  2:25 PM on 6/5/2021.    SEJAL DE LEON MD   MR Brain w/o Contrast    Narrative    MRI BRAIN WITHOUT CONTRAST  6/5/2021 2:57 PM    HISTORY:  Blurry vision.    TECHNIQUE:  Multiplanar, multisequence MRI of the brain without  gadolinium IV contrast material.      COMPARISON:  CT head dated 6/5/2021. Brain MRI 11/4/2012.    FINDINGS: There are several scattered foci of restricted diffusion  with corresponding T2 FLAIR hyperintense signal, consistent with  acute-to-subacute ischemic infarcts. These lesions are seen involving  the cortex/subcortical white matter of the left frontal lobe, left  centrum semiovale, right parietal white matter, right periatrial white  matter, right occipital temporal junction, right occipital  cortex/subcortical white matter, left inferolateral occipital lobe,  and lateral right cerebellar hemisphere. There is no associated  intracranial hemorrhage or mass effect.     Cavum septum pellucidum et verge, a normal variant. The ventricles are  normal in size and configuration. Moderately extensive scattered  nonspecific T2 FLAIR hyperintense signal changes in the subcortical  and deep cerebral white matter, most likely due to chronic small  vessel ischemic disease. Mild generalized brain parenchymal volume  loss.    The orbits appear normal. There is a retention cyst in the left  maxillary sinus measuring up to approximately 3.5 cm in axial plane.  Mild mucosal thickening in the alveolar recess of the right maxillary  sinus. The calvarium, skull base, and midface are otherwise grossly  unremarkable. The  major arterial flow voids at the skull base are  grossly maintained.      Impression    IMPRESSION:  1. Several scattered acute-to-subacute ischemic infarcts involving the  supratentorial brain and right cerebellar hemisphere, as described.  The pattern raises concern for embolic disease from a central source.  2. No intracranial hemorrhage or mass effect.  3. Brain atrophy and presumed chronic small vessel ischemic changes.    SEJAL DE LEON MD   US Lower Extremity Venous Duplex Right    Narrative    EXAM: US LOWER EXTREMITY VENOUS DUPLEX RIGHT  LOCATION: [facility]  DATE/TIME: 6/6/2021 12:34 AM    INDICATION: ; swelling of right knee;  COMPARISON: 05/31/2021.  TECHNIQUE: Venous Duplex ultrasound of the right lower extremity with and without compression, augmentation and duplex. Color flow and spectral Doppler with waveform analysis performed.    FINDINGS: Exam includes the common femoral, femoral, popliteal, and contralateral common femoral veins as well as segmentally visualized deep calf veins and greater saphenous vein.     RIGHT: No deep vein thrombosis. No superficial thrombophlebitis. No popliteal cyst.      Impression    IMPRESSION:  1.  No deep venous thrombosis in the right lower extremity.   MRA Brain ([place]) wo Contrast    Narrative    MR ANGIOGRAM OF THE HEAD WITHOUT CONTRAST   6/6/2021 9:18 AM     HISTORY: Stroke, follow-up; embolic-appearing infarcts.    TECHNIQUE:  3D time-of-flight MR angiogram of the head without  contrast.    COMPARISON: Brain MRA 11/4/2012.    FINDINGS: Motion artifact. The vertebral arteries, basilar artery, and  posterior cerebral arteries are patent. The internal carotid arteries,  anterior cerebral arteries, and middle cerebral arteries are patent.  Apparent moderate narrowing of the bilateral internal carotid arteries  at the bilateral supraclinoid segments, apparent moderate narrowing of  the right internal carotid artery at the paraclinoid  segment, and  apparent mild narrowing of the left internal carotid artery at the  paraclinoid segment, more conspicuous compared to 11/4/2012. Poor  visualization of the left proximal A1 segment, presumably artifactual,  slightly more conspicuous compared to the prior exam. No convincing  evidence of aneurysm; however, evaluation is limited due to motion  artifact. Small area of enhancement along the medial aspect of the  left internal carotid artery at the cavernous segment, presumably  artifactual or normal vascular structure.    Opacification of the left maxillary sinus.      Impression    IMPRESSION:  Apparent narrowings of the bilateral internal carotid  arteries at the paraclinoid and supraclinoid segments which could be  artifactual related to a combination of motion artifact and vascular  calcifications, more conspicuous compared to 11/4/2012. CTA could be  performed for further characterization if indicated.      GUERO RIDDLE MD   MRA Neck (Carotids) wo Contrast    Narrative    MRA NECK WITHOUT CONTRAST  6/6/2021 9:18 AM     HISTORY: Neuro deficit, acute, stroke suspected. Embolic-appearing  infarcts.    TECHNIQUE: MRA of the neck without contrast. Estimates of carotid  stenoses are made relative to the distal internal carotid artery  diameters except as noted.    COMPARISON: None.    FINDINGS:  Poor visualization of the great vessel origins and  vertebral artery origins, presumably technical related to a  combination of motion artifact and lack of intravenous contrast. The  bilateral common carotid, internal carotid, external carotid, and  vertebral arteries are patent. No evidence of large vessel occlusion  or high-grade stenosis. No evidence of dissection. Mild plaquing at  the bilateral carotid bifurcations.      Impression    IMPRESSION:   1. No evidence of large vessel occlusion, high-grade stenosis, or  dissection.  2. Mild plaque at the bilateral carotid bifurcations.    GUERO RIDDLE MD    Echocardiogram Limited    Narrative    [id number]  [facility]  [id number]  [id number]^[email or web address]^SCHUYLER^JUANITA     [facility]  Echocardiography Laboratory  [address] [place] [address]     Name: EDVIN MERLOS  MRN: [id number]  : 1960  Study Date: 2021 10:29 AM  Age: 61 yrs  Gender: Female  Patient Location: [facility]  Reason For Study: Cerebrovascular Incident  Ordering Physician: SCHUYLER PARKINSON  Referring Physician: ROXANE [facility]  Performed By: Catrina Hollingsworth     BSA: 1.9 m2  Height: 64 in  Weight: 185 lb  HR: 72  BP: 168/63 mmHg  ______________________________________________________________________________  Procedure  Limited Portable Bubble Echo Adult. [employer] (NDC #[id number]) given  intravenously.  ______________________________________________________________________________  Interpretation Summary     The visual ejection fraction is estimated at 50-55%. There is moderate to  severe distal anterior and apical wall hypokinesis.  The right ventricle is normal in size and function.  A contrast injection (Bubble Study) was performed that was negative for flow  across the interatrial septum.  Trivial pericardial effusion also noted previously on 2021.  Moderate posterior mitral annular calcification. Mild MR.     Technically difficult study despite contrast use. Limited echo.  ______________________________________________________________________________  Left Ventricle  The left ventricle is normal in size. There is mild concentric left  ventricular hypertrophy. The visual ejection fraction is estimated at 50-55%.  There is moderate to severe apical wall hypokinesis.     Right Ventricle  The right ventricle is normal in size and function.     Atria  A contrast injection (Bubble Study) was performed that was negative for flow  across the interatrial septum.     Mitral Valve  Thickened mitral valve posterior leaflet. There is moderate mitral  annular  calcification. There is mild (1+) mitral regurgitation.     Aortic Valve  There is trace aortic regurgitation. No aortic stenosis is present.     Pulmonic Valve  The pulmonic valve is not well visualized.     Vessels  The inferior vena cava was normal in size with preserved respiratory  variability.     Pericardium  Trivial pericardial effusion.     ______________________________________________________________________________  MMode/2D Measurements & Calculations  IVSd: 1.2 cm  LVIDd: 4.2 cm  LVIDs: 2.9 cm  LVPWd: 1.4 cm  FS: 31.2 %  LV mass(C)d: 204.4 grams  LV mass(C)dI: 108.0 grams/m2  RWT: 0.66     ______________________________________________________________________________  Report approved by: Bebe Land 06/06/2021 12:35 PM

## 2021-06-06 NOTE — PHARMACY-CONSULT NOTE
Pharmacy Consult to evaluate for medication related stroke core measures    Rowan Leiva, 61 year old female admitted for stroke on 6/5/2021.    Thrombolytic was not given because of home medication Apixaban    VTE Prophylaxis Apixaban given on 6/6/21 as appropriate prior to end of hospital day 2.    Antithrombotic: apixaban started on 6/6/21, as appropriate by end of hospital day 2. Continue antithrombotic therapy on discharge to meet quality measures, unless contraindicated.    Anticoagulation if history of A-fib/flutter: Patient does not have history of A-fib/flutter - anticoagulation not required for medication related stroke core measures.       LDL Cholesterol Calculated   Date Value Ref Range Status   05/31/2021 105 (H) <100 mg/dL Final     Comment:     Above desirable:  100-129 mg/dl  Borderline High:  130-159 mg/dL  High:             160-189 mg/dL  Very high:       >189 mg/dl         Patient's home statin, Lipitor (atorvastatin) restarted; continue statin on discharge to meet quality measures, unless contraindicated.     Recommendations: None at this time    Thank you for the consult.    Alona Barnes RPH 6/6/2021 5:46 PM

## 2021-06-06 NOTE — PLAN OF CARE
OT; Declines therapy eval at this time d/t talking on the phone. Asked therapist to come back later.

## 2021-06-07 ENCOUNTER — TELEPHONE (OUTPATIENT)
Dept: INTERNAL MEDICINE | Facility: CLINIC | Age: 61
End: 2021-06-07

## 2021-06-07 ENCOUNTER — TELEPHONE (OUTPATIENT)
Dept: CARDIOLOGY | Facility: CLINIC | Age: 61
End: 2021-06-07

## 2021-06-07 DIAGNOSIS — I25.10 CORONARY ARTERY DISEASE INVOLVING NATIVE CORONARY ARTERY OF NATIVE HEART WITHOUT ANGINA PECTORIS: Primary | ICD-10-CM

## 2021-06-07 RX ORDER — NITROGLYCERIN 0.4 MG/1
TABLET SUBLINGUAL
Qty: 30 TABLET | Refills: 0 | Status: SHIPPED | OUTPATIENT
Start: 2021-06-07 | End: 2022-07-15

## 2021-06-07 NOTE — TELEPHONE ENCOUNTER
IP F/U    Date: 6/4/21  Diagnosis: Nstemi (Non-St Elevated Myocardial Infarction) (H)   Is patient active in care coordination? No  Was patient in TCU? No    Next 5 appointments (look out 90 days)    Jun 14, 2021  5:00 PM  SHORT with Brian Ruiz MD  [facility] ([facility] ) [address] [place] [id number] [phone]

## 2021-06-08 ENCOUNTER — TRANSFERRED RECORDS (OUTPATIENT)
Dept: HEALTH INFORMATION MANAGEMENT | Facility: CLINIC | Age: 61
End: 2021-06-08

## 2021-06-08 LAB — RETINOPATHY: POSITIVE

## 2021-06-08 NOTE — TELEPHONE ENCOUNTER
Nutrition Education Scheduling Outreach #2:    Call to patient to schedule. Left message with phone number to call to schedule.    Rowan Gutierrez  [facility]  Diabetes and Nutrition Scheduling

## 2021-06-08 NOTE — TELEPHONE ENCOUNTER
"Hospital/TCU/ED for chronic condition Discharge Protocol    \"Hi, my name is Kathie Jefferson RN, a registered nurse, and I am calling from [facility].  I am calling to follow up and see how things are going for you after your recent emergency visit/hospital/TCU stay.\"    Tell me how you are doing now that you are home?\" ok      Discharge Instructions    \"Let's review your discharge instructions.  What is/are the follow-up recommendations?  Pt. Response: saw eye doctor today - referred to eye specialist    \"Has an appointment with your primary care provider been scheduled?\"   Yes. (confirm)    \"When you see the provider, I would recommend that you bring your medications with you.\"    Medications    \"Tell me what changed about your medicines when you discharged?\"    Changes to chronic meds?    0-1    \"What questions do you have about your medications?\"    None     New diagnoses of heart failure, COPD, diabetes, or MI?    No     On insulin: \"Did you start on insulin in the hospital or did you have your insulin dose changed?\"  No         Post Discharge Medication Reconciliation Status: discharge medications reconciled, continue medications without change.    Was MTM referral placed (*Make sure to put transitions as reason for referral)?   Yes - \"The Medication Therapy  will call you within the next few days to schedule an appointment with an MTM pharmacist to discuss your medicines and make sure they are working as well as they possibly can for you. This visit can be done in a [place] clinic or on the phone and is at no charge to you.\"    Call Summary    \"What questions or concerns do you have about your recent visit and your follow-up care?\"     none    \"If you have questions or things don't continue to improve, we encourage you contact us through the main clinic number (give number).  Even if the clinic is not open, triage nurses are available 24/7 to help you.     We would " "like you to know that our clinic has extended hours (provide information).  We also have urgent care (provide details on closest location and hours/contact info)\"      \"Thank you for your time and take care!\"             "

## 2021-06-08 NOTE — TELEPHONE ENCOUNTER
Patient was evaluated by cardiology while inpatient for chest pain with elevated troponin, NSTEMI. PMH: CAD s/p PCI, progressive diabetic renal disease, poorly-controlled DMII, PAD and ongoing tobacco use. 5/31/21: Coronary angiogram via RRA resulted in PCI to severe distal LAD lesion. Moderate diffuse coronary atherosclerosis involving the distal LAD, distal LCx, and entire RCA, Patent overlapping stents in the mid-distal LAD with mild ISR, and patent stent in the proximal RCA with moderate ISR. Echo showed EF of 55% and a hypokinetic distal septal wall motion abnormality as well as a severely hypokinetic basal inferior/inferior lateral wall motion abnormality. Pt developed DVT/PE during hospitalization, with thrombocytopenia-HIT ruled out. Pt was discharged taking Plavix, Eliquis, Lipitor and Coreg. Pt readmitted the following day for blurred vision. MRI of the brain showed several scattered acute to subacute ischemic infarcts concerning for embolic stroke. Neurology consulted and smoking cessation strongly encouraged. Discharged with Nicoderm. Writer will call pt to verify that pt has an adequate supply of Plavix and will be reminded of importance of taking Plavix uninterrupted. Pt does not have and RX for PRN SL NTG. Will route this note to CHRISTOPHER Viktoria Mcdonough. Pt is scheduled for f/u OV with CHRISTOPHER Ebony Stien on 6/29/21 at 1400 at our [place] office. Cardiac rehab is scheduled on 6/10/21 at 1000 also in [place]. JORI Byrnes RN.  
Rx for 30 tabs sent to pharmacy.   
Writer called pt and she denies any chest pain, SOB, fever or lightheadedness. Continues to have blurry vision. Seen Ophthalmologist today and referred to specialist. No other neurological complaints. Denies any medication questions and states she has an adequate supply of Plavix and Eliquis. Reminded of importance of taking both as prescribed without interruption. Writer instructed pt on PRN SL Nitroglycerin, including indications, storage and expiration period once bottle opened, administration, expected side effects and when to call the clinic vs 911. Pt verbalized understanding and RX escribed to pt's [facility] per by CHRISTOPHER Viktoria Mcdonough. RRA access site is healing without signs of infection, swelling or drainage. Reviewed f/u appt's as below. Pt verbalized understanding of all instructions without further questions. JORI Byrnes RN.  
127

## 2021-06-09 ENCOUNTER — TELEPHONE (OUTPATIENT)
Dept: INTERNAL MEDICINE | Facility: CLINIC | Age: 61
End: 2021-06-09

## 2021-06-09 NOTE — TELEPHONE ENCOUNTER
MTM referral from: [facility] visit (referral by provider)    MTM referral outreach attempt #1 on June 9, 2021 at 2:49 PM      Outcome: Patient declined due to private pay*, will route to MTM Pharmacist/Provider as an FYI. Thank you for the referral.     Paxton Martinez, MTM coordinator

## 2021-06-10 ENCOUNTER — TELEPHONE (OUTPATIENT)
Dept: INTERNAL MEDICINE | Facility: CLINIC | Age: 61
End: 2021-06-10

## 2021-06-10 ENCOUNTER — HOSPITAL ENCOUNTER (OUTPATIENT)
Dept: CARDIAC REHAB | Facility: CLINIC | Age: 61
End: 2021-06-10
Attending: STUDENT IN AN ORGANIZED HEALTH CARE EDUCATION/TRAINING PROGRAM
Payer: COMMERCIAL

## 2021-06-10 DIAGNOSIS — I21.4 NSTEMI (NON-ST ELEVATED MYOCARDIAL INFARCTION) (H): ICD-10-CM

## 2021-06-10 PROCEDURE — 999N000109 HC STATISTIC OP CR VISIT: Performed by: OCCUPATIONAL THERAPIST

## 2021-06-10 PROCEDURE — 93797 PHYS/QHP OP CAR RHAB WO ECG: CPT | Mod: XU | Performed by: OCCUPATIONAL THERAPIST

## 2021-06-10 PROCEDURE — 999N000108 HC STATISTIC OP CARDIAC VISIT #2: Performed by: OCCUPATIONAL THERAPIST

## 2021-06-10 PROCEDURE — 93798 PHYS/QHP OP CAR RHAB W/ECG: CPT | Performed by: OCCUPATIONAL THERAPIST

## 2021-06-10 NOTE — TELEPHONE ENCOUNTER
Antonia from Outpatient Cardiac Rehab called to advise patient was Positive on the Depression Screening.  Number 9 was negative.  Antonia states she thinks it is situational and they are going to set patient up for some counseling.

## 2021-06-11 NOTE — TELEPHONE ENCOUNTER
Pt has appt on Monday, in person visit. Will route to Dr Ruiz to discuss Depression if pt comes in. Reminder call was made.   Will also get reminder text as she has Fadyt.

## 2021-06-14 ENCOUNTER — HOSPITAL ENCOUNTER (EMERGENCY)
Facility: CLINIC | Age: 61
Discharge: HOME | DRG: 377 | End: 2021-06-15
Attending: EMERGENCY MEDICINE | Admitting: EMERGENCY MEDICINE
Payer: COMMERCIAL

## 2021-06-14 ENCOUNTER — OFFICE VISIT (OUTPATIENT)
Dept: INTERNAL MEDICINE | Facility: CLINIC | Age: 61
End: 2021-06-14
Payer: COMMERCIAL

## 2021-06-14 ENCOUNTER — TELEPHONE (OUTPATIENT)
Dept: INTERNAL MEDICINE | Facility: CLINIC | Age: 61
End: 2021-06-14

## 2021-06-14 VITALS
DIASTOLIC BLOOD PRESSURE: 50 MMHG | HEIGHT: 64 IN | BODY MASS INDEX: 30.9 KG/M2 | TEMPERATURE: 98.3 F | RESPIRATION RATE: 16 BRPM | OXYGEN SATURATION: 97 % | HEART RATE: 77 BPM | SYSTOLIC BLOOD PRESSURE: 134 MMHG | WEIGHT: 181 LBS

## 2021-06-14 DIAGNOSIS — E89.0 POSTABLATIVE HYPOTHYROIDISM: ICD-10-CM

## 2021-06-14 DIAGNOSIS — E61.1 IRON DEFICIENCY: ICD-10-CM

## 2021-06-14 DIAGNOSIS — I25.10 CORONARY ARTERY DISEASE INVOLVING NATIVE CORONARY ARTERY OF NATIVE HEART WITHOUT ANGINA PECTORIS: ICD-10-CM

## 2021-06-14 DIAGNOSIS — I63.9 CEREBELLAR STROKE, ACUTE (H): ICD-10-CM

## 2021-06-14 DIAGNOSIS — C73 MALIGNANT NEOPLASM OF THYROID GLAND (H): ICD-10-CM

## 2021-06-14 DIAGNOSIS — Z79.4 TYPE 2 DIABETES MELLITUS WITH HYPERGLYCEMIA, WITH LONG-TERM CURRENT USE OF INSULIN (H): ICD-10-CM

## 2021-06-14 DIAGNOSIS — Z85.43 HISTORY OF OVARIAN CANCER: ICD-10-CM

## 2021-06-14 DIAGNOSIS — I10 ESSENTIAL HYPERTENSION: ICD-10-CM

## 2021-06-14 DIAGNOSIS — D69.6 THROMBOCYTOPENIA (H): ICD-10-CM

## 2021-06-14 DIAGNOSIS — N18.32 STAGE 3B CHRONIC KIDNEY DISEASE (H): ICD-10-CM

## 2021-06-14 DIAGNOSIS — E78.2 MIXED HYPERLIPIDEMIA: ICD-10-CM

## 2021-06-14 DIAGNOSIS — Z12.11 COLON CANCER SCREENING: ICD-10-CM

## 2021-06-14 DIAGNOSIS — E11.65 TYPE 2 DIABETES MELLITUS WITH HYPERGLYCEMIA, WITH LONG-TERM CURRENT USE OF INSULIN (H): ICD-10-CM

## 2021-06-14 DIAGNOSIS — D64.9 SYMPTOMATIC ANEMIA: ICD-10-CM

## 2021-06-14 DIAGNOSIS — Z09 HOSPITAL DISCHARGE FOLLOW-UP: Primary | ICD-10-CM

## 2021-06-14 DIAGNOSIS — D64.9 ANEMIA, UNSPECIFIED TYPE: ICD-10-CM

## 2021-06-14 LAB
ABO + RH BLD: NORMAL
ABO + RH BLD: NORMAL
BASOPHILS # BLD AUTO: 0 10E9/L (ref 0–0.2)
BASOPHILS NFR BLD AUTO: 0.3 %
BLD GP AB SCN SERPL QL: NORMAL
BLD PROD TYP BPU: NORMAL
BLD PROD TYP BPU: NORMAL
BLD UNIT ID BPU: 0
BLOOD BANK CMNT PATIENT-IMP: NORMAL
BLOOD PRODUCT CODE: NORMAL
BPU ID: NORMAL
DIFFERENTIAL METHOD BLD: ABNORMAL
EOSINOPHIL # BLD AUTO: 0.3 10E9/L (ref 0–0.7)
EOSINOPHIL NFR BLD AUTO: 2 %
ERYTHROCYTE [DISTWIDTH] IN BLOOD BY AUTOMATED COUNT: 14.8 % (ref 10–15)
ERYTHROCYTE [DISTWIDTH] IN BLOOD BY AUTOMATED COUNT: 14.9 % (ref 10–15)
FERRITIN SERPL-MCNC: 145 NG/ML (ref 8–252)
HCT VFR BLD AUTO: 19.9 % (ref 35–47)
HCT VFR BLD AUTO: 21.1 % (ref 35–47)
HGB BLD-MCNC: 6.3 G/DL (ref 11.7–15.7)
HGB BLD-MCNC: 6.5 G/DL (ref 11.7–15.7)
IMM GRANULOCYTES # BLD: 0.1 10E9/L (ref 0–0.4)
IMM GRANULOCYTES NFR BLD: 0.6 %
IRON SATN MFR SERPL: 11 % (ref 15–46)
IRON SERPL-MCNC: 20 UG/DL (ref 35–180)
LYMPHOCYTES # BLD AUTO: 1.9 10E9/L (ref 0.8–5.3)
LYMPHOCYTES NFR BLD AUTO: 14.6 %
MCH RBC QN AUTO: 25.1 PG (ref 26.5–33)
MCH RBC QN AUTO: 25.3 PG (ref 26.5–33)
MCHC RBC AUTO-ENTMCNC: 30.8 G/DL (ref 31.5–36.5)
MCHC RBC AUTO-ENTMCNC: 31.7 G/DL (ref 31.5–36.5)
MCV RBC AUTO: 80 FL (ref 78–100)
MCV RBC AUTO: 82 FL (ref 78–100)
MONOCYTES # BLD AUTO: 0.6 10E9/L (ref 0–1.3)
MONOCYTES NFR BLD AUTO: 5 %
NEUTROPHILS # BLD AUTO: 9.9 10E9/L (ref 1.6–8.3)
NEUTROPHILS NFR BLD AUTO: 77.5 %
NRBC # BLD AUTO: 0 10*3/UL
NRBC BLD AUTO-RTO: 0 /100
NUM BPU REQUESTED: 1
PLATELET # BLD AUTO: 60 10E9/L (ref 150–450)
PLATELET # BLD AUTO: 71 10E9/L (ref 150–450)
RBC # BLD AUTO: 2.49 10E12/L (ref 3.8–5.2)
RBC # BLD AUTO: 2.59 10E12/L (ref 3.8–5.2)
RETICS # AUTO: 64.5 10E9/L (ref 25–95)
RETICS/RBC NFR AUTO: 2.6 % (ref 0.5–2)
SPECIMEN EXP DATE BLD: NORMAL
TIBC SERPL-MCNC: 179 UG/DL (ref 240–430)
TRANSFUSION STATUS PATIENT QL: NORMAL
TRANSFUSION STATUS PATIENT QL: NORMAL
WBC # BLD AUTO: 11.1 10E9/L (ref 4–11)
WBC # BLD AUTO: 12.7 10E9/L (ref 4–11)

## 2021-06-14 PROCEDURE — 86901 BLOOD TYPING SEROLOGIC RH(D): CPT | Performed by: EMERGENCY MEDICINE

## 2021-06-14 PROCEDURE — 86923 COMPATIBILITY TEST ELECTRIC: CPT | Performed by: EMERGENCY MEDICINE

## 2021-06-14 PROCEDURE — 86850 RBC ANTIBODY SCREEN: CPT | Performed by: EMERGENCY MEDICINE

## 2021-06-14 PROCEDURE — 86304 IMMUNOASSAY TUMOR CA 125: CPT | Performed by: INTERNAL MEDICINE

## 2021-06-14 PROCEDURE — 80053 COMPREHEN METABOLIC PANEL: CPT | Performed by: INTERNAL MEDICINE

## 2021-06-14 PROCEDURE — P9016 RBC LEUKOCYTES REDUCED: HCPCS | Performed by: EMERGENCY MEDICINE

## 2021-06-14 PROCEDURE — 83550 IRON BINDING TEST: CPT | Performed by: EMERGENCY MEDICINE

## 2021-06-14 PROCEDURE — 86900 BLOOD TYPING SEROLOGIC ABO: CPT | Performed by: EMERGENCY MEDICINE

## 2021-06-14 PROCEDURE — 84432 ASSAY OF THYROGLOBULIN: CPT | Mod: 90 | Performed by: INTERNAL MEDICINE

## 2021-06-14 PROCEDURE — 36415 COLL VENOUS BLD VENIPUNCTURE: CPT | Performed by: INTERNAL MEDICINE

## 2021-06-14 PROCEDURE — 85025 COMPLETE CBC W/AUTO DIFF WBC: CPT | Performed by: EMERGENCY MEDICINE

## 2021-06-14 PROCEDURE — 99214 OFFICE O/P EST MOD 30 MIN: CPT | Performed by: INTERNAL MEDICINE

## 2021-06-14 PROCEDURE — 84466 ASSAY OF TRANSFERRIN: CPT | Performed by: EMERGENCY MEDICINE

## 2021-06-14 PROCEDURE — 82728 ASSAY OF FERRITIN: CPT | Performed by: EMERGENCY MEDICINE

## 2021-06-14 PROCEDURE — 36430 TRANSFUSION BLD/BLD COMPNT: CPT

## 2021-06-14 PROCEDURE — 86800 THYROGLOBULIN ANTIBODY: CPT | Mod: 90 | Performed by: INTERNAL MEDICINE

## 2021-06-14 PROCEDURE — 85027 COMPLETE CBC AUTOMATED: CPT | Performed by: INTERNAL MEDICINE

## 2021-06-14 PROCEDURE — 83540 ASSAY OF IRON: CPT | Performed by: EMERGENCY MEDICINE

## 2021-06-14 PROCEDURE — 99285 EMERGENCY DEPT VISIT HI MDM: CPT | Mod: 25

## 2021-06-14 PROCEDURE — 99000 SPECIMEN HANDLING OFFICE-LAB: CPT | Performed by: INTERNAL MEDICINE

## 2021-06-14 ASSESSMENT — ENCOUNTER SYMPTOMS
CHILLS: 1
BLOOD IN STOOL: 0
FATIGUE: 1
HEMATURIA: 0

## 2021-06-14 ASSESSMENT — MIFFLIN-ST. JEOR: SCORE: 1381.01

## 2021-06-14 NOTE — TELEPHONE ENCOUNTER
The lab called with a critically low hemoglobin. Dr. Ruiz was already gone for the day. The hemoglobin came back at 6.3. I called the patient and recommended she go to the emergency department tonight. She will do this. Forwarded to Dr. Mendez as an FYI.

## 2021-06-14 NOTE — PROGRESS NOTES
"    Assessment & Plan     Type 2 diabetes mellitus with hyperglycemia, with long-term current use of insulin (H)  Continue Lantus - decreased dose to 12 units, because of hypoglycemias, monitor   - insulin glargine (LANTUS PEN) 100 UNIT/ML pen; Inject 12 Units Subcutaneous At Bedtime    Malignant neoplasm of thyroid gland (H)  Assess lab work   - Thyroglobulin and Antibody (Sendout to [facility]); Future    History of ovarian cancer  Assess lab work   -     Anemia, unspecified type  Likely anemia of renal disease. Recheck lab   - CBC with platelets  - Comprehensive metabolic panel    Stage 3b chronic kidney disease  Follow up with nephrology   - Comprehensive metabolic panel    Colon cancer screening  Assess lab   - COLOGUARD([employer])    Hospital discharge follow-up  No angina, no new symptoms neurologically     Cerebellar stroke, acute (H)  Continue AC, Plavix     Mixed hyperlipidemia  Cont statin     Postablative hypothyroidism  Monitor thyroid function     Thrombocytopenia (H)  Monitor Plt, follow up with oncology     Essential hypertension  Controlled     Coronary artery disease involving native coronary artery of native heart without angina pectoris  Post stent placement, cont Plavix.                Tobacco Cessation:   reports that she has been smoking cigarettes. She has a 48.00 pack-year smoking history. She has never used smokeless tobacco.  Tobacco Cessation Action Plan: Self help information given to patient    BMI:   Estimated body mass index is 31.07 kg/m  as calculated from the following:    Height as of this encounter: 1.626 m (5' 4\").    Weight as of this encounter: 82.1 kg (181 lb).   Weight management plan: Discussed healthy diet and exercise guidelines    See Patient Instructions    Return in about 3 months (around 9/14/2021) for Routine Visit.    Brian Ruiz MD  [facility]    Pillo Donahue is a 61 year old who presents for the following health issues  " accompanied by her mother:    [facility] Follow-up Visit:    Hospital/Nursing Home/IP Rehab Facility: [facility]  Date of Admission: 5/30/21 & 6/5/21  Date of Discharge: 6/4/21 & 6/6/21  Reason(s) for Admission: MI / cerebral accident       Was your hospitalization related to COVID-19? No   Problems taking medications regularly:  None  Medication changes since discharge: added elequist ,atrovastatin ,and carvadilol   Problems adhering to non-medication therapy:  None    Summary of hospitalization:  [facility] discharge summary reviewed  Diagnostic Tests/Treatments reviewed.  Follow up needed: clinic visit   Other Healthcare Providers Involved in Patient s Care:         Specialist appointment - cardiology, nephrology   Update since discharge: improved. Post Discharge Medication Reconciliation: discharge medications reconciled, continue medications without change.  Plan of care communicated with patient and family          Patient is seen for a follow up visit.  Recently hospitalized for MI, followed by readmission for blurred vision.   Patient had MI requiring stent placement, LAD. Post discharge had blurred vision, admitted with embolic CVA.   Has h/o DVT, was on Eliquis. Has h/o CRF. Symptoms include weakness, edema LE. Monitoring BP, BG, medications, avoiding OTC NSAIDs. Needs periodic recheck of kidney function.  Unable to do IV contrast with the CKD.   Has h/o HTN. on medical treatment. BP has been controlled. No side effects from medications. No CP, HA, dizziness. good compliance with medications and low salt diet.  Has H/O hyperlipidemia. On medical treatment and diet. No side effects. No muscle weakness, myalgias or upset stomach.   Has H/O DM. On diet , exercise and insulin. Blood sugars are controlled. Has LE parestesias. Has had hypoglycemias.  Patient has anemia of renal disease .   Has h/o thyroid cancer post surgery. On Synthroid.   Has h/o ovarian precancerous lesion,  "post complete hysterectomy.     Has h/o smoking, chronic cough, trying to decrease smoking.   Reports feeling weak, SOB on exertion.   Abdomen is bloated.   Has h/o retinopathy, unable to read and work on computer. Seeing retina specialist.   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     Review of Systems   Constitutional, HEENT, cardiovascular, pulmonary, gi and gu systems are negative, except as otherwise noted.      Objective    /50   Pulse 77   Temp 98.3  F (36.8  C) (Oral)   Resp 16   Ht 1.626 m (5' 4\")   Wt 82.1 kg (181 lb)   LMP 05/01/2000   SpO2 97%   BMI 31.07 kg/m    Body mass index is 31.07 kg/m .  Physical Exam   GENERAL: weak, plale, alert and no distress  EYES: Eyes grossly normal to inspection, PERRL and conjunctivae and sclerae normal  HENT: ear canals and TM's normal, nose and mouth without ulcers or lesions  NECK: no adenopathy, no asymmetry, masses, or scars and thyroid normal to palpation  RESP: lungs clear to auscultation - no rales, rhonchi or wheezes  CV: regular rate and rhythm, normal S1 S2, no S3 or S4, no murmur, click or rub,  peripheral pulses strong  ABDOMEN: soft, nontender, no hepatosplenomegaly, no masses and bowel sounds normal  MS: no gross musculoskeletal defects noted, trace LE edema  SKIN: no suspicious lesions or rashes  NEURO: Normal strength and tone, mentation intact and speech normal    Admission on 06/05/2021, Discharged on 06/06/2021   Component Date Value Ref Range Status     Interpretation ECG 06/05/2021 Click View Image link to view waveform and result   Final     Sodium 06/05/2021 140  133 - 144 mmol/L Final     Potassium 06/05/2021 3.8  3.4 - 5.3 mmol/L Final     Chloride 06/05/2021 109  94 - 109 mmol/L Final     Carbon Dioxide 06/05/2021 22  20 - 32 mmol/L Final     Anion Gap 06/05/2021 9  3 - 14 mmol/L Final     Glucose 06/05/2021 90  70 - 99 mg/dL Final     Urea Nitrogen 06/05/2021 40* 7 - 30 mg/dL Final     Creatinine 06/05/2021 2.38* 0.52 - 1.04 " mg/dL Final     GFR Estimate 06/05/2021 22* >60 mL/min/[1.73_m2] Final    Comment: Non  GFR Calc  Starting 12/18/2018, serum creatinine based estimated GFR (eGFR) will be   calculated using the Chronic Kidney Disease Epidemiology Collaboration   (CKD-EPI) equation.       GFR Estimate If Black 06/05/2021 25* >60 mL/min/[1.73_m2] Final    Comment:  GFR Calc  Starting 12/18/2018, serum creatinine based estimated GFR (eGFR) will be   calculated using the Chronic Kidney Disease Epidemiology Collaboration   (CKD-EPI) equation.       Calcium 06/05/2021 8.3* 8.5 - 10.1 mg/dL Final     WBC 06/05/2021 10.3  4.0 - 11.0 10e9/L Final     RBC Count 06/05/2021 3.33* 3.8 - 5.2 10e12/L Final     Hemoglobin 06/05/2021 8.5* 11.7 - 15.7 g/dL Final     Hematocrit 06/05/2021 26.2* 35.0 - 47.0 % Final     MCV 06/05/2021 79  78 - 100 fl Final     MCH 06/05/2021 25.5* 26.5 - 33.0 pg Final     MCHC 06/05/2021 32.4  31.5 - 36.5 g/dL Final     RDW 06/05/2021 14.8  10.0 - 15.0 % Final     Platelet Count 06/05/2021 53* 150 - 450 10e9/L Final     Diff Method 06/05/2021 Manual Differential   Final     % Neutrophils 06/05/2021 81.0  % Final     % Lymphocytes 06/05/2021 10.0  % Final     % Monocytes 06/05/2021 5.0  % Final     % Eosinophils 06/05/2021 3.0  % Final     % Basophils 06/05/2021 1.0  % Final     Absolute Neutrophil 06/05/2021 8.3  1.6 - 8.3 10e9/L Final     Absolute Lymphocytes 06/05/2021 1.0  0.8 - 5.3 10e9/L Final     Absolute Monocytes 06/05/2021 0.5  0.0 - 1.3 10e9/L Final     Absolute Eosinophils 06/05/2021 0.3  0.0 - 0.7 10e9/L Final     Absolute Basophils 06/05/2021 0.1  0.0 - 0.2 10e9/L Final     RBC Morphology 06/05/2021 Consistent with reported results   Final     Platelet Estimate 06/05/2021 Automated count confirmed.  Platelet morphology is normal.   Final     INR 06/05/2021 1.80* 0.86 - 1.14 Final     PTT 06/05/2021 74* 22 - 37 sec Final     SARS-CoV-2 Virus Specimen Source 06/05/2021  Nasopharyngeal   Final     SARS-CoV-2 PCR Result 06/05/2021 NEGATIVE   Final    SARS-CoV2 (COVID-19) RNA not detected, presumed negative.     SARS-CoV-2 PCR Comment 06/05/2021 (Note)   Final    Comment: Testing was performed using the gini SARS-CoV-2 & Influenza A/B Assay on the   gini Maya System.  This test should be ordered for the detection of SARS-COV-2 in individuals who   meet SARS-CoV-2 clinical and/or epidemiological criteria. Test performance is   unknown in asymptomatic patients.  This test is for in vitro diagnostic use under the FDA EUA for laboratories   certified under CLIA to perform moderate and/or high complexity testing. This   test has not been FDA cleared or approved.  A negative test does not rule out the presence of PCR inhibitors in the   specimen or target RNA in concentration below the limit of detection for the   assay. The possibility of a false negative should be considered if the   patient's recent exposure or clinical presentation suggests COVID-19.  [facility] are certified under the Clinical Laboratory   Improvement Amendments of 1988 (CLIA-88) as qualified to perform moderate   and/or high complexity laboratory testing.       Glucose 06/05/2021 83  70 - 99 mg/dL Final     Glucose 06/05/2021 80  70 - 99 mg/dL Final     WBC 06/06/2021 10.5  4.0 - 11.0 10e9/L Final     RBC Count 06/06/2021 3.31* 3.8 - 5.2 10e12/L Final     Hemoglobin 06/06/2021 8.4* 11.7 - 15.7 g/dL Final     Hematocrit 06/06/2021 25.8* 35.0 - 47.0 % Final     MCV 06/06/2021 78  78 - 100 fl Final     MCH 06/06/2021 25.4* 26.5 - 33.0 pg Final     MCHC 06/06/2021 32.6  31.5 - 36.5 g/dL Final     RDW 06/06/2021 14.8  10.0 - 15.0 % Final     Platelet Count 06/06/2021 55* 150 - 450 10e9/L Final     Sodium 06/06/2021 139  133 - 144 mmol/L Final     Potassium 06/06/2021 3.6  3.4 - 5.3 mmol/L Final     Chloride 06/06/2021 109  94 - 109 mmol/L Final     Carbon Dioxide 06/06/2021 21  20 - 32 mmol/L Final      Anion Gap 06/06/2021 9  3 - 14 mmol/L Final     Glucose 06/06/2021 93  70 - 99 mg/dL Final     Urea Nitrogen 06/06/2021 39* 7 - 30 mg/dL Final     Creatinine 06/06/2021 2.18* 0.52 - 1.04 mg/dL Final     GFR Estimate 06/06/2021 24* >60 mL/min/[1.73_m2] Final    Comment: Non  GFR Calc  Starting 12/18/2018, serum creatinine based estimated GFR (eGFR) will be   calculated using the Chronic Kidney Disease Epidemiology Collaboration   (CKD-EPI) equation.       GFR Estimate If Black 06/06/2021 28* >60 mL/min/[1.73_m2] Final    Comment:  GFR Calc  Starting 12/18/2018, serum creatinine based estimated GFR (eGFR) will be   calculated using the Chronic Kidney Disease Epidemiology Collaboration   (CKD-EPI) equation.       Calcium 06/06/2021 8.3* 8.5 - 10.1 mg/dL Final     Glucose 06/06/2021 85  70 - 99 mg/dL Final     Glucose 06/06/2021 96  70 - 99 mg/dL Final     Glucose 06/06/2021 139* 70 - 99 mg/dL Final     Glucose 06/06/2021 170* 70 - 99 mg/dL Final

## 2021-06-15 ENCOUNTER — TELEPHONE (OUTPATIENT)
Dept: INTERNAL MEDICINE | Facility: CLINIC | Age: 61
End: 2021-06-15

## 2021-06-15 VITALS
SYSTOLIC BLOOD PRESSURE: 150 MMHG | TEMPERATURE: 98.5 F | HEART RATE: 73 BPM | OXYGEN SATURATION: 93 % | RESPIRATION RATE: 16 BRPM | DIASTOLIC BLOOD PRESSURE: 56 MMHG

## 2021-06-15 DIAGNOSIS — D64.9 ANEMIA, UNSPECIFIED TYPE: Primary | ICD-10-CM

## 2021-06-15 LAB
ALBUMIN SERPL-MCNC: 2.6 G/DL (ref 3.4–5)
ALP SERPL-CCNC: 102 U/L (ref 40–150)
ALT SERPL W P-5'-P-CCNC: 12 U/L (ref 0–50)
ANION GAP SERPL CALCULATED.3IONS-SCNC: 7 MMOL/L (ref 3–14)
AST SERPL W P-5'-P-CCNC: 10 U/L (ref 0–45)
BILIRUB SERPL-MCNC: 0.2 MG/DL (ref 0.2–1.3)
BUN SERPL-MCNC: 49 MG/DL (ref 7–30)
CALCIUM SERPL-MCNC: 8.3 MG/DL (ref 8.5–10.1)
CANCER AG125 SERPL-ACNC: 12 U/ML (ref 0–30)
CHLORIDE SERPL-SCNC: 108 MMOL/L (ref 94–109)
CO2 SERPL-SCNC: 22 MMOL/L (ref 20–32)
CREAT SERPL-MCNC: 2.17 MG/DL (ref 0.52–1.04)
GFR SERPL CREATININE-BSD FRML MDRD: 24 ML/MIN/{1.73_M2}
GLUCOSE SERPL-MCNC: 170 MG/DL (ref 70–99)
POTASSIUM SERPL-SCNC: 4.1 MMOL/L (ref 3.4–5.3)
PROT SERPL-MCNC: 6.7 G/DL (ref 6.8–8.8)
SODIUM SERPL-SCNC: 137 MMOL/L (ref 133–144)
TRANSFERRIN SERPL-MCNC: 148 MG/DL (ref 210–360)

## 2021-06-15 NOTE — ED TRIAGE NOTES
Pt was recently hospitalized for MI And had stent put in over [facility]. Pt has been having issues with her blood counts. Pt's hgb at clinic was 6.3 at clinic today and she was called and told to come in for blood transfusion. ABC intact. A/O x4.

## 2021-06-15 NOTE — ED PROVIDER NOTES
History   Chief Complaint:  Abnormal Labs       The history is provided by the patient.      Rowan Leiva is a 61 year old female on Eliquis and Plavix with history of NSTEMI, DVT, stroke, CKD stage III, and type 2 diabetes mellitus who presents for abnormal labs. Patient has a recent hospitalization at [facility] for NSTEMI s/p stenting to LAD on 5/31/2021. She had another hospitalization on 6/5/2021 for multiple acute ischemic cerebral infarcts. Patient had blood work done with her primary care provider today and was told to come in to the ED for a low hemoglobin of 6.3. Patient states she had an episode of epistaxis today but it was mild and self limited. She denies any other bleeding. No blood in stool or urine. She notes she has not had a bowel movement for a week. Patient also reports fatigue and chills. Patient denies recent colonoscopy of history of blood transfusion. Last dose of her anticoagulation was this morning. Patient admits tobacco use. She notes that she has felt more fatigued and always cold lately. She denies lightheadedness or dizziness. No syncope or chest pain. She denies shortness of breath. She denies any other symptoms.    Review of Systems   Constitutional: Positive for chills and fatigue.   HENT: Positive for nosebleeds.    Gastrointestinal: Negative for blood in stool.   Genitourinary: Negative for hematuria.   All other systems reviewed and are negative.      Allergies:  Heparin    Medications:  Albuterol  Eliquis  Lipitor  Coreg  Plavix  Nitrostat  Levothyroxine    Past Medical History:    Type 2 diabetes mellitus   Hyperlipidemia   Tobacco use disorder  Asthma  Thyroid cancer  CAD  Ovarian malignancy  Hypothyroidism   MI  Pulmonary nodule  Stroke  NSTEMI  Hypertension     Past Surgical History:    Thyroidectomy  Hysterectomy  Tubal ligation   Cholecystectomy  Angiogram     Family History:    Father: diabetes mellitus, heart disease, lung cancer  Mother: thyroid  cancer  Brother: diabetes mellitus, hyperlipidemia  Sister: diabetes mellitus, hyperlipidemia, blood disease    Social History:  Patient presents to the ED with .   Tobacco use: yes    Physical Exam     No data found.    Physical Exam  General: Well appearing, nontoxic. Resting comfortably  Head:  Scalp, face, and head appear normal  Eyes:  Pupils are equal, round    Conjunctivae non-injected and sclerae white  ENT:    The external nose is normal    Pinnae are normal  Neck:  Normal range of motion    There is no rigidity noted    Trachea is in the midline  CV:  Regular rate and rhythm     Normal S1/S2, no S3/S4    No murmur or rub. Radial pulses 2+ bilaterally.  Resp:  Lungs are clear and equal bilaterally  There is no tachypnea    No increased work of breathing    No rales, wheezing, or rhonchi  GI:  Abdomen is soft, no rigidity or guarding    No distension, or mass    No tenderness or rebound tenderness   MS:  Normal muscular tone    Symmetric motor strength    No lower extremity edema  Skin:  No rash or acute skin lesions noted  Neuro: Awake and alert  Speech is normal and fluent  Moves all extremities spontaneously  Psych:  Normal affect. Appropriate interactions.      Emergency Department Course       Laboratory:  CBC: WBC: 12.7 (H) , HGB: 6.5 (L) , PLT: 71 (L)     ABO/Rh Type and Screen: O Negative    Iron and iron binding capacity: iron 20 (L), bindin (L), saturation: 11 (L)     Reticulocyte count: % retic 2.6 (H),  Absolute retic 64.5     Ferritin: 145    Emergency Department Course:    Reviewed:  I reviewed nursing notes, vitals, past medical history and care everywhere    Assessments & Consults:   ED Course as of  1304   Mon  I obtained history and examined the patient as noted above.       Patient signed the consent form for blood transfusion.           Interventions:  1u pRBCs    Disposition:  The patient was discharged to home.       Impression & Plan        Medical Decision Making:  Rowan Leiva is a 61 year old female who presents for evaluation of low hemoglobin after she had outpatient blood work done today.  On my evaluation she is well-appearing, hemodynamically stable and afebrile.  No significant shortness of breath or recent syncopal events.  Patient does endorse fatigue and feeling cold all the time.  She denies any bleeding other than mild epistaxis.  Patient is anticoagulated.  Patient had anemia and thrombocytopenia while in the hospital and was evaluated by hematology.  She has an appointment for follow-up with hematology as an outpatient for further monitoring and evaluation of this.  Anemia is likely multifactorial given recent illness, procedures, underlying renal disease.  Iron studies are sent from the ED which also indicates some degree of iron deficiency.  Repeat hemoglobin at presentation to the ED is stable at 6.5.  Given the presence of significant symptomatic anemia as well as recent NSTEMI with cardiac stent placement patient was transfused 1 unit of PRBCs.  Patient is otherwise stable with no evidence for active bleeding.  Patient wishes to avoid hospitalization if possible.  I recommended patient start oral iron supplementation pending close follow-up with her primary care physician as well as hematology.  I stressed the importance of very close follow-up with repeat hemoglobin check in 3 days or sooner if any symptoms worsen.  The patient was agreeable to plan of care.  Close return precautions were provided and she was discharged in stable condition.      Diagnosis:    ICD-10-CM    1. Symptomatic anemia  D64.9 Transferrin     Transferrin     CANCELED: Transferrin   2. Iron deficiency  E61.1        Discharge Medications:  Discharge Medication List as of 6/15/2021 12:59 AM      START taking these medications    Details   ferrous sulfate 44 Fe mg/mL ELIX Take 10 mL by mouth mixed in 1/3 of a glass of orange juice (to enhance  absorption) 30 minutes before breakfast, every other day., Disp-473 mL, R-3, Local Print             Scribe Disclosure:  I, Ashley Raymundo, am serving as a scribe at 8:45 PM on 6/14/2021 to document services personally performed by Zack aRines MD based on my observations and the provider's statements to me.          Zack Raines MD  06/16/21 1451

## 2021-06-15 NOTE — RESULT ENCOUNTER NOTE
Low level of Transferrin.  Resulted after Emergency Dept  Routed to PCP. Dr Ruiz and Oncologist, Lexa Miller MD

## 2021-06-15 NOTE — TELEPHONE ENCOUNTER
Reason for Call:  Other call back    Detailed comments: Patient was seen at ER yesterday and was told to have her blood rechecked due to a low hgb. Need orders. Patient has lab appt on 06/17/21 Thank you     Phone Number Patient can be reached at: Home number on file [phone] (home)    Best Time: ASAP    Can we leave a detailed message on this number? YES    Call taken on 6/15/2021 at 2:42 PM by Mindy Aguillon

## 2021-06-17 ENCOUNTER — TELEPHONE (OUTPATIENT)
Dept: ONCOLOGY | Facility: CLINIC | Age: 61
End: 2021-06-17

## 2021-06-17 ENCOUNTER — TELEPHONE (OUTPATIENT)
Dept: INTERNAL MEDICINE | Facility: CLINIC | Age: 61
End: 2021-06-17

## 2021-06-17 ENCOUNTER — HOSPITAL ENCOUNTER (INPATIENT)
Facility: CLINIC | Age: 61
LOS: 5 days | Discharge: HOME | DRG: 377 | End: 2021-06-22
Attending: PHYSICIAN ASSISTANT | Admitting: INTERNAL MEDICINE
Payer: COMMERCIAL

## 2021-06-17 DIAGNOSIS — C73 MALIGNANT NEOPLASM OF THYROID GLAND (H): ICD-10-CM

## 2021-06-17 DIAGNOSIS — K92.2 COLONIC HEMORRHAGE: ICD-10-CM

## 2021-06-17 DIAGNOSIS — K92.2 GI BLEED: ICD-10-CM

## 2021-06-17 DIAGNOSIS — N18.9 CHRONIC RENAL INSUFFICIENCY: ICD-10-CM

## 2021-06-17 DIAGNOSIS — D64.9 ANEMIA: ICD-10-CM

## 2021-06-17 DIAGNOSIS — D62 ANEMIA DUE TO BLOOD LOSS, ACUTE: ICD-10-CM

## 2021-06-17 DIAGNOSIS — K92.1 MELENA: ICD-10-CM

## 2021-06-17 DIAGNOSIS — K92.1 MELENA: Primary | ICD-10-CM

## 2021-06-17 DIAGNOSIS — I63.9 CEREBELLAR STROKE, ACUTE (H): ICD-10-CM

## 2021-06-17 DIAGNOSIS — D64.9 ANEMIA, UNSPECIFIED TYPE: ICD-10-CM

## 2021-06-17 LAB
ALBUMIN SERPL-MCNC: 2.5 G/DL (ref 3.4–5)
ALBUMIN UR-MCNC: 100 MG/DL
ALP SERPL-CCNC: 96 U/L (ref 40–150)
ALT SERPL W P-5'-P-CCNC: 8 U/L (ref 0–50)
AMORPH CRY #/AREA URNS HPF: ABNORMAL /HPF
ANION GAP SERPL CALCULATED.3IONS-SCNC: 7 MMOL/L (ref 3–14)
APPEARANCE UR: CLEAR
APTT PPP: 76 SEC (ref 22–37)
AST SERPL W P-5'-P-CCNC: 5 U/L (ref 0–45)
BACTERIA #/AREA URNS HPF: ABNORMAL /HPF
BASOPHILS # BLD AUTO: 0 10E9/L (ref 0–0.2)
BASOPHILS NFR BLD AUTO: 0.2 %
BILIRUB SERPL-MCNC: 0.2 MG/DL (ref 0.2–1.3)
BILIRUB UR QL STRIP: NEGATIVE
BLD PROD TYP BPU: NORMAL
BLD UNIT ID BPU: 0
BLOOD PRODUCT CODE: NORMAL
BPU ID: NORMAL
BUN SERPL-MCNC: 56 MG/DL (ref 7–30)
CALCIUM SERPL-MCNC: 7.9 MG/DL (ref 8.5–10.1)
CHLORIDE SERPL-SCNC: 107 MMOL/L (ref 94–109)
CO2 SERPL-SCNC: 20 MMOL/L (ref 20–32)
COLOR UR AUTO: ABNORMAL
CREAT SERPL-MCNC: 1.98 MG/DL (ref 0.52–1.04)
DIFFERENTIAL METHOD BLD: ABNORMAL
EOSINOPHIL # BLD AUTO: 0.1 10E9/L (ref 0–0.7)
EOSINOPHIL NFR BLD AUTO: 1.1 %
ERYTHROCYTE [DISTWIDTH] IN BLOOD BY AUTOMATED COUNT: 15.8 % (ref 10–15)
GFR SERPL CREATININE-BSD FRML MDRD: 27 ML/MIN/{1.73_M2}
GLUCOSE BLDC GLUCOMTR-MCNC: 204 MG/DL (ref 70–99)
GLUCOSE SERPL-MCNC: 226 MG/DL (ref 70–99)
GLUCOSE UR STRIP-MCNC: 70 MG/DL
HCT VFR BLD AUTO: 17.2 % (ref 35–47)
HEMOCCULT STL QL: POSITIVE
HGB BLD-MCNC: 5.3 G/DL (ref 11.7–15.7)
HGB BLD-MCNC: 5.4 G/DL (ref 11.7–15.7)
HGB UR QL STRIP: ABNORMAL
IMM GRANULOCYTES # BLD: 0.1 10E9/L (ref 0–0.4)
IMM GRANULOCYTES NFR BLD: 0.7 %
INR PPP: 1.95 (ref 0.86–1.14)
KETONES UR STRIP-MCNC: NEGATIVE MG/DL
LABORATORY COMMENT REPORT: NORMAL
LEUKOCYTE ESTERASE UR QL STRIP: NEGATIVE
LYMPHOCYTES # BLD AUTO: 1.5 10E9/L (ref 0.8–5.3)
LYMPHOCYTES NFR BLD AUTO: 11.8 %
MCH RBC QN AUTO: 25.6 PG (ref 26.5–33)
MCHC RBC AUTO-ENTMCNC: 30.8 G/DL (ref 31.5–36.5)
MCV RBC AUTO: 83 FL (ref 78–100)
MONOCYTES # BLD AUTO: 0.6 10E9/L (ref 0–1.3)
MONOCYTES NFR BLD AUTO: 4.7 %
MUCOUS THREADS #/AREA URNS LPF: PRESENT /LPF
NEUTROPHILS # BLD AUTO: 10.3 10E9/L (ref 1.6–8.3)
NEUTROPHILS NFR BLD AUTO: 81.5 %
NITRATE UR QL: NEGATIVE
NRBC # BLD AUTO: 0 10*3/UL
NRBC BLD AUTO-RTO: 0 /100
PH UR STRIP: 5.5 PH (ref 5–7)
PLATELET # BLD AUTO: 56 10E9/L (ref 150–450)
POTASSIUM SERPL-SCNC: 4.2 MMOL/L (ref 3.4–5.3)
PROT SERPL-MCNC: 6.4 G/DL (ref 6.8–8.8)
RBC # BLD AUTO: 2.07 10E12/L (ref 3.8–5.2)
RBC #/AREA URNS AUTO: 3 /HPF (ref 0–2)
SARS-COV-2 RNA RESP QL NAA+PROBE: NEGATIVE
SODIUM SERPL-SCNC: 134 MMOL/L (ref 133–144)
SOURCE: ABNORMAL
SP GR UR STRIP: 1.02 (ref 1–1.03)
SPECIMEN SOURCE: NORMAL
SQUAMOUS #/AREA URNS AUTO: 1 /HPF (ref 0–1)
TRANSFUSION STATUS PATIENT QL: NORMAL
TRANSFUSION STATUS PATIENT QL: NORMAL
TROPONIN I SERPL-MCNC: 0.04 UG/L (ref 0–0.04)
UROBILINOGEN UR STRIP-MCNC: NORMAL MG/DL (ref 0–2)
WBC # BLD AUTO: 12.6 10E9/L (ref 4–11)
WBC #/AREA URNS AUTO: 3 /HPF (ref 0–5)

## 2021-06-17 PROCEDURE — 93005 ELECTROCARDIOGRAM TRACING: CPT

## 2021-06-17 PROCEDURE — C9113 INJ PANTOPRAZOLE SODIUM, VIA: HCPCS | Performed by: PHYSICIAN ASSISTANT

## 2021-06-17 PROCEDURE — 86850 RBC ANTIBODY SCREEN: CPT | Performed by: PHYSICIAN ASSISTANT

## 2021-06-17 PROCEDURE — P9016 RBC LEUKOCYTES REDUCED: HCPCS | Performed by: PHYSICIAN ASSISTANT

## 2021-06-17 PROCEDURE — 86901 BLOOD TYPING SEROLOGIC RH(D): CPT | Performed by: PHYSICIAN ASSISTANT

## 2021-06-17 PROCEDURE — 86900 BLOOD TYPING SEROLOGIC ABO: CPT | Performed by: PHYSICIAN ASSISTANT

## 2021-06-17 PROCEDURE — 120N000001 HC R&B MED SURG/OB

## 2021-06-17 PROCEDURE — 250N000011 HC RX IP 250 OP 636: Performed by: PHYSICIAN ASSISTANT

## 2021-06-17 PROCEDURE — 82272 OCCULT BLD FECES 1-3 TESTS: CPT | Performed by: PHYSICIAN ASSISTANT

## 2021-06-17 PROCEDURE — 250N000013 HC RX MED GY IP 250 OP 250 PS 637: Performed by: PHYSICIAN ASSISTANT

## 2021-06-17 PROCEDURE — 87635 SARS-COV-2 COVID-19 AMP PRB: CPT | Performed by: PHYSICIAN ASSISTANT

## 2021-06-17 PROCEDURE — 85025 COMPLETE CBC W/AUTO DIFF WBC: CPT | Performed by: EMERGENCY MEDICINE

## 2021-06-17 PROCEDURE — 250N000012 HC RX MED GY IP 250 OP 636 PS 637: Performed by: PHYSICIAN ASSISTANT

## 2021-06-17 PROCEDURE — 99223 1ST HOSP IP/OBS HIGH 75: CPT | Mod: AI | Performed by: PHYSICIAN ASSISTANT

## 2021-06-17 PROCEDURE — 85610 PROTHROMBIN TIME: CPT | Performed by: EMERGENCY MEDICINE

## 2021-06-17 PROCEDURE — 36430 TRANSFUSION BLD/BLD COMPNT: CPT

## 2021-06-17 PROCEDURE — 84484 ASSAY OF TROPONIN QUANT: CPT | Performed by: EMERGENCY MEDICINE

## 2021-06-17 PROCEDURE — 85018 HEMOGLOBIN: CPT | Performed by: INTERNAL MEDICINE

## 2021-06-17 PROCEDURE — 999N001017 HC STATISTIC GLUCOSE BY METER IP

## 2021-06-17 PROCEDURE — 80053 COMPREHEN METABOLIC PANEL: CPT | Performed by: EMERGENCY MEDICINE

## 2021-06-17 PROCEDURE — 36415 COLL VENOUS BLD VENIPUNCTURE: CPT | Performed by: INTERNAL MEDICINE

## 2021-06-17 PROCEDURE — 86923 COMPATIBILITY TEST ELECTRIC: CPT | Performed by: PHYSICIAN ASSISTANT

## 2021-06-17 PROCEDURE — C9803 HOPD COVID-19 SPEC COLLECT: HCPCS

## 2021-06-17 PROCEDURE — 81001 URINALYSIS AUTO W/SCOPE: CPT | Performed by: PHYSICIAN ASSISTANT

## 2021-06-17 PROCEDURE — 85730 THROMBOPLASTIN TIME PARTIAL: CPT | Performed by: EMERGENCY MEDICINE

## 2021-06-17 PROCEDURE — 83021 HEMOGLOBIN CHROMOTOGRAPHY: CPT | Performed by: PHYSICIAN ASSISTANT

## 2021-06-17 PROCEDURE — 96374 THER/PROPH/DIAG INJ IV PUSH: CPT

## 2021-06-17 PROCEDURE — 80048 BASIC METABOLIC PNL TOTAL CA: CPT | Performed by: INTERNAL MEDICINE

## 2021-06-17 PROCEDURE — 99285 EMERGENCY DEPT VISIT HI MDM: CPT | Mod: 25

## 2021-06-17 RX ORDER — CARVEDILOL 12.5 MG/1
12.5 TABLET ORAL 2 TIMES DAILY
Status: DISCONTINUED | OUTPATIENT
Start: 2021-06-17 | End: 2021-06-22 | Stop reason: HOSPADM

## 2021-06-17 RX ORDER — NICOTINE POLACRILEX 4 MG
15-30 LOZENGE BUCCAL
Status: DISCONTINUED | OUTPATIENT
Start: 2021-06-17 | End: 2021-06-22 | Stop reason: HOSPADM

## 2021-06-17 RX ORDER — DEXTROSE MONOHYDRATE 25 G/50ML
25-50 INJECTION, SOLUTION INTRAVENOUS
Status: DISCONTINUED | OUTPATIENT
Start: 2021-06-17 | End: 2021-06-22 | Stop reason: HOSPADM

## 2021-06-17 RX ORDER — FUROSEMIDE 10 MG/ML
20 INJECTION INTRAMUSCULAR; INTRAVENOUS ONCE
Status: COMPLETED | OUTPATIENT
Start: 2021-06-17 | End: 2021-06-17

## 2021-06-17 RX ORDER — SODIUM CHLORIDE 9 MG/ML
INJECTION, SOLUTION INTRAVENOUS CONTINUOUS
Status: DISCONTINUED | OUTPATIENT
Start: 2021-06-17 | End: 2021-06-21

## 2021-06-17 RX ORDER — ATORVASTATIN CALCIUM 40 MG/1
40 TABLET, FILM COATED ORAL DAILY
Status: DISCONTINUED | OUTPATIENT
Start: 2021-06-18 | End: 2021-06-21

## 2021-06-17 RX ORDER — NICOTINE 21 MG/24HR
1 PATCH, TRANSDERMAL 24 HOURS TRANSDERMAL DAILY
Status: DISCONTINUED | OUTPATIENT
Start: 2021-06-18 | End: 2021-06-22 | Stop reason: HOSPADM

## 2021-06-17 RX ORDER — LEVOTHYROXINE SODIUM 150 UG/1
150 TABLET ORAL
Status: DISCONTINUED | OUTPATIENT
Start: 2021-06-18 | End: 2021-06-22 | Stop reason: HOSPADM

## 2021-06-17 RX ADMIN — CARVEDILOL 12.5 MG: 12.5 TABLET, FILM COATED ORAL at 22:51

## 2021-06-17 RX ADMIN — FUROSEMIDE 20 MG: 10 INJECTION, SOLUTION INTRAMUSCULAR; INTRAVENOUS at 22:51

## 2021-06-17 RX ADMIN — PANTOPRAZOLE SODIUM 80 MG: 40 INJECTION, POWDER, FOR SOLUTION INTRAVENOUS at 19:07

## 2021-06-17 RX ADMIN — INSULIN GLARGINE 6 UNITS: 100 INJECTION, SOLUTION SUBCUTANEOUS at 22:51

## 2021-06-17 ASSESSMENT — ACTIVITIES OF DAILY LIVING (ADL)
HEARING_DIFFICULTY_OR_DEAF: NO
DOING_ERRANDS_INDEPENDENTLY_DIFFICULTY: NO
DIFFICULTY_EATING/SWALLOWING: NO
WALKING_OR_CLIMBING_STAIRS_DIFFICULTY: NO
CONCENTRATING,_REMEMBERING_OR_MAKING_DECISIONS_DIFFICULTY: NO
PATIENT_/_FAMILY_COMMUNICATION_STYLE: SPOKEN LANGUAGE (ENGLISH OR BILINGUAL)
DRESSING/BATHING_DIFFICULTY: NO
FALL_HISTORY_WITHIN_LAST_SIX_MONTHS: NO
DIFFICULTY_COMMUNICATING: NO
WEAR_GLASSES_OR_BLIND: NO
TOILETING_ISSUES: NO

## 2021-06-17 ASSESSMENT — ENCOUNTER SYMPTOMS
BLOOD IN STOOL: 1
APPETITE CHANGE: 1
FEVER: 0
FATIGUE: 1
BACK PAIN: 1
VOMITING: 0
WEAKNESS: 1
CONSTIPATION: 1

## 2021-06-17 NOTE — TELEPHONE ENCOUNTER
This RN was given results to call patient regarding today's HGB.  See message below. Per Sima Gonzalez advised patient to go the ED for possibly another transfusion.    Pt verbalizes understanding and will go.   Hgb 5.4 today

## 2021-06-17 NOTE — TELEPHONE ENCOUNTER
Fax received from [employer] for review and signature.  Put in Dr. Ruiz's in basket.

## 2021-06-17 NOTE — TELEPHONE ENCOUNTER
Patient transferred to triage line requesting sooner appointment. Currently scheduled with Dr. Miller 6/24 as follow-up to inpatient visit. Patient was seen in ED 6/14 with low Hgb. Scheduled for labs today ordered by PCP Dr. Ruiz.     Recommendation: Patient to proceed with labs today as scheduled. Follow-up pending results/recommendations from Dr. Ruiz. If patient is needing blood transfusion can work to arrange if availability or would advise ED for blood transfusion. Patient reports understanding and agrees with plan.     Tracee Lazar, JANN, RN, PHN, OCN  Oncology Care Coordinator  [facility]

## 2021-06-17 NOTE — ED PROVIDER NOTES
"  History   Chief Complaint:  Abnormal Labs       HPI   Rowan Leiva is a 61 year old female current tobacco user anticoagulated on Plavix and Eliquis with history of MI, NSTEMI, embolic stroke, DVT, CKD stage 3 and type 2 diabetes who presents with abnormal labs. The patient reports multiple recent admissions to the hospital for NSTEMI s/p stenting to LAD on 5/31 and multiple acute ischemic cerebral infarct on 06/05. She was also seen in the emergency department on 06/14 for low hemoglobin after outpatient lab draw. She was given a blood transfusion, advised to begin iron supplements, and told to follow up for repeat hemoglobin draw today. The patient states that she was called after her lab visit advising her to go to emergency department after hemoglobin of 5.4. On exam, she endorses increased weakness, fatigue, and decreased appetite. She also states that she was constipated and had her first bowel movement this morning after taking a laxative last night. Describes the stool and \"dark and black\". Notes some lower left back pain yesterday that is now only present when coughing. Denies any chest pain, vomiting, urinary symptoms, fever, or leg swelling.     Review of Systems   Constitutional: Positive for appetite change and fatigue. Negative for fever.   Cardiovascular: Negative for chest pain and leg swelling.   Gastrointestinal: Positive for blood in stool and constipation. Negative for vomiting.   Genitourinary: Negative.    Musculoskeletal: Positive for back pain.   Neurological: Positive for weakness.   All other systems reviewed and are negative.    Allergies:  Heparin     Medications:  Albuterol   Lipitor   Levothyroxine   Lantus pen   Plavix   Eliquis   Nitrostat   Coreg   Metformin     Past Medical History:    Thyroid cancer   Coronary artery disease   Ovarian malignancy   Hyperlipidemia   Hypertension   Hypothyroidism   MI   Asthma   Pulmonary nodule   Thrombocytopenia   Type 2 diabetes   NSTEMI "   Cerebellar stroke     Past Surgical History:    Bladder surgery    section   Tubal ligation   Abdominal hysterectomy   Cholecystectomy   PCI stent drug eluting   Heart catheterization   Sling transvaginal   Cystoscopy   Heart cath angioplasty     Family History:    Sister: hepatitis B, diabetes, thyroid disease, hypertension   Father: diabetes, MI     Social History:  Presents to emergency department with     Physical Exam     Patient Vitals for the past 24 hrs:   BP Temp Temp src Pulse Resp SpO2   21 1940 (!) 145/58 -- -- 75 -- --   21 1920 (!) 157/69 -- -- 73 -- 99 %   21 1900 (!) 141/66 -- -- 78 -- 96 %   21 1840 (!) 144/59 -- -- 76 -- 99 %   21 1820 -- -- -- -- -- 98 %   21 181 (!) 158/65 -- -- -- -- --   21 1732 (!) 152/56 98  F (36.7  C) Temporal 74 16 100 %       Physical Exam  Constitutional:       General: She is not in acute distress.     Appearance: She is not diaphoretic.   HENT:      Head: Atraumatic.      Mouth/Throat:      Pharynx: No oropharyngeal exudate.   Eyes:      General: No scleral icterus.     Pupils: Pupils are equal, round, and reactive to light.   Cardiovascular:      Heart sounds: Normal heart sounds.   Pulmonary:      Effort: No respiratory distress.      Breath sounds: Normal breath sounds.   Abdominal:      General: Bowel sounds are normal.      Palpations: Abdomen is soft.      Tenderness: There is no abdominal tenderness.   Genitourinary:     Rectum: Guaiac result positive.   Musculoskeletal:         General: No tenderness.   Skin:     General: Skin is warm.      Findings: No rash.       Emergency Department Course   ECG  ECG taken at 18:12:42, ECG read at 1830  Normal sinus rhythm. Low voltage QRS. Septal infarct, age undetermined. ST & T wave abnormality, consider inferior ischemia. ST & T wave abnormality, consider anterolateral ischemia. Abnormal ECG.    Rate 76 bpm. WV interval 156 ms. QRS duration 80 ms. QT/QTc 440/495  ms. P-R-T axes 50 54 265.     Laboratory:  CBC: WBC: 12.6 (H), HGB: 5.3 (LL), PLT: 56 (L)  CMP: Glucose 226 (H), Urea Nitrogen: 56 (H), GFR: 27 (L), Calcium: 7.9 (L), Albumin: 2.5 (L), Protein Total: 6.4 (L), Creatinine: 1.98 (H) o/w WNL  Troponin (Collected 1804): 0.038  Occult Blood Stool: Positive  ABO/Rh type and screen: O- (Antibody negative)  UA: pending o/w Negative   HGB Eval Reflex to ELP or RBC solubility   Asymptomatic COVID-19 Virus by PCR Nasopharyngeal swab: pending     Emergency Department Course:    Reviewed:  I reviewed nursing notes, vitals, past medical history and care everywhere    Assessments:  1752 I obtained history and examined the patient as noted above.  1836 I performed a rectal exam   1902 I rechecked the patient and explained findings.     Consults:   1854 I discussed the patient with SLIME Bergman, hospitalist      Interventions:  1907  Protonix 80 mg IV  2015 1u pRBCs    Disposition:  The patient was admitted to the hospital under the care of Dr. De.       Impression & Plan       Medical Decision Making:  Presents for evaluation of fatigue. She is noted to demonstrate anemia for the recent history with normocytic component however TIBC/Ferritin changes that are concerning for iron deficiency anemia. Rectal examination performed demonstrating melenic stool prompting occult blood testing to differentiate from changes of iron supplementation. Occult blood positive and consideration for GI bleeding. She is anticoagulated complicating the situtation. She is demonstrating no continued GI bleeding at this time and does not warrant emergent intervention. Her diagnostic labs demonstrated continued anemia warranting pRBC transfusion. She is stable for admission and continued care    Critical Care Time: was 0 minutes for this patient excluding procedures    Covid-19  Rowan Leiva was evaluated during a global COVID-19 pandemic, which necessitated consideration that the patient might be  at risk for infection with the SARS-CoV-2 virus that causes COVID-19.   Applicable protocols for evaluation were followed during the patient's care.   COVID-19 was considered as part of the patient's evaluation. The plan for testing is:  a test was obtained during this visit.    Diagnosis:    ICD-10-CM    1. Anemia  D64.9 Stool: occult blood     ABO/Rh type and screen     Blood component     Blood component     Blood component     Blood component   2. GI bleed  K92.2    3. Chronic renal insufficiency  N18.9        Scribe Disclosure:  I, Nataly Fowler, am serving as a scribe at 5:52 PM on 6/17/2021 to document services personally performed by Edison Cornelius PA-C based on my observations and the provider's statements to me.         Edison Cornelius PA-C  06/17/21 [id number]

## 2021-06-17 NOTE — TELEPHONE ENCOUNTER
Pt krishan Hgb 6.5, one U PRB 6/14/21.  Repeat Hgb today 5.4.  Has hematology consult next day or 2.  Advise pt to ER, likely more transfusion.  Sima Gonzalez CNP

## 2021-06-17 NOTE — ED TRIAGE NOTES
Patient comes in for evaluation of hemoglobin of 5.4 in clinic. Patient reports feeling shaky, back pain. Is pale. Reports dark stool but has also been taking iron. Has frequent nose and gum bleeding. ABCs intact.

## 2021-06-18 PROBLEM — K92.1 MELENA: Status: ACTIVE | Noted: 2021-06-17

## 2021-06-18 PROBLEM — K92.2 COLONIC HEMORRHAGE: Status: ACTIVE | Noted: 2021-06-17

## 2021-06-18 PROBLEM — D62 ANEMIA DUE TO BLOOD LOSS, ACUTE: Status: ACTIVE | Noted: 2021-06-17

## 2021-06-18 LAB
ABO + RH BLD: NORMAL
ABO + RH BLD: NORMAL
ANION GAP SERPL CALCULATED.3IONS-SCNC: 4 MMOL/L (ref 3–14)
ANION GAP SERPL CALCULATED.3IONS-SCNC: 6 MMOL/L (ref 3–14)
BLD GP AB SCN SERPL QL: NORMAL
BLD PROD TYP BPU: NORMAL
BLD UNIT ID BPU: 0
BLD UNIT ID BPU: 0
BLOOD BANK CMNT PATIENT-IMP: NORMAL
BLOOD PRODUCT CODE: NORMAL
BLOOD PRODUCT CODE: NORMAL
BPU ID: NORMAL
BPU ID: NORMAL
BUN SERPL-MCNC: 55 MG/DL (ref 7–30)
BUN SERPL-MCNC: 57 MG/DL (ref 7–30)
CALCIUM SERPL-MCNC: 7.6 MG/DL (ref 8.5–10.1)
CALCIUM SERPL-MCNC: 7.9 MG/DL (ref 8.5–10.1)
CHLORIDE SERPL-SCNC: 109 MMOL/L (ref 94–109)
CHLORIDE SERPL-SCNC: 111 MMOL/L (ref 94–109)
CO2 SERPL-SCNC: 20 MMOL/L (ref 20–32)
CO2 SERPL-SCNC: 23 MMOL/L (ref 20–32)
CREAT SERPL-MCNC: 2.04 MG/DL (ref 0.52–1.04)
CREAT SERPL-MCNC: 2.07 MG/DL (ref 0.52–1.04)
ERYTHROCYTE [DISTWIDTH] IN BLOOD BY AUTOMATED COUNT: 15.9 % (ref 10–15)
GFR SERPL CREATININE-BSD FRML MDRD: 26 ML/MIN/{1.73_M2}
GFR SERPL CREATININE-BSD FRML MDRD: 26 ML/MIN/{1.73_M2}
GLUCOSE BLDC GLUCOMTR-MCNC: 126 MG/DL (ref 70–99)
GLUCOSE BLDC GLUCOMTR-MCNC: 144 MG/DL (ref 70–99)
GLUCOSE BLDC GLUCOMTR-MCNC: 160 MG/DL (ref 70–99)
GLUCOSE BLDC GLUCOMTR-MCNC: 165 MG/DL (ref 70–99)
GLUCOSE SERPL-MCNC: 137 MG/DL (ref 70–99)
GLUCOSE SERPL-MCNC: 226 MG/DL (ref 70–99)
HCT VFR BLD AUTO: 21.7 % (ref 35–47)
HGB BLD-MCNC: 6.9 G/DL (ref 11.7–15.7)
HGB BLD-MCNC: 8.2 G/DL (ref 11.7–15.7)
HGB BLD-MCNC: 8.3 G/DL (ref 11.7–15.7)
HGB BLD-MCNC: 8.7 G/DL (ref 11.7–15.7)
INTERPRETATION ECG - MUSE: NORMAL
MCH RBC QN AUTO: 26.6 PG (ref 26.5–33)
MCHC RBC AUTO-ENTMCNC: 31.8 G/DL (ref 31.5–36.5)
MCV RBC AUTO: 84 FL (ref 78–100)
NUM BPU REQUESTED: 3
PLATELET # BLD AUTO: 66 10E9/L (ref 150–450)
POTASSIUM SERPL-SCNC: 3.8 MMOL/L (ref 3.4–5.3)
POTASSIUM SERPL-SCNC: 4.5 MMOL/L (ref 3.4–5.3)
RBC # BLD AUTO: 2.59 10E12/L (ref 3.8–5.2)
SODIUM SERPL-SCNC: 136 MMOL/L (ref 133–144)
SODIUM SERPL-SCNC: 137 MMOL/L (ref 133–144)
SPECIMEN EXP DATE BLD: NORMAL
TRANSFUSION STATUS PATIENT QL: NORMAL
UPPER GI ENDOSCOPY: NORMAL
WBC # BLD AUTO: 11 10E9/L (ref 4–11)

## 2021-06-18 PROCEDURE — 0DJD8ZZ INSPECTION OF LOWER INTESTINAL TRACT, VIA NATURAL OR ARTIFICIAL OPENING ENDOSCOPIC: ICD-10-PCS | Performed by: INTERNAL MEDICINE

## 2021-06-18 PROCEDURE — 85018 HEMOGLOBIN: CPT | Performed by: INTERNAL MEDICINE

## 2021-06-18 PROCEDURE — 99222 1ST HOSP IP/OBS MODERATE 55: CPT | Performed by: INTERNAL MEDICINE

## 2021-06-18 PROCEDURE — 250N000009 HC RX 250: Performed by: INTERNAL MEDICINE

## 2021-06-18 PROCEDURE — 85027 COMPLETE CBC AUTOMATED: CPT | Performed by: PHYSICIAN ASSISTANT

## 2021-06-18 PROCEDURE — P9016 RBC LEUKOCYTES REDUCED: HCPCS | Performed by: PHYSICIAN ASSISTANT

## 2021-06-18 PROCEDURE — C9113 INJ PANTOPRAZOLE SODIUM, VIA: HCPCS | Performed by: INTERNAL MEDICINE

## 2021-06-18 PROCEDURE — 250N000013 HC RX MED GY IP 250 OP 250 PS 637: Performed by: INTERNAL MEDICINE

## 2021-06-18 PROCEDURE — 250N000011 HC RX IP 250 OP 636: Performed by: INTERNAL MEDICINE

## 2021-06-18 PROCEDURE — 80048 BASIC METABOLIC PNL TOTAL CA: CPT | Performed by: PHYSICIAN ASSISTANT

## 2021-06-18 PROCEDURE — 250N000013 HC RX MED GY IP 250 OP 250 PS 637: Performed by: PHYSICIAN ASSISTANT

## 2021-06-18 PROCEDURE — 36415 COLL VENOUS BLD VENIPUNCTURE: CPT | Performed by: INTERNAL MEDICINE

## 2021-06-18 PROCEDURE — 85018 HEMOGLOBIN: CPT | Performed by: PHYSICIAN ASSISTANT

## 2021-06-18 PROCEDURE — 36415 COLL VENOUS BLD VENIPUNCTURE: CPT | Performed by: PHYSICIAN ASSISTANT

## 2021-06-18 PROCEDURE — 99233 SBSQ HOSP IP/OBS HIGH 50: CPT | Performed by: INTERNAL MEDICINE

## 2021-06-18 PROCEDURE — 258N000003 HC RX IP 258 OP 636: Performed by: PHYSICIAN ASSISTANT

## 2021-06-18 PROCEDURE — 0DJ08ZZ INSPECTION OF UPPER INTESTINAL TRACT, VIA NATURAL OR ARTIFICIAL OPENING ENDOSCOPIC: ICD-10-PCS | Performed by: INTERNAL MEDICINE

## 2021-06-18 PROCEDURE — 999N001017 HC STATISTIC GLUCOSE BY METER IP

## 2021-06-18 PROCEDURE — 999N000099 HC STATISTIC MODERATE SEDATION < 10 MIN: Performed by: INTERNAL MEDICINE

## 2021-06-18 PROCEDURE — 250N000012 HC RX MED GY IP 250 OP 636 PS 637: Performed by: INTERNAL MEDICINE

## 2021-06-18 PROCEDURE — 120N000001 HC R&B MED SURG/OB

## 2021-06-18 PROCEDURE — 43235 EGD DIAGNOSTIC BRUSH WASH: CPT | Performed by: INTERNAL MEDICINE

## 2021-06-18 RX ORDER — NALOXONE HYDROCHLORIDE 0.4 MG/ML
0.2 INJECTION, SOLUTION INTRAMUSCULAR; INTRAVENOUS; SUBCUTANEOUS
Status: DISCONTINUED | OUTPATIENT
Start: 2021-06-18 | End: 2021-06-18

## 2021-06-18 RX ORDER — FLUMAZENIL 0.1 MG/ML
0.2 INJECTION, SOLUTION INTRAVENOUS
Status: DISCONTINUED | OUTPATIENT
Start: 2021-06-18 | End: 2021-06-18 | Stop reason: HOSPADM

## 2021-06-18 RX ORDER — NALOXONE HYDROCHLORIDE 0.4 MG/ML
0.4 INJECTION, SOLUTION INTRAMUSCULAR; INTRAVENOUS; SUBCUTANEOUS
Status: DISCONTINUED | OUTPATIENT
Start: 2021-06-18 | End: 2021-06-19

## 2021-06-18 RX ORDER — LIDOCAINE 40 MG/G
CREAM TOPICAL
Status: DISCONTINUED | OUTPATIENT
Start: 2021-06-18 | End: 2021-06-18 | Stop reason: HOSPADM

## 2021-06-18 RX ORDER — NALOXONE HYDROCHLORIDE 0.4 MG/ML
0.4 INJECTION, SOLUTION INTRAMUSCULAR; INTRAVENOUS; SUBCUTANEOUS
Status: DISCONTINUED | OUTPATIENT
Start: 2021-06-18 | End: 2021-06-22 | Stop reason: HOSPADM

## 2021-06-18 RX ORDER — FLUMAZENIL 0.1 MG/ML
0.2 INJECTION, SOLUTION INTRAVENOUS
Status: ACTIVE | OUTPATIENT
Start: 2021-06-18 | End: 2021-06-20

## 2021-06-18 RX ORDER — FLUMAZENIL 0.1 MG/ML
0.2 INJECTION, SOLUTION INTRAVENOUS
Status: ACTIVE | OUTPATIENT
Start: 2021-06-18 | End: 2021-06-19

## 2021-06-18 RX ORDER — FUROSEMIDE 10 MG/ML
20 INJECTION INTRAMUSCULAR; INTRAVENOUS ONCE
Status: COMPLETED | OUTPATIENT
Start: 2021-06-18 | End: 2021-06-18

## 2021-06-18 RX ORDER — NALOXONE HYDROCHLORIDE 0.4 MG/ML
0.2 INJECTION, SOLUTION INTRAMUSCULAR; INTRAVENOUS; SUBCUTANEOUS
Status: DISCONTINUED | OUTPATIENT
Start: 2021-06-18 | End: 2021-06-22 | Stop reason: HOSPADM

## 2021-06-18 RX ORDER — FENTANYL CITRATE 50 UG/ML
25 INJECTION, SOLUTION INTRAMUSCULAR; INTRAVENOUS EVERY 5 MIN PRN
Status: ACTIVE | OUTPATIENT
Start: 2021-06-18 | End: 2021-06-19

## 2021-06-18 RX ORDER — FENTANYL CITRATE 50 UG/ML
INJECTION, SOLUTION INTRAMUSCULAR; INTRAVENOUS PRN
Status: COMPLETED | OUTPATIENT
Start: 2021-06-18 | End: 2021-06-18

## 2021-06-18 RX ORDER — FENTANYL CITRATE 50 UG/ML
50 INJECTION, SOLUTION INTRAMUSCULAR; INTRAVENOUS
Status: ACTIVE | OUTPATIENT
Start: 2021-06-18 | End: 2021-06-19

## 2021-06-18 RX ADMIN — TOPICAL ANESTHETIC 1 SPRAY: 200 SPRAY DENTAL; PERIODONTAL at 12:14

## 2021-06-18 RX ADMIN — FUROSEMIDE 20 MG: 10 INJECTION, SOLUTION INTRAMUSCULAR; INTRAVENOUS at 10:21

## 2021-06-18 RX ADMIN — SODIUM CHLORIDE: 9 INJECTION, SOLUTION INTRAVENOUS at 03:43

## 2021-06-18 RX ADMIN — INSULIN ASPART 1 UNITS: 100 INJECTION, SOLUTION INTRAVENOUS; SUBCUTANEOUS at 18:19

## 2021-06-18 RX ADMIN — PANTOPRAZOLE SODIUM 40 MG: 40 INJECTION, POWDER, FOR SOLUTION INTRAVENOUS at 10:21

## 2021-06-18 RX ADMIN — LEVOTHYROXINE SODIUM 150 MCG: 0.15 TABLET ORAL at 10:20

## 2021-06-18 RX ADMIN — CARVEDILOL 12.5 MG: 12.5 TABLET, FILM COATED ORAL at 19:52

## 2021-06-18 RX ADMIN — PANTOPRAZOLE SODIUM 40 MG: 40 INJECTION, POWDER, FOR SOLUTION INTRAVENOUS at 19:52

## 2021-06-18 RX ADMIN — INSULIN GLARGINE 6 UNITS: 100 INJECTION, SOLUTION SUBCUTANEOUS at 22:39

## 2021-06-18 RX ADMIN — POLYETHYLENE GLYCOL 3350, SODIUM SULFATE ANHYDROUS, SODIUM BICARBONATE, SODIUM CHLORIDE, POTASSIUM CHLORIDE 4000 ML: 236; 22.74; 6.74; 5.86; 2.97 POWDER, FOR SOLUTION ORAL at 15:27

## 2021-06-18 RX ADMIN — FENTANYL CITRATE 100 MCG: 50 INJECTION, SOLUTION INTRAMUSCULAR; INTRAVENOUS at 12:15

## 2021-06-18 RX ADMIN — MIDAZOLAM 2 MG: 1 INJECTION INTRAMUSCULAR; INTRAVENOUS at 12:15

## 2021-06-18 RX ADMIN — CARVEDILOL 12.5 MG: 12.5 TABLET, FILM COATED ORAL at 10:20

## 2021-06-18 RX ADMIN — NICOTINE 1 PATCH: 21 PATCH, EXTENDED RELEASE TRANSDERMAL at 06:10

## 2021-06-18 RX ADMIN — ATORVASTATIN CALCIUM 40 MG: 40 TABLET, FILM COATED ORAL at 10:20

## 2021-06-18 ASSESSMENT — ACTIVITIES OF DAILY LIVING (ADL)
ADLS_ACUITY_SCORE: 11
ADLS_ACUITY_SCORE: 12
ADLS_ACUITY_SCORE: 11
ADLS_ACUITY_SCORE: 12
ADLS_ACUITY_SCORE: 11

## 2021-06-18 NOTE — PLAN OF CARE
Slightly elevated sbp-home bp med given. Afebrile. Lungs-RuL expiratory wheezes mainly clearing with cough. Room air mid-high 90s. +bs/+gas, no nausea. Tolerating clears. Last bm 06/17/21. Voiding-urine specimen sent. Infusing 1st unit PRBCs at this time. Skin has few small bruises. Generalized weakness-no dizziness. Triggered sepsis protocol, but Dr. Moon did not want the lactic drawn at this time. Generalized edema-lasix given. GI and Cardiology consults in the am. No other significant issues noted this evening.

## 2021-06-18 NOTE — CONSULTS
GASTROENTEROLOGY CONSULTATION      Rowan Leiva  [address]  [facility] [id number]  61 year old female     Admission Date/Time: 6/17/2021  Primary Care Provider: Brian Ruiz  Referring / Attending Physician:  Sarai SALAZAR     We were asked to see the patient in consultation bySarai SALAZAR for evaluation of melena.        HPI:  Rowan Leiva is a 61 year old female with medical history of cardiomyopathy, chronic kidney disease stage III, diabetes mellitus, ongoing cigarette smoking, with recent hospitalization May 30 to June 4 for acute coronary disease requiring ELLIOT x2 to LAD, bilateral DVTs with suspected PE, TIA who presented to [facility] after findings of a hemoglobin of 6.3 at her primary care clinic.    Patient reports going to her posthospitalization follow-up.  Her hemoglobin when she left the hospital was 8.4 and this dropped to 6.3.  It was suggested that she go to the emergency room.  She received 1 unit of packed red cells along with an iron infusion and then went home.  Yesterday however she had a la her TIA was thought to be secondary to an embolic event rge volume of melanotic stool.  She became lightheaded to return to the hospital.  Her hemoglobin was noted to be 5.4 which dropped from 6.53 days prior.  The patient denies any abdominal pain.  She has no nausea or vomiting.  She reports only the one episode of melanotic stool.  Prior to her hospital admission from [facility] she was on Eliquis twice daily and Plavix.  Neurology, hematology, and cardiology were all involved decision-making process.  After her cardiac catheterization and stent placement.    Patient denies any history of GI bleeding.  She reports having a normal colonoscopy a number of years ago.  She believes this was done somewhere in [place].       PAST MEDICAL HISTORY:  Patient Active Problem List    Diagnosis Date Noted     Anemia 06/17/2021     Priority: Medium     GI bleed 06/17/2021     Priority:  Medium     Chronic renal insufficiency 06/17/2021     Priority: Medium     Blurred vision 06/05/2021     Priority: Medium     Cerebellar stroke, acute (H) 06/05/2021     Priority: Medium     NSTEMI (non-ST elevated myocardial infarction) (H) 05/30/2021     Priority: Medium     Hyperlipidemia      Priority: Medium     Coronary artery disease      Priority: Medium     Stents-2011       Myocardial infarction (H)      Priority: Medium     anterior       Hypertension      Priority: Medium     Hypothyroidism      Priority: Medium     Advanced directives, counseling/discussion 11/05/2012     Priority: Medium     Patient states has Advance Directive and will bring in a copy to clinic. 11/5/2012        Thrombocytopenia (H) 11/04/2012     Priority: Medium     Mild intermittent asthma      Priority: Medium     minimal symptoms, albuterol use 2x/year       Mixed hyperlipidemia 06/04/2003     Priority: Medium     Tobacco use disorder 08/22/2002     Priority: Medium     Personal history of malignant neoplasm of ovary      Priority: Medium     Malignant neoplasm of thyroid gland (H)      Priority: Medium     papillary carcinoma; resection 6/00       Diabetes mellitus, type 2 (H) 01/01/2000     Priority: Medium     Problem list name updated by automated process. Provider to review       Type 2 diabetes mellitus with hyperglycemia, with long-term current use of insulin (H) 2000     Priority: Medium          ROS: A comprehensive ten point review of systems was negative aside from those in mentioned in the HPI.       MEDICATIONS:   Prior to Admission medications    Medication Sig Start Date End Date Taking? Authorizing Provider   ALBUTEROL 90 MCG/ACT IN AERS 1-2 puffs Q 2-4 hrs prn with spacer - HFA 11/15/07  Yes Krystian Herbert MD   apixaban ANTICOAGULANT (ELIQUIS) 5 MG tablet Take 1 tablet (5 mg) by mouth 2 times daily 6/11/21  Yes Hanh Mccurdy MD   atorvastatin (LIPITOR) 40 MG tablet Take 1 tablet (40 mg) by mouth daily  21  Yes Hanh Mccurdy MD   carvedilol (COREG) 12.5 MG tablet Take 1 tablet (12.5 mg) by mouth 2 times daily 21  Yes Hanh Mccurdy MD   clopidogrel (PLAVIX) 75 MG tablet Take 1 tablet (75 mg) by mouth daily 21  Yes Hanh Mccurdy MD   ferrous sulfate 44 Fe mg/mL ELIX Take 10 mL by mouth mixed in 1/3 of a glass of orange juice (to enhance absorption) 30 minutes before breakfast, every other day. 6/15/21  Yes Zack Raines MD   insulin glargine (LANTUS PEN) 100 UNIT/ML pen Inject 12 Units Subcutaneous At Bedtime 21  Yes Brian Ruiz MD   levothyroxine (SYNTHROID, LEVOTHROID) 50 MCG tablet Take 150 mcg by mouth every morning (before breakfast)    Yes Reported, Patient   nicotine (NICODERM CQ) 21 MG/24HR 24 hr patch Place 1 patch onto the skin daily 21  Yes Hanh Mccurdy MD   nitroGLYcerin (NITROSTAT) 0.4 MG sublingual tablet For chest pain place 1 tablet under the tongue every 5 minutes for 3 doses. If symptoms persist 5 minutes after 1st dose call 911. 21  Yes Viktoria Mcdonough PA-C   LANCETS REGULAR MISC use twice a day for blood sugar 02           ALLERGIES:   Allergies   Allergen Reactions     Heparin Heparin Induced Thrombocytopenia     HIT panel pending     No Known Drug Allergies         SOCIAL HISTORY:  Social History     Tobacco Use     Smoking status: Current Every Day Smoker     Packs/day: 2.00     Years: 24.00     Pack years: 48.00     Types: Cigarettes     Smokeless tobacco: Never Used     Tobacco comment: Strongly recommended quitting at each visit.   Substance Use Topics     Alcohol use: No     Alcohol/week: 0.0 standard drinks     Drug use: No        FAMILY HISTORY:  Family History   Problem Relation Age of Onset     Blood Disease Sister         Hepatitis B-alive     Cancer Maternal Aunt         bronchial tubes, neck-     Cancer Maternal Uncle         glands in neck-     Cancer Maternal Grandfather          lungs-     Diabetes Father              Heart Disease Father         2 heartattacks-     Diabetes Sister              Diabetes Other         -cousins     Diabetes Paternal Grandmother              Diabetes Paternal Grandfather              Diabetes Paternal Aunt              Hypertension Sister         alive     Thyroid Disease Sister         both sisters-alive     Thyroid Disease Other         niece-alive        PHYSICAL EXAM:     /53 (BP Location: Left arm)   Pulse 69   Temp 97.7  F (36.5  C) (Temporal)   Resp 18   LMP 2000   SpO2 94%      PHYSICAL EXAM:  GENERAL: No acute distress, resting comfortably  SKIN: no suspicious lesions, rashes, jaundice  HEAD: Normocephalic. Atraumatic.  NECK: Neck supple. No adenopathy.   EYES: No scleral icterus  GASTROINTESTINAL: +BS, soft, non tender, non distended, no guarding/rebound  JOINT/EXTREMITIES:  no gross deformities noted, normal muscle tone  NEURO: CN 2-12 grossly intact, no focal deficits  PSYCH: Normal affect              ADDITIONAL COMMENTS:   I reviewed the patient's new clinical lab test results.   Recent Labs   Lab Test 21  0723 21  1804 21  1538 21  1951 21  1354 21  1354 04/10/15  1023 04/10/15  1023   WBC 11.0 12.6*  --  12.7*   < > 10.3   < > 11.6*   HGB 6.9* 5.3* 5.4* 6.5*   < > 8.5*   < > 15.5   MCV 84 83  --  82   < > 79   < > 80   PLT 66* 56*  --  71*   < > 53*   < > 139*   INR  --  1.95*  --   --   --  1.80*  --  0.93    < > = values in this interval not displayed.     Recent Labs   Lab Test 21  0723 21  1804 21  1538   POTASSIUM 3.8 4.2 4.5   CHLORIDE 111* 107 109   CO2 20 20 23   BUN 57* 56* 55*   ANIONGAP 6 7 4     Recent Labs   Lab Test 21  2305 21  1804 21  1750 21  0858 21  1227 21  1227 21  2355 21  2355 20  9380   ALBUMIN  --  2.5* 2.6* 2.6*   < > 2.5*   < >  --    --    BILITOTAL  --  0.2 0.2  --   --  0.3   < >  --   --    ALT  --  8 12  --   --  16   < >  --   --    AST  --  5 10  --   --  54*   < >  --   --    PROTEIN 100*  --   --   --   --   --   --  300* 300*    < > = values in this interval not displayed.        CONSULTATION ASSESSMENT AND PLAN:    Rowan Leiva is a 61 year old with medical history of cardiomyopathy, chronic kidney disease stage III, diabetes mellitus, ongoing cigarette smoking, with recent hospitalization May 30 to June 4 for acute coronary disease requiring ELLIOT x2 to LAD, bilateral DVTs with suspected PE, TIA who presented to [facility] after findings of anemia with melena in the setting of Eliquis and Plavix use.    1.  Melena: This episode occurred prior to admission.  She has had subsequent drop of hemoglobin progressively over the past few weeks and was 5.3 yesterday evening.  Prior to admission she was using Eliquis twice a day and Plavix.  These are now on hold.  She is a cigarette smoker.  She was started on a PPI drip.  She was transfused 1 unit last evening.  Concern for upper GI source including gastric ulcer, duodenal ulcer, esophagitis, gastritis, dieulafoy lesion, AVMs.    -- Anticoagulation and antiplatelet medication is on hold.  -- She is being transfused packed red blood cells as needed.  -- Continue on her PPI drip  -- She is n.p.o. we will plan on upper endoscopy this afternoon.  Further recommendations to follow      I discussed the patient plan with Dr. Hsieh. Thank you for asking us to participate in the care of this patient.    Liat Woods PA-C  [facility] ( [facility])

## 2021-06-18 NOTE — PROVIDER NOTIFICATION
DATE:  6/18/2021   TIME OF RECEIPT FROM LAB:  0815   LAB TEST: hgb   LAB VALUE:  6.9  RESULTS GIVEN WITH READ-BACK TO (PROVIDER):  Dr. Brown - KRYSTAL  TIME LAB VALUE REPORTED TO PROVIDER:  0823

## 2021-06-18 NOTE — ED NOTES
RECEIVING UNIT ED HANDOFF REVIEW    Above ED Nurse Handoff Report was reviewed: Yes  Reviewed by: Erna Delgado RN on June 17, 2021 at 9:02 PM   [facility]  ED Nurse Handoff Report    Rowan Leiva is a 61 year old female   ED Chief complaint: Abnormal Labs  . ED Diagnosis:   Final diagnoses:   Anemia   GI bleed   Chronic renal insufficiency     Allergies:   Allergies   Allergen Reactions     Heparin Heparin Induced Thrombocytopenia     HIT panel pending     No Known Drug Allergies        Code Status: Full Code  Activity level - Baseline/Home:  Independent. Activity Level - Current:   Stand by Assist. Lift room needed: No. Bariatric: No   Needed: No   Isolation: No. Infection: Not Applicable.     Vital Signs:   Vitals:    06/17/21 1732 06/17/21 1810 06/17/21 1820   BP: (!) 152/56 (!) 158/65    Pulse: 74     Resp: 16     Temp: 98  F (36.7  C)     TempSrc: Temporal     SpO2: 100%  98%       Cardiac Rhythm:  ,      Pain level:    Patient confused: No. Patient Falls Risk: Yes.   Elimination Status: Pt has not voided in ED yet   Patient Report - Initial Complaint: lab work. Focused Assessment: Gastrointestinal - Gastrointestinal WDL: .WDL except  Gastrointestinal Comment: Pt states that yesterday evening pt had a black looking stool. Pt states she had another stool this AM. Prior to that pt had not had a bowel movement in a couple days. Pt started on liquid iron yesterday. Pt seen on Monday and had blood transfusion. Had labs done today and pt was found to have hgb of 5.4. Pt also endorses coughing up blood and having bleeding from gums. Pt is on xarelto and eliquis. H/o of heart attack with stent placement and strokes.      Tests Performed: lab work. Abnormal Results:   Abnormal Labs Reviewed   CBC WITH PLATELETS DIFFERENTIAL - Abnormal; Notable for the following components:       Result Value    WBC 12.6 (*)     RBC Count 2.07 (*)     Hemoglobin 5.3 (*)     Hematocrit 17.2 (*)     MCH 25.6  (*)     MCHC 30.8 (*)     RDW 15.8 (*)     Platelet Count 56 (*)     Absolute Neutrophil 10.3 (*)     All other components within normal limits   COMPREHENSIVE METABOLIC PANEL - Abnormal; Notable for the following components:    Glucose 226 (*)     Urea Nitrogen 56 (*)     Creatinine 1.98 (*)     GFR Estimate 27 (*)     GFR Estimate If Black 31 (*)     Calcium 7.9 (*)     Albumin 2.5 (*)     Protein Total 6.4 (*)     All other components within normal limits   INR - Abnormal; Notable for the following components:    INR 1.95 (*)     All other components within normal limits   PARTIAL THROMBOPLASTIN TIME - Abnormal; Notable for the following components:    PTT 76 (*)     All other components within normal limits   OCCULT BLOOD STOOL - Abnormal; Notable for the following components:    Occult Blood Positive (*)     All other components within normal limits   .   Treatments provided: protonix 80 mg, blood transfusion  Family Comments:  at bedside and aware of plan of care  OBS brochure/video discussed/provided to patient:  N/A  ED Medications:   Medications   pantoprazole (PROTONIX) IV push injection 80 mg (has no administration in time range)     Drips infusing:  No  For the majority of the shift, the patient's behavior Green. Interventions performed were n/a.    Sepsis treatment initiated: No     Patient tested for COVID 19 prior to admission: YES    ED Nurse Name/Phone Number: Deisy Lam RN,   7:03 PM

## 2021-06-18 NOTE — CONSULTS
Cardiology Consultation      Rowan Leiva MRN# [id number]   YOB: 1960 Age: 61 year old   Date of Admission: 6/17/2021     Reason for consult:  Bleeding with recent history of anticoagulation and antiplatelet           Assessment and Plan:     1. GI bleed with hemoglobin down to 5.3 in the setting of antiplatelet plus anticoagulation for recent PCI and DVT    A very complicated situation with elevated risks of clotting and bleeding.  She has LAD stents as well as recent DVT.  Anticoagulation plus antiplatelet is indicated and without these she has high risk of thrombotic occlusion and other complications.  However she has active bleeding and severe anemia with melena that precludes ongoing anticoagulation at this time.      Patient understands the current predicament and the elevated risks she has currently.      If colonoscopy shows an active bleed or an area that could be injected or otherwise definitively treated, may be able to restart single antiplatelet plus reduced dose anticoagulation for 1 week and increase to full anticoagulation if tolerated.    If patient does not have anything definitively treatable on colonoscopy, may need to consider just dual antiplatelet therapy without anticoagulation for 3 months and readdress.               Chief Complaint:   Abnormal Labs           History of Present Illness:   This patient is a 61 year old female with history of PCI to the LAD and RCA in 2011 who presented with chest discomfort and elevated troponin and had repeat cardiac catheterization on 5/31/2021 with Dr. Brumfield and received mid distal LAD stents.  These were somewhat small caliber.    She also has been anticoagulated for DVT of bilateral posterior tibial veins noted 5/31/2021.    She presents with melena and a drop in her hemoglobin down to 5.3.  EGD was negative today.  Colonoscopy is planned.         Physical Exam:   Vitals were reviewed  Blood pressure (!) 146/54, pulse 63, temperature  97.1  F (36.2  C), temperature source Temporal, resp. rate 18, weight 82 kg (180 lb 12.8 oz), last menstrual period 2000, SpO2 97 %, not currently breastfeeding.  Temperatures:  Current - Temp: 97.1  F (36.2  C); Max - Temp  Av.1  F (36.7  C)  Min: 97.1  F (36.2  C)  Max: 99.3  F (37.4  C)  Respiration range: Resp  Av.6  Min: 10  Max: 21  Pulse range: Pulse  Av.1  Min: 61  Max: 79  Blood pressure range: Systolic (24hrs), Av , Min:117 , Max:189   ; Diastolic (24hrs), Av, Min:37, Max:75    Pulse oximetry range: SpO2  Av.7 %  Min: 93 %  Max: 100 %    Intake/Output Summary (Last 24 hours) at 2021 1649  Last data filed at 2021 1243  Gross per 24 hour   Intake 1654.25 ml   Output 1600 ml   Net 54.25 ml     Constitutional:   awake, alert, cooperative, no apparent distress, and appears stated age     Eyes:   Lids and lashes normal, pupils equal, round and reactive to light, extra ocular muscles intact, sclera clear, conjunctiva normal     Neck:   supple, symmetrical, trachea midline, no JVD     Back:   symmetric     Lungs:   symmetric     Cardiovascular:   regular     Abdomen:   benign     Musculoskeletal:   motor strength is 5 out of 5 all extremities bilaterally     Neurologic:   Grossly nonfocal     Skin:   normal skin color, texture, turgor     Additional findings:                  Past Medical History:   I have reviewed this patient's past medical history  Past Medical History:   Diagnosis Date     Cancer of thyroid (H)      Chest pain      Coronary artery disease     Stents-     HX OF OVARIAN MALIGNANCY      Hyperlipidemia      Hypertension      Hypothyroidism      Malignant neoplasm of thyroid gland (H)     papillary carcinoma; resection      Mild intermittent asthma     minimal symptoms, albuterol use 2x/year     Myocardial infarction (H)     anterior     Pulmonary nodule      Thrombocytopenia (H)      Tobacco use disorder      Type II or  unspecified type diabetes mellitus without mention of complication, not stated as uncontrolled              Past Surgical History:   I have reviewed this patient's past surgical history  Past Surgical History:   Procedure Laterality Date     BLADDER SURGERY       C ANESTH, SECTION       C LIGATE FALLOPIAN TUBE      Tubal Ligation     C TOTAL ABDOM HYSTERECTOMY      ovarian cancer (low grade) - Heme/Onc     CHOLECYSTECTOMY       CV HEART CATHETERIZATION WITH POSSIBLE INTERVENTION N/A 2021    Procedure: Heart Catheterization with Possible Intervention;  Surgeon: Wolf Brumfield MD;  Location:  HEART CARDIAC CATH LAB     CV PCI STENT DRUG ELUTING N/A 2021    Procedure: Percutaneous Coronary Intervention Stent Drug Eluting;  Surgeon: Wolf Brumfield MD;  Location: [facility] CARDIAC CATH LAB     CYSTOSCOPY  2014    Procedure: CYSTOSCOPY;  Surgeon: Sabino Jamil MD;  Location: [facility] THYROIDECTOMY  2000    papillary thyroid carcinoma - Endocrine     HEART CATH, ANGIOPLASTY  11    2.5 X 18 mm & 2.25 X 18 mm Xience ELLIOT to Mid LAD     HEART CATH, ANGIOPLASTY  11    Staged-3.0 X 23 mm Xience ELLIOT to Mid RAC     SLING TRANSVAGINAL N/A 2014    Procedure: SLING TRANSVAGINAL;  Surgeon: Sabino Jamil MD;  Location: [facility]               Social History:   I have reviewed this patient's social history  Social History     Tobacco Use     Smoking status: Current Every Day Smoker     Packs/day: 2.00     Years: 24.00     Pack years: 48.00     Types: Cigarettes     Smokeless tobacco: Never Used     Tobacco comment: Strongly recommended quitting at each visit.   Substance Use Topics     Alcohol use: No     Alcohol/week: 0.0 standard drinks             Family History:   I have reviewed this patient's family history  Family History   Problem Relation Age of Onset     Blood Disease Sister         Hepatitis B-alive     Cancer Maternal Aunt         bronchial  tubes, neck-     Cancer Maternal Uncle         glands in neck-     Cancer Maternal Grandfather         lungs-     Diabetes Father              Heart Disease Father         2 heartattacks-     Diabetes Sister              Diabetes Other         -cousins     Diabetes Paternal Grandmother              Diabetes Paternal Grandfather              Diabetes Paternal Aunt              Hypertension Sister         alive     Thyroid Disease Sister         both sisters-alive     Thyroid Disease Other         niece-alive             Allergies:     Allergies   Allergen Reactions     Heparin Heparin Induced Thrombocytopenia     HIT panel pending     No Known Drug Allergies              Medications:   I have reviewed this patient's current medications  Medications Prior to Admission   Medication Sig Dispense Refill Last Dose     ALBUTEROL 90 MCG/ACT IN AERS 1-2 puffs Q 2-4 hrs prn with spacer - HFA 1 1      apixaban ANTICOAGULANT (ELIQUIS) 5 MG tablet Take 1 tablet (5 mg) by mouth 2 times daily 60 tablet 0 2021 at am     atorvastatin (LIPITOR) 40 MG tablet Take 1 tablet (40 mg) by mouth daily 30 tablet 0 2021 at am     carvedilol (COREG) 12.5 MG tablet Take 1 tablet (12.5 mg) by mouth 2 times daily 60 tablet 0 2021 at am     clopidogrel (PLAVIX) 75 MG tablet Take 1 tablet (75 mg) by mouth daily 30 tablet 0 2021 at am     ferrous sulfate 44 Fe mg/mL ELIX Take 10 mL by mouth mixed in 1/3 of a glass of orange juice (to enhance absorption) 30 minutes before breakfast, every other day. 473 mL 3 2021 at Unknown time     insulin glargine (LANTUS PEN) 100 UNIT/ML pen Inject 12 Units Subcutaneous At Bedtime 15 mL 1 2021 at Unknown time     levothyroxine (SYNTHROID, LEVOTHROID) 50 MCG tablet Take 150 mcg by mouth every morning (before breakfast)    2021 at Unknown time     nicotine (NICODERM CQ) 21 MG/24HR 24 hr patch Place 1  patch onto the skin daily 15 patch 0 Past Week at Unknown time     nitroGLYcerin (NITROSTAT) 0.4 MG sublingual tablet For chest pain place 1 tablet under the tongue every 5 minutes for 3 doses. If symptoms persist 5 minutes after 1st dose call 911. 30 tablet 0      LANCETS REGULAR MISC use twice a day for blood sugar 2 boxes 0      Current Facility-Administered Medications Ordered in Epic   Medication Dose Route Frequency Last Rate Last Admin     atorvastatin (LIPITOR) tablet 40 mg  40 mg Oral Daily   40 mg at 06/18/21 1020     carvedilol (COREG) tablet 12.5 mg  12.5 mg Oral BID   12.5 mg at 06/18/21 1020     glucose gel 15-30 g  15-30 g Oral Q15 Min PRN        Or     dextrose 50 % injection 25-50 mL  25-50 mL Intravenous Q15 Min PRN        Or     glucagon injection 1 mg  1 mg Subcutaneous Q15 Min PRN         fentaNYL (PF) (SUBLIMAZE) injection 50 mcg  50 mcg Intravenous Once within 24 hrs        Followed by     fentaNYL (PF) (SUBLIMAZE) injection 25 mcg  25 mcg Intravenous Q5 Min PRN         flumazenil (ROMAZICON) injection 0.2 mg  0.2 mg Intravenous q1 min prn         flumazenil (ROMAZICON) injection 0.2 mg  0.2 mg Intravenous q1 min prn         insulin aspart (NovoLOG) injection (RAPID ACTING)  1-7 Units Subcutaneous TID AC         insulin aspart (NovoLOG) injection (RAPID ACTING)  1-5 Units Subcutaneous At Bedtime   1 Units at 06/17/21 2252     insulin glargine (LANTUS) injection 6 Units  6 Units Subcutaneous At Bedtime   6 Units at 06/17/21 2251     levothyroxine (SYNTHROID/LEVOTHROID) tablet 150 mcg  150 mcg Oral QAM AC   150 mcg at 06/18/21 1020     May continue current IV fluids if patient has IV fluids infusing.   Does not apply Continuous PRN         melatonin tablet 1 mg  1 mg Oral At Bedtime PRN         midazolam (VERSED) injection 2 mg  2 mg Intravenous Once within 24 hrs        Followed by     midazolam (VERSED) injection 1 mg  1 mg Intravenous Q4 Min PRN         naloxone (NARCAN) injection 0.2 mg  0.2  mg Intravenous Q2 Min PRN         naloxone (NARCAN) injection 0.2 mg  0.2 mg Intramuscular Q2 Min PRN         naloxone (NARCAN) injection 0.4 mg  0.4 mg Intravenous Q2 Min PRN         naloxone (NARCAN) injection 0.4 mg  0.4 mg Intramuscular Q2 Min PRN         naloxone (NARCAN) injection 0.4 mg  0.4 mg Intravenous Q2 Min PRN         naloxone (NARCAN) injection 0.4 mg  0.4 mg Intramuscular Q2 Min PRN         naloxone (NARCAN) injection 0.4 mg  0.4 mg Intravenous Q2 Min PRN         naloxone (NARCAN) injection 0.4 mg  0.4 mg Intramuscular Q2 Min PRN         nicotine (NICODERM CQ) 21 MG/24HR 24 hr patch 1 patch  1 patch Transdermal Daily   1 patch at 06/18/21 0610     nicotine Patch in Place   Transdermal Q8H         pantoprazole (PROTONIX) IV push injection 40 mg  40 mg Intravenous BID   40 mg at 06/18/21 1021     sodium chloride (PF) 0.9% PF flush 3 mL  3 mL Intravenous q1 min prn         sodium chloride 0.9% infusion   Intravenous Continuous 75 mL/hr at 06/18/21 0343 New Bag at 06/18/21 0343     No current [facility]-ordered outpatient medications on file.             Review of Systems:   The 10 point Review of Systems is negative other than noted in the HPI            Data:   All laboratory data reviewed  Results for orders placed or performed during the hospital encounter of 06/17/21 (from the past 24 hour(s))   CBC with platelets differential   Result Value Ref Range    WBC 12.6 (H) 4.0 - 11.0 10e9/L    RBC Count 2.07 (L) 3.8 - 5.2 10e12/L    Hemoglobin 5.3 (LL) 11.7 - 15.7 g/dL    Hematocrit 17.2 (L) 35.0 - 47.0 %    MCV 83 78 - 100 fl    MCH 25.6 (L) 26.5 - 33.0 pg    MCHC 30.8 (L) 31.5 - 36.5 g/dL    RDW 15.8 (H) 10.0 - 15.0 %    Platelet Count 56 (L) 150 - 450 10e9/L    Diff Method Automated Method     % Neutrophils 81.5 %    % Lymphocytes 11.8 %    % Monocytes 4.7 %    % Eosinophils 1.1 %    % Basophils 0.2 %    % Immature Granulocytes 0.7 %    Nucleated RBCs 0 0 /100    Absolute Neutrophil 10.3 (H) 1.6 - 8.3 10e9/L     Absolute Lymphocytes 1.5 0.8 - 5.3 10e9/L    Absolute Monocytes 0.6 0.0 - 1.3 10e9/L    Absolute Eosinophils 0.1 0.0 - 0.7 10e9/L    Absolute Basophils 0.0 0.0 - 0.2 10e9/L    Abs Immature Granulocytes 0.1 0 - 0.4 10e9/L    Absolute Nucleated RBC 0.0    Comprehensive metabolic panel   Result Value Ref Range    Sodium 134 133 - 144 mmol/L    Potassium 4.2 3.4 - 5.3 mmol/L    Chloride 107 94 - 109 mmol/L    Carbon Dioxide 20 20 - 32 mmol/L    Anion Gap 7 3 - 14 mmol/L    Glucose 226 (H) 70 - 99 mg/dL    Urea Nitrogen 56 (H) 7 - 30 mg/dL    Creatinine 1.98 (H) 0.52 - 1.04 mg/dL    GFR Estimate 27 (L) >60 mL/min/[1.73_m2]    GFR Estimate If Black 31 (L) >60 mL/min/[1.73_m2]    Calcium 7.9 (L) 8.5 - 10.1 mg/dL    Bilirubin Total 0.2 0.2 - 1.3 mg/dL    Albumin 2.5 (L) 3.4 - 5.0 g/dL    Protein Total 6.4 (L) 6.8 - 8.8 g/dL    Alkaline Phosphatase 96 40 - 150 U/L    ALT 8 0 - 50 U/L    AST 5 0 - 45 U/L   INR   Result Value Ref Range    INR 1.95 (H) 0.86 - 1.14   Partial thromboplastin time   Result Value Ref Range    PTT 76 (H) 22 - 37 sec   Troponin I   Result Value Ref Range    Troponin I ES 0.038 0.000 - 0.045 ug/L   ABO/Rh type and screen   Result Value Ref Range    Units Ordered 3     ABO O     RH(D) Neg     Antibody Screen Neg     Test Valid Only At [facility]        Specimen Expires 06/20/2021     Crossmatch Red Blood Cells    Blood component   Result Value Ref Range    Unit Number [id number]     Blood Component Type Red Blood Cells Leukocyte Reduced     Division Number 00     Status of Unit Released to care unit     Blood Product Code [id number]     Unit Status ISS    Blood component   Result Value Ref Range    Unit Number [id number]     Blood Component Type Red Blood Cells Leukocyte Reduced     Division Number 00     Status of Unit Released to care unit 06/18/2021 0017     Blood Product Code [id number]     Unit Status ISS    Blood component   Result Value Ref Range    Unit Number [id number]      Blood Component Type Red Blood Cells Leukocyte Reduced     Division Number 00     Status of Unit Released to care unit 06/18/2021 1025     Blood Product Code [id number]     Unit Status ISS    EKG 12-lead, tracing only   Result Value Ref Range    Interpretation ECG Click View Image link to view waveform and result    Stool: occult blood   Result Value Ref Range    Occult Blood Positive (A) NEG^Negative   Asymptomatic SARS-CoV-2 COVID-19 Virus (Coronavirus) by PCR    Specimen: Nasopharyngeal   Result Value Ref Range    SARS-CoV-2 Virus Specimen Source Nasopharyngeal     SARS-CoV-2 PCR Result NEGATIVE     SARS-CoV-2 PCR Comment (Note)    Cardiology IP Consult: Patient to be seen: Routine - within 24 hours; GIB on Eliquis/Plavix in the setting of recent stent 5/2021 and embolic CVA 6/2021; Consultant may enter orders: Yes; Requesting provider? Hospitalist (if different from attendi...    Narrative    Moris Weinberg MD     6/18/2021 12:49 PM  Attempted to see patient, she was in endoscopy.    A very complicated situation with elevated risks of clotting and   bleeding.  She has LAD stents as well as DVT.  Anticoagulation   plus antiplatelet is indicated and without these, she has high   risk of thrombotic occlusion and other complication.  However,   she has had active bleeding with melena and severe anemia.    At this point, holding her anticoagulation until definitive   treatment of her bleeding can be done is reasonable.    Ultimately, if definitive treatment of bleeding can be achieved   with good hemostasis, may try clopidogrel plus reduced dose   Eliquis and increase to full-strength Eliquis if tolerated   without recurrent bleeding after 1 week.   Gastroenterology IP Consult: melanotic stool, suspect UGIB in the setting of plavix and Eliquis for recent cardiac stent (5/21) and embolic CVA (6/21); Consultant may enter orders: Yes; Patient to be seen: Routine - within 24 hours; Requesting pro...    Narrative     Stephanie Woods PA-C     6/18/2021  9:19 AM  GASTROENTEROLOGY CONSULTATION      Rowan Leiva  [address]  [facility] [id number]  61 year old female     Admission Date/Time: 6/17/2021  Primary Care Provider: Brian Ruiz  Referring / Attending Physician:  Sarai SALAZAR     We were asked to see the patient in consultation bySarai SALAZAR for   evaluation of melena.        HPI:  Rowan Leiva is a 61 year old female with medical   history of cardiomyopathy, chronic kidney disease stage III,   diabetes mellitus, ongoing cigarette smoking, with recent   hospitalization May 30 to June 4 for acute coronary disease   requiring ELLIOT x2 to LAD, bilateral DVTs with suspected PE, TIA   who presented to [facility] after findings of a   hemoglobin of 6.3 at her primary care clinic.    Patient reports going to her posthospitalization follow-up.  Her   hemoglobin when she left the hospital was 8.4 and this dropped to   6.3.  It was suggested that she go to the emergency room.  She   received 1 unit of packed red cells along with an iron infusion   and then went home.  Yesterday however she had a la her TIA was   thought to be secondary to an embolic event rge volume of   melanotic stool.  She became lightheaded to return to the   hospital.  Her hemoglobin was noted to be 5.4 which dropped from   6.53 days prior.  The patient denies any abdominal pain.  She has   no nausea or vomiting.  She reports only the one episode of   melanotic stool.  Prior to her hospital admission from [facility]   she was on Eliquis twice daily and Plavix.  Neurology,   hematology, and cardiology were all involved decision-making   process.  After her cardiac catheterization and stent placement.    Patient denies any history of GI bleeding.  She reports having a   normal colonoscopy a number of years ago.  She believes this was   done somewhere in [place].       PAST MEDICAL HISTORY:  Patient Active Problem List     Diagnosis Date Noted     Anemia 06/17/2021     Priority: Medium     GI bleed 06/17/2021     Priority: Medium     Chronic renal insufficiency 06/17/2021     Priority: Medium     Blurred vision 06/05/2021     Priority: Medium     Cerebellar stroke, acute (H) 06/05/2021     Priority: Medium     NSTEMI (non-ST elevated myocardial infarction) (H) 05/30/2021     Priority: Medium     Hyperlipidemia      Priority: Medium     Coronary artery disease      Priority: Medium     Stents-2011       Myocardial infarction (H)      Priority: Medium     anterior       Hypertension      Priority: Medium     Hypothyroidism      Priority: Medium     Advanced directives, counseling/discussion 11/05/2012     Priority: Medium     Patient states has Advance Directive and will bring in a copy   to clinic. 11/5/2012        Thrombocytopenia (H) 11/04/2012     Priority: Medium     Mild intermittent asthma      Priority: Medium     minimal symptoms, albuterol use 2x/year       Mixed hyperlipidemia 06/04/2003     Priority: Medium     Tobacco use disorder 08/22/2002     Priority: Medium     Personal history of malignant neoplasm of ovary      Priority: Medium     Malignant neoplasm of thyroid gland (H)      Priority: Medium     papillary carcinoma; resection 6/00       Diabetes mellitus, type 2 (H) 01/01/2000     Priority: Medium     Problem list name updated by automated process. Provider to   review       Type 2 diabetes mellitus with hyperglycemia, with long-term   current use of insulin (H) 2000     Priority: Medium          ROS: A comprehensive ten point review of systems was negative   aside from those in mentioned in the HPI.       MEDICATIONS:   Prior to Admission medications    Medication Sig Start Date End Date Taking? Authorizing Provider   ALBUTEROL 90 MCG/ACT IN AERS 1-2 puffs Q 2-4 hrs prn with spacer   - HFA 11/15/07  Yes Krystian Herbert MD   apixaban ANTICOAGULANT (ELIQUIS) 5 MG tablet Take 1 tablet (5 mg)   by mouth 2 times daily  6/11/21  Yes Hanh Mccurdy MD   atorvastatin (LIPITOR) 40 MG tablet Take 1 tablet (40 mg) by   mouth daily 6/5/21  Yes Hanh Mccurdy MD   carvedilol (COREG) 12.5 MG tablet Take 1 tablet (12.5 mg) by   mouth 2 times daily 6/4/21  Yes Hanh Mccurdy MD   clopidogrel (PLAVIX) 75 MG tablet Take 1 tablet (75 mg) by mouth   daily 6/5/21  Yes Hanh Mccurdy MD   ferrous sulfate 44 Fe mg/mL ELIX Take 10 mL by mouth mixed in 1/3   of a glass of orange juice (to enhance absorption) 30 minutes   before breakfast, every other day. 6/15/21  Yes Zack Raines MD   insulin glargine (LANTUS PEN) 100 UNIT/ML pen Inject 12 Units   Subcutaneous At Bedtime 6/14/21  Yes Brian Ruiz MD   levothyroxine (SYNTHROID, LEVOTHROID) 50 MCG tablet Take 150 mcg   by mouth every morning (before breakfast)    Yes Reported,   Patient   nicotine (NICODERM CQ) 21 MG/24HR 24 hr patch Place 1 patch onto   the skin daily 6/7/21  Yes Hanh Mccurdy MD   nitroGLYcerin (NITROSTAT) 0.4 MG sublingual tablet For chest pain   place 1 tablet under the tongue every 5 minutes for 3 doses. If   symptoms persist 5 minutes after 1st dose call 911. 6/7/21  Yes   Viktoria Mcdonough PA-C   LANCETS REGULAR MISC use twice a day for blood sugar 8/22/02           ALLERGIES:   Allergies   Allergen Reactions     Heparin Heparin Induced Thrombocytopenia     HIT panel pending     No Known Drug Allergies         SOCIAL HISTORY:  Social History     Tobacco Use     Smoking status: Current Every Day Smoker     Packs/day: 2.00     Years: 24.00     Pack years: 48.00     Types: Cigarettes     Smokeless tobacco: Never Used     Tobacco comment: Strongly recommended quitting at each visit.   Substance Use Topics     Alcohol use: No     Alcohol/week: 0.0 standard drinks     Drug use: No        FAMILY HISTORY:  Family History   Problem Relation Age of Onset     Blood Disease Sister         Hepatitis B-alive     Cancer Maternal Aunt          bronchial tubes, neck-     Cancer Maternal Uncle         glands in neck-     Cancer Maternal Grandfather         lungs-     Diabetes Father              Heart Disease Father         2 heartattacks-     Diabetes Sister              Diabetes Other         -cousins     Diabetes Paternal Grandmother              Diabetes Paternal Grandfather              Diabetes Paternal Aunt              Hypertension Sister         alive     Thyroid Disease Sister         both sisters-alive     Thyroid Disease Other         niece-alive        PHYSICAL EXAM:     /53 (BP Location: Left arm)   Pulse 69   Temp 97.7  F   (36.5  C) (Temporal)   Resp 18   LMP 2000   SpO2 94%      PHYSICAL EXAM:  GENERAL: No acute distress, resting comfortably  SKIN: no suspicious lesions, rashes, jaundice  HEAD: Normocephalic. Atraumatic.  NECK: Neck supple. No adenopathy.   EYES: No scleral icterus  GASTROINTESTINAL: +BS, soft, non tender, non distended, no   guarding/rebound  JOINT/EXTREMITIES:  no gross deformities noted, normal muscle   tone  NEURO: CN 2-12 grossly intact, no focal deficits  PSYCH: Normal affect              ADDITIONAL COMMENTS:   I reviewed the patient's new clinical lab test results.   Recent Labs   Lab Test 21  0721  1538 21  1951 21  1354 21  1354 04/10/15  1023 04/10/15  1023   WBC 11.0 12.6*  --  12.7*   < > 10.3   < > 11.6*   HGB 6.9* 5.3* 5.4* 6.5*   < > 8.5*   < > 15.5   MCV 84 83  --  82   < > 79   < > 80   PLT 66* 56*  --  71*   < > 53*   < > 139*   INR  --  1.95*  --   --   --  1.80*  --  0.93    < > = values in this interval not displayed.     Recent Labs   Lab Test 21  0723 21  18021  1538   POTASSIUM 3.8 4.2 4.5   CHLORIDE 111* 107 109   CO2 20 20 23   BUN 57* 56* 55*   ANIONGAP 6 7 4     Recent Labs   Lab Test 21  2305 21  1750  06/04/21  0858 06/01/21  1227 06/01/21  1227 05/30/21  2355 05/30/21  2355 04/04/20  1340   ALBUMIN  --  2.5* 2.6* 2.6*   < > 2.5*   < >  --   --    BILITOTAL  --  0.2 0.2  --   --  0.3   < >  --   --    ALT  --  8 12  --   --  16   < >  --   --    AST  --  5 10  --   --  54*   < >  --   --    PROTEIN 100*  --   --   --   --   --   --  300* 300*    < > = values in this interval not displayed.        CONSULTATION ASSESSMENT AND PLAN:    Rowan Leiva is a 61 year old with medical history of   cardiomyopathy, chronic kidney disease stage III, diabetes   mellitus, ongoing cigarette smoking, with recent hospitalization   May 30 to June 4 for acute coronary disease requiring ELLIOT x2 to   LAD, bilateral DVTs with suspected PE, TIA who presented to   [facility] after findings of anemia with melena in   the setting of Eliquis and Plavix use.    1.  Melena: This episode occurred prior to admission.  She has   had subsequent drop of hemoglobin progressively over the past few   weeks and was 5.3 yesterday evening.  Prior to admission she was   using Eliquis twice a day and Plavix.  These are now on hold.    She is a cigarette smoker.  She was started on a PPI drip.  She   was transfused 1 unit last evening.  Concern for upper GI source   including gastric ulcer, duodenal ulcer, esophagitis, gastritis,   dieulafoy lesion, AVMs.    -- Anticoagulation and antiplatelet medication is on hold.  -- She is being transfused packed red blood cells as needed.  -- Continue on her PPI drip  -- She is n.p.o. we will plan on upper endoscopy this afternoon.    Further recommendations to follow      I discussed the patient plan with Dr. Hsieh. Thank you for   asking us to participate in the care of this patient.    Liat Woods PA-C  [facility] ( [facility])                  Glucose by meter   Result Value Ref Range    Glucose 204 (H) 70 - 99 mg/dL   UA reflex to Microscopic and Culture    Specimen: Catheterized  Urine   Result Value Ref Range    Color Urine Light Yellow     Appearance Urine Clear     Glucose Urine 70 (A) NEG^Negative mg/dL    Bilirubin Urine Negative NEG^Negative    Ketones Urine Negative NEG^Negative mg/dL    Specific Gravity Urine 1.018 1.003 - 1.035    Blood Urine Small (A) NEG^Negative    pH Urine 5.5 5.0 - 7.0 pH    Protein Albumin Urine 100 (A) NEG^Negative mg/dL    Urobilinogen mg/dL Normal 0.0 - 2.0 mg/dL    Nitrite Urine Negative NEG^Negative    Leukocyte Esterase Urine Negative NEG^Negative    Source Catheterized Urine     RBC Urine 3 (H) 0 - 2 /HPF    WBC Urine 3 0 - 5 /HPF    Bacteria Urine Few (A) NEG^Negative /HPF    Squamous Epithelial /HPF Urine 1 0 - 1 /HPF    Mucous Urine Present (A) NEG^Negative /LPF    Amorphous Crystals Few (A) NEG^Negative /HPF   Glucose by meter   Result Value Ref Range    Glucose 165 (H) 70 - 99 mg/dL   Basic metabolic panel   Result Value Ref Range    Sodium 137 133 - 144 mmol/L    Potassium 3.8 3.4 - 5.3 mmol/L    Chloride 111 (H) 94 - 109 mmol/L    Carbon Dioxide 20 20 - 32 mmol/L    Anion Gap 6 3 - 14 mmol/L    Glucose 137 (H) 70 - 99 mg/dL    Urea Nitrogen 57 (H) 7 - 30 mg/dL    Creatinine 2.07 (H) 0.52 - 1.04 mg/dL    GFR Estimate 26 (L) >60 mL/min/[1.73_m2]    GFR Estimate If Black 30 (L) >60 mL/min/[1.73_m2]    Calcium 7.6 (L) 8.5 - 10.1 mg/dL   CBC with platelets   Result Value Ref Range    WBC 11.0 4.0 - 11.0 10e9/L    RBC Count 2.59 (L) 3.8 - 5.2 10e12/L    Hemoglobin 6.9 (LL) 11.7 - 15.7 g/dL    Hematocrit 21.7 (L) 35.0 - 47.0 %    MCV 84 78 - 100 fl    MCH 26.6 26.5 - 33.0 pg    MCHC 31.8 31.5 - 36.5 g/dL    RDW 15.9 (H) 10.0 - 15.0 %    Platelet Count 66 (L) 150 - 450 10e9/L   Glucose by meter   Result Value Ref Range    Glucose 144 (H) 70 - 99 mg/dL   UPPER GI ENDOSCOPY   Result Value Ref Range    Upper GI Endoscopy       [facility]  _______________________________________________________________________________  Patient Name: Rowan INGRAM  Cali        Procedure Date: 6/18/2021 11:46 AM  MRN: [id number]                       Account Number: [id number]  YOB: 1960              Admit Type: Inpatient  Age: 61                               Gender: Female  Attending MD: Jean Hsieh MD Total Sedation Time: Minutes of continuous   bedside 1:1: 7 minutes  Instrument Name: 208 - Gastroscope      _______________________________________________________________________________     Procedure:                Upper GI endoscopy  Indications:              Acute post hemorrhagic anemia, Melena  Providers:                Jean Hsieh MD (Doctor)  Referring MD:               Medicines:                Midazolam 2 mg IV, Fentanyl 100 micrograms IV,                             Benzocaine spray  Complications:            No immediate complications.  __________________________________ _____________________________________________  Procedure:                Pre-Anesthesia Assessment:                            - Prior to the procedure, a History and Physical                             was performed, and patient medications and                             allergies were reviewed. The patient is competent.                             The risks and benefits of the procedure and the                             sedation options and risks were discussed with the                             patient. All questions were answered and informed                             consent was obtained. Patient identification and                             proposed procedure were verified by the physician                             in the pre-procedure area. Mental Status                             Examination: alert and oriented. Prophylactic                             Antibiotics: The patient does not require                             prophylactic antibiotics. Prior Anticoagulant s: The                             patient has taken no previous  anticoagulant or                             antiplatelet agents. ASA Grade Assessment: II - A                             patient with mild systemic disease. After reviewing                             the risks and benefits, the patient was deemed in                             satisfactory condition to undergo the procedure.                             The anesthesia plan was to use moderate sedation /                             analgesia (conscious sedation). Immediately prior                             to administration of medications, the patient was                             re-assessed for adequacy to receive sedatives. The                             heart rate, respiratory rate, oxygen saturations,                             blood pressure, adequacy of pulmonary ventilation,                             and response to care were monitored throughout the                             procedure. The physical sta tus of the patient was                             re-assessed after the procedure.                            After obtaining informed consent, the endoscope was                             passed under direct vision. Throughout the                             procedure, the patient's blood pressure, pulse, and                             oxygen saturations were monitored continuously. The                             Olympus Gastroscope, Model # GIF-H190, [place] #                             208, SN # [id number] was introduced through the mouth,                             and advanced to the second part of duodenum. The                             upper GI endoscopy was accomplished without                             difficulty. The patient tolerated the procedure                             well.                                                                                   Findings:       The esophagus was normal.       The stomach was normal. No ulcers. No blood or hematin to sug gest recent         upper GI bleeding.       The examined duodenum was normal.                                                                                   Impression:               - Normal esophagus.                            - Normal stomach.                            - Normal examined duodenum.                            - No specimens collected.  Recommendation:           - Perform a colonoscopy tomorrow.                                                                                   Procedure Code(s):       --- Professional ---       78232, Esophagogastroduodenoscopy, flexible, transoral; diagnostic,        including collection of specimen(s) by brushing or washing, when        performed (separate procedure)  Diagnosis Code(s):       --- Professional ---       D62, Acute posthemorrhagic anemia       K92.1, Melena (includes Hematochezia)    CPT copyright 2019 [employer]. All rights reserved.    The codes documented in this report are preliminary and upon   review may   be revised to meet current compliance requirements.    Jean Hsieh M.D.  ______________________  Jean Hsieh MD  6/18/2021 1:07:51 PM  Number of Addenda: 0    Note Initiated On: 6/18/2021 11:46 AM  MRN:                      [id number]  Procedure Date:           6/18/2021 11:46:32 AM  Total Procedure Duration: 0 hours 5 minutes 1 second   Estimated Blood Loss:       Scope In: 12:17:38 PM  Scope Out: 12:22:39 PM     Hemoglobin   Result Value Ref Range    Hemoglobin 8.2 (L) 11.7 - 15.7 g/dL       Lab Results   Component Value Date    CHOL 191 05/31/2021     Lab Results   Component Value Date    HDL 29 05/31/2021     Lab Results   Component Value Date     05/31/2021     Lab Results   Component Value Date    TRIG 285 05/31/2021     Lab Results   Component Value Date    CHOLHDLRATIO 6.7 01/08/2015     TSH   Date Value Ref Range Status   05/31/2021 0.41 0.40 - 4.00 mU/L Final

## 2021-06-18 NOTE — PLAN OF CARE
Pt received 1 unit(s) of PRBC this morning, recheck hgb back at 8.2. pt up indep. IVF held while pt drinking colonoscopy prep- pt unable to handle 8oz every 15 minutes- had 1 large bout of emesis bc pt felt too full(  she predicted 32oz of emesis), staff encouraged pt to ambulate, denies nausea so now giving 8 Oz [id number] md aware. Endoscopy showed no old bleeding or new bleeding. On clears. NPO at 0000. Colonoscopy tomorrow.

## 2021-06-18 NOTE — H&P
History and Physical     Rowan Leiva MRN# [id number]   YOB: 1960 Age: 61 year old      Date of Admission:  6/17/2021    Primary care provider: Brian Ruiz          Assessment and Plan:   Mrs. Rowan Leiva is an extremely pleasant 61-year-old-female with PMH for ischemic cardiomyopathy, thrombocytopenia, chronic kidney disease stage III, hypothyroidism, diabetes mellitus type 2 on insulin, recent hospitalization at [facility] (5/30-6/4) for dx of BL DVT with suspected PE, non-ST elevation MI status post ELLIOT x2 of LAD.  There was initial concern for possible HIT but ultimately was ruled out and she was ultimately discharged home with Eliquis and Plavix.  Unfortunately she was readmitted the next day, for complaints of blurred vision which MRI subsequently revealed multiple acute to subacute ischemic infarct concern for embolic stroke in the setting of cardiac catheterization and recent stent placement.  This was discussed with neurology, hematology and cardiology and they all agreed on discharging home with Eliquis twice daily and Plavix.    Patient had been doing well until this past Monday when she went to her PCP for posthospitalization visit and was found to have a hemoglobin of 6.3 from 8.4.  She had no complaints of GI bleed at the time.  She was sent to emergency room and received 1 unit packed red blood cell along with iron infusion and outpatient hematology follow-up.  Unfortunately patient started having melanotic stool yesterday which continued until this morning.  She is also complaining lightheadedness and dizziness that prompted her to return to the hospital.    Work emergency room today reveals, declining hemoglobin at 5.4 from 6.5 3 days ago.  Fortunately her vital signs were stable with heart rates in the 70s and blood pressures in the 140s.  However she was complaining of lightheaded and dizziness.  She was started on 1 unit packed red blood cell was  recommended for mission to the hospital.    1.  Acute GI bleed-patient with complaints of dark tarry like stool since 6/16.  Suspect upper GI bleed and likely Eliquis and Plavix are the culprit.   --She has no history of GI bleed in the past.  --Looks like she stopped bleeding her last episode was 9:00 this morning  --Fortunately she is also hemodialyzed stable  --Currently getting 1 unit packed red blood cell now, will order 1 more unit of packed red blood cell to maintain a hemoglobin around 8.    --She does have some mild lower extremity edema, will give one-time 20 mg IV Lasix in between the units.  --Continue Protonix IV twice daily  --Monitor serial hemoglobin every 6  --GI consult for likely EGD tomorrow  --Clear liquid diets and n.p.o. after midnight    2.  Recent ELLIOT x2 LAD for non-STEMI  #History of ischemic cardiomyopathy  --Recent echo shows preserved EF of 50 to 55% with severe distal anterior and apical wall hypokinesis  --Doing well from a cardiac standpoint, with no complaints of chest pain or shortness of breath  --We will need to hold Eliquis and Plavix for now  --We will request cardiology evaluation to help assist in ultimate anticoagulation plan  --Continue carvedilol 12.5 mg twice daily  --Continue statin    3.  Acute blood loss anemia acute due to GI bleed  #Iron deficiency anemia/anemia of chronic disease  #Acute on chronic thrombocytopenia  --Patient has a known history of iron deficiency anemia along with chronic thrombocytopenia.   --she had acute worsening thrombocytopenia during [facility] admission in May but ruled out HIT  --She has an upcoming outpatient appointment with hematology to address these issues  --We will defer to her morning rounder if hematology should be consulted during this admission versus having her follow-up as an outpatient.  --Iron stores low, consider IV iron    4.  Chronic kidney disease  --patient has known stage III chronic kidney disease, recent creatinine had  been in the 1.8-2.0 range  --Creatinine stable at 1.9 today but BUN elevated consistent with GI bleed  --Monitor creatinine  --She will likely need outpatient nephrology follow-up    5.  Recent Dx of multiple acute to subacute ischemic infarct concern for embolic stroke   #Recent diagnosis of of bilateral DVT  --MRI of the brain demonstrated multiple acute and subacute ischemic infarct, seen by neurology at the time and felt this could be possibly related to her recent cardiac catheterization.  --She did have some blurred vision but neurology did not feel it was attributed by her infarcts, otherwise she has no focal deficits  --Neurology recommended continuation of Eliquis and Plavix  --JOSEPHINE performed that time was negative for bubble  --It does not appear that she has an outpatient Holter monitor order, she probably needs to rule out for paroxysmal A. fib.  --Bilateral post tubal DVT on ultrasound 5/31  --Felt likely related to her sedentary lifestyle  --Has mild bilateral lower extremity swelling  --We will give one-time dose of IV Lasix, add GREYSON hose and compression stocking as outpatient to reduce swelling  --Elevate legs as much as possible  --Oral anticoagulant on hold given GI bleed    6. Diabetes mellitus  --blood glucose stable, recent A1c 7.9 which is improved from her past numbers  --Await med rec but I believe she is on glipizide 20 units of Lantus with 5 units of lispro with meals.  We will reconcile and order these once she is taking adequate p.o.  --We will decrease her Lantus dose by half given n.p.o. after midnight  --Accu-Cheks every 4 hours and add medium recent sliding scale as needed    7.  Hypothyroidism-resume Synthroid  8.  Ongoing tobacco abuse-resume nicotine patch    Covid status-negative  DVT prophylaxis-SCDs  Admit to inpatient status  Disposition-likely home in 2 to 3 days         Chief Complaint:   Lightheaded dizziness and melanotic stool     History of Present Illness:   Mrs. Donahue  Cali is an extremely pleasant 61-year-old-female with PMH for ischemic cardiomyopathy, thrombocytopenia, chronic kidney disease stage III, hypothyroidism, diabetes mellitus type 2 on insulin, recent hospitalization at [facility] (5/30-6/4) for dx of BL DVT with suspected PE, non-ST elevation MI status post ELLIOT of LAD.      Patient has a known history of coronary disease status post stenting x3 3 years ago in [place].  She was not maintained on aspirin nor Plavix.  She presented to [facility] with complaints of lung pain along with central chest discomfort with radiation to her jaw.  Her troponin was elevated at 10 she was seen by cardiology underwent angiogram that ultimately revealed 90% occlusion in the distal LAD.  She also had episode of cough and hemoptysis and with a diagnosis of bilateral posterior tibial vein occlusive DVT it was suspect that she likely had pulmonary embolism as well.  She had been started on heparin drip along with transition to aspirin and Brilinta however she had worsening thrombocytopenia and was then transition to argatroban and Plavix.  After HIT was ruled out ultimately it was agreed upon that she will be discharged with Eliquis twice daily and Plavix.    Unfortunately she was readmitted the next day, for complaints of blurred vision which MRI subsequently revealed multiple acute to subacute ischemic infarct concern for embolic stroke in the setting of cardiac catheterization and recent stent placement.  Neurology however did not feel that her blurred vision is related to the stroke, she had no other focal deficit.  This was discussed with neurology, hematology and cardiology and they all agreed on discharging home with Eliquis twice daily and Plavix.    Patient had been doing well until this past Monday when she went to her PCP for posthospitalization visit and was found to have a hemoglobin of 6.3 from 8.4.  She had no complaints of GI bleed at  the time.  She was sent to emergency room and received 1 unit packed red blood cell along with iron infusion and outpatient hematology follow-up.  Unfortunately patient started having melanotic stool yesterday which continued until this morning.  She is also complaining lightheadedness and dizziness that prompted her to return to the hospital.    Work emergency room today reveals, declining hemoglobin at 5.4 from 6.5 3 days ago.  Fortunately her vital signs were stable with heart rates in the 70s and blood pressures in the 140s.  However she was complaining of lightheaded and dizziness.  She was started on 1 unit packed red blood cell was recommended for mission to the hospital.         Past Medical History:     Past Medical History:   Diagnosis Date     Cancer of thyroid (H)      Chest pain      Coronary artery disease     -     HX OF OVARIAN MALIGNANCY      Hyperlipidemia      Hypertension      Hypothyroidism      Malignant neoplasm of thyroid gland (H)     papillary carcinoma; resection      Mild intermittent asthma     minimal symptoms, albuterol use 2x/year     Myocardial infarction (H)     anterior     Pulmonary nodule      Thrombocytopenia (H)      Tobacco use disorder      Type II or unspecified type diabetes mellitus without mention of complication, not stated as uncontrolled                Past Surgical History:     Past Surgical History:   Procedure Laterality Date     BLADDER SURGERY       C ANESTH, SECTION       C LIGATE FALLOPIAN TUBE      Tubal Ligation     C TOTAL ABDOM HYSTERECTOMY      ovarian cancer (low grade) - Heme/Onc     CHOLECYSTECTOMY       CV HEART CATHETERIZATION WITH POSSIBLE INTERVENTION N/A 2021    Procedure: Heart Catheterization with Possible Intervention;  Surgeon: Wolf Brumfield MD;  Location:  HEART CARDIAC CATH LAB     CV PCI STENT DRUG ELUTING N/A 2021    Procedure: Percutaneous Coronary Intervention Stent  Drug Eluting;  Surgeon: Wolf Brumfield MD;  Location:  HEART CARDIAC CATH LAB     CYSTOSCOPY  12/30/2014    Procedure: CYSTOSCOPY;  Surgeon: Sabino Jamil MD;  Location: [facility]     HC THYROIDECTOMY  6/2000    papillary thyroid carcinoma - Endocrine     HEART CATH, ANGIOPLASTY  7/13/11    2.5 X 18 mm & 2.25 X 18 mm Xience ELLIOT to Mid LAD     HEART CATH, ANGIOPLASTY  7/29/11    Staged-3.0 X 23 mm Xience ELLIOT to Mid RAC     SLING TRANSVAGINAL N/A 12/30/2014    Procedure: SLING TRANSVAGINAL;  Surgeon: Sabino Jamil MD;  Location: [facility]               Social History:     Social History     Socioeconomic History     Marital status:      Spouse name: Clif     Number of children: 2     Years of education: 13     Highest education level: Not on file   Occupational History     Comment: [employer]   Social Needs     Financial resource strain: Somewhat hard     Food insecurity     Worry: Never true     Inability: Never true     Transportation needs     Medical: No     Non-medical: No   Tobacco Use     Smoking status: Current Every Day Smoker     Packs/day: 2.00     Years: 24.00     Pack years: 48.00     Types: Cigarettes     Smokeless tobacco: Never Used     Tobacco comment: Strongly recommended quitting at each visit.   Substance and Sexual Activity     Alcohol use: No     Alcohol/week: 0.0 standard drinks     Drug use: No     Sexual activity: Yes     Partners: Male     Birth control/protection: Female Surgical     Comment: Hysterectomy and tubal ligation   Lifestyle     Physical activity     Days per week: 0 days     Minutes per session: 0 min     Stress: Rather much   Relationships     Social connections     Talks on phone: More than three times a week     Gets together: Never     Attends Roman [employer] service: Never     Active member of club or organization: No     Attends meetings of clubs or organizations: Never     Relationship status: Not on file     Intimate partner violence     Fear of current  or ex partner: No     Emotionally abused: No     Physically abused: No     Forced sexual activity: No   Other Topics Concern      Service Not Asked     Blood Transfusions Not Asked     Caffeine Concern Yes     Comment: 3-4 cans qd     Occupational Exposure Not Asked     Hobby Hazards Not Asked     Sleep Concern Not Asked     Stress Concern Not Asked     Weight Concern Not Asked     Special Diet Not Asked     Back Care Not Asked     Exercise Yes     Bike Helmet Not Asked     Seat Belt Yes     Self-Exams Not Asked     Parent/sibling w/ CABG, MI or angioplasty before 65F 55M? Not Asked   Social History Narrative     Not on file               Family History:     Family History   Problem Relation Age of Onset     Blood Disease Sister         Hepatitis B-alive     Cancer Maternal Aunt         bronchial tubes, neck-     Cancer Maternal Uncle         glands in neck-     Cancer Maternal Grandfather         lungs-     Diabetes Father              Heart Disease Father         2 heartattacks-     Diabetes Sister              Diabetes Other         -cousins     Diabetes Paternal Grandmother              Diabetes Paternal Grandfather              Diabetes Paternal Aunt              Hypertension Sister         alive     Thyroid Disease Sister         both sisters-alive     Thyroid Disease Other         niece-alive              Allergies:      Allergies   Allergen Reactions     Heparin Heparin Induced Thrombocytopenia     HIT panel pending     No Known Drug Allergies                Medications:     Prior to Admission medications    Medication Sig Last Dose Taking? Auth Provider   ALBUTEROL 90 MCG/ACT IN AERS 1-2 puffs Q 2-4 hrs prn with spacer - HFA   Krystian Herbert MD   apixaban ANTICOAGULANT (ELIQUIS) 5 MG tablet Take 1 tablet (5 mg) by mouth 2 times daily   Hanh Mccurdy MD   atorvastatin (LIPITOR) 40 MG tablet Take 1 tablet (40 mg) by  mouth daily   Hanh Mccurdy MD   carvedilol (COREG) 12.5 MG tablet Take 1 tablet (12.5 mg) by mouth 2 times daily   Hanh Mccurdy MD   clopidogrel (PLAVIX) 75 MG tablet Take 1 tablet (75 mg) by mouth daily   Hanh Mccurdy MD   ferrous sulfate 44 Fe mg/mL ELIX Take 10 mL by mouth mixed in 1/3 of a glass of orange juice (to enhance absorption) 30 minutes before breakfast, every other day.   Zack Raines MD   insulin glargine (LANTUS PEN) 100 UNIT/ML pen Inject 12 Units Subcutaneous At Bedtime   Brian Ruiz MD   LANCETS REGULAR MISC use twice a day for blood sugar      levothyroxine (SYNTHROID, LEVOTHROID) 50 MCG tablet Take 150 mcg by mouth every morning (before breakfast)    Reported, Patient   nicotine (NICODERM CQ) 21 MG/24HR 24 hr patch Place 1 patch onto the skin daily   Hanh Mccurdy MD   nitroGLYcerin (NITROSTAT) 0.4 MG sublingual tablet For chest pain place 1 tablet under the tongue every 5 minutes for 3 doses. If symptoms persist 5 minutes after 1st dose call 911.   Viktoria Mcdonough PA-C              Review of Systems:     A Comprehensive greater than 10 system review of systems was carried out.  Pertinent positives and negatives are noted above.  Otherwise negative for contributory information.            Physical Exam:   Blood pressure 136/56, pulse 75, temperature 99.2  F (37.3  C), temperature source Oral, resp. rate 21, last menstrual period 05/01/2000, SpO2 98 %, not currently breastfeeding.  Exam:  GENERAL:  Comfortable.  Slightly pale and fatigued appearing but no acute distress  PSYCH: pleasant, oriented, No acute distress.  HEENT:  PERRLA. Normal conjunctiva, normal hearing, nasal mucosa and Oropharynx are normal.  NECK:  Supple, no neck vein distention, adenopathy or bruits, normal thyroid.  HEART:  Normal S1, S2 with soft murmur, no pericardial rub, gallops or S3 or S4.  LUNGS:  Clear to auscultation, normal Respiratory effort. No wheezing, rales or  mavis.  ABDOMEN:  Soft, no hepatosplenomegaly, normal bowel sounds. Non-tender, non distended.   EXTREMITIES:   Trace to 1+pedal edema, +2 pulses bilateral and equal.  SKIN:  Dry to touch, No rash, wound or ulcerations.  NEUROLOGIC:  CN 2-12 intact, BL 5/5 symmetric upper and lower extremity strength, sensation is intact with no focal deficits.           Data:     Recent Labs   Lab 06/17/21  1804 06/17/21  1538 06/14/21  1951 06/14/21  1750   WBC 12.6*  --  12.7* 11.1*   HGB 5.3* 5.4* 6.5* 6.3*   HCT 17.2*  --  21.1* 19.9*   MCV 83  --  82 80   PLT 56*  --  71* 60*     Recent Labs   Lab 06/17/21  1804 06/14/21  1750    137   POTASSIUM 4.2 4.1   CHLORIDE 107 108   CO2 20 22   ANIONGAP 7 7   * 170*   BUN 56* 49*   CR 1.98* 2.17*   GFRESTIMATED 27* 24*   GFRESTBLACK 31* 28*   BERNY 7.9* 8.3*       Recent Labs   Lab 06/17/21  1804   TROPI 0.038         Results for orders placed or performed during the hospital encounter of 06/05/21   CT Head w/o Contrast    Narrative    CT SCAN OF THE HEAD WITHOUT CONTRAST   6/5/2021 2:16 PM     HISTORY: Code stroke. Blurred vision. Headache.    TECHNIQUE:  Axial images of the head and coronal reformations without  IV contrast material. Radiation dose for this scan was reduced using  automated exposure control, adjustment of the mA and/or kV according  to patient size, or iterative reconstruction technique.    COMPARISON: MRI of the brain dated 11/4/2012.    FINDINGS: There is no evidence of intracranial hemorrhage, mass, acute  infarct or anomaly. Cavum septum pellucidum et verge, a normal  variant, unchanged. The ventricles are within normal limits in size  and configuration. Mild diffuse parenchymal volume loss. Mild patchy  periventricular white matter hypodensities which are nonspecific, but  likely related to chronic microvascular ischemic disease. Intracranial  atherosclerotic calcifications primarily involving the carotid  siphons.    Partially visualized presumed  mucus retention cyst in the left  maxillary sinus. Visualized aspects of the mastoid and middle ear  cavities are clear. The bony calvarium and bones of the skull base  appear intact.       Impression    IMPRESSION:   1. No CT findings of acute intracranial abnormality. Aspect score =  10.  2. Mild generalized brain atrophy and redemonstrated nonspecific  scattered foci of hypoattenuation in the cerebral white matter,  presumably representing chronic small vessel ischemic disease.    The findings were discussed with Dr. Pepper by myself at approximately  2:25 PM on 6/5/2021.    SEJAL DE LEON MD   MR Brain w/o Contrast    Narrative    MRI BRAIN WITHOUT CONTRAST  6/5/2021 2:57 PM    HISTORY:  Blurry vision.    TECHNIQUE:  Multiplanar, multisequence MRI of the brain without  gadolinium IV contrast material.      COMPARISON:  CT head dated 6/5/2021. Brain MRI 11/4/2012.    FINDINGS: There are several scattered foci of restricted diffusion  with corresponding T2 FLAIR hyperintense signal, consistent with  acute-to-subacute ischemic infarcts. These lesions are seen involving  the cortex/subcortical white matter of the left frontal lobe, left  centrum semiovale, right parietal white matter, right periatrial white  matter, right occipital temporal junction, right occipital  cortex/subcortical white matter, left inferolateral occipital lobe,  and lateral right cerebellar hemisphere. There is no associated  intracranial hemorrhage or mass effect.     Cavum septum pellucidum et verge, a normal variant. The ventricles are  normal in size and configuration. Moderately extensive scattered  nonspecific T2 FLAIR hyperintense signal changes in the subcortical  and deep cerebral white matter, most likely due to chronic small  vessel ischemic disease. Mild generalized brain parenchymal volume  loss.    The orbits appear normal. There is a retention cyst in the left  maxillary sinus measuring up to approximately 3.5 cm in axial  plane.  Mild mucosal thickening in the alveolar recess of the right maxillary  sinus. The calvarium, skull base, and midface are otherwise grossly  unremarkable. The major arterial flow voids at the skull base are  grossly maintained.      Impression    IMPRESSION:  1. Several scattered acute-to-subacute ischemic infarcts involving the  supratentorial brain and right cerebellar hemisphere, as described.  The pattern raises concern for embolic disease from a central source.  2. No intracranial hemorrhage or mass effect.  3. Brain atrophy and presumed chronic small vessel ischemic changes.    SEJAL DE LEON MD   US Lower Extremity Venous Duplex Right    Narrative    EXAM: US LOWER EXTREMITY VENOUS DUPLEX RIGHT  LOCATION: [facility]  DATE/TIME: 6/6/2021 12:34 AM    INDICATION: ; swelling of right knee;  COMPARISON: 05/31/2021.  TECHNIQUE: Venous Duplex ultrasound of the right lower extremity with and without compression, augmentation and duplex. Color flow and spectral Doppler with waveform analysis performed.    FINDINGS: Exam includes the common femoral, femoral, popliteal, and contralateral common femoral veins as well as segmentally visualized deep calf veins and greater saphenous vein.     RIGHT: No deep vein thrombosis. No superficial thrombophlebitis. No popliteal cyst.      Impression    IMPRESSION:  1.  No deep venous thrombosis in the right lower extremity.   MRA Brain ([place]) wo Contrast    Narrative    MR ANGIOGRAM OF THE HEAD WITHOUT CONTRAST   6/6/2021 9:18 AM     HISTORY: Stroke, follow-up; embolic-appearing infarcts.    TECHNIQUE:  3D time-of-flight MR angiogram of the head without  contrast.    COMPARISON: Brain MRA 11/4/2012.    FINDINGS: Motion artifact. The vertebral arteries, basilar artery, and  posterior cerebral arteries are patent. The internal carotid arteries,  anterior cerebral arteries, and middle cerebral arteries are patent.  Apparent moderate narrowing of the  bilateral internal carotid arteries  at the bilateral supraclinoid segments, apparent moderate narrowing of  the right internal carotid artery at the paraclinoid segment, and  apparent mild narrowing of the left internal carotid artery at the  paraclinoid segment, more conspicuous compared to 11/4/2012. Poor  visualization of the left proximal A1 segment, presumably artifactual,  slightly more conspicuous compared to the prior exam. No convincing  evidence of aneurysm; however, evaluation is limited due to motion  artifact. Small area of enhancement along the medial aspect of the  left internal carotid artery at the cavernous segment, presumably  artifactual or normal vascular structure.    Opacification of the left maxillary sinus.      Impression    IMPRESSION:  Apparent narrowings of the bilateral internal carotid  arteries at the paraclinoid and supraclinoid segments which could be  artifactual related to a combination of motion artifact and vascular  calcifications, more conspicuous compared to 11/4/2012. CTA could be  performed for further characterization if indicated.      GUERO RIDDLE MD   MRA Neck (Carotids) wo Contrast    Narrative    MRA NECK WITHOUT CONTRAST  6/6/2021 9:18 AM     HISTORY: Neuro deficit, acute, stroke suspected. Embolic-appearing  infarcts.    TECHNIQUE: MRA of the neck without contrast. Estimates of carotid  stenoses are made relative to the distal internal carotid artery  diameters except as noted.    COMPARISON: None.    FINDINGS:  Poor visualization of the great vessel origins and  vertebral artery origins, presumably technical related to a  combination of motion artifact and lack of intravenous contrast. The  bilateral common carotid, internal carotid, external carotid, and  vertebral arteries are patent. No evidence of large vessel occlusion  or high-grade stenosis. No evidence of dissection. Mild plaquing at  the bilateral carotid bifurcations.      Impression    IMPRESSION:    1. No evidence of large vessel occlusion, high-grade stenosis, or  dissection.  2. Mild plaque at the bilateral carotid bifurcations.    GUERO RIDDLE MD   CT Chest Abdomen Pelvis w/o Contrast    Narrative    CT CHEST, ABDOMEN, PELVIS WITHOUT CONTRAST  6/6/2021 3:25 PM    HISTORY:  New stroke, DVT, evaluate for underlying malignancy.    TECHNIQUE: Scans obtained of the chest, abdomen, and pelvis without IV  contrast.   Radiation dose for this scan was reduced using automated exposure  control, adjustment of the mA and/or kV according to patient size, or  iterative reconstruction technique.    COMPARISON: CT abdomen pelvis dated 5/31/2021. Chest x-rays dated  6/1/2021.    FINDINGS:   LUNGS/PLEURA: There is a subtle diffuse centrilobular micronodular  infiltrative process in the bilateral lungs with multiple  centrilobular groundglass densities as can be sometimes seen in  hypersensitivity pneumonitis as well as infectious or inflammatory  processes. There is also mild prominence of the soft tissues in the  peribronchial vascular regions and also possibly associated with an  infectious or inflammatory process. Nodularity of the lungs measures  up to approximately 0.5 cm (posterior right upper lung lobe laterally  on image 120 series 5). Although most measure less than 0.2 cm. There  are small bilateral pleural fluid collections and associated  compressive atelectasis in the posterior bilateral lungs.    MEDIASTINUM/AXILLAE: Heart is grossly normal in size. Extensive  coronary artery calcifications and/or stents are noted. There is a  small pericardial effusion. Pretracheal lymph node measures up to 1.1  cm in short axis. There are minimally prominent AP window lymph nodes  measuring up to 0.6 cm. No other evidence for mediastinal  lymphadenopathy seen. No definite axillary or hilar lymphadenopathy is  identified. Visualized portions of the thyroid are unremarkable.  Thyroid is not well-seen on this  study.    HEPATOBILIARY: The patient is status post cholecystectomy. The liver  is grossly normal in appearance for a noncontrast CT. No biliary  ductal dilatation or choledocholithiasis is seen.    PANCREAS: Normal.    SPLEEN: Normal.    ADRENAL GLANDS: Normal.    KIDNEYS/URINARY BLADDER: Tiny nonobstructive bilateral intrarenal  calculi measure up to 0.2 cm. No hydronephrosis, hydroureter, or  ureteral calculus is identified. Kidneys are otherwise normal in  appearance for a noncontrast CT. Urinary bladder is unremarkable.    BOWEL: The colon contains contrast material. Colon is of normal  caliber without pericolonic inflammatory change to suggest acute  diverticulitis. No evidence for colonic obstruction. Appendix is  normal in appearance. Small bowel is of normal caliber and appearance.  Stomach contains a moderate amount of ingested material and a small  amount of air. Otherwise unremarkable.    PELVIC ORGANS: Uterus is absent, consistent with surgical history.  Ovaries are not seen and are likely are also surgically absent.    OTHER: Extensive atherosclerosis is noted. No evidence for aneurysm is  seen. There is a small amount of free fluid in the pelvis of uncertain  clinical significance and etiology. No intraperitoneal or  retroperitoneal lymphadenopathy is identified.    MUSCULOSKELETAL: There are degenerative changes mostly in the facet  joints of the lower lumbar spine. Grade 1 anterolisthesis of L4 and L5  appears degenerative in etiology. No aggressive osseous lesions or  acute osseous fractures are seen. There are strandy densities in the  subcutaneous adipose tissues of the lower abdominal wall likely  representing blood thinner injection sites.      Impression    IMPRESSION:  1. Micronodular infiltrative process in the bilateral lungs with  centrilobular groundglass nodular densities and mild peribronchial  soft tissue prominence could represent hypersensitivity pneumonitis or  other inflammatory or  infectious process or less likely neoplastic  process. Other nodules in the lungs measure up to 0.5 cm. I recommend  appropriate clinical treatment and follow-up CT chest.  2. Small bilateral pleural fluid collections.  3. Extensive atherosclerosis including extensive coronary artery  calcifications and or stents.  4. Small pericardial effusion.  5. Nonobstructive bilateral intrarenal calculi.  6. No definite primary malignancy is identified.  7. Small amount of free fluid in the pelvis is of uncertain clinical  significance and etiology.  8. Evaluation of the solid organs of the chest, abdomen and pelvis is  limited due to lack of IV contrast.    TRU TYLER MD   Echocardiogram Limited    Narrative    [id number]  [id number]  [id number]  [id number]^TESHA^SCHUYLER^JUANITA     [facility]  Echocardiography Laboratory  [address] [place] [address]     Name: EDVIN MERLOS  MRN: [id number]  : 1960  Study Date: 2021 10:29 AM  Age: 61 yrs  Gender: Female  Patient Location: [facility]  Reason For Study: Cerebrovascular Incident  Ordering Physician: SCHUYLER PARKINSON  Referring Physician: ROXANE [facility]  Performed By: Catrina Hollingsworth     BSA: 1.9 m2  Height: 64 in  Weight: 185 lb  HR: 72  BP: 168/63 mmHg  ______________________________________________________________________________  Procedure  Limited Portable Bubble Echo Adult. Optison (NDC #3353-1545) given  intravenously.  ______________________________________________________________________________  Interpretation Summary     The visual ejection fraction is estimated at 50-55%. There is moderate to  severe distal anterior and apical wall hypokinesis.  The right ventricle is normal in size and function.  A contrast injection (Bubble Study) was performed that was negative for flow  across the interatrial septum.  Trivial pericardial effusion also noted previously on 2021.  Moderate posterior mitral annular calcification.  Mild MR.     Technically difficult study despite contrast use. Limited echo.  ______________________________________________________________________________  Left Ventricle  The left ventricle is normal in size. There is mild concentric left  ventricular hypertrophy. The visual ejection fraction is estimated at 50-55%.  There is moderate to severe apical wall hypokinesis.     Right Ventricle  The right ventricle is normal in size and function.     Atria  A contrast injection (Bubble Study) was performed that was negative for flow  across the interatrial septum.     Mitral Valve  Thickened mitral valve posterior leaflet. There is moderate mitral annular  calcification. There is mild (1+) mitral regurgitation.     Aortic Valve  There is trace aortic regurgitation. No aortic stenosis is present.     Pulmonic Valve  The pulmonic valve is not well visualized.     Vessels  The inferior vena cava was normal in size with preserved respiratory  variability.     Pericardium  Trivial pericardial effusion.     ______________________________________________________________________________  MMode/2D Measurements & Calculations  IVSd: 1.2 cm  LVIDd: 4.2 cm  LVIDs: 2.9 cm  LVPWd: 1.4 cm  FS: 31.2 %  LV mass(C)d: 204.4 grams  LV mass(C)dI: 108.0 grams/m2  RWT: 0.66     ______________________________________________________________________________  Report approved by: Bebe Land 06/06/2021 12:35 PM                 Sheree Moon PA-C

## 2021-06-18 NOTE — PROGRESS NOTES
[facility]    Hospitalist Progress Note  Name: Rowan Leiva    MRN: [id number]  Provider: Nika Brown MD  Date of Service: 06/18/2021    Assessment & Plan   Summary of Stay: Rowan Leiva is a 61 year old female who was admitted on 6/17/2021 for acute GI bleed.  Her past medical history significant for ischemic cardiomyopathy, thrombocytopenia, chronic kidney disease stage III, hypothyroidism, diabetes mellitus type 2 on insulin, recent hospitalization from 5 30-6 4 for bilateral DVTs and PE, non-ST elevation MI status post ELLIOT x2 to LAD, questionable concern for HIT at that time which was ruled out was sent home on Eliquis and Plavix, return to the ER a day after discharge with blurry vision MRI showed subacute ischemic infarct associated with embolic stroke and was discharged home on Eliquis twice daily and Plavix.    Subsequently patient had drop in hemoglobin to 6.3 from 8.4 as noted by PCP 4 days prior to hospitalization and was treated with iron infusion and a unit of PRBCs.  A day prior to hospitalization she developed melanotic stools associated with lightheadedness dizziness.  In the emergency room her hemoglobin was down to 5.4.  Admitted for acute blood loss anemia    1.  Acute GI bleed-patient with complaints of dark tarry like stool since 6/16.  Suspect upper GI bleed and likely Eliquis and Plavix are the culprit.   --She has no history of GI bleed in the past.  --Received 3 units of PRBCs  -Recheck hemoglobin after third-unit  -Serial hemoglobin  --IV Lasix with transfusion  --Continue Protonix IV twice daily  --Status post EGD today 6/17/2021 with no evidence of bleeding.  Scheduled for colonoscopy tomorrow       2.  Recent ELLIOT x2 LAD for non-STEMI  #History of ischemic cardiomyopathy  --Recent echo shows preserved EF of 50 to 55% with severe distal anterior and apical wall hypokinesis  --Doing well from a cardiac standpoint, with no complaints of chest pain or shortness of  "breath  -Holding Eliquis and Plavix for now  --We will request cardiology evaluation to help assist in ultimate anticoagulation plan  --Continue carvedilol 12.5 mg twice daily  --Continue statin  -Reviewed cardiology consult.  As recommended \"Ultimately, if definitive treatment of bleeding can be achieved with good hemostasis, may try clopidogrel plus reduced dose Eliquis and increase to full-strength Eliquis if tolerated without recurrent bleeding after 1 week.\"        3.  Acute blood loss anemia acute due to GI bleed  #Iron deficiency anemia/anemia of chronic disease  #Acute on chronic thrombocytopenia  --Patient has a known history of iron deficiency anemia along with chronic thrombocytopenia.   --she had acute worsening thrombocytopenia during [facility] admission in May but ruled out HIT  --She has an upcoming outpatient appointment with hematology to address these issues  --We will defer to her morning rounder if hematology should be consulted during this admission versus having her follow-up as an outpatient.  --Iron stores low, consider IV iron  -     4.  Chronic kidney disease  --patient has known stage III chronic kidney disease, recent creatinine had been in the 1.8-2.0 range  --Creatinine stable at 1.9 today but BUN elevated consistent with GI bleed  --Monitor creatinine  --She will likely need outpatient nephrology follow-up     5.  Recent Dx of multiple acute to subacute ischemic infarct concern for embolic stroke   #Recent diagnosis of of bilateral DVT  --MRI of the brain demonstrated multiple acute and subacute ischemic infarct, seen by neurology at the time and felt this could be possibly related to her recent cardiac catheterization.  --She did have some blurred vision but neurology did not feel it was attributed by her infarcts, otherwise she has no focal deficits  --Neurology recommended continuation of Eliquis and Plavix  --JOSEPHINE performed that time was negative for bubble  --It does not appear " that she has an outpatient Holter monitor order, she probably needs to rule out for paroxysmal A. fib.  --Bilateral post tubal DVT on ultrasound 5/31  --Felt likely related to her sedentary lifestyle  --Has mild bilateral lower extremity swelling  --We will give one-time dose of IV Lasix, add GREYSON hose and compression stocking as outpatient to reduce swelling  --Elevate legs as much as possible  --Oral anticoagulant on hold given GI bleed     6. Diabetes mellitus  --blood glucose stable, recent A1c 7.9 which is improved from her past numbers  --Await med rec but I believe she is on glipizide 20 units of Lantus with 5 units of lispro with meals.  We will reconcile and order these once she is taking adequate p.o.  --We will decrease her Lantus dose by half given n.p.o. after midnight  --Accu-Cheks every 4 hours and add medium recent sliding scale as needed     7.  Hypothyroidism-resume Synthroid  8.  Ongoing tobacco abuse-resume nicotine patch       DVT Prophylaxis: Pneumatic Compression Devices  Code Status: Full Code    Disposition: Expected discharge to be decided at least 2 to 3 days for work-up    Interval History   Assumed care reviewed chart.  Patient was evaluated after endoscopy.  Denied any chest pain or shortness of breath no more bleeding noted.  Able to tolerate p.o. complains of associated headache.  10 point review of systems was completed was otherwise negative.  Care plan discussed with GI.  No clear source of bleeding noted on EGD patient will be scheduled for colonoscopy tomorrow.    Total time spent in direct patient care and coordination of care 135    -Data reviewed today: I reviewed all new labs and imaging reports over the last 24 hours. I personally reviewed no images or EKG's today.    Physical Exam   Temp: 97.7  F (36.5  C) Temp src: Temporal BP: 138/53 Pulse: 69   Resp: 18 SpO2: 94 % O2 Device: None (Room air)    There were no vitals filed for this visit.  Vital Signs with Ranges  Temp:   [97.4  F (36.3  C)-99.3  F (37.4  C)] 97.7  F (36.5  C)  Pulse:  [69-79] 69  Resp:  [14-21] 18  BP: (127-159)/(49-69) 138/53  SpO2:  [93 %-100 %] 94 %  I/O last 3 completed shifts:  In: 1355 [P.O.:120; I.V.:450]  Out: 1600 [Urine:1600]      GEN:  Alert, oriented x 3, appears comfortable, NAD.  HEENT:  Normocephalic/atraumatic, no scleral icterus, no nasal discharge, mouth moist.  CV:  Regular rate and rhythm, no murmur or JVD.  S1 + S2 noted, no S3 or S4.  LUNGS:  Clear to auscultation bilaterally without rales/rhonchi/wheezing/retractions.  Symmetric chest rise on inhalation noted.  ABD:  Active bowel sounds, soft, non-tender/non-distended.  No rebound/guarding/rigidity.  EXT:  No edema.  No cyanosis.  No joint synovitis noted.  SKIN:  Dry to touch, pale    Medications     sodium chloride 75 mL/hr at 06/18/21 0343       atorvastatin  40 mg Oral Daily     carvedilol  12.5 mg Oral BID     insulin aspart  1-7 Units Subcutaneous TID AC     insulin aspart  1-5 Units Subcutaneous At Bedtime     insulin glargine  6 Units Subcutaneous At Bedtime     levothyroxine  150 mcg Oral QAM AC     nicotine  1 patch Transdermal Daily     nicotine   Transdermal Q8H     pantoprazole (PROTONIX) IV  40 mg Intravenous BID     Data     No results for input(s): PH, PHV, PO2, PO2V, SAT, PCO2, PCO2V, HCO3, HCO3V in the last 168 hours.  Recent Labs   Lab 06/18/21  0723 06/17/21  1804 06/17/21  1538 06/14/21 1951   WBC 11.0 12.6*  --  12.7*   HGB 6.9* 5.3* 5.4* 6.5*   HCT 21.7* 17.2*  --  21.1*   MCV 84 83  --  82   PLT 66* 56*  --  71*     Recent Labs   Lab 06/18/21  0723 06/17/21  1804 06/17/21  1538    134 136   POTASSIUM 3.8 4.2 4.5   CHLORIDE 111* 107 109   CO2 20 20 23   ANIONGAP 6 7 4   * 226* 226*   BUN 57* 56* 55*   CR 2.07* 1.98* 2.04*   GFRESTIMATED 26* 27* 26*   GFRESTBLACK 30* 31* 30*   BERNY 7.6* 7.9* 7.9*     No results for input(s): CULT in the last 168 hours.  No results for input(s): NTBNPI, NTBNP in the last 168  hours.  Recent Labs   Lab 06/18/21  0723 06/17/21  1804 06/17/21  1538   HGB 6.9* 5.3* 5.4*     Recent Labs   Lab 06/17/21  1804 06/14/21  1750   AST 5 10   ALT 8 12   ALKPHOS 96 102   BILITOTAL 0.2 0.2     Recent Labs   Lab 06/17/21  1804   INR 1.95*     No results for input(s): LACT in the last 168 hours.  No results for input(s): LIPASE in the last 168 hours.  Recent Labs   Lab 06/18/21  0723 06/17/21  1804 06/17/21  1538   BUN 57* 56* 55*   CR 2.07* 1.98* 2.04*     No results for input(s): TSH in the last 168 hours.  Recent Labs   Lab 06/17/21  1804   TROPI 0.038     Recent Labs   Lab 06/17/21  2305   COLOR Light Yellow   APPEARANCE Clear   URINEGLC 70*   URINEBILI Negative   URINEKETONE Negative   SG 1.018   UBLD Small*   URINEPH 5.5   PROTEIN 100*   NITRITE Negative   LEUKEST Negative   RBCU 3*   WBCU 3       No results found for this or any previous visit (from the past 24 hour(s)).

## 2021-06-18 NOTE — PHARMACY-ADMISSION MEDICATION HISTORY
Admission medication history interview status for this patient is complete. See [facility] admission navigator for allergy information, prior to admission medications and immunization status.     Medication history interview done, indicate source(s): Patient  Medication history resources (including written lists, pill bottles, clinic record):None      Changes made to PTA medication list:  Added: none  Deleted: none  Changed: none    Actions taken by pharmacist (provider contacted, etc):None     Additional medication history information:None    Medication reconciliation/reorder completed by provider prior to medication history?  y   (Y/N)          Prior to Admission medications    Medication Sig Last Dose Taking? Auth Provider   ALBUTEROL 90 MCG/ACT IN AERS 1-2 puffs Q 2-4 hrs prn with spacer - HFA  Yes Krystian Herbert MD   apixaban ANTICOAGULANT (ELIQUIS) 5 MG tablet Take 1 tablet (5 mg) by mouth 2 times daily 6/17/2021 at am Yes Hanh Mccurdy MD   atorvastatin (LIPITOR) 40 MG tablet Take 1 tablet (40 mg) by mouth daily 6/17/2021 at am Yes Hanh Mccurdy MD   carvedilol (COREG) 12.5 MG tablet Take 1 tablet (12.5 mg) by mouth 2 times daily 6/17/2021 at am Yes Hanh Mccurdy MD   clopidogrel (PLAVIX) 75 MG tablet Take 1 tablet (75 mg) by mouth daily 6/17/2021 at am Yes Hanh Mccurdy MD   ferrous sulfate 44 Fe mg/mL ELIX Take 10 mL by mouth mixed in 1/3 of a glass of orange juice (to enhance absorption) 30 minutes before breakfast, every other day. 6/16/2021 at Unknown time Yes Zack Raines MD   insulin glargine (LANTUS PEN) 100 UNIT/ML pen Inject 12 Units Subcutaneous At Bedtime 6/16/2021 at Unknown time Yes Brian Ruiz MD   levothyroxine (SYNTHROID, LEVOTHROID) 50 MCG tablet Take 150 mcg by mouth every morning (before breakfast)  6/17/2021 at Unknown time Yes Reported, Patient   nicotine (NICODERM CQ) 21 MG/24HR 24 hr patch Place 1 patch onto the skin daily Past Week at Unknown time  Yes Hanh Mccurdy MD   nitroGLYcerin (NITROSTAT) 0.4 MG sublingual tablet For chest pain place 1 tablet under the tongue every 5 minutes for 3 doses. If symptoms persist 5 minutes after 1st dose call 911.  Yes Viktoria Mcdonough PA-C   LANCETS REGULAR MISC use twice a day for blood sugar

## 2021-06-18 NOTE — PLAN OF CARE
RN [id number]  A&Ox4, VSS, up SBA. 1st unit of PRBC's completed, 2nd administered and completed as well. Hgb recheck for 0600. Infrequent cough, nonproductive. LS clear, BS active, CMS intact. Voiding well. NPO since 0000.    Plan for GI and cardiology consults today to determine further POC.

## 2021-06-19 LAB
ANION GAP SERPL CALCULATED.3IONS-SCNC: 5 MMOL/L (ref 3–14)
BUN SERPL-MCNC: 43 MG/DL (ref 7–30)
CALCIUM SERPL-MCNC: 7.9 MG/DL (ref 8.5–10.1)
CHLORIDE SERPL-SCNC: 111 MMOL/L (ref 94–109)
CO2 SERPL-SCNC: 25 MMOL/L (ref 20–32)
COLONOSCOPY: NORMAL
CREAT SERPL-MCNC: 1.86 MG/DL (ref 0.52–1.04)
ERYTHROCYTE [DISTWIDTH] IN BLOOD BY AUTOMATED COUNT: 15.8 % (ref 10–15)
GFR SERPL CREATININE-BSD FRML MDRD: 29 ML/MIN/{1.73_M2}
GLUCOSE BLDC GLUCOMTR-MCNC: 106 MG/DL (ref 70–99)
GLUCOSE BLDC GLUCOMTR-MCNC: 120 MG/DL (ref 70–99)
GLUCOSE BLDC GLUCOMTR-MCNC: 141 MG/DL (ref 70–99)
GLUCOSE BLDC GLUCOMTR-MCNC: 184 MG/DL (ref 70–99)
GLUCOSE BLDC GLUCOMTR-MCNC: 97 MG/DL (ref 70–99)
GLUCOSE SERPL-MCNC: 111 MG/DL (ref 70–99)
HCT VFR BLD AUTO: 23.9 % (ref 35–47)
HGB BLD-MCNC: 7.7 G/DL (ref 11.7–15.7)
HGB BLD-MCNC: 7.7 G/DL (ref 11.7–15.7)
HGB BLD-MCNC: 7.8 G/DL (ref 11.7–15.7)
LACTATE BLD-SCNC: 0.6 MMOL/L (ref 0.7–2)
MCH RBC QN AUTO: 27 PG (ref 26.5–33)
MCHC RBC AUTO-ENTMCNC: 32.2 G/DL (ref 31.5–36.5)
MCV RBC AUTO: 84 FL (ref 78–100)
PLATELET # BLD AUTO: 83 10E9/L (ref 150–450)
POTASSIUM SERPL-SCNC: 3.5 MMOL/L (ref 3.4–5.3)
RBC # BLD AUTO: 2.85 10E12/L (ref 3.8–5.2)
SODIUM SERPL-SCNC: 141 MMOL/L (ref 133–144)
WBC # BLD AUTO: 11.2 10E9/L (ref 4–11)

## 2021-06-19 PROCEDURE — 99233 SBSQ HOSP IP/OBS HIGH 50: CPT | Performed by: INTERNAL MEDICINE

## 2021-06-19 PROCEDURE — G0500 MOD SEDAT ENDO SERVICE >5YRS: HCPCS | Performed by: INTERNAL MEDICINE

## 2021-06-19 PROCEDURE — 80048 BASIC METABOLIC PNL TOTAL CA: CPT | Performed by: INTERNAL MEDICINE

## 2021-06-19 PROCEDURE — 85027 COMPLETE CBC AUTOMATED: CPT | Performed by: INTERNAL MEDICINE

## 2021-06-19 PROCEDURE — 36415 COLL VENOUS BLD VENIPUNCTURE: CPT | Performed by: INTERNAL MEDICINE

## 2021-06-19 PROCEDURE — 250N000013 HC RX MED GY IP 250 OP 250 PS 637: Performed by: INTERNAL MEDICINE

## 2021-06-19 PROCEDURE — C9113 INJ PANTOPRAZOLE SODIUM, VIA: HCPCS | Performed by: INTERNAL MEDICINE

## 2021-06-19 PROCEDURE — 999N001017 HC STATISTIC GLUCOSE BY METER IP

## 2021-06-19 PROCEDURE — 83605 ASSAY OF LACTIC ACID: CPT | Performed by: INTERNAL MEDICINE

## 2021-06-19 PROCEDURE — 85018 HEMOGLOBIN: CPT | Performed by: INTERNAL MEDICINE

## 2021-06-19 PROCEDURE — 99232 SBSQ HOSP IP/OBS MODERATE 35: CPT | Performed by: INTERNAL MEDICINE

## 2021-06-19 PROCEDURE — 250N000012 HC RX MED GY IP 250 OP 636 PS 637: Performed by: INTERNAL MEDICINE

## 2021-06-19 PROCEDURE — 120N000001 HC R&B MED SURG/OB

## 2021-06-19 PROCEDURE — 45378 DIAGNOSTIC COLONOSCOPY: CPT | Performed by: INTERNAL MEDICINE

## 2021-06-19 PROCEDURE — 250N000011 HC RX IP 250 OP 636: Performed by: INTERNAL MEDICINE

## 2021-06-19 PROCEDURE — 99153 MOD SED SAME PHYS/QHP EA: CPT | Performed by: INTERNAL MEDICINE

## 2021-06-19 RX ORDER — LIDOCAINE 40 MG/G
CREAM TOPICAL
Status: DISCONTINUED | OUTPATIENT
Start: 2021-06-19 | End: 2021-06-19 | Stop reason: HOSPADM

## 2021-06-19 RX ORDER — FENTANYL CITRATE 50 UG/ML
INJECTION, SOLUTION INTRAMUSCULAR; INTRAVENOUS PRN
Status: COMPLETED | OUTPATIENT
Start: 2021-06-19 | End: 2021-06-19

## 2021-06-19 RX ORDER — ACETAMINOPHEN 325 MG/1
650 TABLET ORAL EVERY 4 HOURS PRN
Status: DISCONTINUED | OUTPATIENT
Start: 2021-06-19 | End: 2021-06-22 | Stop reason: HOSPADM

## 2021-06-19 RX ORDER — SIMETHICONE 40MG/0.6ML
SUSPENSION, DROPS(FINAL DOSAGE FORM)(ML) ORAL PRN
Status: COMPLETED | OUTPATIENT
Start: 2021-06-19 | End: 2021-06-19

## 2021-06-19 RX ADMIN — FENTANYL CITRATE 100 MCG: 50 INJECTION, SOLUTION INTRAMUSCULAR; INTRAVENOUS at 11:47

## 2021-06-19 RX ADMIN — CARVEDILOL 12.5 MG: 12.5 TABLET, FILM COATED ORAL at 19:39

## 2021-06-19 RX ADMIN — NICOTINE 1 PATCH: 21 PATCH, EXTENDED RELEASE TRANSDERMAL at 05:58

## 2021-06-19 RX ADMIN — LEVOTHYROXINE SODIUM 150 MCG: 0.15 TABLET ORAL at 10:38

## 2021-06-19 RX ADMIN — INSULIN GLARGINE 6 UNITS: 100 INJECTION, SOLUTION SUBCUTANEOUS at 22:29

## 2021-06-19 RX ADMIN — MIDAZOLAM 1 MG: 1 INJECTION INTRAMUSCULAR; INTRAVENOUS at 11:53

## 2021-06-19 RX ADMIN — PANTOPRAZOLE SODIUM 40 MG: 40 INJECTION, POWDER, FOR SOLUTION INTRAVENOUS at 10:37

## 2021-06-19 RX ADMIN — MIDAZOLAM 1 MG: 1 INJECTION INTRAMUSCULAR; INTRAVENOUS at 11:47

## 2021-06-19 RX ADMIN — Medication 133 MG: at 12:12

## 2021-06-19 RX ADMIN — PANTOPRAZOLE SODIUM 40 MG: 40 INJECTION, POWDER, FOR SOLUTION INTRAVENOUS at 19:40

## 2021-06-19 RX ADMIN — FENTANYL CITRATE 50 MCG: 50 INJECTION, SOLUTION INTRAMUSCULAR; INTRAVENOUS at 12:03

## 2021-06-19 RX ADMIN — CARVEDILOL 12.5 MG: 12.5 TABLET, FILM COATED ORAL at 10:38

## 2021-06-19 RX ADMIN — ATORVASTATIN CALCIUM 40 MG: 40 TABLET, FILM COATED ORAL at 10:38

## 2021-06-19 ASSESSMENT — ACTIVITIES OF DAILY LIVING (ADL)
ADLS_ACUITY_SCORE: 12

## 2021-06-19 NOTE — PROVIDER NOTIFICATION
Web based paged: Supposed to have serial Hgb q 6hours, but no order found. Last check was at 1800, but can we start now? Several bloody stools since with Go-Lightly.

## 2021-06-19 NOTE — PROVIDER NOTIFICATION
Web based paged: Hgb of 7.7. Reports a HA. Please let me know if you'd like to give blood. Thanks!

## 2021-06-19 NOTE — PLAN OF CARE
7PM-7AM RN     Patient vital signs are at baseline: Yes  Patient able to ambulate as they were prior to admission or with assist devices provided by therapies during their stay:  Yes  Patient MUST void prior to discharge:  Yes  Patient able to tolerate oral intake: Clear-->NPO at midnight.   Pain has adequate pain control using Oral analgesics: Yes.    Several bloody stools (maroon) with Go-Lightly. Hgb 8.7, 7.7, and 7.7 at 0600. Asymptomatic, no blood ordered. Colonoscopy today.

## 2021-06-19 NOTE — PROGRESS NOTES
[facility] Cardiology Progress Note       Assessment and Plan:   1. acute anemia, likely due to a combination of epistaxis GI bleeding.  2.  Coronary artery disease with recent non-STEMI, status post drug-eluting stent placement in the distal LAD.  3.  Cerebral infarction, likely cardioembolic in origin.  No documentation of atrial fibrillation at this time.  4.  Insulin-dependent diabetes mellitus.  5.  Acute on chronic renal failure.  6.  Hypertension.    7.  Epistaxis.  Consider ENT consult.  8.  Probable GI bleed, appreciate GI input.    Currently hemodynamically stable, closely follow.  Anticoagulation and antiplatelet drug dosing as per Dr. Weinberg's recommendations.            Interval History:   Mrs. Rowan Leiva is an extremely pleasant 61-year-old-female with PMH for ischemic cardiomyopathy, thrombocytopenia, chronic kidney disease stage III, hypothyroidism, diabetes mellitus type 2 on insulin, recent hospitalization at [facility] (5/30-6/4) for dx of BL DVT with suspected PE, non-ST elevation MI (troponin peak 13) status post ELLIOT x2 of LAD.  There was initial concern for possible HIT but ultimately was ruled out and she was ultimately discharged home with Eliquis and Plavix.  Shortly after this discharge she was readmitted again and found to have multiple cerebral infarcts involving the supratentorial brain as well as right cerebellum.  Intensive anticoagulation regimen was continued.    Just prior to this current admission on June 17, she had 1 week of epistaxis.  Some of these episodes were severe and she had bleeding from her gums as well.  She denies any GI bleeding.  However nursing staff have noted that with preparation for colonoscopy her stools have been very bloody.    Currently appears comfortable, no chest pain.  Denies shortness of breath.  Epistaxis and gum bleeding have stopped.  Hemoglobin after transfusion 7.7.                      Medications:     Current  Facility-Administered Medications   Medication     acetaminophen (TYLENOL) tablet 650 mg     atorvastatin (LIPITOR) tablet 40 mg     carvedilol (COREG) tablet 12.5 mg     glucose gel 15-30 g    Or     dextrose 50 % injection 25-50 mL    Or     glucagon injection 1 mg     fentaNYL (PF) (SUBLIMAZE) injection 50 mcg    Followed by     fentaNYL (PF) (SUBLIMAZE) injection 25 mcg     flumazenil (ROMAZICON) injection 0.2 mg     insulin aspart (NovoLOG) injection (RAPID ACTING)     insulin aspart (NovoLOG) injection (RAPID ACTING)     insulin glargine (LANTUS) injection 6 Units     levothyroxine (SYNTHROID/LEVOTHROID) tablet 150 mcg     May continue current IV fluids if patient has IV fluids infusing.     melatonin tablet 1 mg     midazolam (VERSED) injection 2 mg    Followed by     midazolam (VERSED) injection 1 mg     naloxone (NARCAN) injection 0.2 mg     naloxone (NARCAN) injection 0.2 mg     naloxone (NARCAN) injection 0.4 mg     naloxone (NARCAN) injection 0.4 mg     naloxone (NARCAN) injection 0.4 mg     naloxone (NARCAN) injection 0.4 mg     naloxone (NARCAN) injection 0.4 mg     naloxone (NARCAN) injection 0.4 mg     nicotine (NICODERM CQ) 21 MG/24HR 24 hr patch 1 patch     nicotine Patch in Place     pantoprazole (PROTONIX) IV push injection 40 mg     sodium chloride (PF) 0.9% PF flush 3 mL     sodium chloride 0.9% infusion             Physical Exam:   Blood pressure (!) 160/45, pulse 69, temperature 97.5  F (36.4  C), temperature source Temporal, resp. rate 16, weight 80.9 kg (178 lb 6.4 oz), last menstrual period 2000, SpO2 96 %, not currently breastfeeding.  Wt Readings from Last 4 Encounters:   21 80.9 kg (178 lb 6.4 oz)   21 82.1 kg (181 lb)   21 84.1 kg (185 lb 6.4 oz)   21 84 kg (185 lb 3.2 oz)         Vital Sign Ranges  Temperature Temp  Av.5  F (36.4  C)  Min: 96.9  F (36.1  C)  Max: 98  F (36.7  C)   Blood pressure Systolic (24hrs), Av , Min:117 , Max:189         Diastolic (24hrs), Av, Min:37, Max:75      Pulse Pulse  Av.3  Min: 61  Max: 69   Respirations Resp  Av  Min: 10  Max: 20   Pulse oximetry SpO2  Av.3 %  Min: 93 %  Max: 99 %         Intake/Output Summary (Last 24 hours) at 2021 0933  Last data filed at 2021 1822  Gross per 24 hour   Intake 2299.25 ml   Output 1000 ml   Net 1299.25 ml          Cardiovascular:   Normal apical impulse, regular rate and rhythm, normal S1 and S2, no S3 or S4, and no murmur noted   Chest clear.  No peripheral oedema         Data:     Labs:  Lab Results   Component Value Date     2021    Lab Results   Component Value Date    CHLORIDE 111 2021    Lab Results   Component Value Date    BUN 43 2021      Lab Results   Component Value Date    POTASSIUM 3.5 2021    Lab Results   Component Value Date    CO2 25 2021    Lab Results   Component Value Date    CR 1.86 2021        Lab Results   Component Value Date    WBC 11.2 (H) 2021    HGB 7.7 (L) 2021    HCT 23.9 (L) 2021    MCV 84 2021    PLT 83 (L) 2021     Lab Results   Component Value Date    TROPI 0.038 2021           Attestation:  I have reviewed today's vital signs, notes, medications, labs and imaging.         DR MELI HERNANDEZ MD 2021  9:33 AM

## 2021-06-19 NOTE — PROGRESS NOTES
[facility]    Hospitalist Progress Note  Name: Rowan Leiva    MRN: [id number]  Provider: Nika Brown MD  Date of Service: 06/19/2021    Assessment & Plan   Summary of Stay: Rowan Leiva is a 61 year old female who was admitted on 6/17/2021 for acute GI bleed.  Her past medical history significant for ischemic cardiomyopathy, thrombocytopenia, chronic kidney disease stage III, hypothyroidism, diabetes mellitus type 2 on insulin, recent hospitalization from 5 30-6 4 for bilateral DVTs and PE, non-ST elevation MI status post ELLIOT x2 to LAD, questionable concern for HIT at that time which was ruled out was sent home on Eliquis and Plavix, return to the ER a day after discharge with blurry vision MRI showed subacute ischemic infarct associated with embolic stroke and was discharged home on Eliquis twice daily and Plavix.    Subsequently patient had drop in hemoglobin to 6.3 from 8.4 as noted by PCP 4 days prior to hospitalization and was treated with iron infusion and a unit of PRBCs.  A day prior to hospitalization she developed melanotic stools associated with lightheadedness dizziness.  In the emergency room her hemoglobin was down to 5.4.  Admitted for acute blood loss anemia    1.  Acute GI bleed-patient with complaints of dark tarry like stool since 6/16.  Suspect upper GI bleed and likely Eliquis and Plavix are the culprit.   --She has no history of GI bleed in the past.  --Received 3 units of PRBCs  -Recheck hemoglobin after third-unit was more than 7  -Serial hemoglobin  --Ordered IV Lasix with transfusion  --Continue Protonix IV twice daily  --Status post EGD today 6/17/2021 with no evidence of bleeding.   --Status post colonoscopy today no source of bleeding identified however there was a lot of old dried blood noted.    --Suspect slow bleeding from small bowel.  Will need capsule enteroscopy.  Likely scheduled for 6/22/2021  -Monitor serial hemoglobin in the interim if decompensates  "will need emergent angio       2.  Recent ELLIOT x2 LAD for non-STEMI  #History of ischemic cardiomyopathy  --Recent echo shows preserved EF of 50 to 55% with severe distal anterior and apical wall hypokinesis  --Doing well from a cardiac standpoint, with no complaints of chest pain or shortness of breath  -Holding Eliquis and Plavix for now  --We will request cardiology evaluation to help assist in ultimate anticoagulation plan  --Continue carvedilol 12.5 mg twice daily  --Continue statin  -Reviewed cardiology consult.  As recommended \"Ultimately, if definitive treatment of bleeding can be achieved with good hemostasis, may try clopidogrel plus reduced dose Eliquis and increase to full-strength Eliquis if tolerated without recurrent bleeding after 1 week.\"        3.  Acute blood loss anemia acute due to GI bleed  #Iron deficiency anemia/anemia of chronic disease  #Acute on chronic thrombocytopenia  --Patient has a known history of iron deficiency anemia along with chronic thrombocytopenia.   --she had acute worsening thrombocytopenia during [facility] admission in May but ruled out HIT  --She has an upcoming outpatient appointment with hematology to address these issues  --We will defer to her morning rounder if hematology should be consulted during this admission versus having her follow-up as an outpatient.  --Iron stores low, consider IV iron  -     4.  Chronic kidney disease  --patient has known stage III chronic kidney disease, recent creatinine had been in the 1.8-2.0 range  --Creatinine stable at 1.9 today but BUN elevated consistent with GI bleed  --Monitor creatinine  --She will likely need outpatient nephrology follow-up     5.  Recent Dx of multiple acute to subacute ischemic infarct concern for embolic stroke   #Recent diagnosis of of bilateral DVT  --MRI of the brain demonstrated multiple acute and subacute ischemic infarct, seen by neurology at the time and felt this could be possibly related to her " recent cardiac catheterization.  --She did have some blurred vision but neurology did not feel it was attributed by her infarcts, otherwise she has no focal deficits  --Neurology recommended continuation of Eliquis and Plavix  --JOSEPHINE performed that time was negative for bubble  --It does not appear that she has an outpatient Holter monitor order, she probably needs to rule out for paroxysmal A. fib.  --Bilateral post tubal DVT on ultrasound 5/31  --Felt likely related to her sedentary lifestyle  --Has mild bilateral lower extremity swelling  --We will give one-time dose of IV Lasix, add GREYSON hose and compression stocking as outpatient to reduce swelling  --Elevate legs as much as possible  --Oral anticoagulant on hold given GI bleed     6. Diabetes mellitus  --blood glucose stable, recent A1c 7.9 which is improved from her past numbers  --Await med rec but I believe she is on glipizide 20 units of Lantus with 5 units of lispro with meals.  We will reconcile and order these once she is taking adequate p.o.  --Reduce Lantus dose as only on clear liquids  --Accu-Cheks      7.  Hypothyroidism  -resume Synthroid    8.  Ongoing tobacco abuse  -resume nicotine patch       DVT Prophylaxis: Pneumatic Compression Devices  Code Status: Full Code    Disposition: Expected discharge to be decided at least 2 to 3 days for work-up    Interval History   Reviewed chart.  Patient evaluated after colonoscopy.  Overnight in during day had hematochezia.  Colonoscopy showed no active bleeding.  Patient denies abdominal pain no lightheadedness dizziness nausea vomiting or diarrhea.  10 point review of system was completed was otherwise negative.    Care plan discussed with GI.  Patient would likely need capsule enteroscopy which would likely be on 6/22/2021.          -Data reviewed today: I reviewed all new labs and imaging reports over the last 24 hours. I personally reviewed no images or EKG's today.    Physical Exam   Temp: 97.5  F (36.4   C) Temp src: Temporal BP: (!) 160/45 Pulse: 69   Resp: 16 SpO2: 96 % O2 Device: None (Room air)    Vitals:    06/18/21 0945 06/19/21 0612   Weight: 82 kg (180 lb 12.8 oz) 80.9 kg (178 lb 6.4 oz)     Vital Signs with Ranges  Temp:  [96.9  F (36.1  C)-98  F (36.7  C)] 97.5  F (36.4  C)  Pulse:  [61-69] 69  Resp:  [10-20] 16  BP: (117-189)/(37-75) 160/45  SpO2:  [93 %-99 %] 96 %  I/O last 3 completed shifts:  In: 2299.25 [P.O.:2000]  Out: 1000 [Urine:1000]      GEN:  Alert, oriented x 3, appears comfortable, NAD.  HEENT:  Normocephalic/atraumatic, no scleral icterus, no nasal discharge, mouth moist.  CV:  Regular rate and rhythm, no murmur or JVD.  S1 + S2 noted, no S3 or S4.  LUNGS:  Clear to auscultation bilaterally without rales/rhonchi/wheezing/retractions.  Symmetric chest rise on inhalation noted.  ABD:  Active bowel sounds, soft, non-tender/non-distended.  No rebound/guarding/rigidity.  EXT:  No edema.  No cyanosis.  No joint synovitis noted.  SKIN:  Dry to touch, pale    Medications     - MEDICATION INSTRUCTIONS -       sodium chloride 75 mL/hr at 06/18/21 0343       atorvastatin  40 mg Oral Daily     carvedilol  12.5 mg Oral BID     fentaNYL  50 mcg Intravenous Once within 24 hrs     insulin aspart  1-7 Units Subcutaneous TID AC     insulin aspart  1-5 Units Subcutaneous At Bedtime     insulin glargine  6 Units Subcutaneous At Bedtime     levothyroxine  150 mcg Oral QAM AC     midazolam  2 mg Intravenous Once within 24 hrs     nicotine  1 patch Transdermal Daily     nicotine   Transdermal Q8H     pantoprazole (PROTONIX) IV  40 mg Intravenous BID     Data     No results for input(s): PH, PHV, PO2, PO2V, SAT, PCO2, PCO2V, HCO3, HCO3V in the last 168 hours.  Recent Labs   Lab 06/19/21  0601 06/19/21  0146 06/18/21  2148 06/18/21  0723 06/18/21 0723 06/17/21  1804   WBC 11.2*  --   --   --  11.0 12.6*   HGB 7.7* 7.7* 8.7*   < > 6.9* 5.3*   HCT 23.9*  --   --   --  21.7* 17.2*   MCV 84  --   --   --  84 83   PLT  83*  --   --   --  66* 56*    < > = values in this interval not displayed.     Recent Labs   Lab 06/19/21  0601 06/18/21  0723 06/17/21  1804    137 134   POTASSIUM 3.5 3.8 4.2   CHLORIDE 111* 111* 107   CO2 25 20 20   ANIONGAP 5 6 7   * 137* 226*   BUN 43* 57* 56*   CR 1.86* 2.07* 1.98*   GFRESTIMATED 29* 26* 27*   GFRESTBLACK 34* 30* 31*   BERNY 7.9* 7.6* 7.9*     No results for input(s): CULT in the last 168 hours.  No results for input(s): NTBNPI, NTBNP in the last 168 hours.  Recent Labs   Lab 06/19/21  0601 06/19/21  0146 06/18/21  2148   HGB 7.7* 7.7* 8.7*     Recent Labs   Lab 06/17/21  1804 06/14/21  1750   AST 5 10   ALT 8 12   ALKPHOS 96 102   BILITOTAL 0.2 0.2     Recent Labs   Lab 06/17/21  1804   INR 1.95*     No results for input(s): LACT in the last 168 hours.  No results for input(s): LIPASE in the last 168 hours.  Recent Labs   Lab 06/19/21  0601 06/18/21  0723 06/17/21  1804   BUN 43* 57* 56*   CR 1.86* 2.07* 1.98*     No results for input(s): TSH in the last 168 hours.  Recent Labs   Lab 06/17/21  1804   TROPI 0.038     Recent Labs   Lab 06/17/21  2305   COLOR Light Yellow   APPEARANCE Clear   URINEGLC 70*   URINEBILI Negative   URINEKETONE Negative   SG 1.018   UBLD Small*   URINEPH 5.5   PROTEIN 100*   NITRITE Negative   LEUKEST Negative   RBCU 3*   WBCU 3       No results found for this or any previous visit (from the past 24 hour(s)).

## 2021-06-19 NOTE — PROVIDER NOTIFICATION
wbp Dr. Brown pt is back In room. Can she get a diet order? Fyi she triggered sepsis so ordering a lactic

## 2021-06-19 NOTE — PLAN OF CARE
Pt up independently in room. Denies pain. Had bloody stool's this morning. Had colonoscopy. Clear liquid diet. Hgb drawn at 1800. Will stay until Monday at least for another scan to determine where bleeding is located.

## 2021-06-20 LAB
ANION GAP SERPL CALCULATED.3IONS-SCNC: 6 MMOL/L (ref 3–14)
BUN SERPL-MCNC: 33 MG/DL (ref 7–30)
CALCIUM SERPL-MCNC: 7.8 MG/DL (ref 8.5–10.1)
CHLORIDE SERPL-SCNC: 110 MMOL/L (ref 94–109)
CO2 SERPL-SCNC: 25 MMOL/L (ref 20–32)
CREAT SERPL-MCNC: 1.75 MG/DL (ref 0.52–1.04)
ERYTHROCYTE [DISTWIDTH] IN BLOOD BY AUTOMATED COUNT: 15.8 % (ref 10–15)
GFR SERPL CREATININE-BSD FRML MDRD: 31 ML/MIN/{1.73_M2}
GLUCOSE BLDC GLUCOMTR-MCNC: 124 MG/DL (ref 70–99)
GLUCOSE BLDC GLUCOMTR-MCNC: 155 MG/DL (ref 70–99)
GLUCOSE BLDC GLUCOMTR-MCNC: 162 MG/DL (ref 70–99)
GLUCOSE SERPL-MCNC: 106 MG/DL (ref 70–99)
HCT VFR BLD AUTO: 22.1 % (ref 35–47)
HGB A1 MFR BLD: 96.8 % (ref 95–97.9)
HGB A2 MFR BLD: 2.9 % (ref 2–3.5)
HGB BLD-MCNC: 7 G/DL (ref 11.7–15.7)
HGB C MFR BLD: 0 % (ref 0–0)
HGB E MFR BLD: 0 % (ref 0–0)
HGB F MFR BLD: 0.3 % (ref 0–2.1)
HGB FRACT BLD ELPH-IMP: NORMAL
HGB OTHER MFR BLD: 0 % (ref 0–0)
HGB S BLD QL SOLY: NORMAL
HGB S MFR BLD: 0 % (ref 0–0)
MCH RBC QN AUTO: 26.9 PG (ref 26.5–33)
MCHC RBC AUTO-ENTMCNC: 31.7 G/DL (ref 31.5–36.5)
MCV RBC AUTO: 85 FL (ref 78–100)
PATH INTERP BLD-IMP: NORMAL
PLATELET # BLD AUTO: 73 10E9/L (ref 150–450)
POTASSIUM SERPL-SCNC: 3.7 MMOL/L (ref 3.4–5.3)
RBC # BLD AUTO: 2.6 10E12/L (ref 3.8–5.2)
SODIUM SERPL-SCNC: 141 MMOL/L (ref 133–144)
WBC # BLD AUTO: 8.5 10E9/L (ref 4–11)

## 2021-06-20 PROCEDURE — 99233 SBSQ HOSP IP/OBS HIGH 50: CPT | Performed by: INTERNAL MEDICINE

## 2021-06-20 PROCEDURE — 250N000013 HC RX MED GY IP 250 OP 250 PS 637: Performed by: INTERNAL MEDICINE

## 2021-06-20 PROCEDURE — 85027 COMPLETE CBC AUTOMATED: CPT | Performed by: INTERNAL MEDICINE

## 2021-06-20 PROCEDURE — C9113 INJ PANTOPRAZOLE SODIUM, VIA: HCPCS | Performed by: INTERNAL MEDICINE

## 2021-06-20 PROCEDURE — 250N000011 HC RX IP 250 OP 636: Performed by: INTERNAL MEDICINE

## 2021-06-20 PROCEDURE — 36415 COLL VENOUS BLD VENIPUNCTURE: CPT | Performed by: INTERNAL MEDICINE

## 2021-06-20 PROCEDURE — 80048 BASIC METABOLIC PNL TOTAL CA: CPT | Performed by: INTERNAL MEDICINE

## 2021-06-20 PROCEDURE — 999N001017 HC STATISTIC GLUCOSE BY METER IP

## 2021-06-20 PROCEDURE — 250N000012 HC RX MED GY IP 250 OP 636 PS 637: Performed by: INTERNAL MEDICINE

## 2021-06-20 PROCEDURE — 120N000001 HC R&B MED SURG/OB

## 2021-06-20 PROCEDURE — 99232 SBSQ HOSP IP/OBS MODERATE 35: CPT | Mod: GC | Performed by: INTERNAL MEDICINE

## 2021-06-20 RX ORDER — FLUMAZENIL 0.1 MG/ML
0.2 INJECTION, SOLUTION INTRAVENOUS
Status: ACTIVE | OUTPATIENT
Start: 2021-06-20 | End: 2021-06-21

## 2021-06-20 RX ADMIN — CARVEDILOL 12.5 MG: 12.5 TABLET, FILM COATED ORAL at 10:19

## 2021-06-20 RX ADMIN — INSULIN GLARGINE 6 UNITS: 100 INJECTION, SOLUTION SUBCUTANEOUS at 21:58

## 2021-06-20 RX ADMIN — NICOTINE 1 PATCH: 21 PATCH, EXTENDED RELEASE TRANSDERMAL at 06:15

## 2021-06-20 RX ADMIN — LEVOTHYROXINE SODIUM 150 MCG: 0.15 TABLET ORAL at 06:20

## 2021-06-20 RX ADMIN — PANTOPRAZOLE SODIUM 40 MG: 40 INJECTION, POWDER, FOR SOLUTION INTRAVENOUS at 20:27

## 2021-06-20 RX ADMIN — CARVEDILOL 12.5 MG: 12.5 TABLET, FILM COATED ORAL at 20:27

## 2021-06-20 RX ADMIN — PANTOPRAZOLE SODIUM 40 MG: 40 INJECTION, POWDER, FOR SOLUTION INTRAVENOUS at 10:19

## 2021-06-20 RX ADMIN — ATORVASTATIN CALCIUM 40 MG: 40 TABLET, FILM COATED ORAL at 10:19

## 2021-06-20 RX ADMIN — INSULIN ASPART 1 UNITS: 100 INJECTION, SOLUTION INTRAVENOUS; SUBCUTANEOUS at 18:49

## 2021-06-20 ASSESSMENT — ACTIVITIES OF DAILY LIVING (ADL)
ADLS_ACUITY_SCORE: 12
ADLS_ACUITY_SCORE: 12
ADLS_ACUITY_SCORE: 10
ADLS_ACUITY_SCORE: 12
DEPENDENT_IADLS:: INDEPENDENT
ADLS_ACUITY_SCORE: 12
ADLS_ACUITY_SCORE: 12

## 2021-06-20 NOTE — PROGRESS NOTES
[facility]    Hospitalist Progress Note  Name: Rowan Leiva    MRN: [id number]  Provider: Nika Brown MD  Date of Service: 06/20/2021    Assessment & Plan   Summary of Stay: Rowan Leiva is a 61 year old female who was admitted on 6/17/2021 for acute GI bleed.  Her past medical history significant for ischemic cardiomyopathy, thrombocytopenia, chronic kidney disease stage III, hypothyroidism, diabetes mellitus type 2 on insulin, recent hospitalization from 5 30-6 4 for bilateral DVTs and PE, non-ST elevation MI status post ELLIOT x2 to LAD, questionable concern for HIT at that time which was ruled out was sent home on Eliquis and Plavix, return to the ER a day after discharge with blurry vision MRI showed subacute ischemic infarct associated with embolic stroke and was discharged home on Eliquis twice daily and Plavix.    Subsequently patient had drop in hemoglobin to 6.3 from 8.4 as noted by PCP 4 days prior to hospitalization and was treated with iron infusion and a unit of PRBCs.  A day prior to hospitalization she developed melanotic stools associated with lightheadedness dizziness.  In the emergency room her hemoglobin was down to 5.4.  Admitted for acute blood loss anemia    Status post EGD with no clear evidence of bleeding source, underwent colonoscopy which did not show clear source of bleeding but did show some old blood in colon.  Possible likely source of bleeding from small bowel.  Patient is scheduled for capsule enteroscopy on Tuesday, 6/22/2021    Hemoglobin is 7 today slowly trending down.  Possible slow bleed.  Discussed with cardiology will continue to hold Plavix and aspirin for now.  Patient was adamant about leaving but is encouraged to stay.  If hemoglobin remains stable will consider resuming anticoagulation tomorrow.  GI is following for further work-up    1.  Acute GI bleed-patient with complaints of dark tarry like stool since 6/16.  Suspect upper GI bleed and likely  "Eliquis and Plavix are the culprit.   --She has no history of GI bleed in the past.  --Received 3 units of PRBCs  -Recheck hemoglobin after third-unit was more than 7  -Serial hemoglobin  --Ordered IV Lasix with transfusion  --Continue Protonix IV twice daily  --Status post EGD today 6/17/2021 with no evidence of bleeding.   --Status post colonoscopy today no source of bleeding identified however there was a lot of old dried blood noted.    --Suspect slow bleeding from small bowel.  Will need capsule enteroscopy.  Likely scheduled for 6/22/2021  -Monitor serial hemoglobin in the interim if decompensates will need emergent angio       2.  Recent ELLIOT x2 LAD for non-STEMI  #History of ischemic cardiomyopathy  --Recent echo shows preserved EF of 50 to 55% with severe distal anterior and apical wall hypokinesis  --Doing well from a cardiac standpoint, with no complaints of chest pain or shortness of breath  -Holding Eliquis and Plavix for now  --We will request cardiology evaluation to help assist in ultimate anticoagulation plan  --Continue carvedilol 12.5 mg twice daily  --Continue statin  -Reviewed cardiology consult.  As recommended \"Ultimately, if definitive treatment of bleeding can be achieved with good hemostasis, may try clopidogrel plus reduced dose Eliquis and increase to full-strength Eliquis if tolerated without recurrent bleeding after 1 week.\"        3.  Acute blood loss anemia acute due to GI bleed  #Iron deficiency anemia/anemia of chronic disease  #Acute on chronic thrombocytopenia  --Patient has a known history of iron deficiency anemia along with chronic thrombocytopenia.   --she had acute worsening thrombocytopenia during [facility] admission in May but ruled out HIT  --She has an upcoming outpatient appointment with hematology to address these issues  --We will defer to her morning rounder if hematology should be consulted during this admission versus having her follow-up as an outpatient.  --Iron " stores low, consider IV iron  -     4.  Chronic kidney disease  --patient has known stage III chronic kidney disease, recent creatinine had been in the 1.8-2.0 range  --Creatinine stable at 1.9 today but BUN elevated consistent with GI bleed  --Monitor creatinine  --She will likely need outpatient nephrology follow-up     5.  Recent Dx of multiple acute to subacute ischemic infarct concern for embolic stroke   #Recent diagnosis of of bilateral DVT  --MRI of the brain demonstrated multiple acute and subacute ischemic infarct, seen by neurology at the time and felt this could be possibly related to her recent cardiac catheterization.  --She did have some blurred vision but neurology did not feel it was attributed by her infarcts, otherwise she has no focal deficits  --Neurology recommended continuation of Eliquis and Plavix  --JOSEPHINE performed that time was negative for bubble  --It does not appear that she has an outpatient Holter monitor order, she probably needs to rule out for paroxysmal A. fib.  --Bilateral post tubal DVT on ultrasound 5/31  --Felt likely related to her sedentary lifestyle  --Has mild bilateral lower extremity swelling  --We will give one-time dose of IV Lasix, add GREYSON hose and compression stocking as outpatient to reduce swelling  --Elevate legs as much as possible  --Oral anticoagulant on hold given GI bleed     6. Diabetes mellitus  --blood glucose stable, recent A1c 7.9 which is improved from her past numbers  --Await med rec but I believe she is on glipizide 20 units of Lantus with 5 units of lispro with meals.  We will reconcile and order these once she is taking adequate p.o.  --Reduce Lantus dose as only on clear liquids  --Accu-Cheks      7.  Hypothyroidism  -resume Synthroid    8.  Ongoing tobacco abuse  -resume nicotine patch       DVT Prophylaxis: Pneumatic Compression Devices  Code Status: Full Code    Disposition: Expected discharge to be decided at least 2 to 3 days for  work-up    Interval History   Reviewed chart.  Patient feels better denies any chest pain shortness of breath lightheadedness dizziness.  Noting that her hemoglobin is stable she would like to go home and come back for enteroscopy however given that hemoglobin is slowly drifting down and that patient needs to be on anticoagulation would not advise on her being discharged without identifying source of bleeding.  10 point review of system was completed was otherwise negative.  Had detailed conversation and counseling with patient.    Care plan discussed with GI.  Patient would likely need capsule enteroscopy which would likely be on 6/22/2021.    Time spent more than 35 minutes in direct patient care and in coordination of care      -Data reviewed today: I reviewed all new labs and imaging reports over the last 24 hours. I personally reviewed no images or EKG's today.    Physical Exam   Temp: 97.1  F (36.2  C) Temp src: Temporal BP: 120/43 Pulse: 67   Resp: 16 SpO2: 98 % O2 Device: None (Room air) Oxygen Delivery: 2 LPM  Vitals:    06/18/21 0945 06/19/21 0612   Weight: 82 kg (180 lb 12.8 oz) 80.9 kg (178 lb 6.4 oz)     Vital Signs with Ranges  Temp:  [97.1  F (36.2  C)-97.4  F (36.3  C)] 97.1  F (36.2  C)  Pulse:  [64-91] 67  Resp:  [10-30] 16  BP: (120-165)/(40-68) 120/43  SpO2:  [92 %-100 %] 98 %  No intake/output data recorded.      GEN:  Alert, oriented x 3, appears comfortable, NAD.  HEENT:  Normocephalic/atraumatic, no scleral icterus, no nasal discharge, mouth moist.  CV:  Regular rate and rhythm, no murmur or JVD.  S1 + S2 noted, no S3 or S4.  LUNGS:  Clear to auscultation bilaterally without rales/rhonchi/wheezing/retractions.  Symmetric chest rise on inhalation noted.  ABD:  Active bowel sounds, soft, non-tender/non-distended.  No rebound/guarding/rigidity.  EXT:  No edema.  No cyanosis.  No joint synovitis noted.  SKIN:  Dry to touch, pale    Medications     - MEDICATION INSTRUCTIONS -       sodium chloride  75 mL/hr at 06/18/21 0343       atorvastatin  40 mg Oral Daily     carvedilol  12.5 mg Oral BID     insulin aspart  1-7 Units Subcutaneous TID AC     insulin aspart  1-5 Units Subcutaneous At Bedtime     insulin glargine  6 Units Subcutaneous At Bedtime     levothyroxine  150 mcg Oral QAM AC     nicotine  1 patch Transdermal Daily     nicotine   Transdermal Q8H     pantoprazole (PROTONIX) IV  40 mg Intravenous BID     Data     No results for input(s): PH, PHV, PO2, PO2V, SAT, PCO2, PCO2V, HCO3, HCO3V in the last 168 hours.  Recent Labs   Lab 06/20/21  0605 06/19/21  1749 06/19/21  0601 06/18/21  0723 06/18/21  0723   WBC 8.5  --  11.2*  --  11.0   HGB 7.0* 7.8* 7.7*   < > 6.9*   HCT 22.1*  --  23.9*  --  21.7*   MCV 85  --  84  --  84   PLT 73*  --  83*  --  66*    < > = values in this interval not displayed.     Recent Labs   Lab 06/20/21  0605 06/19/21  0601 06/18/21  0723    141 137   POTASSIUM 3.7 3.5 3.8   CHLORIDE 110* 111* 111*   CO2 25 25 20   ANIONGAP 6 5 6   * 111* 137*   BUN 33* 43* 57*   CR 1.75* 1.86* 2.07*   GFRESTIMATED 31* 29* 26*   GFRESTBLACK 36* 34* 30*   BERNY 7.8* 7.9* 7.6*     No results for input(s): CULT in the last 168 hours.  No results for input(s): NTBNPI, NTBNP in the last 168 hours.  Recent Labs   Lab 06/20/21  0605 06/19/21  1749 06/19/21  0601   HGB 7.0* 7.8* 7.7*     Recent Labs   Lab 06/17/21  1804 06/14/21  1750   AST 5 10   ALT 8 12   ALKPHOS 96 102   BILITOTAL 0.2 0.2     Recent Labs   Lab 06/17/21  1804   INR 1.95*     No results for input(s): LACT in the last 168 hours.  No results for input(s): LIPASE in the last 168 hours.  Recent Labs   Lab 06/20/21  0605 06/19/21  0601 06/18/21  0723   BUN 33* 43* 57*   CR 1.75* 1.86* 2.07*     No results for input(s): TSH in the last 168 hours.  Recent Labs   Lab 06/17/21  1804   TROPI 0.038     Recent Labs   Lab 06/17/21  2305   COLOR Light Yellow   APPEARANCE Clear   URINEGLC 70*   URINEBILI Negative   URINEKETONE Negative    SG 1.018   UBLD Small*   URINEPH 5.5   PROTEIN 100*   NITRITE Negative   LEUKEST Negative   RBCU 3*   WBCU 3       No results found for this or any previous visit (from the past 24 hour(s)).

## 2021-06-20 NOTE — PROGRESS NOTES
[facility] Progress Note     IMPRESSION:  -Overt/obscure GIB, s/p EGD/colnoscopy, suspect either small bowel source or colon lesion which may have been missed due to poor prep  -NSTEMI, ELLIOT placement to the LAD last month    RECOMMENDATIONS:  -Given lack of overt GI bleeding, okay to proceed with antiplt therapy - though if going home, not recommended.  -Discussed with pt that inpt stay would be preferable to complete work up with pillcam, however she is adamant to leave the hospital.  Our service will arrange outpt pillcam early in the week.  If she has any recurrent bleeding, needs to call 911.     Red Alexy, DO   [facility]  Cell [phone]    ________________________________________________________________________      SUBJECTIVE:  Feeling well, tolerating po liquids, no BM since colonoscopy.      OBJECTIVE:  /43 (BP Location: Left arm)   Pulse 67   Temp 97.1  F (36.2  C) (Temporal)   Resp 16   Wt 80.9 kg (178 lb 6.4 oz)   LMP 2000   SpO2 98%   BMI 30.62 kg/m    Temp (24hrs), Av.3  F (36.3  C), Min:97.1  F (36.2  C), Max:97.4  F (36.3  C)    Patient Vitals for the past 72 hrs:   Weight   21 0612 80.9 kg (178 lb 6.4 oz)   21 0945 82 kg (180 lb 12.8 oz)     No intake or output data in the 24 hours ending 21 1411     PHYSICAL EXAM  GEN: Alert, oriented x3, communicative and in NAD.    LYMPH: No LAD noted.  HRT: RRR  LUNGS: CTA  ABD: ND, +BS, no guarding or pain to palpation, no rebound, no HSM.  SKIN: No rash, jaundice or spider angiomata      Additional Data:  I have reviewed the patient's new clinical lab results:     Recent Labs   Lab Test 21  0605 21  6150 21  0601 21  0723 21  0723 21  1804 21  1354 21  1354 04/10/15  1023 04/10/15  1023   WBC 8.5  --  11.2*  --  11.0 12.6*   < > 10.3   < > 11.6*   HGB 7.0* 7.8* 7.7*   < > 6.9* 5.3*   < > 8.5*   < > 15.5   MCV 85  --  84  --  84 83   < > 79    < > 80   PLT 73*  --  83*  --  66* 56*   < > 53*   < > 139*   INR  --   --   --   --   --  1.95*  --  1.80*  --  0.93    < > = values in this interval not displayed.     Recent Labs   Lab Test 06/20/21  0605 06/19/21  0601 06/18/21  0723   POTASSIUM 3.7 3.5 3.8   CHLORIDE 110* 111* 111*   CO2 25 25 20   BUN 33* 43* 57*   ANIONGAP 6 5 6     Recent Labs   Lab Test 06/17/21  2305 06/17/21  1804 06/14/21  1750 06/04/21  0858 06/01/21  1227 06/01/21  1227 05/30/21  2355 05/30/21  2355 04/04/20  1340   ALBUMIN  --  2.5* 2.6* 2.6*   < > 2.5*   < >  --   --    BILITOTAL  --  0.2 0.2  --   --  0.3   < >  --   --    ALT  --  8 12  --   --  16   < >  --   --    AST  --  5 10  --   --  54*   < >  --   --    PROTEIN 100*  --   --   --   --   --   --  300* 300*    < > = values in this interval not displayed.

## 2021-06-20 NOTE — CONSULTS
Care Management Initial Consult    General Information  Assessment completed with: Rowan Simpson  Type of CM/SW Visit: Initial Assessment    Primary Care Provider verified and updated as needed: Yes   Readmission within the last 30 days: current reason for admission unrelated to previous admission   Return Category: New Diagnosis  Reason for Consult: care coordination/care conference, discharge planning  Advance Care Planning: Advance Care Planning Reviewed: no concerns identified        Communication Assessment  Patient's communication style: spoken language (English or Bilingual)    Hearing Difficulty or Deaf: no   Wear Glasses or Blind: no    Cognitive  Cognitive/Neuro/Behavioral: WDL                      Living Environment:   People in home: spouse  Humberto & Rowan  Current living Arrangements: house      Able to return to prior arrangements: yes     Family/Social Support:  Care provided by: self  Provides care for:    Marital Status:     Clif       Description of Support System: Supportive, Involved       Current Resources:   Patient receiving home care services: No     Community Resources: None  Equipment currently used at home: shower chair  Supplies currently used at home: Diabetic Supplies    Employment/Financial:  Employment Status: employed full-time        Financial Concerns: No concerns identified      Lifestyle & Psychosocial Needs:  Lifestyle     Physical activity     Days per week: 0 days     Minutes per session: 0 min     Stress: Rather much     Social Needs     Financial resource strain: Somewhat hard     Food insecurity     Worry: Never true     Inability: Never true     Transportation needs     Medical: No     Non-medical: No     Socioeconomic History     Marital status:      Spouse name: Clif     Number of children: 2     Years of education: 13     Highest education level: Not on file   Occupational History     Comment: [employer]   Relationships     Social connections      Talks on phone: More than three times a week     Gets together: Never     Attends Bahai service: Never     Active member of club or organization: No     Attends meetings of clubs or organizations: Never     Relationship status: Not on file     Intimate partner violence     Fear of current or ex partner: No     Emotionally abused: No     Physically abused: No     Forced sexual activity: No     Tobacco Use     Smoking status: Current Every Day Smoker     Packs/day: 2.00     Years: 24.00     Pack years: 48.00     Types: Cigarettes     Smokeless tobacco: Never Used     Tobacco comment: Strongly recommended quitting at each visit.   Substance and Sexual Activity     Alcohol use: No     Alcohol/week: 0.0 standard drinks     Drug use: No     Sexual activity: Yes     Partners: Male     Birth control/protection: Female Surgical     Comment: Hysterectomy and tubal ligation     Functional Status:  Prior to admission patient needed assistance:   Dependent ADLs:: Independent  Dependent IADLs:: Independent     Mental Health Status:  Mental Health Status: No Current Concerns       Chemical Dependency Status:  Chemical Dependency Status: No Current Concerns           Values/Beliefs:  Spiritual, Cultural Beliefs, Restorationism Practices, Values that affect care: no     Additional Information:  Patient admitted with anemia. She has a URR of >30%. CM met with patient at bedside. Introduced self & role. Verified address & PCP. She lives in a house with her , Humberto. She doesn't have any issues with stairs. Patient works full time outside the home. She is independent. She does have a shower chair. No home care services. She does not have any issues with getting her prescriptions filled at the [facility] on [address] in [place]. She has good support through her  & mother.   She is following with Hematology & Cardiology. She does not know the MD names. Chart review shows  Dr. Lexa Miller for Hem/Onc; Ebony  Abdoulaye SALAZAR for Cardiology. She wants to follow with Nephrology for her kidney issues from DM. She has an appointment with Opthamology for DM retinapathy but it is outside the [facility] system so unavailable in chart review.   Patient also stated she does not have a glucometer. She uses a Holly CGM but the sensors are always falling off and she has no way to monitor her BG levels if the sensor falls off. Paged hospitalist to request an order for a glucometer and necessary supplies for BG monitoring.   Patient declined to have CM schedule a hospital follow up appointment due to all the other appointments coming up. She will schedule a f/u with her PCP. An [facility] referral was made.     CM will continue to follow patient until discharge for any additional needs.     Nola Torres RN, BSN, CPHN, CM  Inpatient Care Coordination - M/S, [facility]  [phone]

## 2021-06-20 NOTE — PLAN OF CARE
7PM-7AM RN     Patient vital signs are at baseline: Yes  Patient able to ambulate as they were prior to admission or with assist devices provided by therapies during their stay:  Yes  Patient MUST void prior to discharge:  Yes  Patient able to tolerate oral intake:  Yes. Clear liquids.   Patient has adequate pain control using Oral analgesics:  Yes    Up independently. No stools during shift. Vitals WNL. Denies pain. Hgb recheck in AM. Clear liquids tolerated. Further studies planned.

## 2021-06-20 NOTE — PROGRESS NOTES
[facility] Cardiology Progress Note       Assessment and Plan:   1. acute anemia, likely due to a combination of epistaxis GI bleeding.  2.  Coronary artery disease with recent non-STEMI, status post drug-eluting stent placement in the distal LAD.  3.  Cerebral infarction, likely cardioembolic in origin.  No documentation of atrial fibrillation at this time.  4.  Insulin-dependent diabetes mellitus.  5.  Acute on chronic renal failure.  6.  Hypertension.    7.  Epistaxis.  Consider ENT consult.  8.  Probable GI bleed, appreciate GI input.    Currently hemodynamically stable.  However hemoglobin has dropped from 7.8 to 7 today.  I would be in favor of withholding antiplatelet agents and anticoagulation for today until we know hemoglobin levels have stabilized.  We will continue to follow.         Interval History:   Mrs. Rowan Leiva is an extremely pleasant 61-year-old-female with PMH for ischemic cardiomyopathy, thrombocytopenia, chronic kidney disease stage III, hypothyroidism, diabetes mellitus type 2 on insulin, recent hospitalization at [facility] (5/30-6/4) for dx of BL DVT with suspected PE, non-ST elevation MI (troponin peak 13) status post ELLIOT x2 of LAD.  There was initial concern for possible HIT but ultimately was ruled out and she was ultimately discharged home with Eliquis and Plavix.  Shortly after this discharge she was readmitted again and found to have multiple cerebral infarcts involving the supratentorial brain as well as right cerebellum.  Intensive anticoagulation regimen was continued.    Just prior to this current admission on June 17, she had 1 week of epistaxis.  Some of these episodes were severe and she had bleeding from her gums as well.  She denies any GI bleeding.  However nursing staff have noted that with preparation for colonoscopy her stools have been very bloody.    Upper GI endoscopy and colonoscopy have not revealed a source of bleeding though blood is  noted in the colon consistent with a small bowel bleed.    Has no chest pains or shortness of breath, resting comfortably in bed.              Medications:     Current Facility-Administered Medications   Medication     acetaminophen (TYLENOL) tablet 650 mg     atorvastatin (LIPITOR) tablet 40 mg     carvedilol (COREG) tablet 12.5 mg     glucose gel 15-30 g    Or     dextrose 50 % injection 25-50 mL    Or     glucagon injection 1 mg     flumazenil (ROMAZICON) injection 0.2 mg     insulin aspart (NovoLOG) injection (RAPID ACTING)     insulin aspart (NovoLOG) injection (RAPID ACTING)     insulin glargine (LANTUS) injection 6 Units     levothyroxine (SYNTHROID/LEVOTHROID) tablet 150 mcg     May continue current IV fluids if patient has IV fluids infusing.     melatonin tablet 1 mg     naloxone (NARCAN) injection 0.2 mg     naloxone (NARCAN) injection 0.2 mg     naloxone (NARCAN) injection 0.4 mg     naloxone (NARCAN) injection 0.4 mg     nicotine (NICODERM CQ) 21 MG/24HR 24 hr patch 1 patch     nicotine Patch in Place     pantoprazole (PROTONIX) IV push injection 40 mg     sodium chloride (PF) 0.9% PF flush 3 mL     sodium chloride 0.9% infusion             Physical Exam:   Blood pressure 120/43, pulse 67, temperature 97.1  F (36.2  C), temperature source Temporal, resp. rate 16, weight 80.9 kg (178 lb 6.4 oz), last menstrual period 2000, SpO2 98 %, not currently breastfeeding.  Wt Readings from Last 4 Encounters:   21 80.9 kg (178 lb 6.4 oz)   21 82.1 kg (181 lb)   21 84.1 kg (185 lb 6.4 oz)   21 84 kg (185 lb 3.2 oz)         Vital Sign Ranges  Temperature Temp  Av.5  F (36.4  C)  Min: 96.9  F (36.1  C)  Max: 98  F (36.7  C)   Blood pressure Systolic (24hrs), Av , Min:117 , Max:189        Diastolic (24hrs), Av, Min:37, Max:75      Pulse Pulse  Av.3  Min: 61  Max: 69   Respirations Resp  Av  Min: 10  Max: 20   Pulse oximetry SpO2  Av.3 %  Min: 93 %  Max: 99 %          Intake/Output Summary (Last 24 hours) at 6/19/2021 0933  Last data filed at 6/18/2021 1822  Gross per 24 hour   Intake 2299.25 ml   Output 1000 ml   Net 1299.25 ml          Cardiovascular:   Normal apical impulse, regular rate and rhythm, normal S1 and S2, no S3 or S4, and no murmur noted   Chest clear.  No peripheral oedema         Data:     Labs:  Lab Results   Component Value Date     06/19/2021    Lab Results   Component Value Date    CHLORIDE 111 06/19/2021    Lab Results   Component Value Date    BUN 43 06/19/2021      Lab Results   Component Value Date    POTASSIUM 3.5 06/19/2021    Lab Results   Component Value Date    CO2 25 06/19/2021    Lab Results   Component Value Date    CR 1.86 06/19/2021        Lab Results   Component Value Date    WBC 8.5 06/20/2021    HGB 7.0 (L) 06/20/2021    HCT 22.1 (L) 06/20/2021    MCV 85 06/20/2021    PLT 73 (L) 06/20/2021     Lab Results   Component Value Date    TROPI 0.038 06/17/2021           Attestation:  I have reviewed today's vital signs, notes, medications, labs and imaging.         DR MELI HERNANDEZ MD 6/19/2021  9:33 AM

## 2021-06-20 NOTE — PLAN OF CARE
No bloody stools this shift. Up independently. Pt is going to discharge this evening to home. Will follow up with pcp for hgb draw and will do outpt procedure at clinic on Tuesday to find where the bleed was coming from and if it is still active. Ok'd by Dr. Tracey. Dr. Ng updated. Time TBD.    Pt no longer discharging. hgb is trending down. So will remain in hospital until after camera study. Discharge 1-2 days

## 2021-06-21 LAB
ANION GAP SERPL CALCULATED.3IONS-SCNC: 6 MMOL/L (ref 3–14)
BUN SERPL-MCNC: 25 MG/DL (ref 7–30)
CALCIUM SERPL-MCNC: 8.2 MG/DL (ref 8.5–10.1)
CHLORIDE SERPL-SCNC: 111 MMOL/L (ref 94–109)
CO2 SERPL-SCNC: 25 MMOL/L (ref 20–32)
CREAT SERPL-MCNC: 1.74 MG/DL (ref 0.52–1.04)
ERYTHROCYTE [DISTWIDTH] IN BLOOD BY AUTOMATED COUNT: 15.7 % (ref 10–15)
GFR SERPL CREATININE-BSD FRML MDRD: 31 ML/MIN/{1.73_M2}
GLUCOSE BLDC GLUCOMTR-MCNC: 103 MG/DL (ref 70–99)
GLUCOSE BLDC GLUCOMTR-MCNC: 105 MG/DL (ref 70–99)
GLUCOSE BLDC GLUCOMTR-MCNC: 127 MG/DL (ref 70–99)
GLUCOSE BLDC GLUCOMTR-MCNC: 165 MG/DL (ref 70–99)
GLUCOSE BLDC GLUCOMTR-MCNC: 300 MG/DL (ref 70–99)
GLUCOSE SERPL-MCNC: 94 MG/DL (ref 70–99)
HCT VFR BLD AUTO: 22.2 % (ref 35–47)
HGB BLD-MCNC: 7 G/DL (ref 11.7–15.7)
MCH RBC QN AUTO: 26.9 PG (ref 26.5–33)
MCHC RBC AUTO-ENTMCNC: 31.5 G/DL (ref 31.5–36.5)
MCV RBC AUTO: 85 FL (ref 78–100)
PLATELET # BLD AUTO: 91 10E9/L (ref 150–450)
POTASSIUM SERPL-SCNC: 3.6 MMOL/L (ref 3.4–5.3)
RBC # BLD AUTO: 2.6 10E12/L (ref 3.8–5.2)
SODIUM SERPL-SCNC: 142 MMOL/L (ref 133–144)
WBC # BLD AUTO: 8.3 10E9/L (ref 4–11)

## 2021-06-21 PROCEDURE — 99233 SBSQ HOSP IP/OBS HIGH 50: CPT | Performed by: HOSPITALIST

## 2021-06-21 PROCEDURE — 999N001017 HC STATISTIC GLUCOSE BY METER IP

## 2021-06-21 PROCEDURE — 85027 COMPLETE CBC AUTOMATED: CPT | Performed by: INTERNAL MEDICINE

## 2021-06-21 PROCEDURE — 99232 SBSQ HOSP IP/OBS MODERATE 35: CPT | Performed by: INTERNAL MEDICINE

## 2021-06-21 PROCEDURE — 250N000012 HC RX MED GY IP 250 OP 636 PS 637: Performed by: INTERNAL MEDICINE

## 2021-06-21 PROCEDURE — 120N000001 HC R&B MED SURG/OB

## 2021-06-21 PROCEDURE — 250N000013 HC RX MED GY IP 250 OP 250 PS 637: Performed by: INTERNAL MEDICINE

## 2021-06-21 PROCEDURE — 36415 COLL VENOUS BLD VENIPUNCTURE: CPT | Performed by: INTERNAL MEDICINE

## 2021-06-21 PROCEDURE — C9113 INJ PANTOPRAZOLE SODIUM, VIA: HCPCS | Performed by: INTERNAL MEDICINE

## 2021-06-21 PROCEDURE — 80048 BASIC METABOLIC PNL TOTAL CA: CPT | Performed by: INTERNAL MEDICINE

## 2021-06-21 PROCEDURE — 250N000013 HC RX MED GY IP 250 OP 250 PS 637: Performed by: HOSPITALIST

## 2021-06-21 PROCEDURE — 250N000011 HC RX IP 250 OP 636: Performed by: INTERNAL MEDICINE

## 2021-06-21 RX ORDER — ATORVASTATIN CALCIUM 40 MG/1
80 TABLET, FILM COATED ORAL DAILY
Status: DISCONTINUED | OUTPATIENT
Start: 2021-06-22 | End: 2021-06-22 | Stop reason: HOSPADM

## 2021-06-21 RX ORDER — CLOPIDOGREL BISULFATE 75 MG/1
75 TABLET ORAL DAILY
Status: DISCONTINUED | OUTPATIENT
Start: 2021-06-21 | End: 2021-06-22 | Stop reason: HOSPADM

## 2021-06-21 RX ORDER — PANTOPRAZOLE SODIUM 40 MG/1
40 TABLET, DELAYED RELEASE ORAL
Status: DISCONTINUED | OUTPATIENT
Start: 2021-06-21 | End: 2021-06-22 | Stop reason: HOSPADM

## 2021-06-21 RX ADMIN — CLOPIDOGREL BISULFATE 75 MG: 75 TABLET ORAL at 12:45

## 2021-06-21 RX ADMIN — INSULIN ASPART 1 UNITS: 100 INJECTION, SOLUTION INTRAVENOUS; SUBCUTANEOUS at 12:45

## 2021-06-21 RX ADMIN — NICOTINE 1 PATCH: 21 PATCH, EXTENDED RELEASE TRANSDERMAL at 05:39

## 2021-06-21 RX ADMIN — PANTOPRAZOLE SODIUM 40 MG: 40 TABLET, DELAYED RELEASE ORAL at 16:25

## 2021-06-21 RX ADMIN — ATORVASTATIN CALCIUM 40 MG: 40 TABLET, FILM COATED ORAL at 08:52

## 2021-06-21 RX ADMIN — PANTOPRAZOLE SODIUM 40 MG: 40 INJECTION, POWDER, FOR SOLUTION INTRAVENOUS at 08:52

## 2021-06-21 RX ADMIN — LEVOTHYROXINE SODIUM 150 MCG: 0.15 TABLET ORAL at 08:52

## 2021-06-21 RX ADMIN — INSULIN ASPART 4 UNITS: 100 INJECTION, SOLUTION INTRAVENOUS; SUBCUTANEOUS at 17:59

## 2021-06-21 RX ADMIN — POLYETHYLENE GLYCOL 3350, SODIUM SULFATE ANHYDROUS, SODIUM BICARBONATE, SODIUM CHLORIDE, POTASSIUM CHLORIDE 4000 ML: 236; 22.74; 6.74; 5.86; 2.97 POWDER, FOR SOLUTION ORAL at 18:00

## 2021-06-21 RX ADMIN — INSULIN GLARGINE 6 UNITS: 100 INJECTION, SOLUTION SUBCUTANEOUS at 21:57

## 2021-06-21 RX ADMIN — CARVEDILOL 12.5 MG: 12.5 TABLET, FILM COATED ORAL at 08:52

## 2021-06-21 ASSESSMENT — ACTIVITIES OF DAILY LIVING (ADL)
ADLS_ACUITY_SCORE: 10

## 2021-06-21 NOTE — PROGRESS NOTES
[facility]    Hospitalist Progress Note  Name: Rowan Leiva    MRN: [id number]  Provider:  Colby Leborn DO MPH  Date of Service: 06/21/2021    Summary of Stay: Rowan Leiva is a 61 year old female with a history of  ischemic cardiomyopathy, thrombocytopenia, chronic kidney disease stage III, hypothyroidism, diabetes mellitus type 2 on insulin, recent hospitalization from 5 30-6 4 for bilateral DVTs and PE, non-ST elevation MI status post ELLIOT x2 to LAD, questionable concern for HIT at that time which was ruled out was sent home on Eliquis and Plavix.  Unfortunately she was readmitted the next day, for complaints of blurred vision which MRI subsequently revealed multiple acute to subacute ischemic infarct concern for embolic stroke in the setting of cardiac catheterization and recent stent placement.  This was discussed with neurology, hematology and cardiology and they all agreed on discharging home with Eliquis twice daily and Plavix.    Patient had been doing well until this past Monday when she went to her PCP for posthospitalization visit and was found to have a hemoglobin of 6.3 from 8.4.  She had no complaints of GI bleed at the time.  She was sent to emergency room and received 1 unit packed red blood cell along with iron infusion and outpatient hematology follow-up.  Unfortunately patient started having melanotic stool yesterday which continued until this morning.  She is also complaining lightheadedness and dizziness that prompted her to return to the hospital.     Work emergency room today reveals, declining hemoglobin at 5.4 from 6.5 three days PTA.  Fortunately her vital signs were stable with heart rates in the 70s and blood pressures in the 140s.  However she was complaining of lightheaded and dizziness.  She was started on 1 unit packed red blood cell was recommended for mission to the hospital.    She ultimately received 3 units of packed red blood cells.  She had dark tarry  stools concerning for GI bleeding.  EGD and colonoscopy were completed which did not show the source of bleeding.  Planning for small bowel enteroscopy per GI.  Cardiology consulted given history of recent drug-eluting stent and DVT/PE.  Will restart clopidogrel today given stable hemoglobin and no evidence of active bleeding.  We will also consider half dose of her Eliquis starting tomorrow.    Problem List:   1. Acute GI bleeding: EGD and colonoscopy do not show the source of bleeding however there was a sniffing and old blood noted.  GI following.  Will need capsule enteroscopy but timing somewhat unclear.  Will likely need to be performed in the outpatient setting.  Resuming clopidogrel this morning so need to monitor closely.  2. Non-STEMI status post recent drug-eluting stent x2 to the LAD: High risk for in-stent event with interruption of antiplatelet medication.  Discussed with patient and family today and risk of in-stent event appears higher than risks associated with bleeding.  He does appear to to have reached hemostasis.  Will restart clopidogrel today and monitor very closely for further evidence of bleeding.  EF 50 to 55% with severe distal anterior and apical wall hypokinesis.  No chest pain or shortness of breath.  Continue carvedilol 12.5 mg twice daily as well as atorvastatin.  Cardiology consultation appreciated.  3. Acute blood loss anemia on chronic anemia: Has received 3 units of packed red blood cells.  Hemoglobin 7.0 which is identical to yesterday.  Assume bleeding has ceased.  4. Recent embolic strokes: MRI showed multiple acute and subacute ischemic infarcts.  Seen by neurology during her previous hospitalization.  Thought is that this is related to recent cardiac catheterization.  JOSEPHINE bubble negative.  Needs to continue outpatient Holter monitor.  Recommendation for ongoing clopidogrel and Eliquis.  5. Recent diagnosis of bilateral DVT: Prior to admission was on Eliquis.  Will resume  half dosing tomorrow and monitor closely for bleeding.  6. Diabetes mellitus: Hemoglobin A1c 7.9.  Continue medium sliding scale insulin and Lantus 6 units nightly.  Glucose well controlled.  7. Chronic kidney disease: Creatinine at baseline, 1.8-2.0.  8. Hypothyroidism: Resume Synthroid.  9. Nicotine abuse: Nicotine replacement as needed.  Cessation counseling performed.    DVT Prophylaxis: Pneumatic Compression Devices  Code Status: Full Code  Diet: NPO per Anesthesia Guidelines for Procedure/Surgery Except for: Meds  Clear Liquid Diet    Perez Catheter: Not present  Disposition: Expected discharge in 2 days to home. Goals prior to discharge include monitor for bleeding.   Incidental Findings: None.  Family updated today: Yes      Interval History   Assumed care from previous hospitalist. The history was fully reviewed.  The patient reports doing well. No chest pain or shortness of breath. No nausea, vomiting, diarrhea, constipation. No fevers. No other specific complaints identified.     -Data reviewed today: I personally reviewed all new labs and imaging results over the last 24 hours.     Physical Exam   Temp: 97.6  F (36.4  C) Temp src: Temporal BP: (!) 144/54 Pulse: 69   Resp: 18 SpO2: 97 % O2 Device: None (Room air)    Vitals:    06/18/21 0945 06/19/21 0612   Weight: 82 kg (180 lb 12.8 oz) 80.9 kg (178 lb 6.4 oz)     Vital Signs with Ranges  Temp:  [97.5  F (36.4  C)-97.7  F (36.5  C)] 97.6  F (36.4  C)  Pulse:  [66-70] 69  Resp:  [16-18] 18  BP: (132-162)/(47-63) 144/54  SpO2:  [95 %-97 %] 97 %  No intake/output data recorded.    GENERAL: No apparent distress. Awake, alert, and fully oriented.  HEENT: Normocephalic, atraumatic. Extraocular movements intact.  CARDIOVASCULAR: Regular rate and rhythm without murmurs or rubs. No S3.  PULMONARY: Clear bilaterally.  GASTROINTESTINAL: Soft, non-tender, non-distended. Bowel sounds normoactive.   EXTREMITIES: No cyanosis or clubbing. No edema.  NEUROLOGICAL: CN 2-12  grossly intact, no focal neurological deficits.  DERMATOLOGICAL: No rash, ulcer, bruising, nor jaundice.     Medications     - MEDICATION INSTRUCTIONS -       - MEDICATION INSTRUCTIONS -         atorvastatin  40 mg Oral Daily     carvedilol  12.5 mg Oral BID     clopidogrel  75 mg Oral Daily     insulin aspart  1-7 Units Subcutaneous TID AC     insulin aspart  1-5 Units Subcutaneous At Bedtime     insulin glargine  6 Units Subcutaneous At Bedtime     levothyroxine  150 mcg Oral QAM AC     nicotine  1 patch Transdermal Daily     nicotine   Transdermal Q8H     pantoprazole (PROTONIX) IV  40 mg Intravenous BID     polyethylene glycol  4,000 mL Oral Once     Data     Laboratory:  Recent Labs   Lab 06/21/21  0558 06/20/21  0605 06/19/21  1749 06/19/21  0601   WBC 8.3 8.5  --  11.2*   HGB 7.0* 7.0* 7.8* 7.7*   HCT 22.2* 22.1*  --  23.9*   MCV 85 85  --  84   PLT 91* 73*  --  83*     Recent Labs   Lab 06/21/21  0558 06/20/21  0605 06/19/21  0601    141 141   POTASSIUM 3.6 3.7 3.5   CHLORIDE 111* 110* 111*   CO2 25 25 25   ANIONGAP 6 6 5   GLC 94 106* 111*   BUN 25 33* 43*   CR 1.74* 1.75* 1.86*   GFRESTIMATED 31* 31* 29*   GFRESTBLACK 36* 36* 34*   BERNY 8.2* 7.8* 7.9*     No results for input(s): CULT in the last 168 hours.    Imaging:  No results found for this or any previous visit (from the past 24 hour(s)).      Colby Lebron DO MPH  [facility] hospital Hospitalist  [address] [place] [address]  06/21/2021

## 2021-06-21 NOTE — PLAN OF CARE
Pt is A&Ox4. VSS. Denies pain. CMS intact. Up independently. Voiding in the bathroom. BM this morning loose with streaks of red. Tolerating a clear liquid diet. Plan is to be NPO tonight at midnight for pillcam procedure in the morning.

## 2021-06-21 NOTE — PROGRESS NOTES
"  [facility]  Cardiology Progress Note    Outpatient cardiologist:  Was Dr. Michaels     Date of Service (when I saw the patient): 06/21/2021    Summary: Rowan Leiva is a 61 year old female with history of CAD, ischemic cardiomyopathy, DM, HTN, CKD, hypothyroidism, DVT with suspected PE.     She was recently admitted at [facility] 5/30-6/4/21 with DVTs and suspected PE (VQ scan), also NSTEMI (peak trop13) s/p ELLIOT of dLAD. Patent overlapping stents in the mid-distal LAD with mild ISR and patent stent in the proximal RCA with moderate ISR.  Discharged home on Eliquis and Plavix.     Admitted 6/5/21 with multiple CVAs.     6/14/21 seen in ED for acute anemia.  Given transfusion.  Discharged with plan to follow up with hematology.     Admitted on 6/17/2021 with acute anemia, ongoing melena.       Interval History   Tele: No afib    No current melena   No chest pain      Assessment & Plan   CAD  - recent NSTEMI and 5/31/21 s/p ELLIOT to dLAD  - Echo 6/6/21: LVEF 50-55%, moderate to severe distal anterior and apical wall HK  - now with acute anemia, have been holding Eliquis and Plavix.  Per  Ip: \"If a definitive treatment of bleeding can be achieved with good hemostasis, may try clopidogrel plus reduced dose Eliquis and increase to full-strength Eliquis if tolerated without recurrent bleeding after 1 week.\"    Per GI, ok with resuming Plavix alone today with close monitoring  - Coreg 12.5 mg BID  - Atorvastatin 40 mg  - SL nitroglycerin prn  - Cardiac rehab      Acute anemia  Suspected upper GI bleed  Thrombocytopenia  - EGD and colonoscopy have not revealed a source of bleeding, though blood is noted in the colon consistent with small bowel bleed.  Will need capsule enteroscopy.  Likely scheduled for 6/22/2021  - Starting Plavix up today  - Will be seeing hematology in follow up      CVA  - Likely cardioembolic in origin.  JOSEPHINE negative bubble study.  MRA without evidence of large vessel " occlusion, high-grade stenosis or dissection.    No afib documented.  Would have her wear an event monitor at discharge if no afib seen on tele during admission.    Neuro questions if related to recent cath.   - Not resuming anticoagulation at this time given acute anemia with ongoing bleeding  - Resuming Plavix today      Recent DVT and likely PE  - Resuming Plavix today for stents  - Not resuming anticoagulation at this time given acute anemia with ongoing bleeding      DM      HTN      CKD      Follow up:  Currently has follow up scheduled 6/29/21        Mateo French PA-C      Patient Active Problem List   Diagnosis     Malignant neoplasm of thyroid gland (H)     Personal history of malignant neoplasm of ovary     Diabetes mellitus, type 2 (H)     Tobacco use disorder     Mixed hyperlipidemia     Mild intermittent asthma     Thrombocytopenia (H)     Advanced directives, counseling/discussion     Hyperlipidemia     Coronary artery disease     Myocardial infarction (H)     Hypertension     Hypothyroidism     Type 2 diabetes mellitus with hyperglycemia, with long-term current use of insulin (H)     NSTEMI (non-ST elevated myocardial infarction) (H)     Blurred vision     Cerebellar stroke, acute (H)     Anemia     GI bleed     Chronic renal insufficiency     Melena     Anemia due to blood loss, acute     Colonic hemorrhage       Physical Exam   Temp: 97.6  F (36.4  C) Temp src: Temporal BP: (!) 144/54 Pulse: 69   Resp: 18 SpO2: 97 % O2 Device: None (Room air)    Vitals:    06/18/21 0945 06/19/21 0612   Weight: 82 kg (180 lb 12.8 oz) 80.9 kg (178 lb 6.4 oz)     Vital Signs with Ranges  Temp:  [97.5  F (36.4  C)-97.7  F (36.5  C)] 97.6  F (36.4  C)  Pulse:  [66-70] 69  Resp:  [16-18] 18  BP: (132-162)/(47-63) 144/54  SpO2:  [95 %-97 %] 97 %  No intake/output data recorded.    Constitutional: NAD.   Respiratory: CTAB.   Cardiovascular: RRR, s1s2, no sig murmur  GI: soft, BS+  Skin: warm, no rashes  Musculoskeletal:  Moving all extremities  Neurologic: Alert, oriented x 3  Neuropsychiatric: Normal affect       Data   Recent Labs   Lab 06/21/21  0558 06/20/21  0605 06/19/21  1749 06/19/21  0601 06/17/21  1804 06/17/21  1804 06/14/21  1750 06/14/21  1750   WBC 8.3 8.5  --  11.2*   < > 12.6*   < > 11.1*   HGB 7.0* 7.0* 7.8* 7.7*   < > 5.3*   < > 6.3*   MCV 85 85  --  84   < > 83   < > 80   PLT 91* 73*  --  83*   < > 56*   < > 60*   INR  --   --   --   --   --  1.95*  --   --     141  --  141   < > 134   < > 137   POTASSIUM 3.6 3.7  --  3.5   < > 4.2   < > 4.1   CHLORIDE 111* 110*  --  111*   < > 107   < > 108   CO2 25 25  --  25   < > 20   < > 22   BUN 25 33*  --  43*   < > 56*   < > 49*   CR 1.74* 1.75*  --  1.86*   < > 1.98*   < > 2.17*   ANIONGAP 6 6  --  5   < > 7   < > 7   BERNY 8.2* 7.8*  --  7.9*   < > 7.9*   < > 8.3*   GLC 94 106*  --  111*   < > 226*   < > 170*   ALBUMIN  --   --   --   --   --  2.5*  --  2.6*   PROTTOTAL  --   --   --   --   --  6.4*  --  6.7*   BILITOTAL  --   --   --   --   --  0.2  --  0.2   ALKPHOS  --   --   --   --   --  96  --  102   ALT  --   --   --   --   --  8  --  12   AST  --   --   --   --   --  5  --  10   TROPI  --   --   --   --   --  0.038  --   --     < > = values in this interval not displayed.     No results found for this or any previous visit (from the past 24 hour(s)).    Medications     - MEDICATION INSTRUCTIONS -       - MEDICATION INSTRUCTIONS -       sodium chloride 75 mL/hr at 06/18/21 0343       atorvastatin  40 mg Oral Daily     carvedilol  12.5 mg Oral BID     insulin aspart  1-7 Units Subcutaneous TID AC     insulin aspart  1-5 Units Subcutaneous At Bedtime     insulin glargine  6 Units Subcutaneous At Bedtime     levothyroxine  150 mcg Oral QAM AC     nicotine  1 patch Transdermal Daily     nicotine   Transdermal Q8H     pantoprazole (PROTONIX) IV  40 mg Intravenous BID

## 2021-06-21 NOTE — PROGRESS NOTES
[facility] Progress Note     IMPRESSION:  -Overt/obscure GIB, s/p EGD/colnoscopy, suspect either small bowel source or colon lesion which may have been missed due to poor prep  -NSTEMI, ELLIOT placement to the LAD last month    RECOMMENDATIONS:  -Given lack of overt GI bleeding, okay to proceed with antiplt therapy - though if going home, not recommended.  -Unable to arrange PillCam for today.  Currently in the process of arranging small bowel PillCam for tomorrow.  -Orders written for GoLYTELY prep in anticipation of PillCam tomorrow.  -Clear liquid diet, n.p.o. after midnight for PillCam tomorrow.  -Okay to continue anticoagulation.  -Monitor hemoglobin hematocrit.  -Page out to discuss with admitting hospitalist team.    Benjie Gallego MD, FACG  [facility]  [phone]    ________________________________________________________________________      SUBJECTIVE:  Chart reviewed.  No active or recent bleeding.  Anticoagulation restarted.  Hemoglobin stable.  Patient will be here for observation on antiplatelet therapy over the next 24 to 36 hours.  Therefore, will plan for PillCam as outlined above.     OBJECTIVE:  BP (!) 144/54 (BP Location: Left arm)   Pulse 69   Temp 97.6  F (36.4  C) (Temporal)   Resp 18   Wt 80.9 kg (178 lb 6.4 oz)   LMP 2000   SpO2 97%   BMI 30.62 kg/m    Temp (24hrs), Av.3  F (36.3  C), Min:97.1  F (36.2  C), Max:97.4  F (36.3  C)    Patient Vitals for the past 72 hrs:   Weight   21 0612 80.9 kg (178 lb 6.4 oz)     No intake or output data in the 24 hours ending 21 1411     PHYSICAL EXAM  GEN: Alert, oriented x3, communicative and in NAD.    LYMPH: No LAD noted.  HRT: RRR  LUNGS: CTA  ABD: ND, +BS, no guarding or pain to palpation, no rebound, no HSM.  SKIN: No rash, jaundice or spider angiomata      Additional Data:  I have reviewed the patient's new clinical lab results:     Recent Labs   Lab Test 21  0558 21  0605  06/19/21  1749 06/19/21  0601 06/17/21  1804 06/17/21  1804 06/05/21  1354 06/05/21  1354 04/10/15  1023 04/10/15  1023   WBC 8.3 8.5  --  11.2*   < > 12.6*   < > 10.3   < > 11.6*   HGB 7.0* 7.0* 7.8* 7.7*   < > 5.3*   < > 8.5*   < > 15.5   MCV 85 85  --  84   < > 83   < > 79   < > 80   PLT 91* 73*  --  83*   < > 56*   < > 53*   < > 139*   INR  --   --   --   --   --  1.95*  --  1.80*  --  0.93    < > = values in this interval not displayed.     Recent Labs   Lab Test 06/21/21  0558 06/20/21  0605 06/19/21  0601   POTASSIUM 3.6 3.7 3.5   CHLORIDE 111* 110* 111*   CO2 25 25 25   BUN 25 33* 43*   ANIONGAP 6 6 5     Recent Labs   Lab Test 06/17/21  2305 06/17/21  1804 06/14/21  1750 06/04/21  0858 06/01/21  1227 06/01/21  1227 05/30/21  2355 05/30/21  2355 04/04/20  1340   ALBUMIN  --  2.5* 2.6* 2.6*   < > 2.5*   < >  --   --    BILITOTAL  --  0.2 0.2  --   --  0.3   < >  --   --    ALT  --  8 12  --   --  16   < >  --   --    AST  --  5 10  --   --  54*   < >  --   --    PROTEIN 100*  --   --   --   --   --   --  300* 300*    < > = values in this interval not displayed.

## 2021-06-21 NOTE — PLAN OF CARE
Sleeping comfortable throughout the night. VSS: afebrile. On remote Tele. Up independently in the room. No Epistaxis and BM overnight per Pt. Denies pain. B: 103.  Pill cam today.

## 2021-06-21 NOTE — PLAN OF CARE
A/Ox4. VSS. On remote tele, NSR. Up ad nicholas. Tolerated clears diet with no N/V. Blood sugars monitored. Voiding adequately. No stools this shift. Had x1 bloody nose. Managed by applying pressure, minimal drainage noted. Denies pain. Plan is GI workup with pill cam. Hemoglobin recheck in the morning.

## 2021-06-22 ENCOUNTER — TRANSFERRED RECORDS (OUTPATIENT)
Dept: HEALTH INFORMATION MANAGEMENT | Facility: CLINIC | Age: 61
End: 2021-06-22

## 2021-06-22 VITALS
SYSTOLIC BLOOD PRESSURE: 145 MMHG | HEART RATE: 70 BPM | BODY MASS INDEX: 30.64 KG/M2 | WEIGHT: 178.5 LBS | DIASTOLIC BLOOD PRESSURE: 56 MMHG | OXYGEN SATURATION: 98 % | TEMPERATURE: 97.7 F | RESPIRATION RATE: 16 BRPM

## 2021-06-22 LAB
ALBUMIN SERPL-MCNC: 2.6 G/DL (ref 3.4–5)
ALP SERPL-CCNC: 97 U/L (ref 40–150)
ALT SERPL W P-5'-P-CCNC: 13 U/L (ref 0–50)
ANION GAP SERPL CALCULATED.3IONS-SCNC: 6 MMOL/L (ref 3–14)
AST SERPL W P-5'-P-CCNC: 13 U/L (ref 0–45)
BILIRUB DIRECT SERPL-MCNC: <0.1 MG/DL (ref 0–0.2)
BILIRUB SERPL-MCNC: 0.3 MG/DL (ref 0.2–1.3)
BUN SERPL-MCNC: 17 MG/DL (ref 7–30)
CALCIUM SERPL-MCNC: 8.1 MG/DL (ref 8.5–10.1)
CHLORIDE SERPL-SCNC: 109 MMOL/L (ref 94–109)
CO2 SERPL-SCNC: 26 MMOL/L (ref 20–32)
CREAT SERPL-MCNC: 1.69 MG/DL (ref 0.52–1.04)
ERYTHROCYTE [DISTWIDTH] IN BLOOD BY AUTOMATED COUNT: 15.6 % (ref 10–15)
GFR SERPL CREATININE-BSD FRML MDRD: 33 ML/MIN/{1.73_M2}
GLUCOSE BLDC GLUCOMTR-MCNC: 110 MG/DL (ref 70–99)
GLUCOSE BLDC GLUCOMTR-MCNC: 113 MG/DL (ref 70–99)
GLUCOSE BLDC GLUCOMTR-MCNC: 116 MG/DL (ref 70–99)
GLUCOSE BLDC GLUCOMTR-MCNC: 90 MG/DL (ref 70–99)
GLUCOSE BLDC GLUCOMTR-MCNC: 98 MG/DL (ref 70–99)
GLUCOSE SERPL-MCNC: 82 MG/DL (ref 70–99)
HCT VFR BLD AUTO: 23.6 % (ref 35–47)
HGB BLD-MCNC: 7.4 G/DL (ref 11.7–15.7)
LAB SCANNED RESULT: NORMAL
MCH RBC QN AUTO: 26.9 PG (ref 26.5–33)
MCHC RBC AUTO-ENTMCNC: 31.4 G/DL (ref 31.5–36.5)
MCV RBC AUTO: 86 FL (ref 78–100)
PLATELET # BLD AUTO: 77 10E9/L (ref 150–450)
POTASSIUM SERPL-SCNC: 3.7 MMOL/L (ref 3.4–5.3)
PROT SERPL-MCNC: 6.2 G/DL (ref 6.8–8.8)
RBC # BLD AUTO: 2.75 10E12/L (ref 3.8–5.2)
SODIUM SERPL-SCNC: 141 MMOL/L (ref 133–144)
WBC # BLD AUTO: 8.5 10E9/L (ref 4–11)

## 2021-06-22 PROCEDURE — 85027 COMPLETE CBC AUTOMATED: CPT | Performed by: HOSPITALIST

## 2021-06-22 PROCEDURE — 250N000013 HC RX MED GY IP 250 OP 250 PS 637: Performed by: HOSPITALIST

## 2021-06-22 PROCEDURE — 99239 HOSP IP/OBS DSCHRG MGMT >30: CPT | Performed by: HOSPITALIST

## 2021-06-22 PROCEDURE — 999N001017 HC STATISTIC GLUCOSE BY METER IP

## 2021-06-22 PROCEDURE — 250N000013 HC RX MED GY IP 250 OP 250 PS 637: Performed by: INTERNAL MEDICINE

## 2021-06-22 PROCEDURE — 80076 HEPATIC FUNCTION PANEL: CPT | Performed by: HOSPITALIST

## 2021-06-22 PROCEDURE — 80048 BASIC METABOLIC PNL TOTAL CA: CPT | Performed by: HOSPITALIST

## 2021-06-22 PROCEDURE — 36415 COLL VENOUS BLD VENIPUNCTURE: CPT | Performed by: HOSPITALIST

## 2021-06-22 PROCEDURE — 99232 SBSQ HOSP IP/OBS MODERATE 35: CPT | Performed by: INTERNAL MEDICINE

## 2021-06-22 PROCEDURE — 0DJ07ZZ INSPECTION OF UPPER INTESTINAL TRACT, VIA NATURAL OR ARTIFICIAL OPENING: ICD-10-PCS | Performed by: INTERNAL MEDICINE

## 2021-06-22 RX ORDER — PANTOPRAZOLE SODIUM 40 MG/1
40 TABLET, DELAYED RELEASE ORAL DAILY
Qty: 30 TABLET | Refills: 0 | Status: SHIPPED | OUTPATIENT
Start: 2021-06-22 | End: 2021-06-28

## 2021-06-22 RX ADMIN — APIXABAN 2.5 MG: 2.5 TABLET, FILM COATED ORAL at 11:28

## 2021-06-22 RX ADMIN — CLOPIDOGREL BISULFATE 75 MG: 75 TABLET ORAL at 11:28

## 2021-06-22 RX ADMIN — CARVEDILOL 12.5 MG: 12.5 TABLET, FILM COATED ORAL at 11:28

## 2021-06-22 RX ADMIN — PANTOPRAZOLE SODIUM 40 MG: 40 TABLET, DELAYED RELEASE ORAL at 06:43

## 2021-06-22 RX ADMIN — LEVOTHYROXINE SODIUM 150 MCG: 0.15 TABLET ORAL at 06:43

## 2021-06-22 RX ADMIN — ATORVASTATIN CALCIUM 80 MG: 40 TABLET, FILM COATED ORAL at 11:29

## 2021-06-22 ASSESSMENT — ACTIVITIES OF DAILY LIVING (ADL)
ADLS_ACUITY_SCORE: 12
ADLS_ACUITY_SCORE: 12
ADLS_ACUITY_SCORE: 10

## 2021-06-22 NOTE — PLAN OF CARE
Pt A&Ox4  Vss, up indep in room, ambulating halls. Lungs clear. Cam box discontinued at 1545. Tolerated low fiber diet again and feels ok with no nausea or pain.Tolerated her clears and low residue diet. Blood sugars stable. Tele stable and discontinued. Cardiac following. Pt discharged at 1730 with her discharge medications and instructions along with all belongings. Questions answered and forms are signed and on chart. Support staff took her down via w/c.  picked her up.

## 2021-06-22 NOTE — PLAN OF CARE
Pt being discharged to home today. Vss, up indep in room, ambulating halls. Lungs clear. Video Pill started this morning, then the Cam Box to be discontinue at 1545, then she can try 1 more low fiber diet and make sure she feels fine. No stool for me today. Tolerated her clears and low residue diet so far. Blood sugars stable. Tele stable. Cardiac following. Plan is to discharge tonight.

## 2021-06-22 NOTE — PLAN OF CARE
A/Ox4. Up ad nicholas. VSS. On remote tele, NSR. Voiding adequately. Denies pain. Tolerated clears diet with no N/V. Blood sugars monitored. Gave Golytely at 1800. Pt initially had watery stools that were dark red/black, but then turned clear. NPO after midnight. Plan is Pillcam procedure in the morning.

## 2021-06-22 NOTE — DISCHARGE SUMMARY
Hospitalist Discharge Summary  [facility]    Rowan Leiva MRN# [id number]   YOB: 1960 Age: 61 year old     Date of Admission:  6/17/2021  Date of Discharge:  6/22/2021  Admitting Physician:  Pelon Saucedo DO  Discharge Physician:  Colby Lebron DO  Discharging Service:  Hospitalist     Primary Provider: Brian Ruiz          Discharge Diagnosis:     Acute GI bleeding  Acute blood loss anemia  Anemia of chronic disease  Recent embolic strokes  Recent diagnosis of bilateral DVT  Diabetes mellitus  Chronic kidney disease  Coronary artery disease with recent non-STEMI requiring drug-eluting stent x2 to the LAD  Hypothyroidism  Nicotine/tobacco use             Discharge Disposition:     Discharged to home           Allergies:     Allergies   Allergen Reactions     Heparin Heparin Induced Thrombocytopenia     HIT panel pending     No Known Drug Allergies               Discharge Medications:     Current Discharge Medication List      START taking these medications    Details   pantoprazole (PROTONIX) 40 MG EC tablet Take 1 tablet (40 mg) by mouth daily  Qty: 30 tablet, Refills: 0    Associated Diagnoses: Anemia due to blood loss, acute         CONTINUE these medications which have CHANGED    Details   apixaban ANTICOAGULANT (ELIQUIS) 2.5 MG tablet Take 1 tablet (2.5 mg) by mouth 2 times daily for 7 days  Qty: 14 tablet, Refills: 0    Associated Diagnoses: Cerebellar stroke, acute (H)         CONTINUE these medications which have NOT CHANGED    Details   ALBUTEROL 90 MCG/ACT IN AERS 1-2 puffs Q 2-4 hrs prn with spacer - HFA  Qty: 1, Refills: 1    Associated Diagnoses: Mild intermittent asthma      atorvastatin (LIPITOR) 40 MG tablet Take 1 tablet (40 mg) by mouth daily  Qty: 30 tablet, Refills: 0    Associated Diagnoses: NSTEMI (non-ST elevated myocardial infarction) (H)      carvedilol (COREG) 12.5 MG tablet Take 1 tablet (12.5 mg) by mouth 2 times daily  Qty: 60  tablet, Refills: 0    Associated Diagnoses: NSTEMI (non-ST elevated myocardial infarction) (H)      clopidogrel (PLAVIX) 75 MG tablet Take 1 tablet (75 mg) by mouth daily  Qty: 30 tablet, Refills: 0    Associated Diagnoses: NSTEMI (non-ST elevated myocardial infarction) (H)      ferrous sulfate 44 Fe mg/mL ELIX Take 10 mL by mouth mixed in 1/3 of a glass of orange juice (to enhance absorption) 30 minutes before breakfast, every other day.  Qty: 473 mL, Refills: 3      insulin glargine (LANTUS PEN) 100 UNIT/ML pen Inject 12 Units Subcutaneous At Bedtime  Qty: 15 mL, Refills: 1    Comments: If Lantus is not covered by insurance, may substitute Basaglar at same dose and frequency.    Associated Diagnoses: Type 2 diabetes mellitus with hyperglycemia, with long-term current use of insulin (H)      levothyroxine (SYNTHROID, LEVOTHROID) 50 MCG tablet Take 150 mcg by mouth every morning (before breakfast)       nicotine (NICODERM CQ) 21 MG/24HR 24 hr patch Place 1 patch onto the skin daily  Qty: 15 patch, Refills: 0    Associated Diagnoses: Tobacco abuse      nitroGLYcerin (NITROSTAT) 0.4 MG sublingual tablet For chest pain place 1 tablet under the tongue every 5 minutes for 3 doses. If symptoms persist 5 minutes after 1st dose call 911.  Qty: 30 tablet, Refills: 0    Associated Diagnoses: Coronary artery disease involving native coronary artery of native heart without angina pectoris      LANCETS REGULAR MISC use twice a day for blood sugar  Qty: 2 boxes, Refills: 0                    Condition on Discharge:     Discharge condition: Stable   Discharge vitals: Blood pressure 136/44, pulse 73, temperature 96.5  F (35.8  C), temperature source Oral, resp. rate 16, weight 81 kg (178 lb 8 oz), last menstrual period 05/01/2000, SpO2 97 %, not currently breastfeeding.   Code status on discharge: Full Code      BASIC PHYSICAL EXAMINATION:  GENERAL: No apparent distress.  CARDIOVASCULAR: Regular rate and rhythm without  murmurs.  PULMONARY: Clear to auscultation bilaterally.   GASTROINTESTINAL: Abdomen soft, non-tender.  EXTREMITIES: No edema, pulses intact.  NEUROLOGIC: No focal deficits.            History of Illness:   See detailed admission note for full details.               Procedures excluding imaging which is summarized below:     Please see details in the electronic medical record.           Consultations:     CARDIOLOGY IP CONSULT  GASTROENTEROLOGY IP CONSULT  CARE MANAGEMENT / SOCIAL WORK IP CONSULT          Significant Results:     Results for orders placed or performed during the hospital encounter of 06/05/21   CT Head w/o Contrast    Narrative    CT SCAN OF THE HEAD WITHOUT CONTRAST   6/5/2021 2:16 PM     HISTORY: Code stroke. Blurred vision. Headache.    TECHNIQUE:  Axial images of the head and coronal reformations without  IV contrast material. Radiation dose for this scan was reduced using  automated exposure control, adjustment of the mA and/or kV according  to patient size, or iterative reconstruction technique.    COMPARISON: MRI of the brain dated 11/4/2012.    FINDINGS: There is no evidence of intracranial hemorrhage, mass, acute  infarct or anomaly. Cavum septum pellucidum et verge, a normal  variant, unchanged. The ventricles are within normal limits in size  and configuration. Mild diffuse parenchymal volume loss. Mild patchy  periventricular white matter hypodensities which are nonspecific, but  likely related to chronic microvascular ischemic disease. Intracranial  atherosclerotic calcifications primarily involving the carotid  siphons.    Partially visualized presumed mucus retention cyst in the left  maxillary sinus. Visualized aspects of the mastoid and middle ear  cavities are clear. The bony calvarium and bones of the skull base  appear intact.       Impression    IMPRESSION:   1. No CT findings of acute intracranial abnormality. Aspect score =  10.  2. Mild generalized brain atrophy and  redemonstrated nonspecific  scattered foci of hypoattenuation in the cerebral white matter,  presumably representing chronic small vessel ischemic disease.    The findings were discussed with Dr. Pepper by myself at approximately  2:25 PM on 6/5/2021.    SEJAL DE LEON MD   MR Brain w/o Contrast    Narrative    MRI BRAIN WITHOUT CONTRAST  6/5/2021 2:57 PM    HISTORY:  Blurry vision.    TECHNIQUE:  Multiplanar, multisequence MRI of the brain without  gadolinium IV contrast material.      COMPARISON:  CT head dated 6/5/2021. Brain MRI 11/4/2012.    FINDINGS: There are several scattered foci of restricted diffusion  with corresponding T2 FLAIR hyperintense signal, consistent with  acute-to-subacute ischemic infarcts. These lesions are seen involving  the cortex/subcortical white matter of the left frontal lobe, left  centrum semiovale, right parietal white matter, right periatrial white  matter, right occipital temporal junction, right occipital  cortex/subcortical white matter, left inferolateral occipital lobe,  and lateral right cerebellar hemisphere. There is no associated  intracranial hemorrhage or mass effect.     Cavum septum pellucidum et verge, a normal variant. The ventricles are  normal in size and configuration. Moderately extensive scattered  nonspecific T2 FLAIR hyperintense signal changes in the subcortical  and deep cerebral white matter, most likely due to chronic small  vessel ischemic disease. Mild generalized brain parenchymal volume  loss.    The orbits appear normal. There is a retention cyst in the left  maxillary sinus measuring up to approximately 3.5 cm in axial plane.  Mild mucosal thickening in the alveolar recess of the right maxillary  sinus. The calvarium, skull base, and midface are otherwise grossly  unremarkable. The major arterial flow voids at the skull base are  grossly maintained.      Impression    IMPRESSION:  1. Several scattered acute-to-subacute ischemic infarcts involving  the  supratentorial brain and right cerebellar hemisphere, as described.  The pattern raises concern for embolic disease from a central source.  2. No intracranial hemorrhage or mass effect.  3. Brain atrophy and presumed chronic small vessel ischemic changes.    SEJAL DE LEON MD   US Lower Extremity Venous Duplex Right    Narrative    EXAM: US LOWER EXTREMITY VENOUS DUPLEX RIGHT  LOCATION: [facility]  DATE/TIME: 6/6/2021 12:34 AM    INDICATION: ; swelling of right knee;  COMPARISON: 05/31/2021.  TECHNIQUE: Venous Duplex ultrasound of the right lower extremity with and without compression, augmentation and duplex. Color flow and spectral Doppler with waveform analysis performed.    FINDINGS: Exam includes the common femoral, femoral, popliteal, and contralateral common femoral veins as well as segmentally visualized deep calf veins and greater saphenous vein.     RIGHT: No deep vein thrombosis. No superficial thrombophlebitis. No popliteal cyst.      Impression    IMPRESSION:  1.  No deep venous thrombosis in the right lower extremity.   MRA Brain ([place]) wo Contrast    Narrative    MR ANGIOGRAM OF THE HEAD WITHOUT CONTRAST   6/6/2021 9:18 AM     HISTORY: Stroke, follow-up; embolic-appearing infarcts.    TECHNIQUE:  3D time-of-flight MR angiogram of the head without  contrast.    COMPARISON: Brain MRA 11/4/2012.    FINDINGS: Motion artifact. The vertebral arteries, basilar artery, and  posterior cerebral arteries are patent. The internal carotid arteries,  anterior cerebral arteries, and middle cerebral arteries are patent.  Apparent moderate narrowing of the bilateral internal carotid arteries  at the bilateral supraclinoid segments, apparent moderate narrowing of  the right internal carotid artery at the paraclinoid segment, and  apparent mild narrowing of the left internal carotid artery at the  paraclinoid segment, more conspicuous compared to 11/4/2012. Poor  visualization of the left  proximal A1 segment, presumably artifactual,  slightly more conspicuous compared to the prior exam. No convincing  evidence of aneurysm; however, evaluation is limited due to motion  artifact. Small area of enhancement along the medial aspect of the  left internal carotid artery at the cavernous segment, presumably  artifactual or normal vascular structure.    Opacification of the left maxillary sinus.      Impression    IMPRESSION:  Apparent narrowings of the bilateral internal carotid  arteries at the paraclinoid and supraclinoid segments which could be  artifactual related to a combination of motion artifact and vascular  calcifications, more conspicuous compared to 11/4/2012. CTA could be  performed for further characterization if indicated.      GUERO RIDDLE MD   MRA Neck (Carotids) wo Contrast    Narrative    MRA NECK WITHOUT CONTRAST  6/6/2021 9:18 AM     HISTORY: Neuro deficit, acute, stroke suspected. Embolic-appearing  infarcts.    TECHNIQUE: MRA of the neck without contrast. Estimates of carotid  stenoses are made relative to the distal internal carotid artery  diameters except as noted.    COMPARISON: None.    FINDINGS:  Poor visualization of the great vessel origins and  vertebral artery origins, presumably technical related to a  combination of motion artifact and lack of intravenous contrast. The  bilateral common carotid, internal carotid, external carotid, and  vertebral arteries are patent. No evidence of large vessel occlusion  or high-grade stenosis. No evidence of dissection. Mild plaquing at  the bilateral carotid bifurcations.      Impression    IMPRESSION:   1. No evidence of large vessel occlusion, high-grade stenosis, or  dissection.  2. Mild plaque at the bilateral carotid bifurcations.    GUERO RIDDLE MD   CT Chest Abdomen Pelvis w/o Contrast    Narrative    CT CHEST, ABDOMEN, PELVIS WITHOUT CONTRAST  6/6/2021 3:25 PM    HISTORY:  New stroke, DVT, evaluate for underlying  malignancy.    TECHNIQUE: Scans obtained of the chest, abdomen, and pelvis without IV  contrast.   Radiation dose for this scan was reduced using automated exposure  control, adjustment of the mA and/or kV according to patient size, or  iterative reconstruction technique.    COMPARISON: CT abdomen pelvis dated 5/31/2021. Chest x-rays dated  6/1/2021.    FINDINGS:   LUNGS/PLEURA: There is a subtle diffuse centrilobular micronodular  infiltrative process in the bilateral lungs with multiple  centrilobular groundglass densities as can be sometimes seen in  hypersensitivity pneumonitis as well as infectious or inflammatory  processes. There is also mild prominence of the soft tissues in the  peribronchial vascular regions and also possibly associated with an  infectious or inflammatory process. Nodularity of the lungs measures  up to approximately 0.5 cm (posterior right upper lung lobe laterally  on image 120 series 5). Although most measure less than 0.2 cm. There  are small bilateral pleural fluid collections and associated  compressive atelectasis in the posterior bilateral lungs.    MEDIASTINUM/AXILLAE: Heart is grossly normal in size. Extensive  coronary artery calcifications and/or stents are noted. There is a  small pericardial effusion. Pretracheal lymph node measures up to 1.1  cm in short axis. There are minimally prominent AP window lymph nodes  measuring up to 0.6 cm. No other evidence for mediastinal  lymphadenopathy seen. No definite axillary or hilar lymphadenopathy is  identified. Visualized portions of the thyroid are unremarkable.  Thyroid is not well-seen on this study.    HEPATOBILIARY: The patient is status post cholecystectomy. The liver  is grossly normal in appearance for a noncontrast CT. No biliary  ductal dilatation or choledocholithiasis is seen.    PANCREAS: Normal.    SPLEEN: Normal.    ADRENAL GLANDS: Normal.    KIDNEYS/URINARY BLADDER: Tiny nonobstructive bilateral intrarenal  calculi  measure up to 0.2 cm. No hydronephrosis, hydroureter, or  ureteral calculus is identified. Kidneys are otherwise normal in  appearance for a noncontrast CT. Urinary bladder is unremarkable.    BOWEL: The colon contains contrast material. Colon is of normal  caliber without pericolonic inflammatory change to suggest acute  diverticulitis. No evidence for colonic obstruction. Appendix is  normal in appearance. Small bowel is of normal caliber and appearance.  Stomach contains a moderate amount of ingested material and a small  amount of air. Otherwise unremarkable.    PELVIC ORGANS: Uterus is absent, consistent with surgical history.  Ovaries are not seen and are likely are also surgically absent.    OTHER: Extensive atherosclerosis is noted. No evidence for aneurysm is  seen. There is a small amount of free fluid in the pelvis of uncertain  clinical significance and etiology. No intraperitoneal or  retroperitoneal lymphadenopathy is identified.    MUSCULOSKELETAL: There are degenerative changes mostly in the facet  joints of the lower lumbar spine. Grade 1 anterolisthesis of L4 and L5  appears degenerative in etiology. No aggressive osseous lesions or  acute osseous fractures are seen. There are strandy densities in the  subcutaneous adipose tissues of the lower abdominal wall likely  representing blood thinner injection sites.      Impression    IMPRESSION:  1. Micronodular infiltrative process in the bilateral lungs with  centrilobular groundglass nodular densities and mild peribronchial  soft tissue prominence could represent hypersensitivity pneumonitis or  other inflammatory or infectious process or less likely neoplastic  process. Other nodules in the lungs measure up to 0.5 cm. I recommend  appropriate clinical treatment and follow-up CT chest.  2. Small bilateral pleural fluid collections.  3. Extensive atherosclerosis including extensive coronary artery  calcifications and or stents.  4. Small pericardial  effusion.  5. Nonobstructive bilateral intrarenal calculi.  6. No definite primary malignancy is identified.  7. Small amount of free fluid in the pelvis is of uncertain clinical  significance and etiology.  8. Evaluation of the solid organs of the chest, abdomen and pelvis is  limited due to lack of IV contrast.    TRU TYLER MD   Echocardiogram Limited    Narrative    [id number]  [id number]  [id number]  [id number]^TESHA^SCHUYLER^JUANITA     [facility]  Echocardiography Laboratory  [address] [place] [id number]     Name: EDVIN MERLOS  MRN: [id number]  : 1960  Study Date: 2021 10:29 AM  Age: 61 yrs  Gender: Female  Patient Location: [facility]  Reason For Study: Cerebrovascular Incident  Ordering Physician: SCHUYLER PARKINSON  Referring Physician: ROXANE [facility]  Performed By: Catrina Hollingsworth     BSA: 1.9 m2  Height: 64 in  Weight: 185 lb  HR: 72  BP: 168/63 mmHg  ______________________________________________________________________________  Procedure  Limited Portable Bubble Echo Adult. Optison (NDC #4114-7704) given  intravenously.  ______________________________________________________________________________  Interpretation Summary     The visual ejection fraction is estimated at 50-55%. There is moderate to  severe distal anterior and apical wall hypokinesis.  The right ventricle is normal in size and function.  A contrast injection (Bubble Study) was performed that was negative for flow  across the interatrial septum.  Trivial pericardial effusion also noted previously on 2021.  Moderate posterior mitral annular calcification. Mild MR.     Technically difficult study despite contrast use. Limited echo.  ______________________________________________________________________________  Left Ventricle  The left ventricle is normal in size. There is mild concentric left  ventricular hypertrophy. The visual ejection fraction is estimated at 50-55%.  There is moderate  to severe apical wall hypokinesis.     Right Ventricle  The right ventricle is normal in size and function.     Atria  A contrast injection (Bubble Study) was performed that was negative for flow  across the interatrial septum.     Mitral Valve  Thickened mitral valve posterior leaflet. There is moderate mitral annular  calcification. There is mild (1+) mitral regurgitation.     Aortic Valve  There is trace aortic regurgitation. No aortic stenosis is present.     Pulmonic Valve  The pulmonic valve is not well visualized.     Vessels  The inferior vena cava was normal in size with preserved respiratory  variability.     Pericardium  Trivial pericardial effusion.     ______________________________________________________________________________  MMode/2D Measurements & Calculations  IVSd: 1.2 cm  LVIDd: 4.2 cm  LVIDs: 2.9 cm  LVPWd: 1.4 cm  FS: 31.2 %  LV mass(C)d: 204.4 grams  LV mass(C)dI: 108.0 grams/m2  RWT: 0.66     ______________________________________________________________________________  Report approved by: Bebe Land 06/06/2021 12:35 PM               Transthoracic Echocardiogram Results:  No results found for this or any previous visit (from the past 4320 hour(s)).             Pending Results:     Unresulted Labs Ordered in the Past 30 Days of this Admission     Date and Time Order Name Status Description    6/1/2021 1230 Partial thromboplastin time In process                       Discharge Instructions and Follow-Up:     Discharge instructions and follow-up:   Discharge Procedure Orders   Medication Therapy Management Referral   Referral Priority: Routine Referral Type: Med Therapy Management   Requested Specialty: Pharmacist   Number of Visits Requested: 1     Care Coordination Referral   Referral Priority: Routine Referral Type: Care Coordination   Number of Visits Requested: 1     Follow-up and recommended labs and tests    Order Comments: Follow up with primary care provider, Brian SHARMA  Joseph, within 7 days to evaluate medication change and for hospital follow- up.  The following labs/tests are recommended: BMP, CBC.  Follow-up with gastroenterology to review your capsule enteroscopy studies.    Apixaban 2.5 mg twice daily for the next 7 days, then return to your prior dosing of 5 mg oral twice daily thereafter.  If further lightheadedness, dizziness, shortness of breath, chest pain, or change in stools (black or red/bloody) you need to return to the emergency department immediately.     Activity   Order Comments: Your activity upon discharge: activity as tolerated     Order Specific Question Answer Comments   Is discharge order? Yes      Full Code     Order Specific Question Answer Comments   Code status determined by: Discussion with patient/ legal decision maker      Diet   Order Comments: Follow this diet upon discharge: Low Fiber Diet     Order Specific Question Answer Comments   Is discharge order? Yes              Hospital Course:     Rowan Leiva is a 61 year old female with a history of  ischemic cardiomyopathy, thrombocytopenia, chronic kidney disease stage III, hypothyroidism, diabetes mellitus type 2 on insulin, recent hospitalization from 5/30-6/4 for bilateral DVT/PE, non-ST elevation MI status post ELLIOT x2 to LAD, questionable concern for HIT at that time which was ruled out was sent home on Eliquis and Plavix.      Unfortunately she was readmitted the next day, for complaints of blurred vision which MRI subsequently revealed multiple acute to subacute ischemic infarct concern for embolic stroke in the setting of cardiac catheterization and recent stent placement.  This was discussed with neurology, hematology and cardiology and they all agreed on discharging home with Eliquis twice daily and Plavix.     Patient had been doing well until this past Monday when she went to her PCP for post hospitalization visit and was found to have a hemoglobin of 6.3 from 8.4.  She had no  complaints of GI bleed at the time.  She was sent to emergency room and received 1 unit packed red blood cell along with iron infusion and outpatient hematology follow-up.  Unfortunately patient started having melanotic stool which continued.  She was also complaining lightheadedness and dizziness that prompted her to return to the hospital.     Work emergency room revealed declining hemoglobin at 5.4 from 6.5 three days PTA.  Fortunately her vital signs were stable with heart rates in the 70s and blood pressures in the 140s.  However she was complaining of lightheaded and dizziness.  She was started on 1 unit packed red blood cell was recommended for admission to the hospital.     She ultimately received 3 units of packed red blood cells.  She had dark tarry stools concerning for GI bleeding.  EGD and colonoscopy were completed which did not show the source of bleeding.  Had small bowel enteroscopy per GI this morning, monitoring will and this afternoon.  Cardiology consulted given history of recent drug-eluting stent and DVT/PE.  Her clopidogrel and Eliquis were held initially due to active bleeding.   Now restarted clopidogrel and half dose of Eliquis at 2.5 mg twice daily yesterday.  Hemoglobin stable and no clinical indication of recurrent bleeding.  Patient feels well with stable hemodynamics and appears suitable for discharge home later today.  She will continue her clopidogrel and half dose of Plavix for 1 week and then return to previous dosing of 5 mg twice daily.  She will follow up in the gastroenterology clinic for results of her PillCam.     Problem List:   1. Acute GI bleeding: EGD and colonoscopy do not show the source of bleeding however there was a old blood noted.  GI following.  Perform capsule enteroscopy today which can be discontinued this afternoon.  Will be formally interpreted in the outpatient setting and she can follow-up in clinic for results.  Resumed Eliquis and clopidogrel yesterday  morning with no evidence of recurrent bleeding.  2. Non-STEMI status post recent drug-eluting stent x2 to the LAD: High risk for in-stent event with interruption of antiplatelet medication.  Discussed with patient and family today and risk of in-stent event appears higher than risks associated with bleeding.  She does appear to to have reached hemostasis.  Restarted clopidogrel and half dose of Eliquis yesterday.  EF 50 to 55% with severe distal anterior and apical wall hypokinesis.  No chest pain or shortness of breath.  Continue carvedilol 12.5 mg twice daily as well as atorvastatin.  Cardiology consultation appreciated.  3. Acute blood loss anemia on chronic anemia: Has received 3 units of packed red blood cells.  Hemoglobin 7.4 which is stable and rising.  Assume bleeding has ceased.  4. Recent embolic strokes: MRI showed multiple acute and subacute ischemic infarcts.  Seen by neurology during her previous hospitalization.  Thought is that this is related to recent cardiac catheterization.  JOSEPHINE bubble negative.  Needs to continue outpatient Holter monitor.  Recommendation for ongoing clopidogrel and Eliquis.  5. Recent diagnosis of bilateral DVT: Prior to admission was on Eliquis.  Resumed half dosing for 1 week and then return to prior dosing of 5 mg twice daily.  Monitor closely for bleeding.  6. Diabetes mellitus: Hemoglobin A1c 7.9.  Continue medium sliding scale insulin and Lantus 6 units nightly.  Glucose well controlled.  7. Chronic kidney disease: Creatinine at baseline, 1.8-2.0.  8. Hypothyroidism: Resumed Synthroid.  9. Nicotine abuse: Nicotine replacement as needed.  Cessation counseling performed.    The patient was seen, examined, and counseled on this day. The hospitalization and plan of care was reviewed with the patient extensively. All questions were addressed and the patient agreed to follow-up as noted above.      Total time spent in face to face contact with the patient and coordinating  discharge was:  45 Minutes    Colby Lebron DO MPH  [facility] Hospitalist  [address] [place] [address]  06/22/2021

## 2021-06-22 NOTE — PLAN OF CARE
A/Ox4. Up ad nicholas. VSS. On remote tele, NSR. Voiding adequately. Denies pain. Blood sugars 116 & 98 overnight. Pt continues to have watery clear stools overnight. NPO initiated at 0000. Pillcam procedure planned for later this AM.

## 2021-06-22 NOTE — PROGRESS NOTES
"  [facility]  Cardiology Progress Note    Outpatient cardiologist:  Was Dr. Michaels     Date of Service (when I saw the patient): 06/22/2021    Summary: Rowan Leiva is a 61 year old female with history of CAD, ischemic cardiomyopathy, DM, HTN, CKD, hypothyroidism, DVT with suspected PE.     She was recently admitted at [facility] 5/30-6/4/21 with DVTs and suspected PE (VQ scan), also NSTEMI (peak trop13) s/p ELLIOT of dLAD. Patent overlapping stents in the mid-distal LAD with mild ISR and patent stent in the proximal RCA with moderate ISR.  Discharged home on Eliquis and Plavix.     Admitted 6/5/21 with multiple CVAs.     6/14/21 seen in ED for acute anemia.  Given transfusion.  Discharged with plan to follow up with hematology.     Admitted on 6/17/2021 with acute anemia, ongoing melena.       Interval History   Tele: No afib    No current melena   No chest pain      Assessment & Plan   CAD  - recent NSTEMI and 5/31/21 s/p long small ELLIOT to dLAD, severely diseased and small caliber RCA  - Echo 6/6/21: LVEF 50-55%, moderate to severe distal anterior and apical wall HK  - now with acute anemia, have been holding Eliquis and Plavix.  Per  Ip: \"If a definitive treatment of bleeding can be achieved with good hemostasis, may try clopidogrel plus reduced dose Eliquis and increase to full-strength Eliquis if tolerated without recurrent bleeding after 1 week.\"    Per GI, resumed Plavix  - Coreg 12.5 mg BID  - Atorvastatin 40 mg increased to 80 mg  - SL nitroglycerin prn  - Cardiac rehab      Acute anemia  Suspected upper GI bleed  Thrombocytopenia  - EGD and colonoscopy have not revealed a source of bleeding, though blood is noted in the colon consistent with small bowel bleed.  Will need capsule enteroscopy.  Scheduled for 6/22/2021  - Resumed Plavix and Eliquis is ordered 2.5 mg BID for today  - Will be seeing hematology in follow up      CVA  - Likely cardioembolic in origin.  JOSEPHINE " negative bubble study.  MRA without evidence of large vessel occlusion, high-grade stenosis or dissection.    No afib documented.  Would have her wear an event monitor at discharge if no afib seen on tele during admission.    Neuro questions if related to recent cath.   - Resumed Plavix and Eliquis is ordered to restart at lower dose today      Recent DVT and likely PE (VQ scan)  - Also with thrombocytopenia with negative HIT eval  - Now back on Plavix for CAD and ?CVA  - Eliquis is ordered to restart at lower dose today  - Heme as outpatient is planned      DM      HTN      CKD      Follow up:  Currently has follow up scheduled 6/29/21  Will need FLP/ALT in about 6 weeks        Mateo French PA-C      Patient Active Problem List   Diagnosis     Malignant neoplasm of thyroid gland (H)     Personal history of malignant neoplasm of ovary     Diabetes mellitus, type 2 (H)     Tobacco use disorder     Mixed hyperlipidemia     Mild intermittent asthma     Thrombocytopenia (H)     Advanced directives, counseling/discussion     Hyperlipidemia     Coronary artery disease     Myocardial infarction (H)     Hypertension     Hypothyroidism     Type 2 diabetes mellitus with hyperglycemia, with long-term current use of insulin (H)     NSTEMI (non-ST elevated myocardial infarction) (H)     Blurred vision     Cerebellar stroke, acute (H)     Anemia     GI bleed     Chronic renal insufficiency     Melena     Anemia due to blood loss, acute     Colonic hemorrhage       Physical Exam   Temp: 96.5  F (35.8  C) Temp src: Oral BP: 136/44 Pulse: 73   Resp: 16 SpO2: 97 % O2 Device: None (Room air)    Vitals:    06/19/21 0612 06/21/21 1743 06/22/21 0128   Weight: 80.9 kg (178 lb 6.4 oz) 81.1 kg (178 lb 12.8 oz) 81 kg (178 lb 8 oz)     Vital Signs with Ranges  Temp:  [96.5  F (35.8  C)-97.5  F (36.4  C)] 96.5  F (35.8  C)  Pulse:  [66-73] 73  Resp:  [16-18] 16  BP: (124-168)/(41-67) 136/44  SpO2:  [97 %-99 %] 97 %  I/O last 3 completed  shifts:  In: 1129 [P.O.:1129]  Out: -     Constitutional: NAD.   Respiratory: CTAB.   Cardiovascular: RRR, s1s2, no sig murmur  GI: soft, BS+  Skin: warm, no rashes  Musculoskeletal: Moving all extremities  Neurologic: Alert, oriented x 3  Neuropsychiatric: Normal affect       Data   Recent Labs   Lab 06/22/21  0804 06/21/21  0558 06/20/21  0605 06/17/21  1804 06/17/21  1804   WBC 8.5 8.3 8.5   < > 12.6*   HGB 7.4* 7.0* 7.0*   < > 5.3*   MCV 86 85 85   < > 83   PLT 77* 91* 73*   < > 56*   INR  --   --   --   --  1.95*    142 141   < > 134   POTASSIUM 3.7 3.6 3.7   < > 4.2   CHLORIDE 109 111* 110*   < > 107   CO2 26 25 25   < > 20   BUN 17 25 33*   < > 56*   CR 1.69* 1.74* 1.75*   < > 1.98*   ANIONGAP 6 6 6   < > 7   BERNY 8.1* 8.2* 7.8*   < > 7.9*   GLC 82 94 106*   < > 226*   ALBUMIN 2.6*  --   --   --  2.5*   PROTTOTAL 6.2*  --   --   --  6.4*   BILITOTAL 0.3  --   --   --  0.2   ALKPHOS 97  --   --   --  96   ALT 13  --   --   --  8   AST 13  --   --   --  5   TROPI  --   --   --   --  0.038    < > = values in this interval not displayed.     No results found for this or any previous visit (from the past 24 hour(s)).    Medications     - MEDICATION INSTRUCTIONS -       - MEDICATION INSTRUCTIONS -         apixaban ANTICOAGULANT  2.5 mg Oral BID     atorvastatin  80 mg Oral Daily     carvedilol  12.5 mg Oral BID     clopidogrel  75 mg Oral Daily     insulin aspart  1-7 Units Subcutaneous TID AC     insulin aspart  1-5 Units Subcutaneous At Bedtime     insulin glargine  6 Units Subcutaneous At Bedtime     levothyroxine  150 mcg Oral QAM AC     nicotine  1 patch Transdermal Daily     nicotine   Transdermal Q8H     pantoprazole  40 mg Oral BID AC

## 2021-06-22 NOTE — PROGRESS NOTES
GI FOLLOWUP WITH CAPSULE PLACEMENT        -Small bowel video study in progress    -Capsule placed at 7:45am (this is an 8 hour study).  -Resume morning medications at 11:45 am. If more immediate medications are needed, these could be given when starting clears.    -NPO until 9:45am - start clear liquids. At 11:45am, start light meal.  -Encourage ambulation to help facilitate capsule movement.  -Please remove equipment at 3:45pm and turn off machine.   To turn off machine, hold down small black rubber button on left panel of machine for 5 seconds.  -Capsule should pass in 24-48 hours, no need to retrieve.  -NO MRI UNTIL FOR 3 DAYS.   -If machine light on upper right corner blinks red (should be blue), please call our office at [phone].     --If endoscopy has staff, capsule equipment can be sent down, otherwise leave at desk if patient discharges after the exam. A staff member from our office will pick it up tomorrow. Our office will relay results of the test to the patient.    --Ok from GI standpoint to discharge after the exam as long as no evidence of ongoing bleeding. Ok to continue anticoagulation/antiplatelet therapy. This was reviewed with Dr. Gallego.     Maddi Martines, [facility] ([facility])

## 2021-06-23 ENCOUNTER — TRANSFERRED RECORDS (OUTPATIENT)
Dept: HEALTH INFORMATION MANAGEMENT | Facility: CLINIC | Age: 61
End: 2021-06-23

## 2021-06-23 ENCOUNTER — PATIENT OUTREACH (OUTPATIENT)
Dept: NURSING | Facility: CLINIC | Age: 61
End: 2021-06-23
Attending: INTERNAL MEDICINE
Payer: COMMERCIAL

## 2021-06-23 ENCOUNTER — TELEPHONE (OUTPATIENT)
Dept: INTERNAL MEDICINE | Facility: CLINIC | Age: 61
End: 2021-06-23

## 2021-06-23 ENCOUNTER — TELEPHONE (OUTPATIENT)
Dept: CARDIOLOGY | Facility: CLINIC | Age: 61
End: 2021-06-23

## 2021-06-23 ASSESSMENT — ACTIVITIES OF DAILY LIVING (ADL): DEPENDENT_IADLS:: INDEPENDENT

## 2021-06-23 NOTE — TELEPHONE ENCOUNTER
IP F/U    Date: 6/22/21  Diagnosis: Melena, Anemia  Is patient active in care coordination? No  Was patient in TCU? No    Next 5 appointments (look out 90 days)    Aug 04, 2021 11:00 AM  Telephone Visit with [facility]  [facility] ([facility] ) [address] [address] [phone]        Patient has been contacted by care coordination

## 2021-06-23 NOTE — TELEPHONE ENCOUNTER
"Patient was evaluated by cardiology while inpatient for anemia/ACC/antiplatelet recommendation. Called patient to discuss any post hospital d/c questions she may have, review medication changes, and confirm f/u appts. Patient denied any questions regarding new medications or changes to PTA medications. Patient denied any SOB, chest pain, or light headedness. RN confirmed with patient that she has an apt scheduled on 6/29/21 with EDUARD Ebony. Patient advised to call clinic with any cardiac related questions or concerns prior to this eduard't. Patient verbalized understanding and agreed with plan.           6/22/21 cardiology progress note  Summary: Rowan Leiva is a 61 year old female with history of CAD, ischemic cardiomyopathy, DM, HTN, CKD, hypothyroidism, DVT with suspected PE.      She was recently admitted at [facility] 5/30-6/4/21 with DVTs and suspected PE (VQ scan), also NSTEMI (peak trop13) s/p ELLIOT of dLAD. Patent overlapping stents in the mid-distal LAD with mild ISR and patent stent in the proximal RCA with moderate ISR.  Discharged home on Eliquis and Plavix.      Admitted 6/5/21 with multiple CVAs.      6/14/21 seen in ED for acute anemia.  Given transfusion.  Discharged with plan to follow up with hematology.      Admitted on 6/17/2021 with acute anemia, ongoing melena.            Interval History     Tele: No afib     No current melena   No chest pain              Assessment & Plan     CAD  - recent NSTEMI and 5/31/21 s/p long small ELLIOT to dLAD, severely diseased and small caliber RCA  - Echo 6/6/21: LVEF 50-55%, moderate to severe distal anterior and apical wall HK  - now with acute anemia, have been holding Eliquis and Plavix.  Per  Ip: \"If a definitive treatment of bleeding can be achieved with good hemostasis, may try clopidogrel plus reduced dose Eliquis and increase to full-strength Eliquis if tolerated without recurrent bleeding after 1 week.\"    Per GI, resumed Plavix  - Coreg 12.5 mg " BID  - Atorvastatin 40 mg increased to 80 mg  - SL nitroglycerin prn  - Cardiac rehab        Acute anemia  Suspected upper GI bleed  Thrombocytopenia  - EGD and colonoscopy have not revealed a source of bleeding, though blood is noted in the colon consistent with small bowel bleed.  Will need capsule enteroscopy.  Scheduled for 6/22/2021  - Resumed Plavix and Eliquis is ordered 2.5 mg BID for today  - Will be seeing hematology in follow up        CVA  - Likely cardioembolic in origin.  JOSEPHINE negative bubble study.  MRA without evidence of large vessel occlusion, high-grade stenosis or dissection.    No afib documented.  Would have her wear an event monitor at discharge if no afib seen on tele during admission.    Neuro questions if related to recent cath.   - Resumed Plavix and Eliquis is ordered to restart at lower dose today        Recent DVT and likely PE (VQ scan)  - Also with thrombocytopenia with negative HIT eval  - Now back on Plavix for CAD and ?CVA  - Eliquis is ordered to restart at lower dose today  - Heme as outpatient is planned        DM        HTN        CKD        Follow up:  Currently has follow up scheduled 6/29/21  Will need FLP/ALT in about 6 weeks           Mateo French PA-C

## 2021-06-23 NOTE — PROGRESS NOTES
Clinic Care Coordination Contact    Clinic Care Coordination Contact  OUTREACH    Referral Information:  Referral Source: IP Report    Primary Diagnosis: GI Disorders    Chief Complaint   Patient presents with     Clinic Care Coordination - Initial        Universal Utilization: [facility] 6/17 - 6/22/21 Acute GI bleeding  Acute blood loss anemia  Anemia of chronic disease  Recent embolic strokes  Recent diagnosis of bilateral DVT  Diabetes mellitus  Chronic kidney disease  Coronary artery disease with recent non-STEMI requiring drug-eluting stent x2 to the LAD  Hypothyroidism  Nicotine/tobacco use  Clinic Utilization  Difficulty keeping appointments:: No  Compliance Concerns: Yes  No-Show Concerns: No  No PCP office visit in Past Year: Yes  Utilization    Last refreshed: 6/23/2021 10:49 AM: Hospital Admissions 3           Last refreshed: 6/23/2021 10:49 AM: ED Visits 4           Last refreshed: 6/23/2021 10:49 AM: No Show Count (past year) 0              Current as of: 6/23/2021 10:49 AM              Clinical Concerns:  Current Medical Concerns:    Patient Active Problem List   Diagnosis     Malignant neoplasm of thyroid gland (H)     Personal history of malignant neoplasm of ovary     Diabetes mellitus, type 2 (H)     Tobacco use disorder     Mixed hyperlipidemia     Mild intermittent asthma     Thrombocytopenia (H)     Advanced directives, counseling/discussion     Hyperlipidemia     Coronary artery disease     Myocardial infarction (H)     Hypertension     Hypothyroidism     Type 2 diabetes mellitus with hyperglycemia, with long-term current use of insulin (H)     NSTEMI (non-ST elevated myocardial infarction) (H)     Blurred vision     Cerebellar stroke, acute (H)     Anemia     GI bleed     Chronic renal insufficiency     Melena     Anemia due to blood loss, acute     Colonic hemorrhage     Current Behavioral Concerns: See above    Education Provided to patient: RN CC role and reason for call.    Pain  Pain  (GOAL):: No  Health Maintenance Reviewed: Due/Overdue   Health Maintenance Due   Topic Date Due     HIV SCREENING  Never done     HEPATITIS B IMMUNIZATION (1 of 3 - Risk 3-dose series) Never done     PREVENTIVE CARE VISIT  09/15/2000     MAMMO SCREENING  04/02/2002     DIABETIC FOOT EXAM  06/08/2007     ASTHMA ACTION PLAN  04/28/2008     ZOSTER IMMUNIZATION (1 of 2) Never done     PAP  09/25/2015     ADVANCE CARE PLANNING  11/05/2017     ASTHMA CONTROL TEST  12/09/2020     PHQ-2  01/01/2021       Clinical Pathway: None    Medication Management:  Current Outpatient Medications   Medication Instructions     ALBUTEROL 90 MCG/ACT IN AERS 1-2 puffs Q 2-4 hrs prn with spacer - HFA     apixaban ANTICOAGULANT (ELIQUIS) 2.5 mg, Oral, 2 TIMES DAILY     atorvastatin (LIPITOR) 40 mg, Oral, DAILY     carvedilol (COREG) 12.5 mg, Oral, 2 TIMES DAILY     clopidogrel (PLAVIX) 75 mg, Oral, DAILY     ferrous sulfate 44 Fe mg/mL ELIX Take 10 mL by mouth mixed in 1/3 of a glass of orange juice (to enhance absorption) 30 minutes before breakfast, every other day.     insulin glargine (LANTUS PEN) 12 Units, Subcutaneous, AT BEDTIME     LANCETS REGULAR MISC use twice a day for blood sugar     levothyroxine (SYNTHROID/LEVOTHROID) 150 mcg, Oral, EVERY MORNING BEFORE BREAKFAST     nicotine (NICODERM CQ) 21 MG/24HR 24 hr patch 1 patch, Transdermal, DAILY     nitroGLYcerin (NITROSTAT) 0.4 MG sublingual tablet For chest pain place 1 tablet under the tongue every 5 minutes for 3 doses. If symptoms persist 5 minutes after 1st dose call 911.     pantoprazole (PROTONIX) 40 mg, Oral, DAILY     Functional Status:  Dependent ADLs:: Independent  Dependent IADLs:: Independent  Bed or wheelchair confined:: No  Mobility Status: Independent  Fallen 2 or more times in the past year?: No  Any fall with injury in the past year?: No    Living Situation:  Current living arrangement:: I live in a private home with family  Type of residence:: Private home -  stairs    Lifestyle & Psychosocial Needs:  Lifestyle     Physical activity     Days per week: 0 days     Minutes per session: 0 min     Stress: Rather much     Social Needs     Financial resource strain: Somewhat hard     Food insecurity     Worry: Never true     Inability: Never true     Transportation needs     Medical: No     Non-medical: No     Diet:: Regular  Inadequate nutrition (GOAL):: No  Tube Feeding: No  Inadequate activity/exercise (GOAL):: No  Significant changes in sleep pattern (GOAL): No  Transportation means:: Regular car     Advent or spiritual beliefs that impact treatment:: No  Mental health DX:: No  Mental health management concern (GOAL):: No  Informal Support system:: Partner, Parent   Socioeconomic History     Marital status:      Spouse name: Clif     Number of children: 2     Years of education: 13     Highest education level: Not on file   Occupational History     Comment: [employer]   Relationships     Social connections     Talks on phone: More than three times a week     Gets together: Never     Attends [employer] service: Never     Active member of club or organization: No     Attends meetings of clubs or organizations: Never     Relationship status: Not on file     Intimate partner violence     Fear of current or ex partner: No     Emotionally abused: No     Physically abused: No     Forced sexual activity: No     Tobacco Use     Smoking status: Current Every Day Smoker     Packs/day: 2.00     Years: 24.00     Pack years: 48.00     Types: Cigarettes     Smokeless tobacco: Never Used     Tobacco comment: Strongly recommended quitting at each visit.   Substance and Sexual Activity     Alcohol use: No     Alcohol/week: 0.0 standard drinks     Drug use: No     Sexual activity: Yes     Partners: Male     Birth control/protection: Female Surgical     Comment: Hysterectomy and tubal ligation     Patient states she is doing ok at home since hospital discharge. Patient states  her largest frustration is blurred vision, patient's mother is currently with her to help with medication set up and administration. Patient has follow up on 6/29/21 to address blurred vision.     Patient states that she feels that her health and wellness is being well managed at this time, patient declines care coordination stating that she has all the support and resources she needs at this time.     Reviewed HM due, medication list/changes, AVS with recommendation for PCP follow up. RN CC assisted patient in schedule PCP follow up for 6/28/21.     Resources and Interventions:  Current Resources:      Community Resources: DME  Supplies used at home:: Diabetic Supplies  Equipment Currently Used at Home: shower chair  Employment Status: employed full-time)   )    Advance Care Plan/Directive  Advanced Care Plans/Directives on file:: No  Advanced Care Plan/Directive Status: Considering Options    Referrals Placed: None     Goals:   None noted       Future Appointments              Tomorrow Emmie Bar RPH [place], [place]    Tomorrow Lexa Miller MD [facility]    In 2 days [place] LAB [facility] [place]    In 5 days Brian Ruiz MD [place], [place]    In 6 days Ebony Stein PA-C [facility]    In 1 month [facility]          Plan: Patient was provided with RN CC's contact information and encouraged to call with questions, concerns, support needs. RN CC will remain available as needed. No further care coordination outreaches will be made at this time.     Nicole Green RN Care Coordinator  [facility]  Email: [email or web address]  Phone: [phone]

## 2021-06-24 ENCOUNTER — VIRTUAL VISIT (OUTPATIENT)
Dept: ONCOLOGY | Facility: CLINIC | Age: 61
End: 2021-06-24
Attending: INTERNAL MEDICINE
Payer: COMMERCIAL

## 2021-06-24 ENCOUNTER — VIRTUAL VISIT (OUTPATIENT)
Dept: PHARMACY | Facility: CLINIC | Age: 61
End: 2021-06-24
Payer: COMMERCIAL

## 2021-06-24 DIAGNOSIS — F17.200 TOBACCO USE DISORDER: ICD-10-CM

## 2021-06-24 DIAGNOSIS — Z79.4 TYPE 2 DIABETES MELLITUS WITH HYPERGLYCEMIA, WITH LONG-TERM CURRENT USE OF INSULIN (H): ICD-10-CM

## 2021-06-24 DIAGNOSIS — I82.90 DEEP VEIN THROMBOSIS (DVT) OF NON-EXTREMITY VEIN, UNSPECIFIED CHRONICITY: ICD-10-CM

## 2021-06-24 DIAGNOSIS — K92.1 GASTROINTESTINAL HEMORRHAGE WITH MELENA: Primary | ICD-10-CM

## 2021-06-24 DIAGNOSIS — I21.4 NSTEMI (NON-ST ELEVATED MYOCARDIAL INFARCTION) (H): ICD-10-CM

## 2021-06-24 DIAGNOSIS — E89.0 POSTABLATIVE HYPOTHYROIDISM: ICD-10-CM

## 2021-06-24 DIAGNOSIS — E11.65 TYPE 2 DIABETES MELLITUS WITH HYPERGLYCEMIA, WITH LONG-TERM CURRENT USE OF INSULIN (H): ICD-10-CM

## 2021-06-24 DIAGNOSIS — J44.9 CHRONIC OBSTRUCTIVE PULMONARY DISEASE, UNSPECIFIED COPD TYPE (H): ICD-10-CM

## 2021-06-24 DIAGNOSIS — I63.9 CEREBELLAR STROKE, ACUTE (H): ICD-10-CM

## 2021-06-24 DIAGNOSIS — D69.6 THROMBOCYTOPENIA (H): Primary | ICD-10-CM

## 2021-06-24 PROCEDURE — 99214 OFFICE O/P EST MOD 30 MIN: CPT | Mod: TEL | Performed by: INTERNAL MEDICINE

## 2021-06-24 PROCEDURE — 99605 MTMS BY PHARM NP 15 MIN: CPT | Performed by: PHARMACIST

## 2021-06-24 PROCEDURE — 99607 MTMS BY PHARM ADDL 15 MIN: CPT | Performed by: PHARMACIST

## 2021-06-24 RX ORDER — LEVOTHYROXINE SODIUM 150 UG/1
150 TABLET ORAL DAILY
COMMUNITY
End: 2024-09-05

## 2021-06-24 RX ORDER — INSULIN LISPRO 100 [IU]/ML
1 INJECTION, SOLUTION INTRAVENOUS; SUBCUTANEOUS PRN
COMMUNITY
End: 2021-09-14

## 2021-06-24 NOTE — Clinical Note
6/24/2021         RE: Rowan Leiva  [address]  [facility] [address]        Dear Colleague,    Thank you for referring your patient, Rowan Leiva, to the [facility]. Please see a copy of my visit note below.    Rowan is a 61 year old who is being evaluated via a billable telephone visit.      What phone number would you like to be contacted at? [phone]    How would you like to obtain your AVS? MyChart      HEMATOLOGY HISTORY: Ms. Leiva is a female with anemia and thrombocytopenia.  1. On 05/30/2021:   -WBC of 13.5, hemoglobin of 11.7 and platelet of 90.  -Creatinine of 1.87.  2. Coronary angiogram on 05/31/2021 revealed stenosis of multiple vessels.  Stents were placed.  3. CT abdomen and pelvis on 05/31/2021 reveals extensive bilateral renal vascular calcification.    -Lower extremity ultrasound on 05/31/2021 reveals bilateral posterior tibial vein DVT. -Because of chest pain and hemoptysis, CT chest angiogram was planned, but could not be done because of elevated creatinine.  Patient had a V/Q scan done today, which was nondiagnostic.  4. HIT negative on 06/01/2021.   5. EGD on 06/18/2021 was normal.    -Colonoscopy on 06/19/2021 revealed red/clotted blood throughout the entire colon.  This was likely from a small bowel bleed.    -Patient had video endoscopy done.  6. On 06/14/2021, iron of 20 and saturation index of 11%.  -On 06/17/2021, hemoglobin electrophoresis is normal.    SUBJECTIVE:  Ms. Leiva is a 61-year-old female who was recently seen in the hospital for thrombocytopenia.  There is concern for heparin-induced thrombocytopenia.  HIT came back negative.     In the hospital, she had a lower extremity ultrasound done on 05/31/2021.  It had revealed bilateral posterior tibial deep vein thrombosis.  There was concern for pulmonary embolism.  CT chest angiogram could not be done because of elevated creatinine.  VQ scan was nondiagnostic.  The patient is on  anticoagulation with Eliquis.  A followup lower extremity ultrasound on 06/06/2021 does not reveal any DVT.     The patient had CT chest, abdomen and pelvis without contrast on 06/06/2021.  It revealed micronodular infiltrate in bilateral lungs.  There are also other lung nodules.  There is no malignancy.     The patient continues to have thrombocytopenia. The patient also has been anemic.  Labs done revealed iron deficiency with iron of 20.     The patient had EGD and colonoscopy done.  EGD on 06/18/2021 was normal.  Colonoscopy on 06/19/2021 revealed red/clotted blood throughout the entire colon.  This was likely from a small bowel bleed.  The patient had a video endoscopy done.  Results are awaited.     The patient feels that she is feeling better.  She is stronger.  No chest pain.  Shortness of breath is better.  No nausea or vomiting.  Denies bleeding from any site.  She is on oral liquid ferrous sulfate.  She is tolerating it well.     She is also on Eliquis.  So far, she is tolerating it well.  No bleeding.  She is also on Plavix because of a cardiac stent.     PHYSICAL EXAMINATION:    GENERAL:  She is alert and oriented x 3.   This is a telephone visit.     LABS:  Reviewed.     ASSESSMENT:  1.  A 61-year-old female with thrombocytopenia.  Thrombocytopenia is stable.  2.  Normocytic anemia.  3.  Iron deficiency.  4.  Bilateral lower extremity DVT. Resolved.  5.  Chronic kidney disease.  6.  Coronary artery disease.     PLAN:  1.  CBC was reviewed.  I explained to her that she continues to be thrombocytopenic.  Cause of thrombocytopenia is not clear.     We will monitor CBC.  If thrombocytopenia does not resolve, we will plan on doing a bone marrow biopsy to rule out primary bone marrow pathology.     2.  She is anemic.  Anemia is due to multiple factors including iron deficiency, GI bleed, anemia of chronic disease and chronic kidney disease.  We will transfuse for hemoglobin below 7.  She is on oral iron,  which will be continued.  She had video endoscopy done.  Results awaited.     3.  The patient had lower extremity DVT.  She is on anticoagulation.  DVT has resolved.  She will continue on Eliquis.  She is tolerating it well.     She has unprovoked thrombosis.  Plan will be for indefinite anticoagulation.     The patient brought up the issue of decreasing the dose of Eliquis.  This will be considered after 3 months.     If her anemia/GI bleed becomes an issue, we might have to discontinue Eliquis.     4.  CT chest revealed abnormality in the lung.  This will be followed up with a CT chest in the next 2-3 months.     5.  I will see her in a month with labs.  I advised her to call us with any questions or concerns.     Total telephone visit time of 25 minutes.       Visit Date: 06/24/2021    SUBJECTIVE:  Ms. Leiva is a 61-year-old female who was recently seen in the hospital for thrombocytopenia.  There is concern for heparin-induced thrombocytopenia.  HIT came back negative.    In the hospital she had a lower extremity ultrasound done on 05/31/2021.  It had revealed bilateral posterior tibial vein thrombosis.  There was concern for pulmonary embolism.  CT chest angiogram could not be done because of elevated creatinine.  VQ scan was nondiagnostic.  The patient is on anticoagulation with Eliquis.  A followup lower extremity ultrasound on 06/06/2021 does not reveal any DVT.    The patient had CT chest, abdomen and pelvis without contrast on 06/06/2021.  It revealed micronodular infiltrate in bilateral lungs.  There are also other lung nodules.  There is no malignancy.    The patient continues to have thrombocytopenia.  It has not resolved.     The patient also has been anemic.  Labs done revealed iron deficiency with iron of 20.    The patient had EGD and colonoscopy done.  EGD on 06/18/2021 was normal.  Colonoscopy on 06/19/2021 revealed red/clotted blood throughout the entire colon.  This was likely from a small  bowel bleed.  The patient had a video endoscopy done.  Results are awaited.    The patient feels that she is feeling better.  She is stronger.  No chest pain.  Shortness of breath is better.  No nausea or vomiting.  Denies bleeding from any site.  She is on oral liquid ferrous sulfate.  She is tolerating it well.    She is also on Eliquis.  So far, she is tolerating it well.  No bleeding.  She is also on Plavix because of a cardiac stent.    PHYSICAL EXAMINATION:    GENERAL:  She is alert and oriented x 3.    This is a telephone visit.    LABS:  Reviewed.    ASSESSMENT:  1.  A 61-year-old female with thrombocytopenia.  Thrombocytopenia is stable.  2.  Normocytic anemia.  3.  Iron deficiency.  4.  Bilateral lower extremity DVT, resolved.  5.  Chronic kidney disease.  6.  Coronary artery disease.    PLAN:  1.  CBC was reviewed.  I explained to her that she continues to be thrombocytopenic.  Cause of thrombocytopenia not clear.    We will monitor CBC.  If thrombocytopenia does not resolve, we will plan on doing a bone marrow biopsy to rule out primary bone marrow pathology.    2.  She is anemic.  Anemia is due to multiple factors including iron deficiency, GI bleed, anemia of chronic disease and chronic kidney disease.  We will transfuse for hemoglobin below 7.  She is on oral iron, which will be continued.  She had video endoscopy done.  Results awaited.    3.  The patient had lower extremity DVT.  She is on anticoagulation.  DVT has resolved.  She will continue on Eliquis.  She is tolerating it well.    She has unprovoked thrombosis.  Plan will be for indefinite anticoagulation.    The patient brought up the issue of decreasing the dose of Eliquis.  This will be considered after 3 months.    If her anemia/GI bleed becomes an issue, we might have to discontinue Eliquis.    4.  CT chest revealed abnormality in the lung.  This will be followed up with a CT chest in the next 2-3 months.    5.  I will see her in a month  with labs.  I advised her to call us with any questions or concerns.    Total telephone visit time of 25 minutes.      Lexa Miller MD        D: 2021   T: 2021   MT: [facility]    Name:     EDVIN MERLOS  MRN:      [id number]        Account:    [id number]   :      1960           Visit Date: 2021     Document: [id number]      Again, thank you for allowing me to participate in the care of your patient.        Sincerely,        Lexa Miller MD

## 2021-06-24 NOTE — PROGRESS NOTES
Medication Therapy Management (MTM) Encounter    ASSESSMENT:                            Medication Adherence/Access: MTM will contact the pharmacy about possible modifications to labels to help patient/family see them better    Tobacco Use: continue with NRT as prescribed.  Reach out for additional help as needed.    GI Bleed: Can take Senna as needed for constipation.      CAD/Stroke: stable    DVT: patient will follow-up with Hematology as planned to confirm plan for Eliquis.    Type 2 Diabetes: A1c at goal. Reported home readings at goal.  Okay to take Lantus once daily.    Hypothyroidism: stable    COPD: stable    PLAN:                            1.  I will contact [facility] about getting a larger print on prescription  labels  2.  Lantus 12 units daily at bedtime  3.  Follow-up with Hematologist as planned to discuss Eliquis plan  4.  Senna 1-2 times per day for constipation  5.  Congratulations on wanting to quit using tobacco.  Continue to use the nicotine patch as needed and reach out if you need additional support in quitting.      Follow-up: Return in about 3 weeks (around 7/15/2021) for Medication Therapy Management.      SUBJECTIVE/OBJECTIVE:                          Rowan Leiva is a 61 year old female called for a transitions of care visit. She was discharged from [facility] on 6/22/21 for GI Bleed.      Reason for visit: ADAM visit    Allergies/ADRs: Reviewed in chart, PCN causes diarrhea  Tobacco: She reports that she has been smoking cigarettes. She has a 48.00 pack-year smoking history. She has never used smokeless tobacco.Tobacco Cessation Action Plan:   Self help information given to patient- plans to use the patch to help her quit.  Smoking about 12 cigarettes per day.      Alcohol: not currently using  Caffeine: <1 sodas/day  Activity: none due to eye sight    Medication Adherence/Access: Patient uses pill box(es) - has help reading the bottles and setting up pill box by children; mom and   also have vision challenges.  [facility] in [place].      GI Bleed: EGD and colonoscopy do not show the source of bleeding however there was a old blood noted.  GI following.  Resumed Eliquis and clopidogrel yesterday morning with no evidence of recurrent bleeding. Has received 3 units of packed red blood cells.  Hemoglobin 7.4 which is stable and rising.   Started on pantoprazole and oral iron every other day.  No concerns with side effects.  She has not had any BM since being home.  Asking what she can take for this.      CAD/Stroke: Non-STEMI status post recent drug-eluting stent x2 to the LAD.  MRI showed multiple acute and subacute ischemic infarcts.  Seen by neurology during her previous hospitalization.  Thought is that this is related to recent cardiac catheterization.  JOSEPHINE bubble negative.  Restarted clopidogrel and half dose of Eliquis yesterday.  EF 50 to 55% with severe distal anterior and apical wall hypokinesis.  No chest pain or shortness of breath.    Taking carvedilol 12.5 mg twice daily, clopidogrel 75 mg daily and atorvastatin 40 mg daily.    No concerns with side effects.  Follow-up with Cardiology next week.  Recent Labs   Lab Test 21  0626 01/08/15   CHOL 191 193   HDL 29* 29   * 90.2   TRIG 285* 369   CHOLHDLRATIO  --  6.7           DVT: Prior to admission was on Eliquis.  Resumed half dosing for 1 week and then return to prior dosing of 5 mg twice daily.  Monitor closely for bleeding.  Patient recalls being told not to increase back up to 5 mg by Cardiologists; she plans to confirm with Hematologist today.  No signs of bleeding since being home.    Type 2 Diabetes:  Currently taking Lantus 6 units twice daily (she was dosing once daily and prefers that) and Humalog 1-2 units if needed (not using very often).   Patient is not experiencing side effects.  Blood sugar monitorin time(s) daily. Ranges (patient reported):   Symptoms of low blood sugar? Has been better lately  but was struggling with lows when she wasn't eating  Eye exam: up to date; has an appointment for retinopathy follow-up on 6/29  Foot exam: up to date  Diet/Exercise: not eating much, no appetite  Aspirin: Not taking due to anticoagulation  Statin: Yes:    ACEi/ARB: No.   Urine Albumin:   Lab Results   Component Value Date    UMALCR 16.32 11/20/2006      Lab Results   Component Value Date    A1C 7.9 05/31/2021    A1C 9.7 04/28/2007    A1C 10.2 11/20/2006    A1C 12.7 05/31/2006    A1C 14.4 07/08/2005         Hypothyroidism: Patient is taking levothyroxine 150 mcg daily. Patient is having the following symptoms: none.   TSH   Date Value Ref Range Status   05/31/2021 0.41 0.40 - 4.00 mU/L Final     COPD:   Asthma on her problem list but she doesn't believe she has been diagnosed with Asthma. Just establishing with Dr. Ruiz.    Current medications: Albuterol as needed; maybe needed a couple of times per year.   Patient is not experiencing side effects.   Patient reports the following symptoms: none.            Today's Vitals: LMP 05/01/2000   ----------------  Post Discharge Medication Reconciliation Status: discharge medications reconciled, continue medications without change.    I spent 30 minutes with this patient today. All changes were made via collaborative practice agreement with PCP. A copy of the visit note was provided to the patient's primary care provider.    The patient was sent via [employer] a summary of these recommendations.     Emmie Bar , Pharm D  [phone] (phone)  Medication Therapy Management Pharmacist     Telemedicine Visit Details  Type of service:  Telephone visit  Start Time: 800 am  End Time: 830 am  Originating Location (patient location): Home  Distant Location (provider location):  [facility]      Medication Therapy Recommendations  Anemia    Current Medication: ferrous sulfate 44 Fe mg/mL ELIX   Rationale: Undesirable effect - Adverse medication event -  Safety   Recommendation: Provide Education - Can take Senna 1-2 tablets daily as needed   Status: Accepted - no CPA Needed         Coronary artery disease    Current Medication: carvedilol (COREG) 12.5 MG tablet   Rationale: Cannot swallow/administer medication - Adherence - Adherence   Recommendation: Provide Education - Contact retail pharmacy about prescription labels   Status: Patient Agreed - Adherence/Education         Diabetes mellitus, type 2 (H)    Current Medication: insulin glargine (LANTUS PEN) 100 UNIT/ML pen   Rationale: Frequency inappropriate - Dosage too high - Safety   Recommendation: Decrease Frequency - Lantus 12 units daily   Status: Accepted per CPA

## 2021-06-24 NOTE — PROGRESS NOTES
Rowan is a 61 year old who is being evaluated via a billable telephone visit.      What phone number would you like to be contacted at? [phone]    How would you like to obtain your AVS? [employer]

## 2021-06-24 NOTE — PATIENT INSTRUCTIONS
Recommendations from today's MTM visit:                                                    MTM (medication therapy management) is a service provided by a clinical pharmacist designed to help you get the most of out of your medicines.   Today we reviewed what your medicines are for, how to know if they are working, that your medicines are safe and how to make your medicine regimen as easy as possible.      1.  I will contact [facility] about getting a larger print on prescription  Labels    2.  Lantus 12 units daily at bedtime    3.  Follow-up with Hematologist as planned to discuss Eliquis plan    4.  Senna 1-2 times per day for constipation - this can be purchased over-the-counter.    5.  Congratulations on wanting to quit using tobacco.  Continue to use the nicotine patch as needed and reach out if you need additional support in quitting.      Follow-up: Return in about 3 weeks (around 7/15/2021) for Medication Therapy Management.    It was great to speak with you today.  I value your experience and would be very thankful for your time with providing feedback on our clinic survey. You may receive a survey via email or text message in the next few days.     To schedule another MTM appointment, please call the clinic directly or you may call the MTM scheduling line at [phone] or toll-free at [phone].     My Clinical Pharmacist's contact information:                                                      Please feel free to contact me with any questions or concerns you have.      Emmie Bar , Pharm D  [phone] (phone)  Medication Therapy Management Pharmacist

## 2021-06-24 NOTE — Clinical Note
6/24/2021         RE: Rowan Leiva  [address]  [facility] [address]        Dear Colleague,    Thank you for referring your patient, Rowan Leiva, to the [facility]. Please see a copy of my visit note below.    Rowan is a 61 year old who is being evaluated via a billable telephone visit.      What phone number would you like to be contacted at? [phone]    How would you like to obtain your AVS? MyChart      HEMATOLOGY HISTORY: Ms. Leiva is a female with anemia and thrombocytopenia.  1. On 05/30/2021:   -WBC of 13.5, hemoglobin of 11.7 and platelet of 90.  -Creatinine of 1.87.  2. Coronary angiogram on 05/31/2021 revealed stenosis of multiple vessels.  Stents were placed.  3. CT abdomen and pelvis on 05/31/2021 reveals extensive bilateral renal vascular calcification.    -Lower extremity ultrasound on 05/31/2021 reveals bilateral posterior tibial vein DVT. -Because of chest pain and hemoptysis, CT chest angiogram was planned, but could not be done because of elevated creatinine.  Patient had a V/Q scan done today, which was nondiagnostic.  4. HIT negative on 06/01/2021.   5. EGD on 06/18/2021 was normal.    -Colonoscopy on 06/19/2021 revealed red/clotted blood throughout the entire colon.  This was likely from a small bowel bleed.    -Patient had video endoscopy done.  6. On 06/14/2021, iron of 20 and saturation index of 11%.  -On 06/17/2021, hemoglobin electrophoresis is normal.    SUBJECTIVE:  Ms. Leiva is a 61-year-old female who was recently seen in the hospital for thrombocytopenia.  There is concern for heparin-induced thrombocytopenia.  HIT came back negative.     In the hospital, she had a lower extremity ultrasound done on 05/31/2021.  It had revealed bilateral posterior tibial deep vein thrombosis.  There was concern for pulmonary embolism.  CT chest angiogram could not be done because of elevated creatinine.  VQ scan was nondiagnostic.  The patient is on  anticoagulation with Eliquis.  A followup lower extremity ultrasound on 06/06/2021 does not reveal any DVT.     The patient had CT chest, abdomen and pelvis without contrast on 06/06/2021.  It revealed micronodular infiltrate in bilateral lungs.  There are also other lung nodules.  There is no malignancy.     The patient continues to have thrombocytopenia. The patient also has been anemic.  Labs done revealed iron deficiency with iron of 20.     The patient had EGD and colonoscopy done.  EGD on 06/18/2021 was normal.  Colonoscopy on 06/19/2021 revealed red/clotted blood throughout the entire colon.  This was likely from a small bowel bleed.  The patient had a video endoscopy done.  Results are awaited.     The patient feels that she is feeling better.  She is stronger.  No chest pain.  Shortness of breath is better.  No nausea or vomiting.  Denies bleeding from any site.  She is on oral liquid ferrous sulfate.  She is tolerating it well.     She is also on Eliquis.  So far, she is tolerating it well.  No bleeding.  She is also on Plavix because of a cardiac stent.     PHYSICAL EXAMINATION:    GENERAL:  She is alert and oriented x 3.   This is a telephone visit.     LABS:  Reviewed.     ASSESSMENT:  1.  A 61-year-old female with thrombocytopenia.  Thrombocytopenia is stable.  2.  Normocytic anemia.  3.  Iron deficiency.  4.  Bilateral lower extremity DVT. Resolved.  5.  Chronic kidney disease.  6.  Coronary artery disease.     PLAN:  1.  CBC was reviewed.  I explained to her that she continues to be thrombocytopenic.  Cause of thrombocytopenia is not clear.     We will monitor CBC.  If thrombocytopenia does not resolve, we will plan on doing a bone marrow biopsy to rule out primary bone marrow pathology.     2.  She is anemic.  Anemia is due to multiple factors including iron deficiency, GI bleed, anemia of chronic disease and chronic kidney disease.  We will transfuse for hemoglobin below 7.  She is on oral iron,  which will be continued.  She had video endoscopy done.  Results awaited.     3.  The patient had lower extremity DVT.  She is on anticoagulation.  DVT has resolved.  She will continue on Eliquis.  She is tolerating it well.     She has unprovoked thrombosis.  Plan will be for indefinite anticoagulation.     The patient brought up the issue of decreasing the dose of Eliquis.  This will be considered after 3 months.     If her anemia/GI bleed becomes an issue, we might have to discontinue Eliquis.     4.  CT chest revealed abnormality in the lung.  This will be followed up with a CT chest in the next 2-3 months.     5.  I will see her in a month with labs.  I advised her to call us with any questions or concerns.     Total telephone visit time of 25 minutes.       Visit Date: 06/24/2021    SUBJECTIVE:  Ms. Leiva is a 61-year-old female who was recently seen in the hospital for thrombocytopenia.  There is concern for heparin-induced thrombocytopenia.  HIT came back negative.    In the hospital she had a lower extremity ultrasound done on 05/31/2021.  It had revealed bilateral posterior tibial vein thrombosis.  There was concern for pulmonary embolism.  CT chest angiogram could not be done because of elevated creatinine.  VQ scan was nondiagnostic.  The patient is on anticoagulation with Eliquis.  A followup lower extremity ultrasound on 06/06/2021 does not reveal any DVT.    The patient had CT chest, abdomen and pelvis without contrast on 06/06/2021.  It revealed micronodular infiltrate in bilateral lungs.  There are also other lung nodules.  There is no malignancy.    The patient continues to have thrombocytopenia.  It has not resolved.     The patient also has been anemic.  Labs done revealed iron deficiency with iron of 20.    The patient had EGD and colonoscopy done.  EGD on 06/18/2021 was normal.  Colonoscopy on 06/19/2021 revealed red/clotted blood throughout the entire colon.  This was likely from a small  bowel bleed.  The patient had a video endoscopy done.  Results are awaited.    The patient feels that she is feeling better.  She is stronger.  No chest pain.  Shortness of breath is better.  No nausea or vomiting.  Denies bleeding from any site.  She is on oral liquid ferrous sulfate.  She is tolerating it well.    She is also on Eliquis.  So far, she is tolerating it well.  No bleeding.  She is also on Plavix because of a cardiac stent.    PHYSICAL EXAMINATION:    GENERAL:  She is alert and oriented x 3.    This is a telephone visit.    LABS:  Reviewed.    ASSESSMENT:  1.  A 61-year-old female with thrombocytopenia.  Thrombocytopenia is stable.  2.  Normocytic anemia.  3.  Iron deficiency.  4.  Bilateral lower extremity DVT, resolved.  5.  Chronic kidney disease.  6.  Coronary artery disease.    PLAN:  1.  CBC was reviewed.  I explained to her that she continues to be thrombocytopenic.  Cause of thrombocytopenia not clear.    We will monitor CBC.  If thrombocytopenia does not resolve, we will plan on doing a bone marrow biopsy to rule out primary bone marrow pathology.    2.  She is anemic.  Anemia is due to multiple factors including iron deficiency, GI bleed, anemia of chronic disease and chronic kidney disease.  We will transfuse for hemoglobin below 7.  She is on oral iron, which will be continued.  She had video endoscopy done.  Results awaited.    3.  The patient had lower extremity DVT.  She is on anticoagulation.  DVT has resolved.  She will continue on Eliquis.  She is tolerating it well.    She has unprovoked thrombosis.  Plan will be for indefinite anticoagulation.    The patient brought up the issue of decreasing the dose of Eliquis.  This will be considered after 3 months.    If her anemia/GI bleed becomes an issue, we might have to discontinue Eliquis.    4.  CT chest revealed abnormality in the lung.  This will be followed up with a CT chest in the next 2-3 months.    5.  I will see her in a month  with labs.  I advised her to call us with any questions or concerns.    Total telephone visit time of 25 minutes.      Lexa Miller MD        D: 2021   T: 2021   MT: [facility]    Name:     EDVIN MERLOS  MRN:      [id number]        Account:    [id number]   :      1960           Visit Date: 2021     Document: [id number]      Again, thank you for allowing me to participate in the care of your patient.        Sincerely,        Lexa Miller MD

## 2021-06-25 ENCOUNTER — TELEPHONE (OUTPATIENT)
Dept: ONCOLOGY | Facility: CLINIC | Age: 61
End: 2021-06-25

## 2021-06-25 DIAGNOSIS — D69.6 THROMBOCYTOPENIA (H): ICD-10-CM

## 2021-06-25 DIAGNOSIS — Z11.4 SCREENING FOR HIV (HUMAN IMMUNODEFICIENCY VIRUS): Primary | ICD-10-CM

## 2021-06-25 LAB
BASOPHILS # BLD AUTO: 0 10E9/L (ref 0–0.2)
BASOPHILS NFR BLD AUTO: 0.2 %
DIFFERENTIAL METHOD BLD: ABNORMAL
EOSINOPHIL # BLD AUTO: 0.3 10E9/L (ref 0–0.7)
EOSINOPHIL NFR BLD AUTO: 2.8 %
ERYTHROCYTE [DISTWIDTH] IN BLOOD BY AUTOMATED COUNT: 14.9 % (ref 10–15)
HCT VFR BLD AUTO: 23.4 % (ref 35–47)
HGB BLD-MCNC: 7.4 G/DL (ref 11.7–15.7)
HIV 1+2 AB+HIV1 P24 AG SERPL QL IA: NONREACTIVE
LYMPHOCYTES # BLD AUTO: 1.4 10E9/L (ref 0.8–5.3)
LYMPHOCYTES NFR BLD AUTO: 14.4 %
MCH RBC QN AUTO: 26.7 PG (ref 26.5–33)
MCHC RBC AUTO-ENTMCNC: 31.6 G/DL (ref 31.5–36.5)
MCV RBC AUTO: 85 FL (ref 78–100)
MONOCYTES # BLD AUTO: 0.5 10E9/L (ref 0–1.3)
MONOCYTES NFR BLD AUTO: 4.9 %
NEUTROPHILS # BLD AUTO: 7.4 10E9/L (ref 1.6–8.3)
NEUTROPHILS NFR BLD AUTO: 77.7 %
PLATELET # BLD AUTO: 69 10E9/L (ref 150–450)
RBC # BLD AUTO: 2.77 10E12/L (ref 3.8–5.2)
WBC # BLD AUTO: 9.5 10E9/L (ref 4–11)

## 2021-06-25 PROCEDURE — 36415 COLL VENOUS BLD VENIPUNCTURE: CPT | Performed by: INTERNAL MEDICINE

## 2021-06-25 PROCEDURE — 85025 COMPLETE CBC W/AUTO DIFF WBC: CPT | Performed by: INTERNAL MEDICINE

## 2021-06-25 PROCEDURE — 87389 HIV-1 AG W/HIV-1&-2 AB AG IA: CPT | Performed by: INTERNAL MEDICINE

## 2021-06-25 NOTE — TELEPHONE ENCOUNTER
She is anemic.  Anemia is due to multiple factors including iron deficiency, GI bleed, anemia of chronic disease and chronic kidney disease.  We will transfuse for hemoglobin below 7.  She is on oral iron, which will be continued.  She had video endoscopy done.  Results awaited.    Writer reviewed chart and patient's hgb is stable and Dr. Miller advises a unit of PRBC if below  hgb of 7.0.    Allyson Tyler RN

## 2021-06-25 NOTE — TELEPHONE ENCOUNTER
DATE:  6/25/2021   TIME OF RECEIPT FROM LAB:  0919  LAB TEST:  Hgb   LAB VALUE:  7.4  RESULTS GIVEN WITH READ-BACK TO RNCC/ urgent staff message to provider      TIME LAB VALUE REPORTED TO PROVIDER:   0922

## 2021-06-28 ENCOUNTER — OFFICE VISIT (OUTPATIENT)
Dept: INTERNAL MEDICINE | Facility: CLINIC | Age: 61
End: 2021-06-28
Payer: COMMERCIAL

## 2021-06-28 VITALS
WEIGHT: 176.5 LBS | DIASTOLIC BLOOD PRESSURE: 62 MMHG | HEART RATE: 70 BPM | BODY MASS INDEX: 30.13 KG/M2 | HEIGHT: 64 IN | OXYGEN SATURATION: 97 % | TEMPERATURE: 98.7 F | SYSTOLIC BLOOD PRESSURE: 141 MMHG

## 2021-06-28 DIAGNOSIS — I63.9 CEREBELLAR STROKE, ACUTE (H): ICD-10-CM

## 2021-06-28 DIAGNOSIS — D62 ANEMIA DUE TO BLOOD LOSS, ACUTE: ICD-10-CM

## 2021-06-28 DIAGNOSIS — I21.4 NSTEMI (NON-ST ELEVATED MYOCARDIAL INFARCTION) (H): ICD-10-CM

## 2021-06-28 DIAGNOSIS — Z79.4 TYPE 2 DIABETES MELLITUS WITH HYPERGLYCEMIA, WITH LONG-TERM CURRENT USE OF INSULIN (H): ICD-10-CM

## 2021-06-28 DIAGNOSIS — Z09 HOSPITAL DISCHARGE FOLLOW-UP: Primary | ICD-10-CM

## 2021-06-28 DIAGNOSIS — I10 ESSENTIAL HYPERTENSION: ICD-10-CM

## 2021-06-28 DIAGNOSIS — N18.32 STAGE 3B CHRONIC KIDNEY DISEASE (H): ICD-10-CM

## 2021-06-28 DIAGNOSIS — E11.65 TYPE 2 DIABETES MELLITUS WITH HYPERGLYCEMIA, WITH LONG-TERM CURRENT USE OF INSULIN (H): ICD-10-CM

## 2021-06-28 LAB — HGB BLD-MCNC: 7.3 G/DL (ref 11.7–15.7)

## 2021-06-28 PROCEDURE — 36415 COLL VENOUS BLD VENIPUNCTURE: CPT | Performed by: INTERNAL MEDICINE

## 2021-06-28 PROCEDURE — 82565 ASSAY OF CREATININE: CPT | Performed by: INTERNAL MEDICINE

## 2021-06-28 PROCEDURE — 85018 HEMOGLOBIN: CPT | Performed by: INTERNAL MEDICINE

## 2021-06-28 PROCEDURE — 99495 TRANSJ CARE MGMT MOD F2F 14D: CPT | Performed by: INTERNAL MEDICINE

## 2021-06-28 RX ORDER — CLOPIDOGREL BISULFATE 75 MG/1
75 TABLET ORAL DAILY
Qty: 90 TABLET | Refills: 3 | Status: SHIPPED | OUTPATIENT
Start: 2021-06-28 | End: 2022-07-29

## 2021-06-28 RX ORDER — PANTOPRAZOLE SODIUM 40 MG/1
40 TABLET, DELAYED RELEASE ORAL DAILY
Qty: 90 TABLET | Refills: 1 | Status: SHIPPED | OUTPATIENT
Start: 2021-06-28 | End: 2021-07-15

## 2021-06-28 RX ORDER — CARVEDILOL 12.5 MG/1
12.5 TABLET ORAL 2 TIMES DAILY
Qty: 180 TABLET | Refills: 3 | Status: SHIPPED | OUTPATIENT
Start: 2021-06-28 | End: 2022-07-19

## 2021-06-28 RX ORDER — ATORVASTATIN CALCIUM 40 MG/1
40 TABLET, FILM COATED ORAL DAILY
Qty: 90 TABLET | Refills: 3 | Status: SHIPPED | OUTPATIENT
Start: 2021-06-28 | End: 2022-07-19

## 2021-06-28 ASSESSMENT — MIFFLIN-ST. JEOR: SCORE: 1360.6

## 2021-06-28 NOTE — PROGRESS NOTES
Assessment & Plan     Cerebellar stroke, acute (H)  Continue treatment, has h/o DVT, monitor for bleeding   - apixaban ANTICOAGULANT (ELIQUIS) 5 MG tablet; Take 1 tablet (5 mg) by mouth 2 times daily    NSTEMI (non-ST elevated myocardial infarction) (H)  Cont treatment , follow up with cardiology   - atorvastatin (LIPITOR) 40 MG tablet; Take 1 tablet (40 mg) by mouth daily  - carvedilol (COREG) 12.5 MG tablet; Take 1 tablet (12.5 mg) by mouth 2 times daily  - clopidogrel (PLAVIX) 75 MG tablet; Take 1 tablet (75 mg) by mouth daily    Anemia due to blood loss, acute  Cont PPI, await camera capsule endoscopy result, will monitor Hgb, outpatient transfusion if <7  - pantoprazole (PROTONIX) 40 MG EC tablet; Take 1 tablet (40 mg) by mouth daily  - Hemoglobin    Essential hypertension  Controlled on treatment   - Creatinine    Hospital discharge follow-up  Improved post GI bleed     Type 2 diabetes mellitus with hyperglycemia, with long-term current use of insulin (H)  On insulin, controlled                See Patient Instructions    Return in about 6 weeks (around 8/9/2021).    Brian Ruiz MD  [facility] JOSE RAMON Donahue is a 61 year old who presents for the following health issues     HPI       Hospital Follow-up Visit:    Hospital/Nursing Home/IP Rehab Facility: [facility]  Date of Admission: 06/17/21  Date of Discharge: 06/22/21  Reason(s) for Admission: Anemia, GI bleed      Was your hospitalization related to COVID-19? No   Problems taking medications regularly:  None  Medication changes since discharge: None  Problems adhering to non-medication therapy:  None    Summary of hospitalization:  [facility] discharge summary reviewed  Diagnostic Tests/Treatments reviewed.  Follow up needed: lab, OV  Other Healthcare Providers Involved in Patient s Care:         Specialist appointment - GI  Update since discharge: improved. Post Discharge Medication  "Reconciliation: discharge medications reconciled, continue medications without change.  Plan of care communicated with patient          Patient is seen for a follow up visit.  Recently hospitalized for GI bleed with severe anemia.   Had Hgb of 5, needed 3 blood transfusions.   Had EGD and colonoscopy, no definite source of bleeding. Had capsule endoscopy, result pending,   Feels better. No blood in the stools ,no black stools. On iron oral treatment.   Pending follow up with GI. Has seen hematology for h/o low platelets.   Has h/o ischemic heart disease, asymptomatic regarding chest pains, SOB,palpitations. Had recent MI, needs to be on Plavix.   Has H/O DM. On diet , exercise and insulin. Blood sugars are controlled. No parestesias. No hypoglycemias.  Has h/o DVT/ PE, on AC with Eliquis. Has restarted the treatment.     Review of Systems   Constitutional, HEENT, cardiovascular, pulmonary, GI, , musculoskeletal, neuro, skin, endocrine and psych systems are negative, except as otherwise noted.      Objective    BP (!) 141/62 (BP Location: Right arm, Patient Position: Sitting, Cuff Size: Adult Large)   Pulse 70   Temp 98.7  F (37.1  C) (Oral)   Ht 1.626 m (5' 4\")   Wt 80.1 kg (176 lb 8 oz)   LMP 05/01/2000   SpO2 97%   BMI 30.30 kg/m    Body mass index is 30.3 kg/m .  Physical Exam   GENERAL: weak, pale, alert and no distress  EYES: Eyes grossly normal to inspection, PERRL and conjunctivae and sclerae normal  HENT: ear canals and TM's normal, nose and mouth without ulcers or lesions  NECK: no adenopathy, no asymmetry, masses, or scars and thyroid normal to palpation  RESP: lungs clear to auscultation - no rales, rhonchi or wheezes  CV: regular rate and rhythm, normal S1 S2, no S3 or S4, no murmur, click or rub, no peripheral edema and peripheral pulses strong  ABDOMEN: soft, nontender, no hepatosplenomegaly, no masses and bowel sounds normal  MS: no gross musculoskeletal defects noted, no edema  SKIN: no " suspicious lesions or rashes  NEURO: Normal strength and tone, mentation intact and speech normal    Orders Only on 06/25/2021   Component Date Value Ref Range Status     WBC 06/25/2021 9.5  4.0 - 11.0 10e9/L Final     RBC Count 06/25/2021 2.77* 3.8 - 5.2 10e12/L Final     Hemoglobin 06/25/2021 7.4* 11.7 - 15.7 g/dL Final    Comment: Results confirmed by repeat test  Critical Value called to and read back by  NOA MCDONALD RN ON 06252021 AT 0919 BY SB       Hematocrit 06/25/2021 23.4* 35.0 - 47.0 % Final     MCV 06/25/2021 85  78 - 100 fl Final     MCH 06/25/2021 26.7  26.5 - 33.0 pg Final     MCHC 06/25/2021 31.6  31.5 - 36.5 g/dL Final     RDW 06/25/2021 14.9  10.0 - 15.0 % Final     Platelet Count 06/25/2021 69* 150 - 450 10e9/L Final    Comment: Results confirmed by repeat test  Verified by smear review       % Neutrophils 06/25/2021 77.7  % Final     % Lymphocytes 06/25/2021 14.4  % Final     % Monocytes 06/25/2021 4.9  % Final     % Eosinophils 06/25/2021 2.8  % Final     % Basophils 06/25/2021 0.2  % Final     Absolute Neutrophil 06/25/2021 7.4  1.6 - 8.3 10e9/L Final     Absolute Lymphocytes 06/25/2021 1.4  0.8 - 5.3 10e9/L Final     Absolute Monocytes 06/25/2021 0.5  0.0 - 1.3 10e9/L Final     Absolute Eosinophils 06/25/2021 0.3  0.0 - 0.7 10e9/L Final     Absolute Basophils 06/25/2021 0.0  0.0 - 0.2 10e9/L Final     Diff Method 06/25/2021 Automated Method   Final     HIV Antigen Antibody Combo 06/25/2021 Nonreactive  NR^Nonreactive     Final    HIV-1 p24 Ag & HIV-1/HIV-2 Ab Not Detected

## 2021-06-29 ENCOUNTER — OFFICE VISIT (OUTPATIENT)
Dept: CARDIOLOGY | Facility: CLINIC | Age: 61
End: 2021-06-29
Attending: PHYSICIAN ASSISTANT
Payer: COMMERCIAL

## 2021-06-29 VITALS
WEIGHT: 178.9 LBS | BODY MASS INDEX: 30.54 KG/M2 | HEIGHT: 64 IN | HEART RATE: 72 BPM | DIASTOLIC BLOOD PRESSURE: 60 MMHG | SYSTOLIC BLOOD PRESSURE: 140 MMHG

## 2021-06-29 DIAGNOSIS — I63.9 CEREBELLAR STROKE, ACUTE (H): ICD-10-CM

## 2021-06-29 DIAGNOSIS — I21.4 NSTEMI (NON-ST ELEVATED MYOCARDIAL INFARCTION) (H): Primary | ICD-10-CM

## 2021-06-29 DIAGNOSIS — E78.5 HYPERLIPIDEMIA LDL GOAL <70: ICD-10-CM

## 2021-06-29 DIAGNOSIS — I25.10 CORONARY ARTERY DISEASE INVOLVING NATIVE CORONARY ARTERY OF NATIVE HEART WITHOUT ANGINA PECTORIS: ICD-10-CM

## 2021-06-29 DIAGNOSIS — I10 ESSENTIAL HYPERTENSION: ICD-10-CM

## 2021-06-29 DIAGNOSIS — N18.4 CHRONIC KIDNEY DISEASE, STAGE 4 (SEVERE) (H): ICD-10-CM

## 2021-06-29 LAB
CREAT SERPL-MCNC: 1.9 MG/DL (ref 0.52–1.04)
GFR SERPL CREATININE-BSD FRML MDRD: 28 ML/MIN/{1.73_M2}

## 2021-06-29 PROCEDURE — 99215 OFFICE O/P EST HI 40 MIN: CPT | Performed by: PHYSICIAN ASSISTANT

## 2021-06-29 ASSESSMENT — MIFFLIN-ST. JEOR: SCORE: 1371.49

## 2021-06-29 NOTE — LETTER
6/29/2021    Brian Ruiz MD  [address] [facility] [address]    RE: Rowan MANZANO Leiva       Dear Colleague,    I had the pleasure of seeing Rowan Leiva in the [facility].    SERVICE DATE: 6/29/2021     Primary Cardiologist: Consult at the time of her recent MI with Dr Alston whom is retiring this month, pt requesting long term follow up with Dr Albrgiht.    REASON FOR VISIT:  Rowan Leiva presents for post hospital/MI/stenting follow up. Today is the first time I am meeting her.    HISTORY OF PRESENT ILLNESS:    She has a rather complex PMHx which includes DM, CKD, PAD, tobacco use and CAD with prior hx MI and ELLIOT to the mLAD and mRCA at an outside hospital in 2011.      More recent she has had several hospital/ER visits. She was admitted at [facility] 5/30-6/4/21 with DVTs and suspected PE (VQ scan), also a NSTEMI (peak trop13) and had ELLIOT to the dLAD. She had patent overlapping stents in the mid-distal LAD with mild ISR and patent stent in the proximal RCA with moderate ISR. Discharged home on Eliquis and Plavix.      She was admitted again to [facility] 6/5-6/6/21 with multiple CVAs, likely cardioembolic. TTE that stay showed LVEF 50-55%, moderate to severe distal anterior and apical wall HK. Negative bubble study. Neuro recommended continuing Eliquis and Plavix.     6/14/21 seen in ED for acute anemia.  Given transfusion.  Discharged with plan to follow up with hematology.      Admitted to [facility] on 6/17-6/22/2021 with acute anemia, suspected GI bleed. EGD and colonoscopy did not reveal a source of bleeding, though blood was noted in the colon consistent with small bowel bleed.  Outpt capsule enteroscopy recommended. Plavix and Eliquis stopped, but then plavix restarted and Eliquis restarted at 2.5mg BID prior to discharge.     Since her recent discharge she has been doing well overall. Did have 1 short episode of jaw  "discomfort (pt states this was her prior anginal symptom) which resolved on it's own. No CP. Did see Dr Miller (heme/onc) last week whom recommended she be on Eliquis 5mg BID (instead of 2.5mg BID).  was called with her capsule endoscopy results yesterday and was told there were \"some AVMs and small ulcers in the small intestine\", no med changes were made and she was told she did not need follow up. Was into see her PCP yesterday, hgb 7.3, pt not sure if she is being set up for an outpt blood transfusion.     CURRENT MEDICATIONS:   Current Outpatient Medications   Medication Sig Dispense Refill     ALBUTEROL 90 MCG/ACT IN AERS 1-2 puffs Q 2-4 hrs prn with spacer - HFA 1 1     [START ON 6/30/2021] apixaban ANTICOAGULANT (ELIQUIS) 5 MG tablet Take 1 tablet (5 mg) by mouth 2 times daily 180 tablet 3     atorvastatin (LIPITOR) 40 MG tablet Take 1 tablet (40 mg) by mouth daily 90 tablet 3     carvedilol (COREG) 12.5 MG tablet Take 1 tablet (12.5 mg) by mouth 2 times daily 180 tablet 3     clopidogrel (PLAVIX) 75 MG tablet Take 1 tablet (75 mg) by mouth daily 90 tablet 3     ferrous sulfate 44 Fe mg/mL ELIX Take 10 mL by mouth mixed in 1/3 of a glass of orange juice (to enhance absorption) 30 minutes before breakfast, every other day. 473 mL 3     insulin glargine (LANTUS PEN) 100 UNIT/ML pen Inject 12 Units Subcutaneous At Bedtime 15 mL 1     insulin lispro (HUMALOG KWIKPEN) 100 UNIT/ML (1 unit dial) KWIKPEN Inject 1 Units Subcutaneous as needed for high blood sugar       LANCETS REGULAR MISC use twice a day for blood sugar 2 boxes 0     levothyroxine (SYNTHROID/LEVOTHROID) 150 MCG tablet Take 150 mcg by mouth daily       nicotine (NICODERM CQ) 21 MG/24HR 24 hr patch Place 1 patch onto the skin daily 15 patch 0     nitroGLYcerin (NITROSTAT) 0.4 MG sublingual tablet For chest pain place 1 tablet under the tongue every 5 minutes for 3 doses. If symptoms persist 5 minutes after 1st dose call 911. 30 tablet 0     " "pantoprazole (PROTONIX) 40 MG EC tablet Take 1 tablet (40 mg) by mouth daily 90 tablet 1       ALLERGIES:  Allergies   Allergen Reactions     No Known Drug Allergies        ROS:  Skin:  Negative     Eyes:  Positive for glasses;visual blurring  ENT:  Negative    Respiratory:  Positive for cough  Cardiovascular:    Positive for;fatigue  Gastroenterology: Positive for heartburn  Genitourinary:  Negative    Musculoskeletal:  Negative    Neurologic:  Positive for numbness or tingling of feet  Psychiatric:  Negative    Heme/Lymph/Imm:  Negative    Endocrine:  Positive for thyroid disorder;diabetes    PHYSICAL EXAMINATION:    GENERAL:  The patient is a pleasant 61 year old who appears their stated age.  In no apparent distress.  VITAL SIGNS:  BP (!) 140/60   Pulse 72   Ht 1.626 m (5' 4\")   Wt 81.1 kg (178 lb 14.4 oz)   LMP 05/01/2000   BMI 30.71 kg/m      RESPIRATORY:  Breathing is nonlabored.  Lungs are clear to auscultation bilaterally.   CARDIAC:  RRR. No murmur  EXTREMITIES:  Trace BLE edema  NEUROLOGIC:  Alert and oriented.    DATA REVIEWED:    Multiple hospital discharge summaries and cardiology notes.    Lab Results   Component Value Date    WBC 9.5 06/25/2021     Lab Results   Component Value Date    RBC 2.77 06/25/2021     Lab Results   Component Value Date    HGB 7.3 06/28/2021     Lab Results   Component Value Date    HCT 23.4 06/25/2021     Lab Results   Component Value Date    MCV 85 06/25/2021     Lab Results   Component Value Date    MCH 26.7 06/25/2021     Lab Results   Component Value Date    MCHC 31.6 06/25/2021     Lab Results   Component Value Date    RDW 14.9 06/25/2021     Lab Results   Component Value Date    PLT 69 06/25/2021       ASSESSMENT AND PLAN:    61 year old F with DM, CKD, PAD, tobacco use and CAD with prior hx MI and ELLIOT to the mLAD and mRCA in 2011 and recent hx of MI with ELLIOT to the dLAD, DVT/possible PE, acute CVAs (possibly cardioembolic from her cardiac cath) and GI bleed whom " presents for cardiology follow up.     1. CAD/MI/recent PCI with DESx1 to the dLAD 5/31/21.  -Continue clopidogrel as lone AP as she is also on Eliquis.  -Statin recently increased. Repeat fasting lipids later this summer  -Continue coreg. Not currently on an ACE-I, I believe due to concern for acute on CKD. If renal function remain stable would consider adding lisinopril.   -EF preserved    2. HTN. BP borderline/mildly elevated, with recent CVAs will not be over aggressive with BP control at this time, but see above about ACE-I    3. HL.  -Plan for repeat labs later this summer    4. Unprovoked DVTs/possible PE  -Heme (Dr Miller) recommends life long AC and recommended increasing Eliquis dose back to 5mg BID    5. Thrombocytopenia/anemia. Per heme/onc    6. Recent CVAs.   -Echo bubble study negative. No afib noted on monitoring during multiple recent hosp stays. Since she will be on lifelong AC I am not sure the utility of further cardiac rhythm monitoring. Possibly cardioembolic from her cardiac cath.    7. CKD stage 3b-4.     Follow up: 2 months with myself with fasting labs prior.  Of course, the patient was encouraged to contact us sooner with questions or concerns.    50 minutes spent on the date of the encounter doing chart review, review of outside records, review of test results, patient visit and documentation     Ebony Stein PA-C      Thank you for allowing me to participate in the care of your patient.      Sincerely,     Ebony Stein PA-C     [facility]  cc:   Viktoria Mcdonough PA-C  [id number]  [place] [address]

## 2021-06-29 NOTE — PATIENT INSTRUCTIONS
Thank you for your [facility] visit today. Your provider has recommended the following:  Medication Changes:  No changes today  Recommendations:  For a home BP monitor I recommend the Omron series 3 monitor  Consider cardiac rehab  Follow-up:  See Ebony QUINONEZ for cardiology follow up in 2 months.    Reminder:  Please bring in your current medication list or your medication, over the counter supplements and vitamin bottles as we will review these at each office visit.

## 2021-06-29 NOTE — PROGRESS NOTES
SERVICE DATE: 6/29/2021     Primary Cardiologist: Consult at the time of her recent MI with Dr Alston whom is retiring this month, pt requesting long term follow up with Dr Albright.    REASON FOR VISIT:  Rowan Leiva presents for post hospital/MI/stenting follow up. Today is the first time I am meeting her.    HISTORY OF PRESENT ILLNESS:    She has a rather complex PMHx which includes DM, CKD, PAD, tobacco use and CAD with prior hx MI and ELLIOT to the mLAD and mRCA at an outside hospital in 2011.      More recent she has had several hospital/ER visits. She was admitted at [facility] [place] 5/30-6/4/21 with DVTs and suspected PE (VQ scan), also a NSTEMI (peak trop13) and had ELLIOT to the dLAD. She had patent overlapping stents in the mid-distal LAD with mild ISR and patent stent in the proximal RCA with moderate ISR. Discharged home on Eliquis and Plavix.      She was admitted again to [facility] [place] 6/5-6/6/21 with multiple CVAs, likely cardioembolic. TTE that stay showed LVEF 50-55%, moderate to severe distal anterior and apical wall HK. Negative bubble study. Neuro recommended continuing Eliquis and Plavix.     6/14/21 seen in ED for acute anemia.  Given transfusion.  Discharged with plan to follow up with hematology.      Admitted to [facility] on 6/17-6/22/2021 with acute anemia, suspected GI bleed. EGD and colonoscopy did not reveal a source of bleeding, though blood was noted in the colon consistent with small bowel bleed.  Outpt capsule enteroscopy recommended. Plavix and Eliquis stopped, but then plavix restarted and Eliquis restarted at 2.5mg BID prior to discharge.     Since her recent discharge she has been doing well overall. Did have 1 short episode of jaw discomfort (pt states this was her prior anginal symptom) which resolved on it's own. No CP. Did see Dr Miller (heme/onc) last week whom recommended she be on Eliquis 5mg BID (instead of 2.5mg BID). States was called with her capsule endoscopy results  "yesterday and was told there were \"some AVMs and small ulcers in the small intestine\", no med changes were made and she was told she did not need follow up. Was into see her PCP yesterday, hgb 7.3, pt not sure if she is being set up for an outpt blood transfusion.     CURRENT MEDICATIONS:   Current Outpatient Medications   Medication Sig Dispense Refill     ALBUTEROL 90 MCG/ACT IN AERS 1-2 puffs Q 2-4 hrs prn with spacer - HFA 1 1     [START ON 6/30/2021] apixaban ANTICOAGULANT (ELIQUIS) 5 MG tablet Take 1 tablet (5 mg) by mouth 2 times daily 180 tablet 3     atorvastatin (LIPITOR) 40 MG tablet Take 1 tablet (40 mg) by mouth daily 90 tablet 3     carvedilol (COREG) 12.5 MG tablet Take 1 tablet (12.5 mg) by mouth 2 times daily 180 tablet 3     clopidogrel (PLAVIX) 75 MG tablet Take 1 tablet (75 mg) by mouth daily 90 tablet 3     ferrous sulfate 44 Fe mg/mL ELIX Take 10 mL by mouth mixed in 1/3 of a glass of orange juice (to enhance absorption) 30 minutes before breakfast, every other day. 473 mL 3     insulin glargine (LANTUS PEN) 100 UNIT/ML pen Inject 12 Units Subcutaneous At Bedtime 15 mL 1     insulin lispro (HUMALOG KWIKPEN) 100 UNIT/ML (1 unit dial) KWIKPEN Inject 1 Units Subcutaneous as needed for high blood sugar       LANCETS REGULAR MISC use twice a day for blood sugar 2 boxes 0     levothyroxine (SYNTHROID/LEVOTHROID) 150 MCG tablet Take 150 mcg by mouth daily       nicotine (NICODERM CQ) 21 MG/24HR 24 hr patch Place 1 patch onto the skin daily 15 patch 0     nitroGLYcerin (NITROSTAT) 0.4 MG sublingual tablet For chest pain place 1 tablet under the tongue every 5 minutes for 3 doses. If symptoms persist 5 minutes after 1st dose call 911. 30 tablet 0     pantoprazole (PROTONIX) 40 MG EC tablet Take 1 tablet (40 mg) by mouth daily 90 tablet 1       ALLERGIES:  Allergies   Allergen Reactions     No Known Drug Allergies        ROS:  Skin:  Negative     Eyes:  Positive for glasses;visual blurring  ENT:  " "Negative    Respiratory:  Positive for cough  Cardiovascular:    Positive for;fatigue  Gastroenterology: Positive for heartburn  Genitourinary:  Negative    Musculoskeletal:  Negative    Neurologic:  Positive for numbness or tingling of feet  Psychiatric:  Negative    Heme/Lymph/Imm:  Negative    Endocrine:  Positive for thyroid disorder;diabetes    PHYSICAL EXAMINATION:    GENERAL:  The patient is a pleasant 61 year old who appears their stated age.  In no apparent distress.  VITAL SIGNS:  BP (!) 140/60   Pulse 72   Ht 1.626 m (5' 4\")   Wt 81.1 kg (178 lb 14.4 oz)   LMP 05/01/2000   BMI 30.71 kg/m      RESPIRATORY:  Breathing is nonlabored.  Lungs are clear to auscultation bilaterally.   CARDIAC:  RRR. No murmur  EXTREMITIES:  Trace BLE edema  NEUROLOGIC:  Alert and oriented.    DATA REVIEWED:    Multiple hospital discharge summaries and cardiology notes.    Lab Results   Component Value Date    WBC 9.5 06/25/2021     Lab Results   Component Value Date    RBC 2.77 06/25/2021     Lab Results   Component Value Date    HGB 7.3 06/28/2021     Lab Results   Component Value Date    HCT 23.4 06/25/2021     Lab Results   Component Value Date    MCV 85 06/25/2021     Lab Results   Component Value Date    MCH 26.7 06/25/2021     Lab Results   Component Value Date    MCHC 31.6 06/25/2021     Lab Results   Component Value Date    RDW 14.9 06/25/2021     Lab Results   Component Value Date    PLT 69 06/25/2021       ASSESSMENT AND PLAN:    61 year old F with DM, CKD, PAD, tobacco use and CAD with prior hx MI and ELLIOT to the mLAD and mRCA in 2011 and recent hx of MI with ELLIOT to the dLAD, DVT/possible PE, acute CVAs (possibly cardioembolic from her cardiac cath) and GI bleed whom presents for cardiology follow up.     1. CAD/MI/recent PCI with DESx1 to the dLAD 5/31/21.  -Continue clopidogrel as lone AP as she is also on Eliquis.  -Statin recently increased. Repeat fasting lipids later this summer  -Continue coreg. Not " currently on an ACE-I, I believe due to concern for acute on CKD. If renal function remain stable would consider adding lisinopril.   -EF preserved    2. HTN. BP borderline/mildly elevated, with recent CVAs will not be over aggressive with BP control at this time, but see above about ACE-I    3. HL.  -Plan for repeat labs later this summer    4. Unprovoked DVTs/possible PE  -Heme (Dr Miller) recommends life long AC and recommended increasing Eliquis dose back to 5mg BID    5. Thrombocytopenia/anemia. Per heme/onc    6. Recent CVAs.   -Echo bubble study negative. No afib noted on monitoring during multiple recent hosp stays. Since she will be on lifelong AC I am not sure the utility of further cardiac rhythm monitoring. Possibly cardioembolic from her cardiac cath.    7. CKD stage 3b-4.     Follow up: 2 months with myself with fasting labs prior.  Of course, the patient was encouraged to contact us sooner with questions or concerns.    50 minutes spent on the date of the encounter doing chart review, review of outside records, review of test results, patient visit and documentation     Ebony Stein PA-C

## 2021-06-30 NOTE — PROGRESS NOTES
HEMATOLOGY HISTORY: Ms. Leiva is a female with anemia and thrombocytopenia.  1. On 05/30/2021:   -WBC of 13.5, hemoglobin of 11.7 and platelet of 90.  -Creatinine of 1.87.  2. Coronary angiogram on 05/31/2021 revealed stenosis of multiple vessels.  Stents were placed.  3. CT abdomen and pelvis on 05/31/2021 reveals extensive bilateral renal vascular calcification.    -Lower extremity ultrasound on 05/31/2021 reveals bilateral posterior tibial vein DVT. -Because of chest pain and hemoptysis, CT chest angiogram was planned, but could not be done because of elevated creatinine.  Patient had a V/Q scan done today, which was nondiagnostic.  4. HIT negative on 06/01/2021.   5. EGD on 06/18/2021 was normal.    -Colonoscopy on 06/19/2021 revealed red/clotted blood throughout the entire colon.  This was likely from a small bowel bleed.    -Patient had video endoscopy done.  6. On 06/14/2021, iron of 20 and saturation index of 11%.  -On 06/17/2021, hemoglobin electrophoresis is normal.    SUBJECTIVE:  Ms. Leiva is a 61-year-old female who was recently seen in the hospital for thrombocytopenia.  There is concern for heparin-induced thrombocytopenia.  HIT came back negative.     In the hospital, she had a lower extremity ultrasound done on 05/31/2021.  It had revealed bilateral posterior tibial deep vein thrombosis.  There was concern for pulmonary embolism.  CT chest angiogram could not be done because of elevated creatinine.  VQ scan was nondiagnostic.  The patient is on anticoagulation with Eliquis.  A followup lower extremity ultrasound on 06/06/2021 does not reveal any DVT.     The patient had CT chest, abdomen and pelvis without contrast on 06/06/2021.  It revealed micronodular infiltrate in bilateral lungs.  There are also other lung nodules.  There is no malignancy.     The patient continues to have thrombocytopenia. The patient also has been anemic.  Labs done revealed iron deficiency with iron of 20.     The  patient had EGD and colonoscopy done.  EGD on 06/18/2021 was normal.  Colonoscopy on 06/19/2021 revealed red/clotted blood throughout the entire colon.  This was likely from a small bowel bleed.  The patient had a video endoscopy done.  Results are awaited.     The patient feels that she is feeling better.  She is stronger.  No chest pain.  Shortness of breath is better.  No nausea or vomiting.  Denies bleeding from any site.  She is on oral liquid ferrous sulfate.  She is tolerating it well.     She is also on Eliquis.  So far, she is tolerating it well.  No bleeding.  She is also on Plavix because of a cardiac stent.     PHYSICAL EXAMINATION:    GENERAL:  She is alert and oriented x 3.   This is a telephone visit.     LABS:  Reviewed.     ASSESSMENT:  1.  A 61-year-old female with thrombocytopenia.  Thrombocytopenia is stable.  2.  Normocytic anemia.  3.  Iron deficiency.  4.  Bilateral lower extremity DVT. Resolved.  5.  Chronic kidney disease.  6.  Coronary artery disease.     PLAN:  1.  CBC was reviewed.  I explained to her that she continues to be thrombocytopenic.  Cause of thrombocytopenia is not clear.     We will monitor CBC.  If thrombocytopenia does not resolve, we will plan on doing a bone marrow biopsy to rule out primary bone marrow pathology.     2.  She is anemic.  Anemia is due to multiple factors including iron deficiency, GI bleed, anemia of chronic disease and chronic kidney disease.  We will transfuse for hemoglobin below 7.  She is on oral iron, which will be continued.  She had video endoscopy done.  Results awaited.     3.  The patient had lower extremity DVT.  She is on anticoagulation.  DVT has resolved.  She will continue on Eliquis.  She is tolerating it well.     She has unprovoked thrombosis.  Plan will be for indefinite anticoagulation.     The patient brought up the issue of decreasing the dose of Eliquis.  This will be considered after 3 months.     If her anemia/GI bleed becomes an  issue, we might have to discontinue Eliquis.     4.  CT chest revealed abnormality in the lung.  This will be followed up with a CT chest in the next 2-3 months.     5.  I will see her in a month with labs.  I advised her to call us with any questions or concerns.     Total telephone visit time of 25 minutes.

## 2021-07-03 ENCOUNTER — HEALTH MAINTENANCE LETTER (OUTPATIENT)
Age: 61
End: 2021-07-03

## 2021-07-08 ENCOUNTER — TELEPHONE (OUTPATIENT)
Dept: INTERNAL MEDICINE | Facility: CLINIC | Age: 61
End: 2021-07-08

## 2021-07-08 DIAGNOSIS — I10 ESSENTIAL HYPERTENSION: ICD-10-CM

## 2021-07-08 DIAGNOSIS — D62 ANEMIA DUE TO BLOOD LOSS, ACUTE: ICD-10-CM

## 2021-07-08 LAB — HGB BLD-MCNC: 7.6 G/DL (ref 11.7–15.7)

## 2021-07-08 PROCEDURE — 85018 HEMOGLOBIN: CPT | Performed by: INTERNAL MEDICINE

## 2021-07-08 PROCEDURE — 82565 ASSAY OF CREATININE: CPT | Performed by: INTERNAL MEDICINE

## 2021-07-08 PROCEDURE — 36415 COLL VENOUS BLD VENIPUNCTURE: CPT | Performed by: INTERNAL MEDICINE

## 2021-07-09 ENCOUNTER — TELEPHONE (OUTPATIENT)
Dept: INTERNAL MEDICINE | Facility: CLINIC | Age: 61
End: 2021-07-09

## 2021-07-09 LAB
CREAT SERPL-MCNC: 1.78 MG/DL (ref 0.52–1.04)
GFR SERPL CREATININE-BSD FRML MDRD: 31 ML/MIN/{1.73_M2}

## 2021-07-09 NOTE — TELEPHONE ENCOUNTER
Fax received from [employer] - Certificate of Serious Health Conditions for review and signature.  Put in Dr. Ruiz's in basket.

## 2021-07-13 NOTE — TELEPHONE ENCOUNTER
Left vm for patient to call and see if she wants to come in and get forms and fill out her portion or if she wants us to mail them to her to complete before being turned in.  Forms are under the wire basket on my desk.

## 2021-07-15 ENCOUNTER — TELEPHONE (OUTPATIENT)
Dept: PHARMACY | Facility: CLINIC | Age: 61
End: 2021-07-15

## 2021-07-15 ENCOUNTER — VIRTUAL VISIT (OUTPATIENT)
Dept: PHARMACY | Facility: CLINIC | Age: 61
End: 2021-07-15
Payer: COMMERCIAL

## 2021-07-15 DIAGNOSIS — J44.9 CHRONIC OBSTRUCTIVE PULMONARY DISEASE, UNSPECIFIED COPD TYPE (H): Primary | ICD-10-CM

## 2021-07-15 DIAGNOSIS — I21.4 NSTEMI (NON-ST ELEVATED MYOCARDIAL INFARCTION) (H): ICD-10-CM

## 2021-07-15 DIAGNOSIS — D62 ANEMIA DUE TO BLOOD LOSS, ACUTE: ICD-10-CM

## 2021-07-15 DIAGNOSIS — I63.9 CEREBELLAR STROKE, ACUTE (H): ICD-10-CM

## 2021-07-15 DIAGNOSIS — Z79.4 TYPE 2 DIABETES MELLITUS WITH HYPERGLYCEMIA, WITH LONG-TERM CURRENT USE OF INSULIN (H): ICD-10-CM

## 2021-07-15 DIAGNOSIS — E11.65 TYPE 2 DIABETES MELLITUS WITH HYPERGLYCEMIA, WITH LONG-TERM CURRENT USE OF INSULIN (H): ICD-10-CM

## 2021-07-15 PROCEDURE — 99606 MTMS BY PHARM EST 15 MIN: CPT | Performed by: PHARMACIST

## 2021-07-15 PROCEDURE — 99607 MTMS BY PHARM ADDL 15 MIN: CPT | Performed by: PHARMACIST

## 2021-07-15 RX ORDER — ALBUTEROL SULFATE 90 UG/1
2 AEROSOL, METERED RESPIRATORY (INHALATION) EVERY 6 HOURS
Qty: 18 G | Refills: 3 | Status: SHIPPED | OUTPATIENT
Start: 2021-07-15 | End: 2022-01-20

## 2021-07-15 RX ORDER — PANTOPRAZOLE SODIUM 40 MG/1
40 TABLET, DELAYED RELEASE ORAL DAILY
Qty: 90 TABLET | Refills: 1 | Status: SHIPPED | OUTPATIENT
Start: 2021-07-15 | End: 2022-09-13

## 2021-07-15 NOTE — TELEPHONE ENCOUNTER
Called patient for scheduled ADAM.  Left message.    Emmie Bar , Pharm D  [phone] (phone)  Medication Therapy Management Pharmacist

## 2021-07-15 NOTE — PROGRESS NOTES
Medication Therapy Management (MTM) Encounter    ASSESSMENT:                            Medication Adherence/Access: No issues identified    GI Bleed: refilled pantoprazole today.    CAD/Stroke: scheduled clinic follow-up to discuss changes in symptoms when laying down and need for oxygen.    Type 2 Diabetes:  Discussed taking vitamin C supplement with iron vs drinking OJ that spikes her blood sugars, she would prefer to keep drinking the OJ since it helps mask the taste of the iron and will adjust her Humalog dose to minimize the spikes      COPD:   Refilled albuterol.    PLAN:                            1.  Refilled pantoprazole and albuterol today  2. Discussed vitamin C tablets with the iron but she prefers to take with OJ    Follow-up: No follow-ups on file.      SUBJECTIVE/OBJECTIVE:                          Rowan Leiva is a 61 year old female called for a transitions of care visit.  She was discharged from [facility] on 6/22/21 for GI Bleed.   Last MTM visit 6/24/21.    Reason for visit: ADAM follow-up      Allergies/ADRs: Reviewed in chart  Tobacco: She reports that she has been smoking cigarettes. She has a 12.00 pack-year smoking history. She has quit using smokeless tobacco.Tobacco Cessation Action Plan:   Self help information given to patient  Alcohol: history of alcohol dependence    Medication Adherence/Access: no issues reported    GI Bleed: Taking pantoprazole 40 mg daily - needing refill and ferrous sulfate every other day.  Can tell stools are black. Having BMs twice weekly.    .    EGD and colonoscopy do not show the source of bleeding however there was a old blood noted.  GI following.  Resumed Eliquis and clopidogrel yesterday morning with no evidence of recurrent bleeding. Has received 3 units of packed red blood cells.  Hemoglobin 7.4 which is stable and rising.       Hemoglobin   Date Value Ref Range Status   07/08/2021 7.6 (LL) 11.7 - 15.7 g/dL Final     Comment:     Critical Value called to  and read back by  JATIN JUNG @1734 ON 21 IH      ]      CAD/Stroke: Taking carvedilol 12.5 mg twice daily, clopidogrel 75 mg daily, Eliquis 5 mg twice daily (dose increased) and atorvastatin 40 mg daily.    Notices pounding in ear and jaw pain when she lays down at night; sitting up or burping helps the jaw pain.  Will lay down with her 's oxygen and that helps.  Also moved the carvedilol and Eliquis earlier and that seems to help.    Non-STEMI status post recent drug-eluting stent x2 to the LAD.  MRI showed multiple acute and subacute ischemic infarcts.  Seen by neurology during her previous hospitalization.  Thought is that this is related to recent cardiac catheterization.  JOSEPHINE bubble negative.  Restarted clopidogrel and half dose of Eliquis yesterday.  EF 50 to 55% with severe distal anterior and apical wall hypokinesis.  No chest pain or shortness of breath.      Recent Labs   Lab Test 21  0626 01/08/15   CHOL 191 193   HDL 29* 29   * 90.2   TRIG 285* 369   CHOLHDLRATIO  --  6.7         Type 2 Diabetes:  Currently taking Lantus 12 units  daily and Humalog 1-2 units if needed (not using very often).   Patient is not experiencing side effects.  Blood sugar monitorin time(s) daily. Ranges (patient reported): a little higher when she takes the iron with OJ; can increase up to 250 after OJ. Usually runs <140s.    Symptoms of low blood sugar? Has been better lately but was struggling with lows when she wasn't eating  Eye exam: up to date; has an appointment for retinopathy follow-up on   Foot exam: up to date  Diet/Exercise: not eating much, no appetite  Aspirin: Not taking due to anticoagulation  Statin: Yes:    ACEi/ARB: No.   Urine Albumin:   Lab Results   Component Value Date    UMALCR 16.32 2006      Lab Results   Component Value Date    A1C 7.9 2021    A1C 9.7 2007    A1C 10.2 2006    A1C 12.7 2006    A1C 14.4 2005           COPD:   Asthma on her  problem list but she doesn't believe she has been diagnosed with Asthma. Just establishing with Dr. Ruiz.    Current medications: Albuterol as needed; maybe needed a couple of times per year. Needing refill today.  Patient is not experiencing side effects.   Patient reports the following symptoms: none.    Today's Vitals: LMP 05/01/2000   ----------------  Post Discharge Medication Reconciliation Status: discharge medications reconciled, continue medications without change.    I spent 30 minutes with this patient today. All changes were made via collaborative practice agreement with PCP. A copy of the visit note was provided to the patient's primary care provider.    The patient declined a summary of these recommendations.     Emmie Bar , Pharm D  [phone] (phone)  Medication Therapy Management Pharmacist     Telemedicine Visit Details  Type of service:  Telephone visit  Start Time: 109 pm  End Time: 1:40 PM  Originating Location (patient location): [place]  Distant Location (provider location):  [facility]     Medication Therapy Recommendations  Chronic obstructive pulmonary disease, unspecified COPD type (H)    Current Medication: albuterol (PROAIR HFA/PROVENTIL HFA/VENTOLIN HFA) 108 (90 Base) MCG/ACT inhaler   Rationale: Condition refractory to medication - Ineffective medication - Effectiveness   Recommendation: Change Medication - scheduled visit to discuss oxygen   Status: Patient Agreed - Adherence/Education

## 2021-07-15 NOTE — PATIENT INSTRUCTIONS
Recommendations from today's MTM visit:                                                       1.  Refilled pantoprazole and albuterol today  2.  Scheduled clinic visit to discuss oxygen use  3. If your blood sugars continue to be a challenge after drinking the orange juice, recommend not taking the juice but take a vitamin C supplement with the iron.        Follow-up: No follow-ups on file.    It was great to speak with you today.  I value your experience and would be very thankful for your time with providing feedback on our clinic survey. You may receive a survey via email or text message in the next few days.     To schedule another MTM appointment, please call the clinic directly or you may call the MTM scheduling line at [phone] or toll-free at [phone].     My Clinical Pharmacist's contact information:                                                      Please feel free to contact me with any questions or concerns you have.      Emmie Bar , Pharm D  [phone] (phone)  Medication Therapy Management Pharmacist

## 2021-07-16 ENCOUNTER — TELEPHONE (OUTPATIENT)
Dept: INTERNAL MEDICINE | Facility: CLINIC | Age: 61
End: 2021-07-16

## 2021-07-20 ENCOUNTER — TELEPHONE (OUTPATIENT)
Dept: INTERNAL MEDICINE | Facility: CLINIC | Age: 61
End: 2021-07-20

## 2021-07-20 ENCOUNTER — LAB (OUTPATIENT)
Dept: LAB | Facility: CLINIC | Age: 61
End: 2021-07-20
Payer: COMMERCIAL

## 2021-07-20 DIAGNOSIS — D64.9 ANEMIA, UNSPECIFIED TYPE: ICD-10-CM

## 2021-07-20 DIAGNOSIS — N18.30 CHRONIC RENAL IMPAIRMENT, STAGE 3 (MODERATE), UNSPECIFIED WHETHER STAGE 3A OR 3B CKD (H): ICD-10-CM

## 2021-07-20 DIAGNOSIS — D62 ANEMIA DUE TO BLOOD LOSS, ACUTE: ICD-10-CM

## 2021-07-20 LAB
ERYTHROCYTE [DISTWIDTH] IN BLOOD BY AUTOMATED COUNT: 15.5 % (ref 10–15)
HCT VFR BLD AUTO: 25.1 % (ref 35–47)
HGB BLD-MCNC: 7.6 G/DL (ref 11.7–15.7)
MCH RBC QN AUTO: 25.2 PG (ref 26.5–33)
MCHC RBC AUTO-ENTMCNC: 30.3 G/DL (ref 31.5–36.5)
MCV RBC AUTO: 83 FL (ref 78–100)
PLATELET # BLD AUTO: 119 10E3/UL (ref 150–450)
RBC # BLD AUTO: 3.02 10E6/UL (ref 3.8–5.2)
WBC # BLD AUTO: 9.3 10E3/UL (ref 4–11)

## 2021-07-20 PROCEDURE — 80048 BASIC METABOLIC PNL TOTAL CA: CPT

## 2021-07-20 PROCEDURE — 82728 ASSAY OF FERRITIN: CPT

## 2021-07-20 PROCEDURE — 36415 COLL VENOUS BLD VENIPUNCTURE: CPT

## 2021-07-20 PROCEDURE — 85027 COMPLETE CBC AUTOMATED: CPT

## 2021-07-20 PROCEDURE — 83550 IRON BINDING TEST: CPT

## 2021-07-20 NOTE — TELEPHONE ENCOUNTER
Pt has another low HGB. Was in hospital for GI bleed on 6/17/21.   Saw [facility], scope done, lower GI bleed.   Oncology, Dr Miller.     Instructions, 6/30/21. From Dr Miller  1. Continue eliquis.  2. Continue ferrous sulfate.  3. Follow-up in 3-4 weeks with labs        Should she call Dr Miller's office?     Lab Results   Component Value Date    HGB 7.6 07/20/2021    HGB 7.6 07/08/2021    HGB 7.3 06/28/2021

## 2021-07-21 ENCOUNTER — ONCOLOGY VISIT (OUTPATIENT)
Dept: ONCOLOGY | Facility: CLINIC | Age: 61
End: 2021-07-21
Attending: INTERNAL MEDICINE
Payer: COMMERCIAL

## 2021-07-21 VITALS
HEART RATE: 77 BPM | WEIGHT: 171 LBS | SYSTOLIC BLOOD PRESSURE: 137 MMHG | RESPIRATION RATE: 16 BRPM | BODY MASS INDEX: 29.35 KG/M2 | DIASTOLIC BLOOD PRESSURE: 70 MMHG | TEMPERATURE: 98.4 F | OXYGEN SATURATION: 100 %

## 2021-07-21 DIAGNOSIS — K90.9 IRON MALABSORPTION: ICD-10-CM

## 2021-07-21 DIAGNOSIS — D64.9 ANEMIA, UNSPECIFIED TYPE: ICD-10-CM

## 2021-07-21 DIAGNOSIS — R91.8 ABNORMAL CT SCAN OF LUNG: ICD-10-CM

## 2021-07-21 DIAGNOSIS — R06.02 SHORTNESS OF BREATH: Primary | ICD-10-CM

## 2021-07-21 DIAGNOSIS — D50.9 IRON DEFICIENCY ANEMIA, UNSPECIFIED IRON DEFICIENCY ANEMIA TYPE: ICD-10-CM

## 2021-07-21 DIAGNOSIS — D69.6 THROMBOCYTOPENIA (H): ICD-10-CM

## 2021-07-21 DIAGNOSIS — N18.30 CHRONIC RENAL IMPAIRMENT, STAGE 3 (MODERATE), UNSPECIFIED WHETHER STAGE 3A OR 3B CKD (H): ICD-10-CM

## 2021-07-21 LAB
ANION GAP SERPL CALCULATED.3IONS-SCNC: 9 MMOL/L (ref 3–14)
BUN SERPL-MCNC: 35 MG/DL (ref 7–30)
CALCIUM SERPL-MCNC: 8.7 MG/DL (ref 8.5–10.1)
CHLORIDE BLD-SCNC: 106 MMOL/L (ref 94–109)
CO2 SERPL-SCNC: 22 MMOL/L (ref 20–32)
CREAT SERPL-MCNC: 2.28 MG/DL (ref 0.52–1.04)
FERRITIN SERPL-MCNC: 125 NG/ML (ref 8–252)
GFR SERPL CREATININE-BSD FRML MDRD: 23 ML/MIN/1.73M2
GLUCOSE BLD-MCNC: 260 MG/DL (ref 70–99)
IRON SATN MFR SERPL: 12 % (ref 15–46)
IRON SERPL-MCNC: 27 UG/DL (ref 35–180)
POTASSIUM BLD-SCNC: 4 MMOL/L (ref 3.4–5.3)
SODIUM SERPL-SCNC: 137 MMOL/L (ref 133–144)
TIBC SERPL-MCNC: 221 UG/DL (ref 240–430)

## 2021-07-21 PROCEDURE — 99214 OFFICE O/P EST MOD 30 MIN: CPT | Performed by: INTERNAL MEDICINE

## 2021-07-21 PROCEDURE — G0463 HOSPITAL OUTPT CLINIC VISIT: HCPCS

## 2021-07-21 RX ORDER — NALOXONE HYDROCHLORIDE 0.4 MG/ML
0.2 INJECTION, SOLUTION INTRAMUSCULAR; INTRAVENOUS; SUBCUTANEOUS
Status: CANCELLED | OUTPATIENT
Start: 2021-07-26

## 2021-07-21 RX ORDER — HEPARIN SODIUM (PORCINE) LOCK FLUSH IV SOLN 100 UNIT/ML 100 UNIT/ML
5 SOLUTION INTRAVENOUS
Status: CANCELLED | OUTPATIENT
Start: 2021-07-26

## 2021-07-21 RX ORDER — ALBUTEROL SULFATE 0.83 MG/ML
2.5 SOLUTION RESPIRATORY (INHALATION)
Status: CANCELLED | OUTPATIENT
Start: 2021-07-26

## 2021-07-21 RX ORDER — MEPERIDINE HYDROCHLORIDE 25 MG/ML
25 INJECTION INTRAMUSCULAR; INTRAVENOUS; SUBCUTANEOUS EVERY 30 MIN PRN
Status: CANCELLED | OUTPATIENT
Start: 2021-07-26

## 2021-07-21 RX ORDER — EPINEPHRINE 1 MG/ML
0.3 INJECTION, SOLUTION INTRAMUSCULAR; SUBCUTANEOUS EVERY 5 MIN PRN
Status: CANCELLED | OUTPATIENT
Start: 2021-07-26

## 2021-07-21 RX ORDER — ALBUTEROL SULFATE 90 UG/1
1-2 AEROSOL, METERED RESPIRATORY (INHALATION)
Status: CANCELLED
Start: 2021-07-26

## 2021-07-21 RX ORDER — METHYLPREDNISOLONE SODIUM SUCCINATE 125 MG/2ML
125 INJECTION, POWDER, LYOPHILIZED, FOR SOLUTION INTRAMUSCULAR; INTRAVENOUS
Status: CANCELLED
Start: 2021-07-26

## 2021-07-21 RX ORDER — DIPHENHYDRAMINE HYDROCHLORIDE 50 MG/ML
50 INJECTION INTRAMUSCULAR; INTRAVENOUS
Status: CANCELLED
Start: 2021-07-26

## 2021-07-21 RX ORDER — HEPARIN SODIUM,PORCINE 10 UNIT/ML
5 VIAL (ML) INTRAVENOUS
Status: CANCELLED | OUTPATIENT
Start: 2021-07-26

## 2021-07-21 ASSESSMENT — PAIN SCALES - GENERAL: PAINLEVEL: NO PAIN (0)

## 2021-07-21 NOTE — LETTER
"    7/21/2021         RE: Rowan Leiva  [address]  [facility] [address]        Dear Colleague,    Thank you for referring your patient, Rowan Leiva, to the [facility]. Please see a copy of my visit note below.    Oncology Rooming Note    July 21, 2021 10:18 AM   Rowan Leiva is a 61 year old female who presents for:    Chief Complaint   Patient presents with     Oncology Clinic Visit     Initial Vitals: /70   Pulse 77   Temp 98.4  F (36.9  C) (Oral)   Resp 16   Wt 77.6 kg (171 lb)   LMP 05/01/2000   SpO2 100%   BMI 29.35 kg/m   Estimated body mass index is 29.35 kg/m  as calculated from the following:    Height as of 6/29/21: 1.626 m (5' 4\").    Weight as of this encounter: 77.6 kg (171 lb). Body surface area is 1.87 meters squared.  No Pain (0) Comment: Data Unavailable   Patient's last menstrual period was 05/01/2000.  Allergies reviewed: Yes  Medications reviewed: Yes    Medications: Medication refills not needed today.  Pharmacy name entered into [employer]:    [id number] [place], [place] [facility] [address]  [id number] - [place], [place] - [address] AT [facility] [address] [facility]    Clinical concerns: no      Nenita Celis, [facility]              HEMATOLOGY HISTORY: Ms. Leiva is a female with anemia and thrombocytopenia and DVT.  1. On 05/30/2021:   -WBC of 13.5, hemoglobin of 11.7 and platelet of 90.  -Creatinine of 1.87.  2. Coronary angiogram on 05/31/2021 revealed stenosis of multiple vessels.  Stents were placed.  3. CT abdomen and pelvis on 05/31/2021 reveals extensive bilateral renal vascular calcification.    -Lower extremity ultrasound on 05/31/2021 reveals bilateral posterior tibial vein DVT.   -Because of chest pain and hemoptysis, CT chest angiogram was planned, but could not be done because of elevated creatinine.  Patient had a V/Q scan done today, which was nondiagnostic.  4. HIT negative on 06/01/2021. "   5. EGD on 06/18/2021 was normal.    -Colonoscopy on 06/19/2021 revealed red/clotted blood throughout the entire colon.  This was likely from a small bowel bleed.    -Patient had video endoscopy done.  6. On 06/14/2021, iron of 20 and saturation index of 11%.  -On 06/17/2021, hemoglobin electrophoresis is normal.    SUBJECTIVE:  Ms. Leiva is a 61-year-old female with multiple medical problems including coronary artery disease and chronic kidney disease.  The patient has bilateral lower extremity DVT.  She is on Eliquis.  Plan is for indefinite anticoagulation.  She also has chronic thrombocytopenia.  She also has anemia.  Anemia is due to renal disease, anemia of chronic disease, and iron deficiency.  She is on oral ferrous sulfate.     She has fatigue.  She also has some visual problems and is seeing the ophthalmologist.  The patient said that she often feels some discomfort in the jaw area.  She uses her 's oxygen and that makes her feel better.     No shortness of breath at rest, but she gets short of breath on minimal exertion.  Last CT scan in 06/20/2021 had revealed micronodular infiltrative process in bilateral lungs and centrilobular ground glass nodular densities.     No abdominal pain, nausea, or vomiting.  Appetite is fair. No bleeding from any site.  She has easy bruising.     PHYSICAL EXAMINATION:    GENERAL:  She is alert and oriented x 3.  Not in any distress.  VITAL SIGNS:  Reviewed.   Rest of systems not examined.     LABORATORY:  CBC and BMP reviewed.     ASSESSMENT:    1.  A 61-year-old female with chronic thrombocytopenia.  Thrombocytopenia is better.  2.  Normocytic anemia secondary to renal disease, anemia of chronic disease and iron deficiency.  3.  Chronic kidney disease.  4.  Shortness of breath on exertion.  5.  Abnormal CT chest.  6.  Bilateral lower extremity DVT on long-term anticoagulation.  7.  Other medical problems, including coronary artery disease.     PLAN:    1.  I had  a long discussion with the patient and her sister.  Labs reviewed.  I discussed regarding thrombocytopenia.  It is chronic, but it is improving.  I explained to the patient that if her thrombocytopenia does not resolve or gets worse, we will consider a bone marrow biopsy.  At this time, we are avoiding it as she has multiple ongoing problems and she is on anticoagulation.  The patient would like to wait on the bone marrow.  As long as platelets do not go down, I think we are okay to hold off on the bone marrow.    2.  I discussed regarding anemia.  Anemia is not going to resolve because of her chronic kidney disease and other medical problems.  She does have iron deficiency.  She will continue on oral iron.  We will recheck her CBC and iron studies in two months.  She may periodically require IV iron.    3.  The patient does have renal disease, which is also causing anemia.  She may benefit from erythropoietin stimulating agent.  That will be decided by nephrologist.  She has chronic kidney disease.  We will set her up to see a nephrologist.    4.  She gets short of breath on exertion.  CT chest was abnormal.  We will set her up to see a pulmonologist.    5.  She will continue on Eliquis.  She is tolerating it well.  Some easy bruising, but no bleeding.      6.  I will see her in 2 months' time.  I advised her to see a physician sooner if she has worsening weakness, bleeding from any site, shortness of breath or any other concerns.     TOTAL FACE-TO-FACE TIME SPENT:  25 minutes, more than 50% of the time was spent in counseling and coordination of care.    This office note has been dictated.          Again, thank you for allowing me to participate in the care of your patient.        Sincerely,        Lexa Miller MD

## 2021-07-21 NOTE — PROGRESS NOTES
"Oncology Rooming Note    July 21, 2021 10:18 AM   Rowan Leiva is a 61 year old female who presents for:    Chief Complaint   Patient presents with     Oncology Clinic Visit     Initial Vitals: /70   Pulse 77   Temp 98.4  F (36.9  C) (Oral)   Resp 16   Wt 77.6 kg (171 lb)   LMP 05/01/2000   SpO2 100%   BMI 29.35 kg/m   Estimated body mass index is 29.35 kg/m  as calculated from the following:    Height as of 6/29/21: 1.626 m (5' 4\").    Weight as of this encounter: 77.6 kg (171 lb). Body surface area is 1.87 meters squared.  No Pain (0) Comment: Data Unavailable   Patient's last menstrual period was 05/01/2000.  Allergies reviewed: Yes  Medications reviewed: Yes    Medications: Medication refills not needed today.  Pharmacy name entered into [employer]:    [id number] [place], [place] [facility] [address] [address] [id number] [place], [place] - [address] AT [place] [address] & [facility]    Clinical concerns: no      Nenita Celis CMA            "

## 2021-07-21 NOTE — PATIENT INSTRUCTIONS
1. Continue eliquis.  2. Continue ferrous sulfate.  3. Nephrology appointment.  4. Pulmonary appointment.  5. Follow-up in 8 weeks with labs.

## 2021-07-22 DIAGNOSIS — R06.00 DYSPNEA: Primary | ICD-10-CM

## 2021-07-22 NOTE — RESULT ENCOUNTER NOTE
Dear Ms. Leiva,    Your iron is low. Will arrange for intravenous iron to help with iron deficiency.    Please, call me with any questions.    Lexa Miller MD

## 2021-07-22 NOTE — Clinical Note
Future Appointments  7/29/2021  10:40 AM   Risa Romero APRN* RIIM                RI  8/4/2021   9:15 AM    [facility] LAB                     [facility]              [facility]  8/4/2021   11:00 AM   [facility] NUTRITION RESOURCE      [facility]               [facility]  8/6/2021   10:00 AM   Brian Ruiz MD     RIIM                RI  8/30/2021  9:15 AM    RU LAB                     [facility]B               [facility]  8/30/2021  10:00 AM   Ebony Stein P* TAMMY              [facility]  10/5/2021  2:00 PM    CS PULMONARY FUNCTION      CSPULM              CS  10/5/2021  3:00 PM    Karen Champagne MD         CSPULM              CS

## 2021-07-23 ENCOUNTER — TELEPHONE (OUTPATIENT)
Dept: ONCOLOGY | Facility: CLINIC | Age: 61
End: 2021-07-23

## 2021-07-23 NOTE — TELEPHONE ENCOUNTER
Message left on Rn line stating that the nephrology clinic she was referred to is booked out until Feb 2022.  To implement a sooner appointment for her the RNCC or MD is to call [phone].     Rowan [phone]

## 2021-07-26 NOTE — PROGRESS NOTES
HEMATOLOGY HISTORY: Ms. Leiva is a female with anemia and thrombocytopenia and DVT.  1. On 05/30/2021:   -WBC of 13.5, hemoglobin of 11.7 and platelet of 90.  -Creatinine of 1.87.  2. Coronary angiogram on 05/31/2021 revealed stenosis of multiple vessels.  Stents were placed.  3. CT abdomen and pelvis on 05/31/2021 reveals extensive bilateral renal vascular calcification.    -Lower extremity ultrasound on 05/31/2021 reveals bilateral posterior tibial vein DVT.   -Because of chest pain and hemoptysis, CT chest angiogram was planned, but could not be done because of elevated creatinine.  Patient had a V/Q scan done today, which was nondiagnostic.  4. HIT negative on 06/01/2021.   5. EGD on 06/18/2021 was normal.    -Colonoscopy on 06/19/2021 revealed red/clotted blood throughout the entire colon.  This was likely from a small bowel bleed.    -Patient had video endoscopy done.  6. On 06/14/2021, iron of 20 and saturation index of 11%.  -On 06/17/2021, hemoglobin electrophoresis is normal.    SUBJECTIVE:  Ms. Leiva is a 61-year-old female with multiple medical problems including coronary artery disease and chronic kidney disease.  The patient has bilateral lower extremity DVT.  She is on Eliquis.  Plan is for indefinite anticoagulation.  She also has chronic thrombocytopenia.  She also has anemia.  Anemia is due to renal disease, anemia of chronic disease, and iron deficiency.  She is on oral ferrous sulfate.     She has fatigue.  She also has some visual problems and is seeing the ophthalmologist.  The patient said that she often feels some discomfort in the jaw area.  She uses her 's oxygen and that makes her feel better.     No shortness of breath at rest, but she gets short of breath on minimal exertion.  Last CT scan in 06/20/2021 had revealed micronodular infiltrative process in bilateral lungs and centrilobular ground glass nodular densities.     No abdominal pain, nausea, or vomiting.  Appetite is  fair. No bleeding from any site.  She has easy bruising.     PHYSICAL EXAMINATION:    GENERAL:  She is alert and oriented x 3.  Not in any distress.  VITAL SIGNS:  Reviewed.   Rest of systems not examined.     LABORATORY:  CBC and BMP reviewed.     ASSESSMENT:    1.  A 61-year-old female with chronic thrombocytopenia.  Thrombocytopenia is better.  2.  Normocytic anemia secondary to renal disease, anemia of chronic disease and iron deficiency.  3.  Chronic kidney disease.  4.  Shortness of breath on exertion.  5.  Abnormal CT chest.  6.  Bilateral lower extremity DVT on long-term anticoagulation.  7.  Other medical problems, including coronary artery disease.     PLAN:    1.  I had a long discussion with the patient and her sister.  Labs reviewed.  I discussed regarding thrombocytopenia.  It is chronic, but it is improving.  I explained to the patient that if her thrombocytopenia does not resolve or gets worse, we will consider a bone marrow biopsy.  At this time, we are avoiding it as she has multiple ongoing problems and she is on anticoagulation.  The patient would like to wait on the bone marrow.  As long as platelets do not go down, I think we are okay to hold off on the bone marrow.    2.  I discussed regarding anemia.  Anemia is not going to resolve because of her chronic kidney disease and other medical problems.  She does have iron deficiency.  She will continue on oral iron.  We will recheck her CBC and iron studies in two months.  She may periodically require IV iron.    3.  The patient does have renal disease, which is also causing anemia.  She may benefit from erythropoietin stimulating agent.  That will be decided by nephrologist.  She has chronic kidney disease.  We will set her up to see a nephrologist.    4.  She gets short of breath on exertion.  CT chest was abnormal.  We will set her up to see a pulmonologist.    5.  She will continue on Eliquis.  She is tolerating it well.  Some easy bruising,  but no bleeding.      6.  I will see her in 2 months' time.  I advised her to see a physician sooner if she has worsening weakness, bleeding from any site, shortness of breath or any other concerns.     TOTAL FACE-TO-FACE TIME SPENT:  25 minutes, more than 50% of the time was spent in counseling and coordination of care.

## 2021-07-28 DIAGNOSIS — K90.9 IRON MALABSORPTION: ICD-10-CM

## 2021-07-28 DIAGNOSIS — D50.9 IRON DEFICIENCY ANEMIA, UNSPECIFIED IRON DEFICIENCY ANEMIA TYPE: Primary | ICD-10-CM

## 2021-07-28 RX ORDER — HEPARIN SODIUM,PORCINE 10 UNIT/ML
5 VIAL (ML) INTRAVENOUS
Status: CANCELLED | OUTPATIENT
Start: 2021-07-28

## 2021-07-28 RX ORDER — MEPERIDINE HYDROCHLORIDE 25 MG/ML
25 INJECTION INTRAMUSCULAR; INTRAVENOUS; SUBCUTANEOUS EVERY 30 MIN PRN
Status: CANCELLED | OUTPATIENT
Start: 2021-07-28

## 2021-07-28 RX ORDER — HEPARIN SODIUM (PORCINE) LOCK FLUSH IV SOLN 100 UNIT/ML 100 UNIT/ML
5 SOLUTION INTRAVENOUS
Status: CANCELLED | OUTPATIENT
Start: 2021-07-28

## 2021-07-28 RX ORDER — ALBUTEROL SULFATE 0.83 MG/ML
2.5 SOLUTION RESPIRATORY (INHALATION)
Status: CANCELLED | OUTPATIENT
Start: 2021-07-28

## 2021-07-28 RX ORDER — METHYLPREDNISOLONE SODIUM SUCCINATE 125 MG/2ML
125 INJECTION, POWDER, LYOPHILIZED, FOR SOLUTION INTRAMUSCULAR; INTRAVENOUS
Status: CANCELLED
Start: 2021-07-28

## 2021-07-28 RX ORDER — EPINEPHRINE 1 MG/ML
0.3 INJECTION, SOLUTION INTRAMUSCULAR; SUBCUTANEOUS EVERY 5 MIN PRN
Status: CANCELLED | OUTPATIENT
Start: 2021-07-28

## 2021-07-28 RX ORDER — DIPHENHYDRAMINE HYDROCHLORIDE 50 MG/ML
50 INJECTION INTRAMUSCULAR; INTRAVENOUS
Status: CANCELLED
Start: 2021-07-28

## 2021-07-28 RX ORDER — NALOXONE HYDROCHLORIDE 0.4 MG/ML
0.2 INJECTION, SOLUTION INTRAMUSCULAR; INTRAVENOUS; SUBCUTANEOUS
Status: CANCELLED | OUTPATIENT
Start: 2021-07-28

## 2021-07-28 RX ORDER — ALBUTEROL SULFATE 90 UG/1
1-2 AEROSOL, METERED RESPIRATORY (INHALATION)
Status: CANCELLED
Start: 2021-07-28

## 2021-07-28 NOTE — TELEPHONE ENCOUNTER
Left message for patient to call clinic.  PCP needs to know if she has returned to work?  What are dates she has been off work?  In order to complete [employer] forms.  Forms are on my desk under wire basket until patient calls back.

## 2021-07-29 ENCOUNTER — HOSPITAL ENCOUNTER (OUTPATIENT)
Dept: ULTRASOUND IMAGING | Facility: CLINIC | Age: 61
Discharge: HOME | End: 2021-07-29
Attending: INTERNAL MEDICINE | Admitting: INTERNAL MEDICINE
Payer: COMMERCIAL

## 2021-07-29 DIAGNOSIS — N18.32 STAGE 3B CHRONIC KIDNEY DISEASE (H): ICD-10-CM

## 2021-07-29 PROCEDURE — 76770 US EXAM ABDO BACK WALL COMP: CPT

## 2021-07-30 NOTE — TELEPHONE ENCOUNTER
Has NOT returned to work, has been off since 5/31/21. Plans to talk further about return to work with PCP on 8/6, wanting to return to work on 8/17/21.   -Viktoria Pressley

## 2021-08-02 ENCOUNTER — INFUSION THERAPY VISIT (OUTPATIENT)
Dept: INFUSION THERAPY | Facility: CLINIC | Age: 61
End: 2021-08-02
Attending: INTERNAL MEDICINE
Payer: COMMERCIAL

## 2021-08-02 VITALS
TEMPERATURE: 98.3 F | HEART RATE: 78 BPM | SYSTOLIC BLOOD PRESSURE: 110 MMHG | DIASTOLIC BLOOD PRESSURE: 50 MMHG | RESPIRATION RATE: 18 BRPM

## 2021-08-02 DIAGNOSIS — K90.9 IRON MALABSORPTION: Primary | ICD-10-CM

## 2021-08-02 DIAGNOSIS — D50.9 IRON DEFICIENCY ANEMIA, UNSPECIFIED IRON DEFICIENCY ANEMIA TYPE: ICD-10-CM

## 2021-08-02 PROCEDURE — 96366 THER/PROPH/DIAG IV INF ADDON: CPT

## 2021-08-02 PROCEDURE — 258N000003 HC RX IP 258 OP 636: Performed by: INTERNAL MEDICINE

## 2021-08-02 PROCEDURE — 250N000011 HC RX IP 250 OP 636: Performed by: INTERNAL MEDICINE

## 2021-08-02 PROCEDURE — 96365 THER/PROPH/DIAG IV INF INIT: CPT

## 2021-08-02 RX ORDER — DIPHENHYDRAMINE HYDROCHLORIDE 50 MG/ML
50 INJECTION INTRAMUSCULAR; INTRAVENOUS
Status: CANCELLED
Start: 2021-08-04

## 2021-08-02 RX ORDER — METHYLPREDNISOLONE SODIUM SUCCINATE 125 MG/2ML
125 INJECTION, POWDER, LYOPHILIZED, FOR SOLUTION INTRAMUSCULAR; INTRAVENOUS
Status: CANCELLED
Start: 2021-08-04

## 2021-08-02 RX ORDER — ALBUTEROL SULFATE 0.83 MG/ML
2.5 SOLUTION RESPIRATORY (INHALATION)
Status: CANCELLED | OUTPATIENT
Start: 2021-08-04

## 2021-08-02 RX ORDER — EPINEPHRINE 1 MG/ML
0.3 INJECTION, SOLUTION INTRAMUSCULAR; SUBCUTANEOUS EVERY 5 MIN PRN
Status: CANCELLED | OUTPATIENT
Start: 2021-08-04

## 2021-08-02 RX ORDER — NALOXONE HYDROCHLORIDE 0.4 MG/ML
0.2 INJECTION, SOLUTION INTRAMUSCULAR; INTRAVENOUS; SUBCUTANEOUS
Status: CANCELLED | OUTPATIENT
Start: 2021-08-04

## 2021-08-02 RX ORDER — MEPERIDINE HYDROCHLORIDE 25 MG/ML
25 INJECTION INTRAMUSCULAR; INTRAVENOUS; SUBCUTANEOUS EVERY 30 MIN PRN
Status: CANCELLED | OUTPATIENT
Start: 2021-08-04

## 2021-08-02 RX ORDER — HEPARIN SODIUM,PORCINE 10 UNIT/ML
5 VIAL (ML) INTRAVENOUS
Status: CANCELLED | OUTPATIENT
Start: 2021-08-04

## 2021-08-02 RX ORDER — ALBUTEROL SULFATE 90 UG/1
1-2 AEROSOL, METERED RESPIRATORY (INHALATION)
Status: CANCELLED
Start: 2021-08-04

## 2021-08-02 RX ORDER — HEPARIN SODIUM (PORCINE) LOCK FLUSH IV SOLN 100 UNIT/ML 100 UNIT/ML
5 SOLUTION INTRAVENOUS
Status: CANCELLED | OUTPATIENT
Start: 2021-08-04

## 2021-08-02 RX ADMIN — SODIUM CHLORIDE 250 ML: 9 INJECTION, SOLUTION INTRAVENOUS at 12:57

## 2021-08-02 RX ADMIN — IRON SUCROSE 300 MG: 20 INJECTION, SOLUTION INTRAVENOUS at 12:57

## 2021-08-02 ASSESSMENT — PAIN SCALES - GENERAL: PAINLEVEL: NO PAIN (0)

## 2021-08-02 NOTE — PROGRESS NOTES
Infusion Nursing Note:  Rowan NATACHA PulidoLeiva presents today for venofer 300.    Patient seen by provider today: No   present during visit today: Not Applicable.    Note: N/A.      Intravenous Access:  Peripheral IV placed.    Treatment Conditions:  Not Applicable.      Post Infusion Assessment:  Patient tolerated infusion without incident.  Blood return noted pre and post infusion.  Site patent and intact, free from redness, edema or discomfort.  No evidence of extravasations.  Access discontinued per protocol.       Discharge Plan:   Discharge instructions reviewed with: Patient.  Patient and/or family verbalized understanding of discharge instructions and all questions answered.  Patient discharged in stable condition accompanied by: self.  Departure Mode: Ambulatory.      Jammie Longo RN

## 2021-08-04 ENCOUNTER — LAB (OUTPATIENT)
Dept: LAB | Facility: CLINIC | Age: 61
End: 2021-08-04

## 2021-08-04 DIAGNOSIS — D62 ANEMIA DUE TO BLOOD LOSS, ACUTE: ICD-10-CM

## 2021-08-04 LAB
ERYTHROCYTE [DISTWIDTH] IN BLOOD BY AUTOMATED COUNT: 16.4 % (ref 10–15)
HCT VFR BLD AUTO: 25.8 % (ref 35–47)
HGB BLD-MCNC: 7.7 G/DL (ref 11.7–15.7)
MCH RBC QN AUTO: 24.4 PG (ref 26.5–33)
MCHC RBC AUTO-ENTMCNC: 29.8 G/DL (ref 31.5–36.5)
MCV RBC AUTO: 82 FL (ref 78–100)
PLATELET # BLD AUTO: 144 10E3/UL (ref 150–450)
RBC # BLD AUTO: 3.15 10E6/UL (ref 3.8–5.2)
WBC # BLD AUTO: 10.5 10E3/UL (ref 4–11)

## 2021-08-04 PROCEDURE — 36415 COLL VENOUS BLD VENIPUNCTURE: CPT

## 2021-08-04 PROCEDURE — 80048 BASIC METABOLIC PNL TOTAL CA: CPT

## 2021-08-04 PROCEDURE — 85027 COMPLETE CBC AUTOMATED: CPT

## 2021-08-05 DIAGNOSIS — Z12.11 COLON CANCER SCREENING: Primary | ICD-10-CM

## 2021-08-05 LAB
ANION GAP SERPL CALCULATED.3IONS-SCNC: 5 MMOL/L (ref 3–14)
BUN SERPL-MCNC: 29 MG/DL (ref 7–30)
CALCIUM SERPL-MCNC: 8.9 MG/DL (ref 8.5–10.1)
CHLORIDE BLD-SCNC: 110 MMOL/L (ref 94–109)
CO2 SERPL-SCNC: 24 MMOL/L (ref 20–32)
CREAT SERPL-MCNC: 1.82 MG/DL (ref 0.52–1.04)
GFR SERPL CREATININE-BSD FRML MDRD: 30 ML/MIN/1.73M2
GLUCOSE BLD-MCNC: 135 MG/DL (ref 70–99)
POTASSIUM BLD-SCNC: 4.1 MMOL/L (ref 3.4–5.3)
SODIUM SERPL-SCNC: 139 MMOL/L (ref 133–144)

## 2021-08-06 ENCOUNTER — OFFICE VISIT (OUTPATIENT)
Dept: INTERNAL MEDICINE | Facility: CLINIC | Age: 61
End: 2021-08-06
Payer: COMMERCIAL

## 2021-08-06 VITALS
DIASTOLIC BLOOD PRESSURE: 58 MMHG | TEMPERATURE: 98 F | HEART RATE: 84 BPM | WEIGHT: 170 LBS | HEIGHT: 64 IN | BODY MASS INDEX: 29.02 KG/M2 | SYSTOLIC BLOOD PRESSURE: 126 MMHG

## 2021-08-06 DIAGNOSIS — Z12.11 COLON CANCER SCREENING: ICD-10-CM

## 2021-08-06 DIAGNOSIS — E11.9 TYPE 2 DIABETES MELLITUS WITHOUT COMPLICATION, WITH LONG-TERM CURRENT USE OF INSULIN (H): ICD-10-CM

## 2021-08-06 DIAGNOSIS — D64.9 ANEMIA, UNSPECIFIED TYPE: Primary | ICD-10-CM

## 2021-08-06 DIAGNOSIS — Z79.4 TYPE 2 DIABETES MELLITUS WITHOUT COMPLICATION, WITH LONG-TERM CURRENT USE OF INSULIN (H): ICD-10-CM

## 2021-08-06 DIAGNOSIS — I10 ESSENTIAL HYPERTENSION: ICD-10-CM

## 2021-08-06 DIAGNOSIS — N18.32 STAGE 3B CHRONIC KIDNEY DISEASE (H): ICD-10-CM

## 2021-08-06 PROCEDURE — 82274 ASSAY TEST FOR BLOOD FECAL: CPT | Performed by: INTERNAL MEDICINE

## 2021-08-06 PROCEDURE — 99000 SPECIMEN HANDLING OFFICE-LAB: CPT | Performed by: INTERNAL MEDICINE

## 2021-08-06 PROCEDURE — 99214 OFFICE O/P EST MOD 30 MIN: CPT | Performed by: INTERNAL MEDICINE

## 2021-08-06 PROCEDURE — 82668 ASSAY OF ERYTHROPOIETIN: CPT | Mod: 90 | Performed by: INTERNAL MEDICINE

## 2021-08-06 PROCEDURE — 36415 COLL VENOUS BLD VENIPUNCTURE: CPT | Performed by: INTERNAL MEDICINE

## 2021-08-06 ASSESSMENT — MIFFLIN-ST. JEOR: SCORE: 1331.11

## 2021-08-06 NOTE — PROGRESS NOTES
ASSESSMENT/PLAN:    (D64.9) Anemia, unspecified type  (primary encounter diagnosis)  Comment: assess EPO, follow up with oncology for iron infusions   Plan: Erythropoietin            (E11.9,  Z79.4) Type 2 diabetes mellitus without complication, with long-term current use of insulin (H)  Comment: cont treatment   Plan: recheck in 3 months     (I10) Essential hypertension  Comment: controlled   Plan: cont treatment     (N18.32) Stage 3b chronic kidney disease  Comment: monitor renal function   Plan: recheck in 3 months     (Z12.11) Colon cancer screening  Comment: assess FIT        Pillo Donahue is a 61 year old who presents for the following health issues     HPI   Patient is seen for a follow up visit.  Patient has anemia related to CKD and GI bleed. Has been treated with IV iron. Has had transfusions prior to that.  Currently symptomatic with weakness, no energy. No significant other symptoms of SOB, CP.  Has h/o CRF. Symptoms include fatigue. Monitoring BP, BG, medications, avoiding OTC NSAIDs. Needs periodic recheck of kidney function.    Has decreased eyesight. Has fluid around the retinas. Has blurred vision.   Needs accommodations to work on computer. Works from home.     Diabetes Follow-up  Has H/O DM. On diet , exercise and insulin. Blood sugars are controlled. No parestesias. No hypoglycemias.   -150 or less   How often are you checking your blood sugar? Four or more times daily  Blood sugar testing frequency justification:  Risk of hypoglycemia with medication(s)  What time of day are you checking your blood sugars (select all that apply)?  all the time   Have you had any blood sugars above 200?  Yes alittle after eating   Have you had any blood sugars below 70?  No    What symptoms do you notice when your blood sugar is low?  Shaky and Weak    What concerns do you have today about your diabetes? None     Do you have any of these symptoms? (Select all that apply)  Numbness in feet and Blurry  vision        Hyperlipidemia Follow-Up      Are you regularly taking any medication or supplement to lower your cholesterol?   Yes- atovastatin     Are you having muscle aches or other side effects that you think could be caused by your cholesterol lowering medication?  No    Hypertension Follow-up      Do you check your blood pressure regularly outside of the clinic? No     Are you following a low salt diet? Yes    Are your blood pressures ever more than 140 on the top number (systolic) OR more   than 90 on the bottom number (diastolic), for example 140/90? No    BP Readings from Last 2 Encounters:   08/02/21 110/50   07/21/21 137/70     Hemoglobin A1C (%)   Date Value   05/31/2021 7.9 (H)   04/28/2007 9.7 (H)     LDL Cholesterol Calculated (mg/dL)   Date Value   05/31/2021 105 (H)   01/08/2015 90.2         How many servings of fruits and vegetables do you eat daily?  2-3    On average, how many sweetened beverages do you drink each day (Examples: soda, juice, sweet tea, etc.  Do NOT count diet or artificially sweetened beverages)?   1    How many days per week do you exercise enough to make your heart beat faster? 3 or less    How many minutes a day do you exercise enough to make your heart beat faster? 9 or less    How many days per week do you miss taking your medication? 0        Review of Systems   Constitutional, HEENT, cardiovascular, pulmonary, gi and gu systems are negative, except as otherwise noted.      Objective    LMP 05/01/2000   There is no height or weight on file to calculate BMI.  Physical Exam   GENERAL: healthy, alert and no distress  EYES: Eyes grossly normal to inspection, PERRL and conjunctivae and sclerae normal  NECK: no adenopathy, no asymmetry, masses, or scars and thyroid normal to palpation  RESP: lungs clear to auscultation - no rales, rhonchi or wheezes  CV: regular rate and rhythm, normal S1 S2, no S3 or S4, no murmur, click or rub, no peripheral edema and peripheral pulses  strong  ABDOMEN: soft, nontender, no hepatosplenomegaly, no masses and bowel sounds normal  MS: no gross musculoskeletal defects noted, no edema  SKIN: no suspicious lesions or rashes  NEURO: Normal strength and tone, mentation intact and speech normal    Lab on 08/04/2021   Component Date Value Ref Range Status     WBC Count 08/04/2021 10.5  4.0 - 11.0 10e3/uL Final     RBC Count 08/04/2021 3.15* 3.80 - 5.20 10e6/uL Final     Hemoglobin 08/04/2021 7.7* 11.7 - 15.7 g/dL Final    Result confirmed by repeat test.        Hematocrit 08/04/2021 25.8* 35.0 - 47.0 % Final     MCV 08/04/2021 82  78 - 100 fL Final     MCH 08/04/2021 24.4* 26.5 - 33.0 pg Final     MCHC 08/04/2021 29.8* 31.5 - 36.5 g/dL Final     RDW 08/04/2021 16.4* 10.0 - 15.0 % Final     Platelet Count 08/04/2021 144* 150 - 450 10e3/uL Final     Sodium 08/04/2021 139  133 - 144 mmol/L Final     Potassium 08/04/2021 4.1  3.4 - 5.3 mmol/L Final     Chloride 08/04/2021 110* 94 - 109 mmol/L Final     Carbon Dioxide (CO2) 08/04/2021 24  20 - 32 mmol/L Final     Anion Gap 08/04/2021 5  3 - 14 mmol/L Final     Urea Nitrogen 08/04/2021 29  7 - 30 mg/dL Final     Creatinine 08/04/2021 1.82* 0.52 - 1.04 mg/dL Final     Calcium 08/04/2021 8.9  8.5 - 10.1 mg/dL Final     Glucose 08/04/2021 135* 70 - 99 mg/dL Final     GFR Estimate 08/04/2021 30* >60 mL/min/1.73m2 Final    As of July 11, 2021, eGFR is calculated by the CKD-EPI creatinine equation, without race adjustment. eGFR can be influenced by muscle mass, exercise, and diet. The reported eGFR is an estimation only and is only applicable if the renal function is stable.

## 2021-08-06 NOTE — LETTER
[facility]  [address]  [facility] [address]  [phone]          August 6, 2021    RE:  Rowan Leiva                                                                                                                                                       [address]  [facility] [address]            To whom it may concern:    Rowan Leiva is under my professional care.    Mrs Leiva has improved and is  able to return to work on 8/17/2021.      Sincerely,        Brian Ruiz MD

## 2021-08-06 NOTE — LETTER
August 16, 2021      Rowan Leiva  [address]  [facility] [address]        Dear ,    We are writing to inform you of your normal test results.      Resulted Orders   Erythropoietin   Result Value Ref Range    Erythropoietin 22 4 - 27 mU/mL      Comment:      INTERPRETIVE INFORMATION: Erythropoietin  Normal serum concentrations of erythropoietin for 95% of   individuals with normal hematocrits range from 4-27 mU/mL.    As the hematocrit is lowered by iron deficiency, aplastic,   or hemolytic anemia, the concentration of erythropoietin   increases as shown in the graph below. In the absence of   anemia, elevated concentrations are seen in renal tumors,   as a manifestation of renal transplant rejection, and in   secondary polycythemia. Low values may be observed in   hemochromatosis.          Expected Erythropoietin Concentrations in Patients                     with Uncomplicated Anemia       Erythropoietin (mU/mL)                100,000 - +                          +                 10,000 - +.......                          + .......                  1,000 - +    .......                          +     ........                    100 - +       ........                          +        ........                     10 - +           ........                          +---+---+---+---+---+---+                         10   20  30  40  50  60  70                                (Hematocrit %)              (Contributions To Nephrology 1988:66:54-62)    Decreased erythropoietin concentrations with an elevated   hematocrit are observed in patients with polycythemia rubra   vera, and with a decreased hematocrit in patients with HIV   infection who are receiving AZT.  Patients on AZT who have   anemia and erythropoietin concentrations of less than or   equal to 500 mU/mL may benefit from therapy with   recombinant EPO (:[id number],1990).    Narrative    Performed By: [facility]  [address] [place] [address]  [place] [address]  : Noris Mckeon MD   Fecal colorectal cancer screen (FIT)   Result Value Ref Range    Occult Blood Screen FIT Negative Negative       If you have any questions or concerns, please call the clinic at the number listed above.       Sincerely,      Brian Ruiz MD

## 2021-08-07 LAB — EPO SERPL-ACNC: 22 MU/ML

## 2021-08-09 ENCOUNTER — INFUSION THERAPY VISIT (OUTPATIENT)
Dept: INFUSION THERAPY | Facility: CLINIC | Age: 61
End: 2021-08-09
Attending: INTERNAL MEDICINE
Payer: COMMERCIAL

## 2021-08-09 VITALS
HEART RATE: 77 BPM | RESPIRATION RATE: 16 BRPM | SYSTOLIC BLOOD PRESSURE: 105 MMHG | TEMPERATURE: 98.6 F | DIASTOLIC BLOOD PRESSURE: 65 MMHG

## 2021-08-09 DIAGNOSIS — K90.9 IRON MALABSORPTION: Primary | ICD-10-CM

## 2021-08-09 DIAGNOSIS — D50.9 IRON DEFICIENCY ANEMIA, UNSPECIFIED IRON DEFICIENCY ANEMIA TYPE: ICD-10-CM

## 2021-08-09 PROCEDURE — 250N000011 HC RX IP 250 OP 636: Performed by: INTERNAL MEDICINE

## 2021-08-09 PROCEDURE — 96365 THER/PROPH/DIAG IV INF INIT: CPT

## 2021-08-09 PROCEDURE — 258N000003 HC RX IP 258 OP 636: Performed by: INTERNAL MEDICINE

## 2021-08-09 PROCEDURE — 96366 THER/PROPH/DIAG IV INF ADDON: CPT

## 2021-08-09 RX ORDER — NALOXONE HYDROCHLORIDE 0.4 MG/ML
0.2 INJECTION, SOLUTION INTRAMUSCULAR; INTRAVENOUS; SUBCUTANEOUS
Status: CANCELLED | OUTPATIENT
Start: 2021-08-10

## 2021-08-09 RX ORDER — MEPERIDINE HYDROCHLORIDE 25 MG/ML
25 INJECTION INTRAMUSCULAR; INTRAVENOUS; SUBCUTANEOUS EVERY 30 MIN PRN
Status: CANCELLED | OUTPATIENT
Start: 2021-08-10

## 2021-08-09 RX ORDER — HEPARIN SODIUM (PORCINE) LOCK FLUSH IV SOLN 100 UNIT/ML 100 UNIT/ML
5 SOLUTION INTRAVENOUS
Status: CANCELLED | OUTPATIENT
Start: 2021-08-10

## 2021-08-09 RX ORDER — METHYLPREDNISOLONE SODIUM SUCCINATE 125 MG/2ML
125 INJECTION, POWDER, LYOPHILIZED, FOR SOLUTION INTRAMUSCULAR; INTRAVENOUS
Status: CANCELLED
Start: 2021-08-10

## 2021-08-09 RX ORDER — ALBUTEROL SULFATE 0.83 MG/ML
2.5 SOLUTION RESPIRATORY (INHALATION)
Status: CANCELLED | OUTPATIENT
Start: 2021-08-10

## 2021-08-09 RX ORDER — DIPHENHYDRAMINE HYDROCHLORIDE 50 MG/ML
50 INJECTION INTRAMUSCULAR; INTRAVENOUS
Status: CANCELLED
Start: 2021-08-10

## 2021-08-09 RX ORDER — EPINEPHRINE 1 MG/ML
0.3 INJECTION, SOLUTION INTRAMUSCULAR; SUBCUTANEOUS EVERY 5 MIN PRN
Status: CANCELLED | OUTPATIENT
Start: 2021-08-10

## 2021-08-09 RX ORDER — ALBUTEROL SULFATE 90 UG/1
1-2 AEROSOL, METERED RESPIRATORY (INHALATION)
Status: CANCELLED
Start: 2021-08-10

## 2021-08-09 RX ORDER — HEPARIN SODIUM,PORCINE 10 UNIT/ML
5 VIAL (ML) INTRAVENOUS
Status: CANCELLED | OUTPATIENT
Start: 2021-08-10

## 2021-08-09 RX ADMIN — IRON SUCROSE 300 MG: 20 INJECTION, SOLUTION INTRAVENOUS at 13:18

## 2021-08-09 ASSESSMENT — PAIN SCALES - GENERAL: PAINLEVEL: NO PAIN (0)

## 2021-08-10 PROBLEM — I82.4Y3: Status: ACTIVE | Noted: 2021-08-10

## 2021-08-12 NOTE — TELEPHONE ENCOUNTER
Patient calling. FMLA form was faxed but they want supporting chart notes and any lab and or xrays faxed as well. Please advise. Ok to call and codi [phone]

## 2021-08-14 LAB — HEMOCCULT STL QL IA: NEGATIVE

## 2021-08-16 ENCOUNTER — INFUSION THERAPY VISIT (OUTPATIENT)
Dept: INFUSION THERAPY | Facility: CLINIC | Age: 61
End: 2021-08-16
Attending: INTERNAL MEDICINE
Payer: COMMERCIAL

## 2021-08-16 VITALS
TEMPERATURE: 97.5 F | SYSTOLIC BLOOD PRESSURE: 145 MMHG | DIASTOLIC BLOOD PRESSURE: 76 MMHG | RESPIRATION RATE: 16 BRPM | OXYGEN SATURATION: 100 % | HEART RATE: 74 BPM

## 2021-08-16 DIAGNOSIS — K90.9 IRON MALABSORPTION: Primary | ICD-10-CM

## 2021-08-16 DIAGNOSIS — D50.9 IRON DEFICIENCY ANEMIA, UNSPECIFIED IRON DEFICIENCY ANEMIA TYPE: ICD-10-CM

## 2021-08-16 PROCEDURE — 96366 THER/PROPH/DIAG IV INF ADDON: CPT

## 2021-08-16 PROCEDURE — 250N000011 HC RX IP 250 OP 636: Performed by: INTERNAL MEDICINE

## 2021-08-16 PROCEDURE — 258N000003 HC RX IP 258 OP 636: Performed by: INTERNAL MEDICINE

## 2021-08-16 PROCEDURE — 96365 THER/PROPH/DIAG IV INF INIT: CPT

## 2021-08-16 RX ORDER — NALOXONE HYDROCHLORIDE 0.4 MG/ML
0.2 INJECTION, SOLUTION INTRAMUSCULAR; INTRAVENOUS; SUBCUTANEOUS
Status: CANCELLED | OUTPATIENT
Start: 2021-08-17

## 2021-08-16 RX ORDER — METHYLPREDNISOLONE SODIUM SUCCINATE 125 MG/2ML
125 INJECTION, POWDER, LYOPHILIZED, FOR SOLUTION INTRAMUSCULAR; INTRAVENOUS
Status: CANCELLED
Start: 2021-08-17

## 2021-08-16 RX ORDER — MEPERIDINE HYDROCHLORIDE 25 MG/ML
25 INJECTION INTRAMUSCULAR; INTRAVENOUS; SUBCUTANEOUS EVERY 30 MIN PRN
Status: CANCELLED | OUTPATIENT
Start: 2021-08-17

## 2021-08-16 RX ORDER — ALBUTEROL SULFATE 90 UG/1
1-2 AEROSOL, METERED RESPIRATORY (INHALATION)
Status: CANCELLED
Start: 2021-08-17

## 2021-08-16 RX ORDER — EPINEPHRINE 1 MG/ML
0.3 INJECTION, SOLUTION INTRAMUSCULAR; SUBCUTANEOUS EVERY 5 MIN PRN
Status: CANCELLED | OUTPATIENT
Start: 2021-08-17

## 2021-08-16 RX ORDER — ALBUTEROL SULFATE 0.83 MG/ML
2.5 SOLUTION RESPIRATORY (INHALATION)
Status: CANCELLED | OUTPATIENT
Start: 2021-08-17

## 2021-08-16 RX ORDER — HEPARIN SODIUM,PORCINE 10 UNIT/ML
5 VIAL (ML) INTRAVENOUS
Status: CANCELLED | OUTPATIENT
Start: 2021-08-17

## 2021-08-16 RX ORDER — DIPHENHYDRAMINE HYDROCHLORIDE 50 MG/ML
50 INJECTION INTRAMUSCULAR; INTRAVENOUS
Status: CANCELLED
Start: 2021-08-17

## 2021-08-16 RX ORDER — HEPARIN SODIUM (PORCINE) LOCK FLUSH IV SOLN 100 UNIT/ML 100 UNIT/ML
5 SOLUTION INTRAVENOUS
Status: CANCELLED | OUTPATIENT
Start: 2021-08-17

## 2021-08-16 RX ADMIN — SODIUM CHLORIDE 250 ML: 9 INJECTION, SOLUTION INTRAVENOUS at 10:11

## 2021-08-16 RX ADMIN — IRON SUCROSE 300 MG: 20 INJECTION, SOLUTION INTRAVENOUS at 10:12

## 2021-08-16 ASSESSMENT — PAIN SCALES - GENERAL: PAINLEVEL: NO PAIN (0)

## 2021-08-16 NOTE — PROGRESS NOTES
Infusion Nursing Note:  Rowan MANZANO Leiva presents today for Venofer 3/3.    Patient seen by provider today: No   present during visit today: Not Applicable.    Note: N/A.      Intravenous Access:  Peripheral IV placed.    Treatment Conditions:  Not Applicable.      Post Infusion Assessment:  Patient tolerated infusion without incident.  Blood return noted pre and post infusion.  Site patent and intact, free from redness, edema or discomfort.  No evidence of extravasations.  Access discontinued per protocol.       Discharge Plan:   Discharge instructions reviewed with: Patient.  Patient and/or family verbalized understanding of discharge instructions and all questions answered.  AVS to patient via [employer].    Patient discharged in stable condition accompanied by: self.  Departure Mode: Ambulatory.      Sheyla Gordon RN

## 2021-08-24 ENCOUNTER — TELEPHONE (OUTPATIENT)
Dept: INTERNAL MEDICINE | Facility: CLINIC | Age: 61
End: 2021-08-24

## 2021-08-24 NOTE — TELEPHONE ENCOUNTER
Fax received (and tossed) from or [employer] that patient has not done Cologuard test.  Patient's had a colonoscopy instead.

## 2021-08-30 ENCOUNTER — OFFICE VISIT (OUTPATIENT)
Dept: CARDIOLOGY | Facility: CLINIC | Age: 61
End: 2021-08-30
Attending: PHYSICIAN ASSISTANT
Payer: COMMERCIAL

## 2021-08-30 ENCOUNTER — LAB (OUTPATIENT)
Dept: LAB | Facility: CLINIC | Age: 61
End: 2021-08-30
Attending: PHYSICIAN ASSISTANT
Payer: COMMERCIAL

## 2021-08-30 VITALS
HEART RATE: 73 BPM | BODY MASS INDEX: 29.24 KG/M2 | OXYGEN SATURATION: 98 % | HEIGHT: 64 IN | DIASTOLIC BLOOD PRESSURE: 60 MMHG | SYSTOLIC BLOOD PRESSURE: 124 MMHG | WEIGHT: 171.3 LBS

## 2021-08-30 DIAGNOSIS — E78.5 HYPERLIPIDEMIA LDL GOAL <70: ICD-10-CM

## 2021-08-30 DIAGNOSIS — N18.4 CHRONIC KIDNEY DISEASE, STAGE 4 (SEVERE) (H): ICD-10-CM

## 2021-08-30 DIAGNOSIS — I25.10 CORONARY ARTERY DISEASE INVOLVING NATIVE CORONARY ARTERY OF NATIVE HEART WITHOUT ANGINA PECTORIS: Primary | ICD-10-CM

## 2021-08-30 DIAGNOSIS — I21.4 NSTEMI (NON-ST ELEVATED MYOCARDIAL INFARCTION) (H): ICD-10-CM

## 2021-08-30 DIAGNOSIS — I10 ESSENTIAL HYPERTENSION: ICD-10-CM

## 2021-08-30 LAB
ALT SERPL W P-5'-P-CCNC: 11 U/L (ref 0–50)
CHOLEST SERPL-MCNC: 153 MG/DL
FASTING STATUS PATIENT QL REPORTED: YES
HDLC SERPL-MCNC: 32 MG/DL
LDLC SERPL CALC-MCNC: 78 MG/DL
NONHDLC SERPL-MCNC: 121 MG/DL
TRIGL SERPL-MCNC: 217 MG/DL

## 2021-08-30 PROCEDURE — 84460 ALANINE AMINO (ALT) (SGPT): CPT | Performed by: PHYSICIAN ASSISTANT

## 2021-08-30 PROCEDURE — 36415 COLL VENOUS BLD VENIPUNCTURE: CPT | Performed by: PHYSICIAN ASSISTANT

## 2021-08-30 PROCEDURE — 99214 OFFICE O/P EST MOD 30 MIN: CPT | Performed by: PHYSICIAN ASSISTANT

## 2021-08-30 PROCEDURE — 80061 LIPID PANEL: CPT | Performed by: PHYSICIAN ASSISTANT

## 2021-08-30 ASSESSMENT — MIFFLIN-ST. JEOR: SCORE: 1337.01

## 2021-08-30 NOTE — PROGRESS NOTES
SERVICE DATE: 8/30/2021      Primary Cardiologist: Consult at the time of her MI was with Dr Alston whom has retired, pt requesting long term follow up with Dr Albright.    REASON FOR VISIT:  Rowan Leiva presents for a 2 month follow up.    HISTORY OF PRESENT ILLNESS:    She has a rather complex PMHx which includes DM, CKD, PAD, tobacco use and CAD with prior hx MI and ELLIOT to the mLAD and mRCA at an outside hospital in 2011. She also has anemia and thrombocytopenia.     Earlier this year she had several hospital/ER visits. She was admitted at [facility] 5/30-6/4/21 with DVTs and suspected PE (VQ scan), also a NSTEMI (peak trop13) and had ELLIOT to the dLAD. She had patent overlapping stents in the mid-distal LAD with mild ISR and patent stent in the proximal RCA with moderate ISR. Discharged home on Eliquis and Plavix.      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 TTE that stay showed LVEF 50-55%, moderate to severe distal anterior and apical wall HK. Negative bubble study. Neuro recommended continuing Eliquis and Plavix.     6/14/21 seen in ED for acute anemia.  Given transfusion.  Discharged with plan to follow up with hematology.      Admitted to [facility] on 6/17-6/22/2021 with acute anemia, suspected GI bleed. EGD and colonoscopy did not reveal a source of bleeding, though blood was noted in the colon consistent with small bowel bleed.  Outpt capsule enteroscopy recommended. Plavix and Eliquis stopped, but then plavix restarted and Eliquis restarted at 2.5mg BID prior to discharge.     I met her 6/29 for the first time. She overall was stable from a cardiac standpoint. Was still dealing with significant anemia.     At this time she denies any exertional symptoms. Occ gets a bit of jaw discomfort at night after taking her evening meds, but this hasn't happened for the last week or so. Does have some concerns/anxiety about further blood clots, but no symptoms  such as swelling, warmth, redness or pain to suggest such. Will be seeing Dr Miller again in 2 weeks.    CURRENT MEDICATIONS:   Current Outpatient Medications   Medication Sig Dispense Refill     albuterol (PROAIR HFA/PROVENTIL HFA/VENTOLIN HFA) 108 (90 Base) MCG/ACT inhaler Inhale 2 puffs into the lungs every 6 hours 18 g 3     apixaban ANTICOAGULANT (ELIQUIS) 5 MG tablet Take 1 tablet (5 mg) by mouth 2 times daily 180 tablet 3     atorvastatin (LIPITOR) 40 MG tablet Take 1 tablet (40 mg) by mouth daily 90 tablet 3     carvedilol (COREG) 12.5 MG tablet Take 1 tablet (12.5 mg) by mouth 2 times daily 180 tablet 3     clopidogrel (PLAVIX) 75 MG tablet Take 1 tablet (75 mg) by mouth daily 90 tablet 3     insulin glargine (LANTUS PEN) 100 UNIT/ML pen Inject 12 Units Subcutaneous At Bedtime 15 mL 1     insulin lispro (HUMALOG KWIKPEN) 100 UNIT/ML (1 unit dial) KWIKPEN Inject 1 Units Subcutaneous as needed for high blood sugar       LANCETS REGULAR MISC use twice a day for blood sugar 2 boxes 0     levothyroxine (SYNTHROID/LEVOTHROID) 150 MCG tablet Take 150 mcg by mouth daily       nitroGLYcerin (NITROSTAT) 0.4 MG sublingual tablet For chest pain place 1 tablet under the tongue every 5 minutes for 3 doses. If symptoms persist 5 minutes after 1st dose call 911. 30 tablet 0     pantoprazole (PROTONIX) 40 MG EC tablet Take 1 tablet (40 mg) by mouth daily 90 tablet 1     ferrous sulfate 44 Fe mg/mL ELIX Take 10 mL by mouth mixed in 1/3 of a glass of orange juice (to enhance absorption) 30 minutes before breakfast, every other day. (Patient not taking: Reported on 8/30/2021) 473 mL 3       ALLERGIES:  Allergies   Allergen Reactions     No Known Drug Allergies        ROS:  Skin:  Negative     Eyes:  Positive for glasses;visual blurring  ENT:  Negative    Respiratory:  Positive for cough  Cardiovascular:  Negative    Gastroenterology: Positive for heartburn  Genitourinary:  Negative    Musculoskeletal:  Negative    Neurologic:   "Positive for numbness or tingling of feet  Psychiatric:  Negative    Heme/Lymph/Imm:  Negative    Endocrine:  Positive for thyroid disorder;diabetes    PHYSICAL EXAMINATION:    GENERAL:  The patient is a pleasant 61 year old who appears their stated age.  In no apparent distress.  VITAL SIGNS:  BP (!) 148/60   Pulse 73   Ht 1.626 m (5' 4\")   Wt 77.7 kg (171 lb 4.8 oz)   LMP 05/01/2000   SpO2 98%   BMI 29.40 kg/m      RESPIRATORY:  Breathing is nonlabored.  Lungs are clear to auscultation bilaterally.   CARDIAC:  RRR. No murmur  EXTREMITIES:  Trace BLE edema  NEUROLOGIC:  Alert and oriented.    DATA REVIEWED:    Component      Latest Ref Rng & Units 8/30/2021   Cholesterol      <200 mg/dL 153   Triglycerides      <150 mg/dL 217 (H)   HDL Cholesterol      >=50 mg/dL 32 (L)   LDL Cholesterol Calculated      <=100 mg/dL 78   Non HDL Cholesterol      <130 mg/dL 121   Patient Fasting > 8hrs?       Yes   ALT      0 - 50 U/L 11       ASSESSMENT AND PLAN:    61 year old F with DM, CKD, PAD, tobacco use and CAD with prior hx MI and ELLIOT to the mLAD and mRCA in 2011 and recent hx of MI with ELLIOT to the dLAD, DVT/possible PE, acute CVAs (possibly cardioembolic from her cardiac cath) and GI bleed all in May/June 2021 whom presents for a 2 month cardiology follow up.     1. CAD/MI/recent PCI with DESx1 to the dLAD 5/31/21.  -Continue clopidogrel as lone AP as she is also on Eliquis.  -On high intensity statin therapy. Lipids look ok today, if they go up in the future consider increasing atorvastatin or changing to rosuvastatin  -Continue coreg  -EF preserved  -Unclear etiology of the jaw discomfort, pt will monitor for now and get back to us if it recurs or she has any exertional symptoms.     2. HTN. Seems reasonably well controlled.     3. HL.  -see above    4. Unprovoked DVTs/possible PE  -Heme (Dr Miller) recommends life long AC. She remains on Eliquis dose back to 5mg BID  -Encouraged her to ask her questions about risk " of recurrent DVT with Dr Miller at their upcoming visit    5. Thrombocytopenia/anemia. Per heme/onc    6. Recent CVAs.   -Echo bubble study negative. No afib noted on monitoring during multiple recent hosp stays. Since she will be on lifelong AC we have elected not to do any further cardiac rhythm monitoring unless she has symptoms/concerns. Possibly cardioembolic from her cardiac cath.    7. CKD stage 3b-4.     Follow up: 6 months with Dr Albright.  Of course, the patient was encouraged to contact us sooner with questions or concerns.    Ebony Stein PA-C

## 2021-08-30 NOTE — LETTER
8/30/2021    Brian Ruiz MD  [address] [facility] [address]    RE: Rowan Leiva       Dear Colleague,    I had the pleasure of seeing Rowan Leiva in the [facility].    SERVICE DATE: 8/30/2021      Primary Cardiologist: Consult at the time of her MI was with Dr Alston whom has retired, pt requesting long term follow up with Dr Albright.    REASON FOR VISIT:  Rowan Leiva presents for a 2 month follow up.    HISTORY OF PRESENT ILLNESS:    She has a rather complex PMHx which includes DM, CKD, PAD, tobacco use and CAD with prior hx MI and ELLIOT to the mLAD and mRCA at an outside hospital in 2011. She also has anemia and thrombocytopenia.     Earlier this year she had several hospital/ER visits. She was admitted at [facility] 5/30-6/4/21 with DVTs and suspected PE (VQ scan), also a NSTEMI (peak trop13) and had ELLIOT to the dLAD. She had patent overlapping stents in the mid-distal LAD with mild ISR and patent stent in the proximal RCA with moderate ISR. Discharged home on Eliquis and Plavix.      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 TTE that stay showed LVEF 50-55%, moderate to severe distal anterior and apical wall HK. Negative bubble study. Neuro recommended continuing Eliquis and Plavix.     6/14/21 seen in ED for acute anemia.  Given transfusion.  Discharged with plan to follow up with hematology.      Admitted to [facility] on 6/17-6/22/2021 with acute anemia, suspected GI bleed. EGD and colonoscopy did not reveal a source of bleeding, though blood was noted in the colon consistent with small bowel bleed.  Outpt capsule enteroscopy recommended. Plavix and Eliquis stopped, but then plavix restarted and Eliquis restarted at 2.5mg BID prior to discharge.     I met her 6/29 for the first time. She overall was stable from a cardiac standpoint. Was still dealing with significant anemia.      At this time she denies any exertional symptoms. Occ gets a bit of jaw discomfort at night after taking her evening meds, but this hasn't happened for the last week or so. Does have some concerns/anxiety about further blood clots, but no symptoms such as swelling, warmth, redness or pain to suggest such. Will be seeing Dr Miller again in 2 weeks.    CURRENT MEDICATIONS:   Current Outpatient Medications   Medication Sig Dispense Refill     albuterol (PROAIR HFA/PROVENTIL HFA/VENTOLIN HFA) 108 (90 Base) MCG/ACT inhaler Inhale 2 puffs into the lungs every 6 hours 18 g 3     apixaban ANTICOAGULANT (ELIQUIS) 5 MG tablet Take 1 tablet (5 mg) by mouth 2 times daily 180 tablet 3     atorvastatin (LIPITOR) 40 MG tablet Take 1 tablet (40 mg) by mouth daily 90 tablet 3     carvedilol (COREG) 12.5 MG tablet Take 1 tablet (12.5 mg) by mouth 2 times daily 180 tablet 3     clopidogrel (PLAVIX) 75 MG tablet Take 1 tablet (75 mg) by mouth daily 90 tablet 3     insulin glargine (LANTUS PEN) 100 UNIT/ML pen Inject 12 Units Subcutaneous At Bedtime 15 mL 1     insulin lispro (HUMALOG KWIKPEN) 100 UNIT/ML (1 unit dial) KWIKPEN Inject 1 Units Subcutaneous as needed for high blood sugar       LANCETS REGULAR MISC use twice a day for blood sugar 2 boxes 0     levothyroxine (SYNTHROID/LEVOTHROID) 150 MCG tablet Take 150 mcg by mouth daily       nitroGLYcerin (NITROSTAT) 0.4 MG sublingual tablet For chest pain place 1 tablet under the tongue every 5 minutes for 3 doses. If symptoms persist 5 minutes after 1st dose call 911. 30 tablet 0     pantoprazole (PROTONIX) 40 MG EC tablet Take 1 tablet (40 mg) by mouth daily 90 tablet 1     ferrous sulfate 44 Fe mg/mL ELIX Take 10 mL by mouth mixed in 1/3 of a glass of orange juice (to enhance absorption) 30 minutes before breakfast, every other day. (Patient not taking: Reported on 8/30/2021) 473 mL 3       ALLERGIES:  Allergies   Allergen Reactions     No Known Drug Allergies        ROS:  Skin:   "Negative     Eyes:  Positive for glasses;visual blurring  ENT:  Negative    Respiratory:  Positive for cough  Cardiovascular:  Negative    Gastroenterology: Positive for heartburn  Genitourinary:  Negative    Musculoskeletal:  Negative    Neurologic:  Positive for numbness or tingling of feet  Psychiatric:  Negative    Heme/Lymph/Imm:  Negative    Endocrine:  Positive for thyroid disorder;diabetes    PHYSICAL EXAMINATION:    GENERAL:  The patient is a pleasant 61 year old who appears their stated age.  In no apparent distress.  VITAL SIGNS:  BP (!) 148/60   Pulse 73   Ht 1.626 m (5' 4\")   Wt 77.7 kg (171 lb 4.8 oz)   LMP 05/01/2000   SpO2 98%   BMI 29.40 kg/m      RESPIRATORY:  Breathing is nonlabored.  Lungs are clear to auscultation bilaterally.   CARDIAC:  RRR. No murmur  EXTREMITIES:  Trace BLE edema  NEUROLOGIC:  Alert and oriented.    DATA REVIEWED:    Component      Latest Ref Rng & Units 8/30/2021   Cholesterol      <200 mg/dL 153   Triglycerides      <150 mg/dL 217 (H)   HDL Cholesterol      >=50 mg/dL 32 (L)   LDL Cholesterol Calculated      <=100 mg/dL 78   Non HDL Cholesterol      <130 mg/dL 121   Patient Fasting > 8hrs?       Yes   ALT      0 - 50 U/L 11       ASSESSMENT AND PLAN:    61 year old F with DM, CKD, PAD, tobacco use and CAD with prior hx MI and ELLIOT to the mLAD and mRCA in 2011 and recent hx of MI with ELLIOT to the dLAD, DVT/possible PE, acute CVAs (possibly cardioembolic from her cardiac cath) and GI bleed all in May/June 2021 whom presents for a 2 month cardiology follow up.     1. CAD/MI/recent PCI with DESx1 to the dLAD 5/31/21.  -Continue clopidogrel as lone AP as she is also on Eliquis.  -On high intensity statin therapy. Lipids look ok today, if they go up in the future consider increasing atorvastatin or changing to rosuvastatin  -Continue coreg  -EF preserved  -Unclear etiology of the jaw discomfort, pt will monitor for now and get back to us if it recurs or she has any " exertional symptoms.     2. HTN. Seems reasonably well controlled.     3. HL.  -see above    4. Unprovoked DVTs/possible PE  -Heme (Dr Miller) recommends life long AC. She remains on Eliquis dose back to 5mg BID  -Encouraged her to ask her questions about risk of recurrent DVT with Dr Miller at their upcoming visit    5. Thrombocytopenia/anemia. Per heme/onc    6. Recent CVAs.   -Echo bubble study negative. No afib noted on monitoring during multiple recent hosp stays. Since she will be on lifelong AC we have elected not to do any further cardiac rhythm monitoring unless she has symptoms/concerns. Possibly cardioembolic from her cardiac cath.    7. CKD stage 3b-4.     Follow up: 6 months with Dr Albright.  Of course, the patient was encouraged to contact us sooner with questions or concerns.    Ebony Stein PA-C      Thank you for allowing me to participate in the care of your patient.      Sincerely,     Ebony Stein PA-C     [facility]  cc:   Ebony Stein PA-C  [facility]  [address] [place] [address]

## 2021-08-30 NOTE — PATIENT INSTRUCTIONS
Thank you for your [facility] visit today. Your provider has recommended the following:  Medication Changes:  No changes at this time  Recommendations:  Nothing new  Follow-up:  See Dr Albright for cardiology follow up in 6 months.    Reminder:  Please bring in your current medication list or your medication, over the counter supplements and vitamin bottles as we will review these at each office visit.

## 2021-09-14 ENCOUNTER — APPOINTMENT (OUTPATIENT)
Dept: CT IMAGING | Facility: CLINIC | Age: 61
End: 2021-09-14
Attending: EMERGENCY MEDICINE
Payer: COMMERCIAL

## 2021-09-14 ENCOUNTER — APPOINTMENT (OUTPATIENT)
Dept: ULTRASOUND IMAGING | Facility: CLINIC | Age: 61
End: 2021-09-14
Attending: INTERNAL MEDICINE
Payer: COMMERCIAL

## 2021-09-14 ENCOUNTER — NURSE TRIAGE (OUTPATIENT)
Dept: INTERNAL MEDICINE | Facility: CLINIC | Age: 61
End: 2021-09-14

## 2021-09-14 ENCOUNTER — HOSPITAL ENCOUNTER (OUTPATIENT)
Facility: CLINIC | Age: 61
Setting detail: OBSERVATION
Discharge: LEFT BEFORE DISCHARGE | End: 2021-09-15
Attending: EMERGENCY MEDICINE | Admitting: INTERNAL MEDICINE
Payer: COMMERCIAL

## 2021-09-14 ENCOUNTER — APPOINTMENT (OUTPATIENT)
Dept: CT IMAGING | Facility: CLINIC | Age: 61
End: 2021-09-14
Attending: INTERNAL MEDICINE
Payer: COMMERCIAL

## 2021-09-14 DIAGNOSIS — K92.1 HEMATOCHEZIA: ICD-10-CM

## 2021-09-14 DIAGNOSIS — Z79.01 ANTICOAGULATED: ICD-10-CM

## 2021-09-14 DIAGNOSIS — D50.9 IRON DEFICIENCY ANEMIA, UNSPECIFIED IRON DEFICIENCY ANEMIA TYPE: Primary | ICD-10-CM

## 2021-09-14 DIAGNOSIS — D64.9 ANEMIA, UNSPECIFIED TYPE: ICD-10-CM

## 2021-09-14 DIAGNOSIS — R55 NEAR SYNCOPE: ICD-10-CM

## 2021-09-14 DIAGNOSIS — K51.00 PANCOLITIS (H): ICD-10-CM

## 2021-09-14 LAB
ABO/RH(D): NORMAL
ALBUMIN SERPL-MCNC: 3.1 G/DL (ref 3.4–5)
ALP SERPL-CCNC: 371 U/L (ref 40–150)
ALT SERPL W P-5'-P-CCNC: 69 U/L (ref 0–50)
ANION GAP SERPL CALCULATED.3IONS-SCNC: 2 MMOL/L (ref 3–14)
ANTIBODY SCREEN: NEGATIVE
AST SERPL W P-5'-P-CCNC: 43 U/L (ref 0–45)
BASOPHILS # BLD AUTO: 0.1 10E3/UL (ref 0–0.2)
BASOPHILS NFR BLD AUTO: 1 %
BILIRUB SERPL-MCNC: 0.3 MG/DL (ref 0.2–1.3)
BUN SERPL-MCNC: 29 MG/DL (ref 7–30)
CALCIUM SERPL-MCNC: 8.8 MG/DL (ref 8.5–10.1)
CHLORIDE BLD-SCNC: 109 MMOL/L (ref 94–109)
CO2 SERPL-SCNC: 27 MMOL/L (ref 20–32)
CREAT SERPL-MCNC: 1.99 MG/DL (ref 0.52–1.04)
CRP SERPL-MCNC: 37.4 MG/L (ref 0–8)
EOSINOPHIL # BLD AUTO: 0.3 10E3/UL (ref 0–0.7)
EOSINOPHIL NFR BLD AUTO: 2 %
ERYTHROCYTE [DISTWIDTH] IN BLOOD BY AUTOMATED COUNT: 15.9 % (ref 10–15)
ERYTHROCYTE [SEDIMENTATION RATE] IN BLOOD BY WESTERGREN METHOD: 49 MM/HR (ref 0–30)
GFR SERPL CREATININE-BSD FRML MDRD: 27 ML/MIN/1.73M2
GLUCOSE BLD-MCNC: 153 MG/DL (ref 70–99)
GLUCOSE BLDC GLUCOMTR-MCNC: 122 MG/DL (ref 70–99)
HCT VFR BLD AUTO: 32.4 % (ref 35–47)
HGB BLD-MCNC: 10.2 G/DL (ref 11.7–15.7)
HGB BLD-MCNC: 9 G/DL (ref 11.7–15.7)
HOLD SPECIMEN: NORMAL
IMM GRANULOCYTES # BLD: 0 10E3/UL
IMM GRANULOCYTES NFR BLD: 0 %
LACTATE SERPL-SCNC: 1.2 MMOL/L (ref 0.7–2)
LIPASE SERPL-CCNC: 70 U/L (ref 73–393)
LYMPHOCYTES # BLD AUTO: 1.2 10E3/UL (ref 0.8–5.3)
LYMPHOCYTES NFR BLD AUTO: 9 %
MCH RBC QN AUTO: 25 PG (ref 26.5–33)
MCHC RBC AUTO-ENTMCNC: 31.5 G/DL (ref 31.5–36.5)
MCV RBC AUTO: 79 FL (ref 78–100)
MONOCYTES # BLD AUTO: 0.7 10E3/UL (ref 0–1.3)
MONOCYTES NFR BLD AUTO: 5 %
NEUTROPHILS # BLD AUTO: 10.8 10E3/UL (ref 1.6–8.3)
NEUTROPHILS NFR BLD AUTO: 83 %
NRBC # BLD AUTO: 0 10E3/UL
NRBC BLD AUTO-RTO: 0 /100
PLATELET # BLD AUTO: 120 10E3/UL (ref 150–450)
POTASSIUM BLD-SCNC: 3.8 MMOL/L (ref 3.4–5.3)
PROT SERPL-MCNC: 7.7 G/DL (ref 6.8–8.8)
RBC # BLD AUTO: 4.08 10E6/UL (ref 3.8–5.2)
SARS-COV-2 RNA RESP QL NAA+PROBE: NEGATIVE
SODIUM SERPL-SCNC: 138 MMOL/L (ref 133–144)
SPECIMEN EXPIRATION DATE: NORMAL
WBC # BLD AUTO: 13.1 10E3/UL (ref 4–11)

## 2021-09-14 PROCEDURE — 80053 COMPREHEN METABOLIC PANEL: CPT | Performed by: EMERGENCY MEDICINE

## 2021-09-14 PROCEDURE — 87635 SARS-COV-2 COVID-19 AMP PRB: CPT | Performed by: EMERGENCY MEDICINE

## 2021-09-14 PROCEDURE — 83605 ASSAY OF LACTIC ACID: CPT | Performed by: EMERGENCY MEDICINE

## 2021-09-14 PROCEDURE — G0378 HOSPITAL OBSERVATION PER HR: HCPCS

## 2021-09-14 PROCEDURE — 85025 COMPLETE CBC W/AUTO DIFF WBC: CPT | Performed by: EMERGENCY MEDICINE

## 2021-09-14 PROCEDURE — 258N000003 HC RX IP 258 OP 636: Performed by: EMERGENCY MEDICINE

## 2021-09-14 PROCEDURE — 87506 IADNA-DNA/RNA PROBE TQ 6-11: CPT | Performed by: EMERGENCY MEDICINE

## 2021-09-14 PROCEDURE — 86901 BLOOD TYPING SEROLOGIC RH(D): CPT | Performed by: INTERNAL MEDICINE

## 2021-09-14 PROCEDURE — 87493 C DIFF AMPLIFIED PROBE: CPT | Mod: XU | Performed by: EMERGENCY MEDICINE

## 2021-09-14 PROCEDURE — 99285 EMERGENCY DEPT VISIT HI MDM: CPT | Mod: 25

## 2021-09-14 PROCEDURE — 86140 C-REACTIVE PROTEIN: CPT | Performed by: INTERNAL MEDICINE

## 2021-09-14 PROCEDURE — 93970 EXTREMITY STUDY: CPT

## 2021-09-14 PROCEDURE — 96360 HYDRATION IV INFUSION INIT: CPT

## 2021-09-14 PROCEDURE — 74150 CT ABDOMEN W/O CONTRAST: CPT

## 2021-09-14 PROCEDURE — C9803 HOPD COVID-19 SPEC COLLECT: HCPCS

## 2021-09-14 PROCEDURE — 85018 HEMOGLOBIN: CPT | Performed by: INTERNAL MEDICINE

## 2021-09-14 PROCEDURE — 36415 COLL VENOUS BLD VENIPUNCTURE: CPT | Performed by: EMERGENCY MEDICINE

## 2021-09-14 PROCEDURE — 83690 ASSAY OF LIPASE: CPT | Performed by: EMERGENCY MEDICINE

## 2021-09-14 PROCEDURE — 83036 HEMOGLOBIN GLYCOSYLATED A1C: CPT | Performed by: INTERNAL MEDICINE

## 2021-09-14 PROCEDURE — 82565 ASSAY OF CREATININE: CPT

## 2021-09-14 PROCEDURE — 85652 RBC SED RATE AUTOMATED: CPT | Performed by: INTERNAL MEDICINE

## 2021-09-14 PROCEDURE — 70450 CT HEAD/BRAIN W/O DYE: CPT

## 2021-09-14 PROCEDURE — 99220 PR INITIAL OBSERVATION CARE,LEVEL III: CPT | Performed by: INTERNAL MEDICINE

## 2021-09-14 PROCEDURE — 36415 COLL VENOUS BLD VENIPUNCTURE: CPT | Performed by: INTERNAL MEDICINE

## 2021-09-14 RX ORDER — CARVEDILOL 12.5 MG/1
12.5 TABLET ORAL 2 TIMES DAILY
Status: DISCONTINUED | OUTPATIENT
Start: 2021-09-15 | End: 2021-09-15 | Stop reason: HOSPADM

## 2021-09-14 RX ORDER — ATORVASTATIN CALCIUM 40 MG/1
40 TABLET, FILM COATED ORAL DAILY
Status: DISCONTINUED | OUTPATIENT
Start: 2021-09-15 | End: 2021-09-15 | Stop reason: HOSPADM

## 2021-09-14 RX ORDER — ONDANSETRON 4 MG/1
4 TABLET, ORALLY DISINTEGRATING ORAL EVERY 6 HOURS PRN
Status: DISCONTINUED | OUTPATIENT
Start: 2021-09-14 | End: 2021-09-15 | Stop reason: HOSPADM

## 2021-09-14 RX ORDER — PROCHLORPERAZINE MALEATE 5 MG
10 TABLET ORAL EVERY 6 HOURS PRN
Status: DISCONTINUED | OUTPATIENT
Start: 2021-09-14 | End: 2021-09-15 | Stop reason: HOSPADM

## 2021-09-14 RX ORDER — AMOXICILLIN 250 MG
2 CAPSULE ORAL 2 TIMES DAILY PRN
Status: DISCONTINUED | OUTPATIENT
Start: 2021-09-14 | End: 2021-09-15 | Stop reason: HOSPADM

## 2021-09-14 RX ORDER — ACETAMINOPHEN 650 MG/1
650 SUPPOSITORY RECTAL EVERY 6 HOURS PRN
Status: DISCONTINUED | OUTPATIENT
Start: 2021-09-14 | End: 2021-09-15 | Stop reason: HOSPADM

## 2021-09-14 RX ORDER — ONDANSETRON 2 MG/ML
4 INJECTION INTRAMUSCULAR; INTRAVENOUS EVERY 6 HOURS PRN
Status: DISCONTINUED | OUTPATIENT
Start: 2021-09-14 | End: 2021-09-15 | Stop reason: HOSPADM

## 2021-09-14 RX ORDER — LANOLIN ALCOHOL/MO/W.PET/CERES
3 CREAM (GRAM) TOPICAL
Status: DISCONTINUED | OUTPATIENT
Start: 2021-09-14 | End: 2021-09-15 | Stop reason: HOSPADM

## 2021-09-14 RX ORDER — PANTOPRAZOLE SODIUM 40 MG/1
40 TABLET, DELAYED RELEASE ORAL DAILY
Status: DISCONTINUED | OUTPATIENT
Start: 2021-09-15 | End: 2021-09-15 | Stop reason: HOSPADM

## 2021-09-14 RX ORDER — NICOTINE 21 MG/24HR
1 PATCH, TRANSDERMAL 24 HOURS TRANSDERMAL DAILY
Status: DISCONTINUED | OUTPATIENT
Start: 2021-09-15 | End: 2021-09-15 | Stop reason: HOSPADM

## 2021-09-14 RX ORDER — SODIUM CHLORIDE 9 MG/ML
INJECTION, SOLUTION INTRAVENOUS CONTINUOUS
Status: DISCONTINUED | OUTPATIENT
Start: 2021-09-14 | End: 2021-09-14

## 2021-09-14 RX ORDER — LEVOTHYROXINE SODIUM 150 UG/1
150 TABLET ORAL DAILY
Status: DISCONTINUED | OUTPATIENT
Start: 2021-09-15 | End: 2021-09-15 | Stop reason: HOSPADM

## 2021-09-14 RX ORDER — AMOXICILLIN 250 MG
1 CAPSULE ORAL 2 TIMES DAILY PRN
Status: DISCONTINUED | OUTPATIENT
Start: 2021-09-14 | End: 2021-09-15 | Stop reason: HOSPADM

## 2021-09-14 RX ORDER — INSULIN LISPRO 100 [IU]/ML
INJECTION, SOLUTION INTRAVENOUS; SUBCUTANEOUS
COMMUNITY
End: 2021-09-21

## 2021-09-14 RX ORDER — LIDOCAINE 40 MG/G
CREAM TOPICAL
Status: DISCONTINUED | OUTPATIENT
Start: 2021-09-14 | End: 2021-09-14

## 2021-09-14 RX ORDER — ACETAMINOPHEN 325 MG/1
650 TABLET ORAL EVERY 6 HOURS PRN
Status: DISCONTINUED | OUTPATIENT
Start: 2021-09-14 | End: 2021-09-15 | Stop reason: HOSPADM

## 2021-09-14 RX ORDER — NICOTINE POLACRILEX 4 MG
15-30 LOZENGE BUCCAL
Status: DISCONTINUED | OUTPATIENT
Start: 2021-09-14 | End: 2021-09-15 | Stop reason: HOSPADM

## 2021-09-14 RX ORDER — DEXTROSE MONOHYDRATE 25 G/50ML
25-50 INJECTION, SOLUTION INTRAVENOUS
Status: DISCONTINUED | OUTPATIENT
Start: 2021-09-14 | End: 2021-09-15 | Stop reason: HOSPADM

## 2021-09-14 RX ORDER — CLOPIDOGREL BISULFATE 75 MG/1
75 TABLET ORAL DAILY
Status: DISCONTINUED | OUTPATIENT
Start: 2021-09-15 | End: 2021-09-15 | Stop reason: HOSPADM

## 2021-09-14 RX ORDER — PROCHLORPERAZINE 25 MG
25 SUPPOSITORY, RECTAL RECTAL EVERY 12 HOURS PRN
Status: DISCONTINUED | OUTPATIENT
Start: 2021-09-14 | End: 2021-09-15 | Stop reason: HOSPADM

## 2021-09-14 RX ADMIN — SODIUM CHLORIDE 1000 ML: 9 INJECTION, SOLUTION INTRAVENOUS at 17:31

## 2021-09-14 ASSESSMENT — ACTIVITIES OF DAILY LIVING (ADL)
HEARING_DIFFICULTY_OR_DEAF: NO
DIFFICULTY_COMMUNICATING: NO
DRESSING/BATHING_DIFFICULTY: NO
DIFFICULTY_EATING/SWALLOWING: NO
DOING_ERRANDS_INDEPENDENTLY_DIFFICULTY: YES
WEAR_GLASSES_OR_BLIND: YES
NUMBER_OF_TIMES_PATIENT_HAS_FALLEN_WITHIN_LAST_SIX_MONTHS: 1
TOILETING_ISSUES: NO
EQUIPMENT_CURRENTLY_USED_AT_HOME: WALKER, STANDARD
PATIENT_/_FAMILY_COMMUNICATION_STYLE: SPOKEN LANGUAGE (ENGLISH OR BILINGUAL)
CONCENTRATING,_REMEMBERING_OR_MAKING_DECISIONS_DIFFICULTY: NO
FALL_HISTORY_WITHIN_LAST_SIX_MONTHS: YES
WALKING_OR_CLIMBING_STAIRS_DIFFICULTY: NO

## 2021-09-14 ASSESSMENT — ENCOUNTER SYMPTOMS
BLOOD IN STOOL: 1
DIARRHEA: 1
VOMITING: 1
FEVER: 0
ABDOMINAL PAIN: 1
NAUSEA: 1

## 2021-09-14 NOTE — TELEPHONE ENCOUNTER
Call received from patient requesting appointment. States she has had bad diarrhea for 8 days. Has also thrown up a few times. States yesterday she collapsed on the floor. Patient is having diarrhea multiple times a day. States about every 20 minutes. Stools are watery. Vomiting has occurred about every other day. States yesterday she was very weak and dizzy and the next thing she new she was on the ground. Advised ER.    Reason for Disposition    Drinking very little and has signs of dehydration (e.g., no urine > 12 hours, very dry mouth, very lightheaded)    Patient sounds very sick or weak to the triager    SEVERE diarrhea (e.g., 7 or more times / day more than normal) and present > 24 hours (1 day)    Additional Information    Negative: Shock suspected (e.g., cold/pale/clammy skin, too weak to stand, low BP, rapid pulse)    Negative: Difficult to awaken or acting confused (e.g., disoriented, slurred speech)    Negative: Sounds like a life-threatening emergency to the triager    Negative: Vomiting also present and worse than the diarrhea    Negative: Blood in stool and without diarrhea    Negative: SEVERE abdominal pain (e.g., excruciating) and present > 1 hour    Negative: SEVERE abdominal pain and age > 60    Negative: Bloody, black, or tarry bowel movements (Exception: chronic-unchanged black-grey bowel movements and is taking iron pills or Pepto-bismol)    Negative: SEVERE diarrhea (e.g., 7 or more times / day more than normal) and age > 60 years    Negative: Constant abdominal pain lasting > 2 hours    Protocols used: DIARRHEA-A-OH

## 2021-09-14 NOTE — ED TRIAGE NOTES
Patient presents with complaints of nausea, vomiting, and diarrhea for the past several days. Today, patient developed blood in her stool. Patient is on blood thinners. ABC intact without need for intervention at this time.

## 2021-09-14 NOTE — ED NOTES
[facility]  ED Nurse Handoff Report    Rowan Leiva is a 61 year old female   ED Chief complaint: Nausea, Vomiting, & Diarrhea  . ED Diagnosis:   Final diagnoses:   Hematochezia   Pancolitis (H)   Anemia, unspecified type - improved from baseline   Near syncope   Anticoagulated     Allergies:   Allergies   Allergen Reactions     No Known Drug Allergies        Code Status: Full Code  Activity level - Baseline/Home:  Independent. Activity Level - Current:   Stand by Assist. Lift room needed: No. Bariatric: No   Needed: No   Isolation: Yes. Infection: Not Applicable  C-Diff Pending.     Vital Signs:   Vitals:    09/14/21 1653 09/14/21 1730 09/14/21 1815 09/14/21 1845   BP: 125/55 (!) 162/66 (!) 179/71 114/86   Pulse: 74 68 71 85   Resp: 18      Temp: 98  F (36.7  C)      SpO2: 100% 100% 98%        Cardiac Rhythm:  ,      Pain level:    Patient confused: No. Patient Falls Risk: Yes.   Elimination Status: Has voided   Patient Report - Initial Complaint: N/V/D. Focused Assessment:  61 year old female anticoagulated on Eliquis and Plavix with a history of NSTEMI, DVT, stroke, CKD stage III, and type 2 diabetes mellitus who presents with nausea, vomiting, and diarrhea. The patient states she has had diarrhea, nausea and vomiting for the past week. She reports that she had 3 episodes of emesis today accompanied with lower abdominal pain and one episode of blood in stool. She states after episode of bloody soft stool today, she felt some relief in symptoms. The patient denies any fever. Of note the patient states she has a complex history of anemia and blood clotting issues.   Tests Performed: labs, imaging. Abnormal Results:   Labs Ordered and Resulted from Time of ED Arrival Up to the Time of Departure from the ED   LIPASE - Abnormal; Notable for the following components:       Result Value    Lipase 70 (*)     All other components within normal limits   COMPREHENSIVE METABOLIC PANEL - Abnormal;  Notable for the following components:    Anion Gap 2 (*)     Creatinine 1.99 (*)     Glucose 153 (*)     Alkaline Phosphatase 371 (*)     ALT 69 (*)     Albumin 3.1 (*)     GFR Estimate 27 (*)     All other components within normal limits   CBC WITH PLATELETS AND DIFFERENTIAL - Abnormal; Notable for the following components:    WBC Count 13.1 (*)     Hemoglobin 10.2 (*)     Hematocrit 32.4 (*)     MCH 25.0 (*)     RDW 15.9 (*)     Platelet Count 120 (*)     Absolute Neutrophils 10.8 (*)     All other components within normal limits   LACTIC ACID WHOLE BLOOD - Normal   EXTRA BLUE TOP TUBE   EXTRA RED TOP TUBE   EXTRA GREEN TOP (LITHIUM HEPARIN) TUBE   EXTRA GREEN TOP (LITHIUM HEPARIN) TUBE   EXTRA BLOOD BANK PURPLE TOP TUBE   EXTRA BLOOD BANK PURPLE TOP TUBE   CBC WITH PLATELETS & DIFFERENTIAL    Narrative:     The following orders were created for panel order CBC with platelets differential.  Procedure                               Abnormality         Status                     ---------                               -----------         ------                     CBC with platelets and d...[[id number]]  Abnormal            Final result                 Please view results for these tests on the individual orders.   EXTRA PURPLE TOP TUBE   COVID-19 VIRUS (CORONAVIRUS) BY PCR   ISTAT CREATININE NURSING POCT   PULSE OXIMETRY NURSING   CARDIAC CONTINUOUS MONITORING   PERIPHERAL IV CATHETER   ENTERIC BACTERIA AND VIRUS PANEL BY FRANCIS STOOL   CLOSTRIDIUM DIFFICILE TOXIN B   EXTRA TUBE    Narrative:     The following orders were created for panel order [place] Draw.  Procedure                               Abnormality         Status                     ---------                               -----------         ------                     Extra Blue Top Tube[[id number]]                              Final result               Extra Red Top Tube[[id number]]                               Final result               Extra Green Top  (Lithium...[[id number]]                      Final result               Extra Green Top (Lithium...[[id number]]                      Final result               Extra Purple Top Tube[[id number]]                            In process                 Extra Blood Bank Purple ...[[id number]]                      Final result               Extra Blood Bank Purple ...[[id number]]                      Final result                 Please view results for these tests on the individual orders.     CT Abdomen w/o Contrast   Final Result   IMPRESSION:    1.  Changes of pancolitis. Trace ascites.      2.  Tiny pericardial effusion. Cardiac enlargement. Vascular calcification.      3.  Multiple calcifications both kidneys compatible with vascular calcification as well as probable nonobstructing stones. No ureteric stone or hydronephrosis.      4.  Previous hysterectomy and cholecystectomy.         Treatments provided: SEE MAR  Family Comments: AT BEDSIDE  OBS brochure/video discussed/provided to patient:  Yes  ED Medications:   Medications   lidocaine 1 % 0.1-1 mL (has no administration in time range)   lidocaine (LMX4) cream (has no administration in time range)   sodium chloride (PF) 0.9% PF flush 3 mL (has no administration in time range)   sodium chloride (PF) 0.9% PF flush 3 mL (has no administration in time range)   sodium chloride 0.9% infusion (has no administration in time range)   0.9% sodium chloride BOLUS (0 mLs Intravenous Stopped 9/14/21 1843)     Drips infusing:  No  For the majority of the shift, the patient's behavior Green. Interventions performed were NA.    Sepsis treatment initiated: No     Patient tested for COVID 19 prior to admission: YES    ED Nurse Name/Phone Number: Ivett Magdaleno RN,   6:50 PM    RECEIVING UNIT ED HANDOFF REVIEW    Above ED Nurse Handoff Report was reviewed: Yes  Reviewed by: Nilam Antoine RN on September 14, 2021 at 9:39 PM

## 2021-09-14 NOTE — ED PROVIDER NOTES
History     Chief Complaint:  Nausea, Vomiting, & Diarrhea      HPI   Rowan Leiva is a 61 year old female anticoagulated on Eliquis and Plavix with a history of NSTEMI, DVT, stroke, CKD stage III, and type 2 diabetes mellitus who presents with nausea, vomiting, and diarrhea. The patient states she has had diarrhea, nausea and vomiting for the past week. She reports that she had 3 episodes of emesis today accompanied with lower abdominal pain and one episode of blood in stool. She states after episode of bloody soft stool today, she felt some relief in symptoms. The patient denies any fever. Of note the patient states she has a complex history of anemia,  blood clotting issues and GI bleed.      Review of Systems   Constitutional: Negative for fever.   Gastrointestinal: Positive for abdominal pain, blood in stool, diarrhea, nausea and vomiting.   All other systems reviewed and are negative.      Allergies:  The patient has no known allergies.    Medications:    Albuterol   Eliquis   Plavix   Lipitor   Coreg   Lantus   Humalog   Synthroid   Nitrostat   Protonix     Past Medical History:    Type 2 diabetes mellitus   Hyperlipidemia   Tobacco use disorder  Asthma  Thyroid cancer  CAD  Ovarian malignancy  Hypothyroidism   MI  Pulmonary nodule  Stroke  NSTEMI  Hypertension   DVT      Past Surgical History:    Thyroidectomy  Hysterectomy  Tubal ligation   Cholecystectomy  Angiogram   Heart cath and stent     Family History:    Father: diabetes mellitus, heart disease, lung cancer  Mother: thyroid cancer  Brother: diabetes mellitus, hyperlipidemia  Sister: diabetes mellitus, hyperlipidemia, blood disease     Social History:  The patient presents with her .     Physical Exam     Patient Vitals for the past 24 hrs:   BP Temp Pulse Resp SpO2   09/14/21 1845 114/86 -- 85 -- --   09/14/21 1815 (!) 179/71 -- 71 -- 98 %   09/14/21 1730 (!) 162/66 -- 68 -- 100 %   09/14/21 1653 125/55 98  F (36.7  C) 74 18 100 %        Physical Exam    HENT:    Mouth/Throat: Oral mucosa dry   Eyes: Conjunctivae are normal. No scleral icterus.  Neck: Neck supple. No cervical adenopathy  Cardiovascular: Normal rate, regular rhythm and intact distal pulses.    Pulmonary/Chest: Effort normal and breath sounds normal.   Abdominal: Soft.  No distension. Diffuse abdominal tenderness, no guarding.    Musculoskeletal:  No edema, No calf tenderness  Neurological:Alert and oriented. Coordination normal.   Skin: Skin is warm and dry.   Psychiatric: Normal mood and affect.       Emergency Department Course     Imaging:  CT Abdomen   1.  Changes of pancolitis. Trace ascites.     2.  Tiny pericardial effusion. Cardiac enlargement. Vascular calcification.     3.  Multiple calcifications both kidneys compatible with vascular calcification as well as probable nonobstructing stones. No ureteric stone or hydronephrosis.     4.  Previous hysterectomy and cholecystectomy.  Reading per radiology.    Laboratory:  CBC: WBC 13.1(H), HGB 10.2(L), (L)     CMP: anion gap 2(L) creatinine 1.99(H) glucose 153(H) alkaline phosphatase 371(H) ALT 69(H) albumin 3.1(H) GFR 27(L)  o/w WNL     Lactic acid (result time 1752) 1.2     Lipase: 70(L)    Clostridium difficile Toxin B: Pending  Clostridium difficile Culture: Pending    Enteric Bacteria and Virus Panel Stool: pending     Asymptomatic COVID19 Virus PCR by nasopharyngeal swab pending    Emergency Department Course:    Reviewed:  I reviewed nursing notes, vitals, past history and care everywhere    Assessments:  1709 I obtained history and examined the patient as noted above.     1829 I rechecked the patient and explained findings.     Consults:   1858 I spoke with Dr. Watts of the Hospitalist service from [facility] regarding patient's presentation, findings, and plan of care.    Interventions:  1731 NS 1L IV Bolus    Disposition:  The patient was admitted to the hospital under the care of   Stefanie.    Impression & Plan      Medical Decision Making:  Rowan Leiva is a 61 year old female complicated past medical history including anticoagulation for DVTs and PE and a recent GI bleed who presents with near syncope in the setting of 1 week of diarrhea and then today passing blood.  ED evaluation is as noted above.  She was hemodynamically stable.  Creatinine is baseline.  Hemoglobin is improved from most recent however that was from her admission in June when she was admitted for GI bleed.  Today's hemoglobin is 10.2.  Lactic acid was normal.  CT scan shows pancolitis.  Plan to admit for IV hydration and serial hemoglobins.  Stool cultures including C. difficile is pending at this time.  Dr. Watts has accepted the patient for ongoing care.     CT scan was obtained as Dr. Watts obtained the history of hitting her head.  This was negative for intracranial hemorrhage.        Covid-19  Rowan Leiva was evaluated during a global COVID-19 pandemic, which necessitated consideration that the patient might be at risk for infection with the SARS-CoV-2 virus that causes COVID-19.   Applicable protocols for evaluation were followed during the patient's care.   COVID-19 was considered as part of the patient's evaluation. The plan for testing is:  a test was obtained during this visit.    Diagnosis:    ICD-10-CM    1. Hematochezia  K92.1    2. Pancolitis (H)  K51.00    3. Anemia, unspecified type  D64.9     improved from baseline   4. Near syncope  R55    5. Anticoagulated  Z79.01        Scribe Disclosure:  I, Lakisha Mcdonald, am serving as a scribe at 5:09 PM on 9/14/2021 to document services personally performed by Sheree Farris MD based on my observations and the provider's statements to me.      Sheree Farris MD  09/14/21 1921       Sheree Farris MD  09/14/21 [id number]

## 2021-09-15 VITALS
SYSTOLIC BLOOD PRESSURE: 162 MMHG | RESPIRATION RATE: 18 BRPM | BODY MASS INDEX: 30.8 KG/M2 | OXYGEN SATURATION: 94 % | TEMPERATURE: 98.8 F | HEART RATE: 68 BPM | HEIGHT: 64 IN | DIASTOLIC BLOOD PRESSURE: 65 MMHG | WEIGHT: 180.4 LBS

## 2021-09-15 LAB
ANION GAP SERPL CALCULATED.3IONS-SCNC: 6 MMOL/L (ref 3–14)
BUN SERPL-MCNC: 26 MG/DL (ref 7–30)
C COLI+JEJUNI+LARI FUSA STL QL NAA+PROBE: NOT DETECTED
C DIFF TOX B STL QL: NEGATIVE
CALCIUM SERPL-MCNC: 8.1 MG/DL (ref 8.5–10.1)
CHLORIDE BLD-SCNC: 112 MMOL/L (ref 94–109)
CO2 SERPL-SCNC: 23 MMOL/L (ref 20–32)
CREAT BLD-MCNC: 2.2 MG/DL (ref 0.5–1)
CREAT SERPL-MCNC: 1.83 MG/DL (ref 0.52–1.04)
EC STX1 GENE STL QL NAA+PROBE: NOT DETECTED
EC STX2 GENE STL QL NAA+PROBE: NOT DETECTED
ERYTHROCYTE [DISTWIDTH] IN BLOOD BY AUTOMATED COUNT: 15.8 % (ref 10–15)
GFR SERPL CREATININE-BSD FRML MDRD: 24 ML/MIN/1.73M2
GFR SERPL CREATININE-BSD FRML MDRD: 30 ML/MIN/1.73M2
GLUCOSE BLD-MCNC: 108 MG/DL (ref 70–99)
GLUCOSE BLDC GLUCOMTR-MCNC: 112 MG/DL (ref 70–99)
GLUCOSE BLDC GLUCOMTR-MCNC: 128 MG/DL (ref 70–99)
GLUCOSE BLDC GLUCOMTR-MCNC: 95 MG/DL (ref 70–99)
GLUCOSE BLDC GLUCOMTR-MCNC: 99 MG/DL (ref 70–99)
HBA1C MFR BLD: 7 % (ref 0–5.6)
HCT VFR BLD AUTO: 28.3 % (ref 35–47)
HGB BLD-MCNC: 8.4 G/DL (ref 11.7–15.7)
HGB BLD-MCNC: 8.4 G/DL (ref 11.7–15.7)
HGB BLD-MCNC: 8.8 G/DL (ref 11.7–15.7)
MCH RBC QN AUTO: 24.4 PG (ref 26.5–33)
MCHC RBC AUTO-ENTMCNC: 31.1 G/DL (ref 31.5–36.5)
MCV RBC AUTO: 78 FL (ref 78–100)
NOROV GI+II ORF1-ORF2 JNC STL QL NAA+PR: NOT DETECTED
PLATELET # BLD AUTO: 83 10E3/UL (ref 150–450)
POTASSIUM BLD-SCNC: 3.1 MMOL/L (ref 3.4–5.3)
POTASSIUM BLD-SCNC: 3.4 MMOL/L (ref 3.4–5.3)
RBC # BLD AUTO: 3.61 10E6/UL (ref 3.8–5.2)
RVA NSP5 STL QL NAA+PROBE: NOT DETECTED
SALMONELLA SP RPOD STL QL NAA+PROBE: NOT DETECTED
SHIGELLA SP+EIEC IPAH STL QL NAA+PROBE: NOT DETECTED
SODIUM SERPL-SCNC: 141 MMOL/L (ref 133–144)
V CHOL+PARA RFBL+TRKH+TNAA STL QL NAA+PR: NOT DETECTED
WBC # BLD AUTO: 10.6 10E3/UL (ref 4–11)
Y ENTERO RECN STL QL NAA+PROBE: NOT DETECTED

## 2021-09-15 PROCEDURE — 84132 ASSAY OF SERUM POTASSIUM: CPT | Mod: 91 | Performed by: PHYSICIAN ASSISTANT

## 2021-09-15 PROCEDURE — 85018 HEMOGLOBIN: CPT | Performed by: INTERNAL MEDICINE

## 2021-09-15 PROCEDURE — 36415 COLL VENOUS BLD VENIPUNCTURE: CPT | Performed by: PHYSICIAN ASSISTANT

## 2021-09-15 PROCEDURE — 250N000013 HC RX MED GY IP 250 OP 250 PS 637: Performed by: PHYSICIAN ASSISTANT

## 2021-09-15 PROCEDURE — 36415 COLL VENOUS BLD VENIPUNCTURE: CPT | Performed by: INTERNAL MEDICINE

## 2021-09-15 PROCEDURE — 80048 BASIC METABOLIC PNL TOTAL CA: CPT | Performed by: INTERNAL MEDICINE

## 2021-09-15 PROCEDURE — 258N000003 HC RX IP 258 OP 636: Performed by: INTERNAL MEDICINE

## 2021-09-15 PROCEDURE — 99226 PR SUBSEQUENT OBSERVATION CARE,LEVEL III: CPT | Performed by: PHYSICIAN ASSISTANT

## 2021-09-15 PROCEDURE — 250N000013 HC RX MED GY IP 250 OP 250 PS 637: Performed by: INTERNAL MEDICINE

## 2021-09-15 PROCEDURE — G0378 HOSPITAL OBSERVATION PER HR: HCPCS

## 2021-09-15 RX ORDER — POTASSIUM CHLORIDE 1500 MG/1
20 TABLET, EXTENDED RELEASE ORAL ONCE
Status: COMPLETED | OUTPATIENT
Start: 2021-09-15 | End: 2021-09-15

## 2021-09-15 RX ORDER — SODIUM CHLORIDE 9 MG/ML
INJECTION, SOLUTION INTRAVENOUS CONTINUOUS
Status: DISCONTINUED | OUTPATIENT
Start: 2021-09-15 | End: 2021-09-15

## 2021-09-15 RX ADMIN — PANTOPRAZOLE SODIUM 40 MG: 40 TABLET, DELAYED RELEASE ORAL at 07:56

## 2021-09-15 RX ADMIN — ATORVASTATIN CALCIUM 40 MG: 40 TABLET, FILM COATED ORAL at 07:56

## 2021-09-15 RX ADMIN — CLOPIDOGREL BISULFATE 75 MG: 75 TABLET ORAL at 07:56

## 2021-09-15 RX ADMIN — SODIUM CHLORIDE: 9 INJECTION, SOLUTION INTRAVENOUS at 09:45

## 2021-09-15 RX ADMIN — CARVEDILOL 12.5 MG: 12.5 TABLET, FILM COATED ORAL at 01:01

## 2021-09-15 RX ADMIN — POTASSIUM CHLORIDE 20 MEQ: 1500 TABLET, EXTENDED RELEASE ORAL at 11:24

## 2021-09-15 RX ADMIN — LEVOTHYROXINE SODIUM 150 MCG: 150 TABLET ORAL at 07:58

## 2021-09-15 RX ADMIN — SODIUM CHLORIDE: 9 INJECTION, SOLUTION INTRAVENOUS at 01:01

## 2021-09-15 RX ADMIN — CARVEDILOL 12.5 MG: 12.5 TABLET, FILM COATED ORAL at 07:56

## 2021-09-15 RX ADMIN — NICOTINE 1 PATCH: 14 PATCH, EXTENDED RELEASE TRANSDERMAL at 07:56

## 2021-09-15 ASSESSMENT — MIFFLIN-ST. JEOR: SCORE: 1378.29

## 2021-09-15 NOTE — PROGRESS NOTES
PRIMARY DIAGNOSIS: GI BLEED    OUTPATIENT/OBSERVATION GOALS TO BE MET BEFORE DISCHARGE  Orthostatic performed: Yes:          Lying Orthostatic BP: 170/66         Sitting Orthostatic BP: 146/63   1.       Standing Orthostatic BP: 119/62     2. Stable Hgb Yes.   Recent Labs   Lab Test 09/15/21  0627 09/15/21  0146 09/14/21  2256   HGB 8.8* 8.4* 9.0*       3. Resolved or declined bleeding episodes: Yes, last BM overnight smear without blood present Last episode: 9/15     4. Appropriate testing complete: Yes    5. Cleared for discharge by consultants (if involved): Yes    6. Safe discharge environment identified: Yes    Discharge Planner Nurse   Safe discharge environment identified: Yes  Barriers to discharge: Yes       Entered by: Margi Talbert 09/15/2021 11:47 AM      A&Ox4, VSS. Tender abdomen, otherwise denies pain. BS active x4, last stool overnight, nonbloody/smear. IVF infusing. NPO. HGB stable. GI to see. Will continue to monitor.       Please review provider order for any additional goals.   Nurse to notify provider when observation goals have been met and patient is ready for discharge.

## 2021-09-15 NOTE — PLAN OF CARE
Pt. Refusing bed alarm upon admission, reports she will call for assistance. Reports 1-2 fall recently at home. Education provided to Pt. On importance of calling for assistance as Pt. Has positive ortho's and unsteadiness on feet. Denies dizziness at this time but does report dizzinesss at home. Postiive ortho's paged to provider, awaiting new orders.

## 2021-09-15 NOTE — PLAN OF CARE
ROOM # 201    Living Situation (if not independent, order SW consult): Home with spouse and mom.   Facility name: N/A  : Daughter Sammie     Activity level at baseline: Independent without device.   Activity level on admit: SBA gait belt.      Patient registered to observation; given Patient Bill of Rights; given the opportunity to ask questions about observation status and their plan of care.  Patient has been oriented to the observation room, bathroom and call light is in place.    Discussed discharge goals and expectations with patient/family.

## 2021-09-15 NOTE — H&P
History and Physical     Rowan Leiva MRN# [id number]   YOB: 1960 Age: 61 year old      Date of Admission:  9/14/2021    Primary care provider: Brian Ruiz          Assessment and Plan:     Summary of Stay: Rowan Leiva is a 61 year old female with a history of T2dm/hlp, CAD with stenting in [place] a few years ago, CKD stage 3 with baseline creat in our system 1.7-2.2 range, with a complicated recent PMH    She was hospitalized 5/30-6/4/21 with NSTEMI s/p cath and ELLIOT to the LAD, she was also diagnosed with bilateral tibial vein clots and started on apixaban.  She was discharged on clopidogrel and apixaban. Echo shows EF 50-55 % with some WMAs.     She was readmitted 6/5-6/6/2021 with multiple embolic strokes likely related to recent angiogram.      She was again admitted 6/17-6/22/21 with an Acute GIB, at which time she under went both EGD and colonoscopy  both negative for source but colonoscopy revealed blood in the entire colon.  She was resumed on apixaban and clopidogrel and discharged home.  In the OP setting completed VCE which was negative for bleeding but did show two apthous  ulcers.     She comes in tonight with 8 days of profuse diarrhea and now with bloody stools further complicated by an episode of syncope and admitted on 9/14/2021 for same.     She has poor baseline vision so can't really tell is her BMs are always bloody.  In any case, she started having diarrhea 8 days ago, every 10-20 minutes with associated gas pain.  She denies fevers but has felt chilled.  No other abdominal pain.  She's been vomiting every other day, typically only once and in the am.  She states it's just been stomach acid and her  has inspected them and they are not bloody.  Yesterday she became concerned that her stools might be bloody and had her mom inspect them and she felt they were brown without any e/o blood.  Also yesterday while in the laundry room to collect something she  felt VERY weak and the next thing she knew she was on the ground.  She has no recollection of how she got there, but did not hurt her head or sustain any injuries.  She then felt nauseated but was too weak to stand so just vomited while sitting up.  Things juli settled down after that and she was able to finish out her day without any further episodes. She states she felt like she did when her hgb was low. She denies any vision changes/headache.     Today she woke up and vomited 3 times right away in the am.  She continued to have frequent diarrhea (anything she ate/drank immediately came out) and her intake was poor.  She has not been able to keep anything down today.  She took some imodium with some reduction in stools but still relatively frequent.  She also noticed that they turned bloody today.    No travel/sick contacts/strange foods     In the ER VSS x some moderate hypertension, she is afebrile.  CMP with creat 2.0, alk phos up at 371, ALT 69 and total bili is low at 0.3.  CRP 37.4, CBC with elevated wbc 13.1 with neutrophilia.  CT abdomen with pan colitis    Bilateral LE US negative for DVT and the prior ones seen have resolved    I am asked to admit her to obs for colitis/GI bleed      Problem List:   N/V/diarrhea  Bloody stools/recent GIB  Pancolitis  Initial symptoms sound infectious (viral vs C diff given multiple recent hospitalizations) although could also be IBD.  She has pancolitis on CT scanning.  This is further complicated by bloody stools in the setting of complicated clotting/CAD hx on both antiplatelet and an antithrombotic   -check CRP/ESR  -keep clopidogrel going for now, I think it's ok to hold the apixaban given that her DVTs were below the knee and are now resolved  -enteric panel and C diff, will hold off on further imodium until at least C diff is back and negative  -[facility] consultation    Syncopal spell   Bloody stools   Sounds most consistent with dehydration.  She denies head trauma  but is on anticoagulants with accelerating nausea and vomiting  -checked NCHCT and no e/o bleed  -orthostatics  -serial hgbs with conditional transfusion < 7.0.  Consented for blood products in the ED    Elevated LFTs  Not really in a pattern that fits with anything, ? Viral illness but even that's not consistent with this pattern.   -recheck in am, maybe [facility] will weigh in    NSTEMI in 6/2021 with ELLIOT to the LAD  Suspected post angio embolic strokes without significant sequelae  -cont clopidogrel, this would be difficult to stop given recent ELLIOT/stroke    Bilateral tibial vein clots on chronic abixaban  -they are resolved on repeat US today  -stop apixaban     CKD stage 3  Baseline creat 1.7-2.2  -stable    T2dm pta regimen is glargine 12 unit(s) at bedtime and lispro with meals.    -resume glargine  -monitor with ISS    htn  Cont with pta carvedilol 12.5 mg bid    Hlp/hypothyroidism  -cont atorva/levothyroxine    COVID 19 negative  -completed the pfizer series in 5/2021    Tobacco addiction  ATQ, does not want nicotine patch.  She has cut down substantially to 12 cigarettes per day           DVT Prophylaxis: DOAC and clopidogrel  Code Status: Full Code  Functional Status:  Independent, lives with , mother, dtr, and JR  Perez:  Not needed  Access:  PIV               Chief Complaint:     Diarrhea/bloody stools       History of Present Illness:    Rowan Leiva is a 61 year old female with a history of T2dm/hlp, CAD with stenting in [place] a few years ago, CKD stage 3 with baseline creat in our system 1.7-2.2 range, with a complicated recent PMH    She was hospitalized 5/30-6/4/21 with NSTEMI s/p cath and ELLIOT to the LAD, she was also diagnosed with bilateral tibial vein clots and started on apixaban.  She was discharged on clopidogrel and apixaban. Echo shows EF 50-55 % with some WMAs.     She was readmitted 6/5-6/6/2021 with multiple embolic strokes likely related to recent angiogram.      She was  again admitted 6/17-6/22/21 with an Acute GIB, at which time she under went both EGD and colonoscopy  both negative for source but colonoscopy revealed blood in the entire colon.  She was resumed on apixaban and clopidogrel and discharged home.  In the OP setting completed VCE which was negative for bleeding but did show two apthous  ulcers.     She comes in tonight with 8 days of profuse diarrhea and now with bloody stools further complicated by an episode of syncope and admitted on 9/14/2021 for same.     She has poor baseline vision so can't really tell is her BMs are always bloody.  In any case, she started having diarrhea 8 days ago, every 10-20 minutes with associated gas pain.  She denies fevers but has felt chilled.  No other abdominal pain.  She's been vomiting every other day, typically only once and in the am.  She states it's just been stomach acid and her  has inspected them and they are not bloody.  Yesterday she became concerned that her stools might be bloody and had her mom inspect them and she felt they were brown without any e/o blood.  Also yesterday while in the laundry room to collect something she felt VERY weak and the next thing she knew she was on the ground.  She has no recollection of how she got there, but did not hurt her head or sustain any injuries.  She then felt nauseated but was too weak to stand so just vomited while sitting up.  Things juli settled down after that and she was able to finish out her day without any further episodes. She states she felt like she did when her hgb was low. She denies any vision changes/headache.     Today she woke up and vomited 3 times right away in the am.  She continued to have frequent diarrhea (anything she ate/drank immediately came out) and her intake was poor.  She has not been able to keep anything down today.  She took some imodium with some reduction in stools but still relatively frequent.  She also noticed that they turned bloody  today.    No travel/sick contacts/strange foods     In the ER VSS x some moderate hypertension, she is afebrile.  CMP with creat 2.0, alk phos up at 371, ALT 69 and total bili is low at 0.3.  CRP 37.4, CBC with elevated wbc 13.1 with neutrophilia.  CT abdomen with pan colitis    Bilateral LE US negative for DVT and the prior ones seen have resolved    I am asked to admit her to obs for colitis/GI bleed      The history is obtained in discussion with the ER provider Dr Farris and the patient with good reliability           Past Medical History:     Past Medical History:   Diagnosis Date     Cancer of thyroid (H)      Chest pain      Coronary artery disease     -     HX OF OVARIAN MALIGNANCY      Hyperlipidemia      Hypertension      Hypothyroidism      Malignant neoplasm of thyroid gland (H)     papillary carcinoma; resection      Mild intermittent asthma     minimal symptoms, albuterol use 2x/year     Myocardial infarction (H)     anterior     Pulmonary nodule      Thrombocytopenia (H)      Tobacco use disorder      Type II or unspecified type diabetes mellitus without mention of complication, not stated as uncontrolled              Past Surgical History:     Past Surgical History:   Procedure Laterality Date     BLADDER SURGERY       C ANESTH, SECTION       C LIGATE FALLOPIAN TUBE      Tubal Ligation     C TOTAL ABDOM HYSTERECTOMY      ovarian cancer (low grade) - Heme/Onc     CHOLECYSTECTOMY       COLONOSCOPY N/A 2021    Procedure: COLONOSCOPY;  Surgeon: Red Tracey DO;  Location:  GI     CV HEART CATHETERIZATION WITH POSSIBLE INTERVENTION N/A 2021    Procedure: Heart Catheterization with Possible Intervention;  Surgeon: Wolf Brumfield MD;  Location:  HEART CARDIAC CATH LAB     CV PCI STENT DRUG ELUTING N/A 2021    Procedure: Percutaneous Coronary Intervention Stent Drug Eluting;  Surgeon: Wolf Brumfiedl MD;  Location:   HEART CARDIAC CATH LAB     CYSTOSCOPY  2014    Procedure: CYSTOSCOPY;  Surgeon: Sabino Jamil MD;  Location: [facility]     ESOPHAGOSCOPY, GASTROSCOPY, DUODENOSCOPY (EGD), COMBINED N/A 2021    Procedure: ESOPHAGOGASTRODUODENOSCOPY (EGD);  Surgeon: Jean Hsieh MD;  Location:  GI     HC THYROIDECTOMY  2000    papillary thyroid carcinoma - Endocrine     HEART CATH, ANGIOPLASTY  11    2.5 X 18 mm & 2.25 X 18 mm Xience ELLIOT to Mid LAD     HEART CATH, ANGIOPLASTY  11    Staged-3.0 X 23 mm Xience ELLIOT to Mid RAC     SLING TRANSVAGINAL N/A 2014    Procedure: SLING TRANSVAGINAL;  Surgeon: Sabino Jamil MD;  Location: [facility]             Social History:     Social History     Tobacco Use     Smoking status: Current Every Day Smoker     Packs/day: 0.50     Years: 24.00     Pack years: 12.00     Types: Cigarettes     Smokeless tobacco: Former User     Tobacco comment: 12 cigarettes daily   Substance Use Topics     Alcohol use: No     Alcohol/week: 0.0 standard drinks      Code Status: Full Code  Functional Status:  Independent, lives with , mother, dtr, and JR       Family History:     Family History   Problem Relation Age of Onset     Blood Disease Sister         Hepatitis B-alive     Cancer Maternal Aunt         bronchial tubes, neck-     Cancer Maternal Uncle         glands in neck-     Cancer Maternal Grandfather         lungs-     Diabetes Father              Heart Disease Father         2 heartattacks-     Diabetes Sister              Diabetes Other         -cousins     Diabetes Paternal Grandmother              Diabetes Paternal Grandfather              Diabetes Paternal Aunt              Hypertension Sister         alive     Thyroid Disease Sister         both sisters-alive     Thyroid Disease Other         niece-alive            Allergies:     Allergies   Allergen Reactions     No Known Drug  Allergies              Medications:     Prior to Admission medications    Medication Sig Last Dose Taking? Auth Provider   albuterol (PROAIR HFA/PROVENTIL HFA/VENTOLIN HFA) 108 (90 Base) MCG/ACT inhaler Inhale 2 puffs into the lungs every 6 hours  at PRN Yes Brian Ruiz MD   apixaban ANTICOAGULANT (ELIQUIS) 5 MG tablet Take 1 tablet (5 mg) by mouth 2 times daily 9/14/2021 at AM x1 Yes Ebony Stein PA-C   atorvastatin (LIPITOR) 40 MG tablet Take 1 tablet (40 mg) by mouth daily 9/14/2021 at AM x1 Yes Brian Ruiz MD   carvedilol (COREG) 12.5 MG tablet Take 1 tablet (12.5 mg) by mouth 2 times daily 9/14/2021 at AM x1 Yes Brian Ruiz MD   clopidogrel (PLAVIX) 75 MG tablet Take 1 tablet (75 mg) by mouth daily 9/14/2021 at AM Yes Brian Ruiz MD   insulin glargine (LANTUS PEN) 100 UNIT/ML pen Inject 12 Units Subcutaneous At Bedtime  Patient taking differently: Inject 22 Units Subcutaneous At Bedtime  9/13/2021 at HS Yes Brian Ruiz MD   insulin lispro (HUMALOG KWIKPEN) 100 UNIT/ML (1 unit dial) KWIKPEN ADMINISTER UP TO 25 UNITS UNDER THE SKIN WITH EACH MEAL 9/13/2021 at Unknown time Yes Unknown, Entered By History   levothyroxine (SYNTHROID/LEVOTHROID) 150 MCG tablet Take 150 mcg by mouth daily 9/14/2021 at AM Yes Reported, Patient   nitroGLYcerin (NITROSTAT) 0.4 MG sublingual tablet For chest pain place 1 tablet under the tongue every 5 minutes for 3 doses. If symptoms persist 5 minutes after 1st dose call 911.  at PRN Yes Viktoria Mcdonough PA-C   pantoprazole (PROTONIX) 40 MG EC tablet Take 1 tablet (40 mg) by mouth daily 9/14/2021 at AM Yes Brian Ruiz MD   LANCETS REGULAR MISC use twice a day for blood sugar                  Review of Systems:     A Comprehensive greater than 10 system review of systems was carried out.  Pertinent positives and negatives are noted above.  Otherwise negative for contributory information.           Physical Exam:   Blood  pressure (!) 172/69, pulse 76, temperature 98  F (36.7  C), resp. rate 18, last menstrual period 05/01/2000, SpO2 100 %, not currently breastfeeding.  Exam:    General:  Pleasant nad looks stated age  HEENT:  Head nc/at sclera clear PERRL O/P:  Mask in place  Neck is supple  Lungs: cta b nl effort   CV:  RRR no m/r/g no le edema  Abd:  S/nt/ with bilateral LQ tenderness to palpation  no r/g  Neuro:  Cn 2-12 grossly intact and young  Alert and oriented affect appropriate   Skin:  W/d no c/c               Data:          Lab Results   Component Value Date     09/14/2021     06/22/2021    Lab Results   Component Value Date    CHLORIDE 109 09/14/2021    CHLORIDE 109 06/22/2021    Lab Results   Component Value Date    BUN 29 09/14/2021    BUN 17 06/22/2021      Lab Results   Component Value Date    POTASSIUM 3.8 09/14/2021    POTASSIUM 3.7 06/22/2021    Lab Results   Component Value Date    CO2 27 09/14/2021    CO2 26 06/22/2021    Lab Results   Component Value Date    CR 1.99 09/14/2021    CR 1.78 07/08/2021        Lab Results   Component Value Date    WBC 13.1 (H) 09/14/2021    HGB 9.0 (L) 09/14/2021    HCT 32.4 (L) 09/14/2021    MCV 79 09/14/2021     (L) 09/14/2021     Lab Results   Component Value Date    SED 49 (H) 09/14/2021     Lab Results   Component Value Date     (H) 09/14/2021     Lab Results   Component Value Date    AST 43 09/14/2021    ALT 69 (H) 09/14/2021    ALKPHOS 371 (H) 09/14/2021    BILITOTAL 0.3 09/14/2021    BILICONJ 0.0 11/05/2012   CRP 37.4         Imaging:     Recent Results (from the past 24 hour(s))   CT Abdomen w/o Contrast    Narrative    EXAM: CT ABDOMEN W/O CONTRAST  LOCATION: [facility]  DATE/TIME: 9/14/2021 5:53 PM    INDICATION: Abdominal pain with bloody diarrhea.  COMPARISON: 06/06/2021.  TECHNIQUE: CT scan of the abdomen was performed without IV contrast. Multiplanar reformats were obtained. Dose reduction techniques were used.    CONTRAST: None.    FINDINGS:    LOWER CHEST: Coronary artery calcification.    HEPATOBILIARY: Cholecystectomy.    PANCREAS: Normal.    SPLEEN: Normal.    ADRENAL GLANDS: Normal.    KIDNEYS/BLADDER: Again seen are multiple calcifications in both kidneys compatible with combination of vascular calcification and nonobstructing stones. No ureteric stone or hydronephrosis.    BOWEL: Diffuse colonic wall thickening compatible with pancolitis. Trace ascites.    LYMPH NODES: Normal.    VASCULATURE: Unremarkable.    PELVIS: Hysterectomy. Small amount of free fluid.    MUSCULOSKELETAL: Lumbar degenerative change.      Impression    IMPRESSION:   1.  Changes of pancolitis. Trace ascites.    2.  Tiny pericardial effusion. Cardiac enlargement. Vascular calcification.    3.  Multiple calcifications both kidneys compatible with vascular calcification as well as probable nonobstructing stones. No ureteric stone or hydronephrosis.    4.  Previous hysterectomy and cholecystectomy.   CT Head w/o Contrast    Narrative    EXAM: CT HEAD W/O CONTRAST  LOCATION: [facility]  DATE/TIME: 9/14/2021 9:43 PM    INDICATION: Syncope, recurrent  COMPARISON: Brain MR evaluation 06/05/2021  TECHNIQUE: Routine CT Head without IV contrast. Multiplanar reformats. Dose reduction techniques were used.    FINDINGS:  INTRACRANIAL CONTENTS: No intracranial hemorrhage, extraaxial collection, or mass effect.  No CT evidence of acute infarct. Mild presumed chronic small vessel ischemic changes. Normal ventricles and sulci. Cavum septum pellucidum at vergae represents   normal anatomic variation. Position of cerebellar tonsils is satisfactory. Sella shows no acute abnormality. Corpus callosum is normal. Chronic lacunar type infarction suggested at the level of the left caudate nuclear region.    VISUALIZED ORBITS/SINUSES/MASTOIDS: No intraorbital abnormality. Left maxillary sinus mucous retention cyst. Membrane thickening ethmoid air  cells. No air-fluid levels. Scattered fluid/membrane thickening in the right mastoid air cells. No apparent mass   in the posterior nasopharynx or skull base.    BONES/SOFT TISSUES: Demineralization of the skull base. Mild degenerative changes both TMJs. No fracture the calvarium or skull base is apparent. No significant swelling of the facial or scalp tissues.      Impression    IMPRESSION:  1.  No CT evidence for acute intracranial process.  2.  Brain atrophy and presumed chronic microvascular ischemic changes as above.    3.  No evidence for acute/evolving infarction, mass, mass effect or hemorrhage.   US Lower Extremity Venous Duplex Bilateral    Narrative    EXAM: US LOWER EXTREMITY VENOUS DUPLEX BILATERAL  LOCATION: [facility]  DATE/TIME: 9/14/2021 9:54 PM    INDICATION: Lower extremity pain and swelling.  COMPARISON: 5/31/2021 and 6/6/2021.  TECHNIQUE: Venous Duplex ultrasound of bilateral lower extremities with and without compression, augmentation and duplex. Color flow and spectral Doppler with waveform analysis performed.    FINDINGS: Exam includes the common femoral, femoral, popliteal veins as well as segmentally visualized deep calf veins and greater saphenous vein.     RIGHT: No deep vein thrombosis. No superficial thrombophlebitis. No popliteal cyst.    LEFT: No deep vein thrombosis. No superficial thrombophlebitis. No popliteal cyst.      Impression    IMPRESSION:  No deep venous thrombosis in the bilateral lower extremities. DVT of the bilateral posterior tibial veins on 05/31/2021 no longer visualized.

## 2021-09-15 NOTE — PROGRESS NOTES
Patient requesting to have fluids turned off since she is eating and drinking.    -Fluids discontinued

## 2021-09-15 NOTE — PHARMACY-ADMISSION MEDICATION HISTORY
Admission medication history interview status for this patient is complete. See [facility] admission navigator for allergy information, prior to admission medications and immunization status.     Medication history interview done, indicate source(s): Patient  Medication history resources (including written lists, pill bottles, clinic record):[employer]  Pharmacy: [id number] [place], [place] - [address] AT [place] [id number] & [address]    Changes made to PTA medication list:  Added: none  Changed:   - Lantus 12u at bedtime -> 22u at bedtime   - Humalog 1u subcutaneous PRN -> up to 25 units with each meal  Reported as Not Taking: none  Removed: Ferrous sulfate Elixir    Actions taken by pharmacist (provider contacted, etc):left sticky note for provider     Additional medication history information:None    Medication reconciliation/reorder completed by provider prior to medication history?  N    For patients on insulin therapy:   Do you use sliding scale insulin based on blood sugars? N/A  What is your pre-meal insulin coverage?  see below  Do you typically eat three meals a day? Yes  How many times do you check your blood glucose per day? 1  How many episodes of hypoglycemia do you typically have per month? occasionally  Do you have a Continuous Glucose Monitor (CGM)?  None    Prior to Admission medications    Medication Sig Last Dose Taking? Auth Provider   albuterol (PROAIR HFA/PROVENTIL HFA/VENTOLIN HFA) 108 (90 Base) MCG/ACT inhaler Inhale 2 puffs into the lungs every 6 hours  at PRN Yes Brian Ruiz MD   apixaban ANTICOAGULANT (ELIQUIS) 5 MG tablet Take 1 tablet (5 mg) by mouth 2 times daily 9/14/2021 at AM x1 Yes Ebony Stein PA-C   atorvastatin (LIPITOR) 40 MG tablet Take 1 tablet (40 mg) by mouth daily 9/14/2021 at AM x1 Yes Brian Ruiz MD   carvedilol (COREG) 12.5 MG tablet Take 1 tablet (12.5 mg) by mouth 2 times daily 9/14/2021 at AM x1 Yes Brian Ruiz  MD CORRINE   clopidogrel (PLAVIX) 75 MG tablet Take 1 tablet (75 mg) by mouth daily 9/14/2021 at AM Yes Brian Ruiz MD   insulin glargine (LANTUS PEN) 100 UNIT/ML pen Inject 12 Units Subcutaneous At Bedtime  Patient taking differently: Inject 22 Units Subcutaneous At Bedtime  9/13/2021 at HS Yes Brian Ruiz MD   insulin lispro (HUMALOG KWIKPEN) 100 UNIT/ML (1 unit dial) KWIKPEN ADMINISTER UP TO 25 UNITS UNDER THE SKIN WITH EACH MEAL 9/13/2021 at Unknown time Yes Unknown, Entered By History   levothyroxine (SYNTHROID/LEVOTHROID) 150 MCG tablet Take 150 mcg by mouth daily 9/14/2021 at AM Yes Reported, Patient   nitroGLYcerin (NITROSTAT) 0.4 MG sublingual tablet For chest pain place 1 tablet under the tongue every 5 minutes for 3 doses. If symptoms persist 5 minutes after 1st dose call 911.  at PRN Yes Viktoria Mcdonough PA-C   pantoprazole (PROTONIX) 40 MG EC tablet Take 1 tablet (40 mg) by mouth daily 9/14/2021 at AM Yes Brian Ruiz MD   LANCETS REGULAR MISC use twice a day for blood sugar

## 2021-09-15 NOTE — PROGRESS NOTES
PRIMARY DIAGNOSIS: GI BLEED    OUTPATIENT/OBSERVATION GOALS TO BE MET BEFORE DISCHARGE  Orthostatic performed: Yes:          Lying Orthostatic BP: 170/66         Sitting Orthostatic BP: 146/63   1.       Standing Orthostatic BP: 119/62     2. Stable Hgb Yes.   Recent Labs   Lab Test 09/15/21  0627 09/15/21  0146 09/14/21  2256   HGB 8.8* 8.4* 9.0*       Resolved or declined bleeding episodes: Yes  3. Appropriate testing complete: Yes    4. Cleared for discharge by consultants (if involved): Yes    5. Safe discharge environment identified: Yes    Discharge Planner Nurse   Safe discharge environment identified: Yes  Barriers to discharge: Yes       Entered by: Margi Talbert 09/15/2021 5:20 PM    VSS. Tender abdomen, otherwise denies pain. BS active x4, x1 small stool this morning with no blood seen per pt. Did get some stomach discomfort after clears. K+ replaced, now 3.4. GI following. Will continue to monitor.       Please review provider order for any additional goals.   Nurse to notify provider when observation goals have been met and patient is ready for discharge.

## 2021-09-15 NOTE — PROGRESS NOTES
[facility]    Hospitalist Progress Note  Name: Rowan Leiva    MRN: [id number]  Provider:  Deepthi Peterson PA-C  Date of Service: 09/15/2021    Assessment & Plan   Summary of Stay:Rowan Leiva is a 61 year old female with a history of T2dm/hlp, CAD with stenting in [place] a few years ago, CKD stage 3 with baseline creatinine in our system 1.7-2.2 range, with a complicated recent past medical history where the patient was hospitalized 5/30-6/4/21 with NSTEMI s/p cath and ELLIOT to the LAD, she was also diagnosed with bilateral tibial vein clots and started on apixaban.  She was discharged on clopidogrel and apixaban. Echo shows EF 50-55 % with some WMAs. She was readmitted 6/5-6/6/2021 with multiple embolic strokes likely related to recent angiogram.  She was again admitted 6/17-6/22/21 with an acute GIB, at which time she under went both EGD and colonoscopy  both negative for source but colonoscopy revealed blood in the entire colon. She was resumed on apixaban and clopidogrel and discharged home.  In the OP setting completed VCE which was negative for bleeding but did show two apthous  ulcers.      Patient presented on 9/14/21 with 8 days of profuse diarrhea every 10-20 minutes with associated gas pain and now with bloody stools further complicated by an episode of syncope. She has poor baseline vision so can't really tell if her BMs are always bloody. She's been vomiting every other day, typically only once and in the am.  She states it's just been stomach acid and her  has inspected them and they are not bloody.       The morning of admission the patient woke up and vomited 3 times right away in the AM. She continued to have frequent diarrhea (anything she ate/drank immediately came out) and her intake was poor. She was unable to tolerate any PO intake the day of admission.  She took some imodium with some reduction in stools but still relatively frequent.  She also noticed that they  turned bloody today. No travel/sick contacts/strange foods.     #Nausea, vomiting, diarrhea  #Bloody stools/recent GIB  #Pancolitis: initial symptoms sound infectious (viral vs C diff given multiple recent hospitalizations).  She has pancolitis on CT scanning. This is further complicated by bloody stools in the setting of complicated clotting/CAD hx on both antiplatelet and an antithrombotic   -CRP of 37.4 and ESR of 49  -continue Plavix, hold the apixaban given that her DVTs were below the knee and are now resolved  -enteric panel and c diff negative   -[facility] consulted, suspect infectious process even though stool studies negative, recommend following BMs and ADAT, pending output could consider flexible sigmoidoscopy this stay      #Syncopal episode: sounds most consistent with dehydration. On 9/13 while in the laundry room to collect something she felt very weak and the next thing she knew she was on the ground. She has no recollection of how she got there, but did not hurt her head or sustain any injuries.  She denies head trauma but is on anticoagulants with presenting with accelerating nausea and vomiting  -CT of head negative for acute pathology   -orthostatics negative  -Hgb drop from 10.2 to 8.4, no need for transfusion at this time      #Elevated LFTs: unclear etiology, possible related to viral illness.   - recheck LFTs in AM and discuss with GI     #NSTEMI in 6/2021 with ELLIOT to the LAD  #Suspected post angio embolic strokes without significant sequelae  -continue PTA Plavix, unable to stop due to recent ELLIOT and CVAs     #Bilateral tibial vein clots on chronic abixaban  -shown to be resolved on repeat bilateral LE ultrasound   -continue to hold Apixaban      #CKD stage 3: baseline of 1.7-2.2, stable at 1.83     #DM2: continue PTA Glargine 12 unit(s) at bedtime and hold PTA Humolog 25 units with meals for now.  -resume Glargine  -monitor with ISS     #HTN: resume PTA carvedilol 12.5 mg bid     #HLP: continue  Atorvastatin    #Hypothyroidism: resume Levothyroxine     #COVID 19 negative  -completed the pfizer series in 5/2021     #Tobacco addiction  ATQ, does not want nicotine patch.  She has cut down substantially to 12 cigarettes per day     DVT Prophylaxis: ambulate every shift   Code Status: Full Code  Disposition: Expected discharge in 1-2 days pending stool output and PO intake     Deepthi Kristen PALENCIAJOHANNA  [facility]     Interval History   Patient reports 1 loose brown stool this morning.  Denies nausea, vomiting, lightheadedness, dizziness, abdominal pain, chest pain, or shortness of breath.  The patient last took Imodium yesterday around 12 PM. She is planning to try clear liquids this morning.     -Data reviewed today: I reviewed all new labs and imaging reports over the last 24 hours.    Physical Exam   Temp: 98.8  F (37.1  C) Temp src: Oral BP: (!) 162/65 Pulse: 68   Resp: 18 SpO2: 94 % O2 Device: None (Room air)    Vitals:    09/15/21 0900   Weight: 81.8 kg (180 lb 6.4 oz)     Vital Signs with Ranges  Temp:  [98.6  F (37  C)-98.8  F (37.1  C)] 98.8  F (37.1  C)  Pulse:  [68-85] 68  Resp:  [16-20] 18  BP: (114-179)/(52-86) 162/65  SpO2:  [90 %-100 %] 94 %  I/O last 3 completed shifts:  In: 20 [P.O.:20]  Out: -     GEN:  Alert, oriented x 3, appears comfortable, NAD.  HEENT:  Normocephalic/atraumatic, no scleral icterus, no nasal discharge, mouth moist.  CV:  Regular rate and rhythm, no murmur or JVD.  S1 + S2 noted, no S3 or S4.  LUNGS:  Clear to auscultation bilaterally without rales/rhonchi/wheezing/retractions.  Symmetric chest rise on inhalation noted.  ABD:  Active bowel sounds, soft, non-tender/non-distended.  No rebound/guarding/rigidity.  EXT:  No edema.  No cyanosis.  No acute joint synovitis noted.  SKIN:  Dry to touch, no exanthems noted in the visualized areas.    Medications     sodium chloride 100 mL/hr at 09/15/21 0945       atorvastatin  40 mg Oral Daily     carvedilol  12.5 mg Oral BID      clopidogrel  75 mg Oral Daily     insulin aspart  1-6 Units Subcutaneous Q4H     insulin glargine  12 Units Subcutaneous At Bedtime     levothyroxine  150 mcg Oral Daily     nicotine  1 patch Transdermal Daily     nicotine   Transdermal Q8H     pantoprazole  40 mg Oral Daily     Data   Results for orders placed or performed during the hospital encounter of 09/14/21   CT Abdomen w/o Contrast     Status: None    Narrative    EXAM: CT ABDOMEN W/O CONTRAST  LOCATION: [facility]  DATE/TIME: 9/14/2021 5:53 PM    INDICATION: Abdominal pain with bloody diarrhea.  COMPARISON: 06/06/2021.  TECHNIQUE: CT scan of the abdomen was performed without IV contrast. Multiplanar reformats were obtained. Dose reduction techniques were used.   CONTRAST: None.    FINDINGS:    LOWER CHEST: Coronary artery calcification.    HEPATOBILIARY: Cholecystectomy.    PANCREAS: Normal.    SPLEEN: Normal.    ADRENAL GLANDS: Normal.    KIDNEYS/BLADDER: Again seen are multiple calcifications in both kidneys compatible with combination of vascular calcification and nonobstructing stones. No ureteric stone or hydronephrosis.    BOWEL: Diffuse colonic wall thickening compatible with pancolitis. Trace ascites.    LYMPH NODES: Normal.    VASCULATURE: Unremarkable.    PELVIS: Hysterectomy. Small amount of free fluid.    MUSCULOSKELETAL: Lumbar degenerative change.      Impression    IMPRESSION:   1.  Changes of pancolitis. Trace ascites.    2.  Tiny pericardial effusion. Cardiac enlargement. Vascular calcification.    3.  Multiple calcifications both kidneys compatible with vascular calcification as well as probable nonobstructing stones. No ureteric stone or hydronephrosis.    4.  Previous hysterectomy and cholecystectomy.   CT Head w/o Contrast     Status: None    Narrative    EXAM: CT HEAD W/O CONTRAST  LOCATION: [facility]  DATE/TIME: 9/14/2021 9:43 PM    INDICATION: Syncope, recurrent  COMPARISON: Brain MR  evaluation 06/05/2021  TECHNIQUE: Routine CT Head without IV contrast. Multiplanar reformats. Dose reduction techniques were used.    FINDINGS:  INTRACRANIAL CONTENTS: No intracranial hemorrhage, extraaxial collection, or mass effect.  No CT evidence of acute infarct. Mild presumed chronic small vessel ischemic changes. Normal ventricles and sulci. Cavum septum pellucidum at vergae represents   normal anatomic variation. Position of cerebellar tonsils is satisfactory. Sella shows no acute abnormality. Corpus callosum is normal. Chronic lacunar type infarction suggested at the level of the left caudate nuclear region.    VISUALIZED ORBITS/SINUSES/MASTOIDS: No intraorbital abnormality. Left maxillary sinus mucous retention cyst. Membrane thickening ethmoid air cells. No air-fluid levels. Scattered fluid/membrane thickening in the right mastoid air cells. No apparent mass   in the posterior nasopharynx or skull base.    BONES/SOFT TISSUES: Demineralization of the skull base. Mild degenerative changes both TMJs. No fracture the calvarium or skull base is apparent. No significant swelling of the facial or scalp tissues.      Impression    IMPRESSION:  1.  No CT evidence for acute intracranial process.  2.  Brain atrophy and presumed chronic microvascular ischemic changes as above.    3.  No evidence for acute/evolving infarction, mass, mass effect or hemorrhage.   US Lower Extremity Venous Duplex Bilateral     Status: None    Narrative    EXAM: US LOWER EXTREMITY VENOUS DUPLEX BILATERAL  LOCATION: [facility]  DATE/TIME: 9/14/2021 9:54 PM    INDICATION: Lower extremity pain and swelling.  COMPARISON: 5/31/2021 and 6/6/2021.  TECHNIQUE: Venous Duplex ultrasound of bilateral lower extremities with and without compression, augmentation and duplex. Color flow and spectral Doppler with waveform analysis performed.    FINDINGS: Exam includes the common femoral, femoral, popliteal veins as well as  segmentally visualized deep calf veins and greater saphenous vein.     RIGHT: No deep vein thrombosis. No superficial thrombophlebitis. No popliteal cyst.    LEFT: No deep vein thrombosis. No superficial thrombophlebitis. No popliteal cyst.      Impression    IMPRESSION:  No deep venous thrombosis in the bilateral lower extremities. DVT of the bilateral posterior tibial veins on 05/31/2021 no longer visualized.   Extra Blue Top Tube     Status: None   Result Value Ref Range    Hold Specimen JIC    Extra Red Top Tube     Status: None   Result Value Ref Range    Hold Specimen JIC    Extra Green Top (Lithium Heparin) Tube     Status: None   Result Value Ref Range    Hold Specimen JIC    Extra Green Top (Lithium Heparin) Tube     Status: None   Result Value Ref Range    Hold Specimen JIC    Extra Purple Top Tube     Status: None   Result Value Ref Range    Hold Specimen JIC    Extra Blood Bank Purple Top Tube     Status: None   Result Value Ref Range    Hold Specimen JIC    Extra Blood Bank Purple Top Tube     Status: None   Result Value Ref Range    Hold Specimen JIC    Lactic acid whole blood     Status: Normal   Result Value Ref Range    Lactic Acid 1.2 0.7 - 2.0 mmol/L   Lipase     Status: Abnormal   Result Value Ref Range    Lipase 70 (L) 73 - 393 U/L   Comprehensive metabolic panel     Status: Abnormal   Result Value Ref Range    Sodium 138 133 - 144 mmol/L    Potassium 3.8 3.4 - 5.3 mmol/L    Chloride 109 94 - 109 mmol/L    Carbon Dioxide (CO2) 27 20 - 32 mmol/L    Anion Gap 2 (L) 3 - 14 mmol/L    Urea Nitrogen 29 7 - 30 mg/dL    Creatinine 1.99 (H) 0.52 - 1.04 mg/dL    Calcium 8.8 8.5 - 10.1 mg/dL    Glucose 153 (H) 70 - 99 mg/dL    Alkaline Phosphatase 371 (H) 40 - 150 U/L    AST 43 0 - 45 U/L    ALT 69 (H) 0 - 50 U/L    Protein Total 7.7 6.8 - 8.8 g/dL    Albumin 3.1 (L) 3.4 - 5.0 g/dL    Bilirubin Total 0.3 0.2 - 1.3 mg/dL    GFR Estimate 27 (L) >60 mL/min/1.73m2   CBC with platelets differential     Status:  Abnormal    Narrative    The following orders were created for panel order CBC with platelets differential.  Procedure                               Abnormality         Status                     ---------                               -----------         ------                     CBC with platelets and d...[[id number]]  Abnormal            Final result                 Please view results for these tests on the individual orders.   CBC with platelets and differential     Status: Abnormal   Result Value Ref Range    WBC Count 13.1 (H) 4.0 - 11.0 10e3/uL    RBC Count 4.08 3.80 - 5.20 10e6/uL    Hemoglobin 10.2 (L) 11.7 - 15.7 g/dL    Hematocrit 32.4 (L) 35.0 - 47.0 %    MCV 79 78 - 100 fL    MCH 25.0 (L) 26.5 - 33.0 pg    MCHC 31.5 31.5 - 36.5 g/dL    RDW 15.9 (H) 10.0 - 15.0 %    Platelet Count 120 (L) 150 - 450 10e3/uL    % Neutrophils 83 %    % Lymphocytes 9 %    % Monocytes 5 %    % Eosinophils 2 %    % Basophils 1 %    % Immature Granulocytes 0 %    NRBCs per 100 WBC 0 <1 /100    Absolute Neutrophils 10.8 (H) 1.6 - 8.3 10e3/uL    Absolute Lymphocytes 1.2 0.8 - 5.3 10e3/uL    Absolute Monocytes 0.7 0.0 - 1.3 10e3/uL    Absolute Eosinophils 0.3 0.0 - 0.7 10e3/uL    Absolute Basophils 0.1 0.0 - 0.2 10e3/uL    Absolute Immature Granulocytes 0.0 <=0.0 10e3/uL    Absolute NRBCs 0.0 10e3/uL   Asymptomatic COVID-19 Virus (Coronavirus) by PCR Nasopharyngeal     Status: Normal    Specimen: Nasopharyngeal; Swab   Result Value Ref Range    SARS CoV2 PCR Negative Negative    Narrative    Testing was performed using the gini  SARS-CoV-2 & Influenza A/B Assay on the gini  [employer]  System.  This test should be ordered for the detection of SARS-COV-2 in individuals who meet SARS-CoV-2 clinical and/or epidemiological criteria. Test performance is unknown in asymptomatic patients.  This test is for in vitro diagnostic use under the FDA EUA for laboratories certified under CLIA to perform moderate and/or high complexity testing. This  test has not been FDA cleared or approved.  A negative test does not rule out the presence of PCR inhibitors in the specimen or target RNA in concentration below the limit of detection for the assay. The possibility of a false negative should be considered if the patient's recent exposure or clinical presentation suggests COVID-19.  [facility] are certified under the Clinical Laboratory Improvement Amendments of 1988 (CLIA-88) as qualified to perform moderate and/or high complexity laboratory testing.   Hemoglobin     Status: Abnormal   Result Value Ref Range    Hemoglobin 9.0 (L) 11.7 - 15.7 g/dL   CRP inflammation     Status: Abnormal   Result Value Ref Range    CRP Inflammation 37.4 (H) 0.0 - 8.0 mg/L   Erythrocyte sedimentation rate auto     Status: Abnormal   Result Value Ref Range    Erythrocyte Sedimentation Rate 49 (H) 0 - 30 mm/hr   Hemoglobin A1c     Status: Abnormal   Result Value Ref Range    Hemoglobin A1C 7.0 (H) 0.0 - 5.6 %   Glucose by meter     Status: Abnormal   Result Value Ref Range    GLUCOSE BY METER POCT 122 (H) 70 - 99 mg/dL   Basic metabolic panel     Status: Abnormal   Result Value Ref Range    Sodium 141 133 - 144 mmol/L    Potassium 3.1 (L) 3.4 - 5.3 mmol/L    Chloride 112 (H) 94 - 109 mmol/L    Carbon Dioxide (CO2) 23 20 - 32 mmol/L    Anion Gap 6 3 - 14 mmol/L    Urea Nitrogen 26 7 - 30 mg/dL    Creatinine 1.83 (H) 0.52 - 1.04 mg/dL    Calcium 8.1 (L) 8.5 - 10.1 mg/dL    Glucose 108 (H) 70 - 99 mg/dL    GFR Estimate 30 (L) >60 mL/min/1.73m2   CBC with platelets     Status: Abnormal   Result Value Ref Range    WBC Count 10.6 4.0 - 11.0 10e3/uL    RBC Count 3.61 (L) 3.80 - 5.20 10e6/uL    Hemoglobin 8.8 (L) 11.7 - 15.7 g/dL    Hematocrit 28.3 (L) 35.0 - 47.0 %    MCV 78 78 - 100 fL    MCH 24.4 (L) 26.5 - 33.0 pg    MCHC 31.1 (L) 31.5 - 36.5 g/dL    RDW 15.8 (H) 10.0 - 15.0 %    Platelet Count 83 (L) 150 - 450 10e3/uL   Hemoglobin     Status: Abnormal   Result Value Ref  Range    Hemoglobin 8.4 (L) 11.7 - 15.7 g/dL   Glucose by meter     Status: Abnormal   Result Value Ref Range    GLUCOSE BY METER POCT 112 (H) 70 - 99 mg/dL   Hemoglobin     Status: Abnormal   Result Value Ref Range    Hemoglobin 8.4 (L) 11.7 - 15.7 g/dL   Glucose by meter     Status: Normal   Result Value Ref Range    GLUCOSE BY METER POCT 95 70 - 99 mg/dL   Creatinine POCT     Status: Abnormal   Result Value Ref Range    Creatinine POCT 2.2 (H) 0.5 - 1.0 mg/dL    GFR, ESTIMATED POCT 24 (L) >60 mL/min/1.73m2   Glucose by meter     Status: Normal   Result Value Ref Range    GLUCOSE BY METER POCT 99 70 - 99 mg/dL   Potassium     Status: Normal   Result Value Ref Range    Potassium 3.4 3.4 - 5.3 mmol/L   Gastroenterology IP Consult: pancolitis; Consultant may enter orders: Yes; Patient to be seen: Routine - within 24 hours; Requested Clinic/Group: [facility]; Requesting provider? Hospitalist (if different from attending physician)     Status: None ()    Maddi Tran PA-C     9/15/2021 10:23 AM  GASTROENTEROLOGY CONSULTATION      Rowan Leiva  [address] [id number]  61 year old female     Admission Date/Time: 9/14/2021  Primary Care Provider: Brian Ruiz     We were asked to see the patient in consultation by Dr. Watts for   evaluation of diarrhea, colitis.    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 CT scan showing evidence   of pancolitis.     Patient reports diarrhea started acutely 8  days ago with   associated nausea and intermittent vomiting. No associated fevers   or chills, hematemesis. She reports having 30+ liquid stools a   day. She does not see very well and could not tell if the stools   were bloody. She had her 81 year old mother look at the stool 2   days ago and it was not bloody, however yesterday she notes the   stools were bright red and bloody and she had a syncopal episode   and every time she tried to take any PO she had diarrhea.   Yesterday, she took a total of 6 Imodium and the day before 2 or   3 tablets. She thinks this helped with the frequency. She had   associated abdominal soreness and yesterday a sharp mid abdominal   pain that resolved with BM.     Of note, in June while admitted she did have evidence of GI   bleeding leading to normal EGD and subsequent colonoscopy that   showed evidence of red clotted blood throughout the colon   (majority in ascending/cecum) and numerous red blood clots in the   TI but no active bleeding lesion identified, small bowel source   was suspected. Small bowel pillCam was performed during that   admission (6/22) and showed no evidence of active bleeding but   angioectasis/AVM was noted in the mid-distal small bowel and a   couple not well visualized aphthous ulcers in the mid small   bowel. It was felt the AVM was likely the source of the bleeding.   She ended up discharging with resumption of apixaban and   clopidogrel. HGB during that time did drop to the 5 range but was   mid -7s at time of discharge.     Labs this admission show a hemoglobin 10.2 that has decreased to   8.8 this morning with fluids, leukocytosis with WBC 13.1 (now   normal), and low platelets 120K, Creatinine elevated 1.99 with   normal BUN, and electrolytes were normal. Alk phos elevated 371   with mildly elevated ALT 63, normal bili, AST, and lipase. CRP   elevated 37.  C diff was negative. Enteric panel is pending.     CT abd/pelvis showed evidence of pancolitis  with trace ascites.     She had only 2 stools overnight. Initial was reported by patient   as slightly blood but the second witnessed by nursing staff was a   smear and nonbloody. No stools yet this morning.      PAST MEDICAL HISTORY:  Patient Active Problem List    Diagnosis Date Noted     Hematochezia 09/14/2021     Priority: Medium     Pancolitis (H) 09/14/2021     Priority: Medium     Anticoagulated 09/14/2021     Priority: Medium     Near syncope 09/14/2021     Priority: Medium     Deep vein thrombosis (DVT) of proximal vein of both lower   extremities (H) 08/10/2021     Priority: Medium     Stage 3b chronic kidney disease 08/06/2021     Priority: Medium     Anemia, iron deficiency 07/21/2021     Priority: Medium     Iron malabsorption 07/21/2021     Priority: Medium     Anemia 06/17/2021     Priority: Medium     GI bleed 06/17/2021     Priority: Medium     Chronic renal insufficiency 06/17/2021     Priority: Medium     Melena 06/17/2021     Priority: Medium     Added automatically from request for surgery [id number]       Anemia due to blood loss, acute 06/17/2021     Priority: Medium     Added automatically from request for surgery [id number]       Colonic hemorrhage 06/17/2021     Priority: Medium     Added automatically from request for surgery [id number]       Blurred vision 06/05/2021     Priority: Medium     Cerebellar stroke, acute (H) 06/05/2021     Priority: Medium     NSTEMI (non-ST elevated myocardial infarction) (H) 05/30/2021     Priority: Medium     Hyperlipidemia      Priority: Medium     Coronary artery disease      Priority: Medium     Stents-2011       Myocardial infarction (H)      Priority: Medium     anterior       Hypertension      Priority: Medium     Hypothyroidism      Priority: Medium     Advanced directives, counseling/discussion 11/05/2012     Priority: Medium     Patient states has Advance Directive and will bring in a copy   to clinic. 11/5/2012        Thrombocytopenia (H) 11/04/2012      Priority: Medium     Mild intermittent asthma      Priority: Medium     minimal symptoms, albuterol use 2x/year       Mixed hyperlipidemia 06/04/2003     Priority: Medium     Tobacco use disorder 08/22/2002     Priority: Medium     Personal history of malignant neoplasm of ovary      Priority: Medium     Malignant neoplasm of thyroid gland (H)      Priority: Medium     papillary carcinoma; resection 6/00       Diabetes mellitus, type 2 (H) 01/01/2000     Priority: Medium     Problem list name updated by automated process. Provider to   review       Type 2 diabetes mellitus with hyperglycemia, with long-term   current use of insulin (H) 2000     Priority: Medium          ROS: A comprehensive ten point review of systems was negative   aside from those in mentioned in the HPI.       MEDICATIONS:   Prior to Admission medications    Medication Sig Start Date End Date Taking? Authorizing Provider   albuterol (PROAIR HFA/PROVENTIL HFA/VENTOLIN HFA) 108 (90 Base)   MCG/ACT inhaler Inhale 2 puffs into the lungs every 6 hours   7/15/21  Yes Brian Ruiz MD   apixaban ANTICOAGULANT (ELIQUIS) 5 MG tablet Take 1 tablet (5 mg)   by mouth 2 times daily 6/30/21  Yes Ebony Stein PA-C   atorvastatin (LIPITOR) 40 MG tablet Take 1 tablet (40 mg) by   mouth daily 6/28/21  Yes Brian Ruiz MD   carvedilol (COREG) 12.5 MG tablet Take 1 tablet (12.5 mg) by   mouth 2 times daily 6/28/21  Yes Brian Ruiz MD   clopidogrel (PLAVIX) 75 MG tablet Take 1 tablet (75 mg) by mouth   daily 6/28/21  Yes Brian Ruiz MD   insulin glargine (LANTUS PEN) 100 UNIT/ML pen Inject 12 Units   Subcutaneous At Bedtime  Patient taking differently: Inject 22 Units Subcutaneous At   Bedtime  6/14/21  Yes Brian Ruiz MD   insulin lispro (HUMALOG KWIKPEN) 100 UNIT/ML (1 unit dial)   KWIKPEN ADMINISTER UP TO 25 UNITS UNDER THE SKIN WITH EACH MEAL     Yes Unknown, Entered By History   levothyroxine (SYNTHROID/LEVOTHROID)  150 MCG tablet Take 150 mcg   by mouth daily   Yes Reported, Patient   nitroGLYcerin (NITROSTAT) 0.4 MG sublingual tablet For chest pain   place 1 tablet under the tongue every 5 minutes for 3 doses. If   symptoms persist 5 minutes after 1st dose call 911. 21  Yes   Viktoria Mcdonough PA-C   pantoprazole (PROTONIX) 40 MG EC tablet Take 1 tablet (40 mg) by   mouth daily 7/15/21  Yes Brian Ruiz MD   LANCETS REGULAR MISC use twice a day for blood sugar 02           ALLERGIES:   Allergies   Allergen Reactions     No Known Drug Allergies         SOCIAL HISTORY:  Social History     Tobacco Use     Smoking status: Current Every Day Smoker     Packs/day: 0.50     Years: 24.00     Pack years: 12.00     Types: Cigarettes     Smokeless tobacco: Former User     Tobacco comment: 12 cigarettes daily   Vaping Use     Vaping Use: Never used   Substance Use Topics     Alcohol use: No     Alcohol/week: 0.0 standard drinks     Drug use: No        FAMILY HISTORY:  Family History   Problem Relation Age of Onset     Blood Disease Sister         Hepatitis B-alive     Cancer Maternal Aunt         bronchial tubes, neck-     Cancer Maternal Uncle         glands in neck-     Cancer Maternal Grandfather         lungs-     Diabetes Father              Heart Disease Father         2 heartattacks-     Diabetes Sister              Diabetes Other         -cousins     Diabetes Paternal Grandmother              Diabetes Paternal Grandfather              Diabetes Paternal Aunt              Hypertension Sister         alive     Thyroid Disease Sister         both sisters-alive     Thyroid Disease Other         niece-alive        PHYSICAL EXAM:   BP (!) 166/63 (BP Location: Left arm)   Pulse 74   Temp 98.8  F   (37.1  C) (Oral)   Resp 16   LMP 2000   SpO2 90%      PHYSICAL EXAM:  General: alert, oriented, NAD  SKIN: no suspicious lesions,  rashes, jaundice, or spider angiomas  HEAD: Normocephalic. No masses, lesions, tenderness or   abnormalities  NECK: Neck supple. No adenopathy. Thyroid symmetric, normal size.  EYES: No scleral icterus  ENT: ENT exam normal, no neck nodes or sinus tenderness  RESPIRATORY: negative, Good diaphragmatic excursion. Lungs clear  CARDIOVASCULAR: negative, PMI normal. No lifts, heaves, or   thrills. RRR. No murmurs, clicks gallops or rub  GASTROINTESTINAL: +BS, soft, mild diffuse tenderness, ND, no HSM,   no masses/guarding/rebound  JOINT/EXTREMITIES: extremities normal- no gross deformities   noted, gait normal and normal muscle tone  NEURO: Reflexes grossly normal and symmetric. Sensation grossly   WNL.  PSYCH: no abnormal anxiety/depression  LYMPH: No anterior cervical, posterior cervical, or   supraclavicular adenopathy     LABS:  I reviewed the patient's new clinical lab test results.   Recent Labs   Lab Test 09/15/21  0627 09/15/21  0146 09/14/21  2256 09/14/21 1711 09/14/21  1711 08/04/21  0907 08/04/21  0907 06/18/21  0723 06/17/21  1804 06/06/21  0705 06/05/21  1354 04/04/20  1358 04/10/15  1023   WBC 10.6  --   --   --  13.1*  --  10.5   < > 12.6*   < > 10.3     < > 11.6*   HGB 8.8* 8.4* 9.0*   < > 10.2*   < > 7.7*   < > 5.3*   < > 8.5*     < > 15.5   MCV 78  --   --   --  79  --  82   < > 83   < > 79   < > 80   PLT 83*  --   --   --  120*  --  144*   < > 56*   < > 53*   < >   139*   INR  --   --   --   --   --   --   --   --  1.95*  --  1.80*  --    0.93    < > = values in this interval not displayed.     Recent Labs   Lab Test 09/15/21  0627 09/14/21  1711 08/04/21  0907    138 139   POTASSIUM 3.1* 3.8 4.1   CHLORIDE 112* 109 110*   CO2 23 27 24   BUN 26 29 29   ANIONGAP 6 2* 5   BERNY 8.1* 8.8 8.9     Recent Labs   Lab Test 09/14/21  1711 08/30/21  0910 06/22/21  0804 06/17/21  2305 06/17/21  1804 06/17/21  1804 05/31/21  0626 05/30/21  2355 04/04/20  1340   ALBUMIN 3.1*  --  2.6*  --   --  2.5*   < >  --    --    BILITOTAL 0.3  --  0.3  --   --  0.2   < >  --   --    ALT 69* 11 13  --    < > 8   < >  --   --    AST 43  --  13  --   --  5   < >  --   --    ALKPHOS 371*  --  97  --   --  96   < >  --   --    PROTEIN  --   --   --  100*  --   --   --  300* 300*   LIPASE 70*  --   --   --   --   --   --   --   --     < > = values in this interval not displayed.        IMAGING  I personally reviewed the patient's new imaging results.     EXAM: CT ABDOMEN W/O CONTRAST  LOCATION: [facility]  DATE/TIME: 9/14/2021 5:53 PM     INDICATION: Abdominal pain with bloody diarrhea.  COMPARISON: 06/06/2021.  TECHNIQUE: CT scan of the abdomen was performed without IV   contrast. Multiplanar reformats were obtained. Dose reduction   techniques were used.   CONTRAST: None.     FINDINGS:    LOWER CHEST: Coronary artery calcification.     HEPATOBILIARY: Cholecystectomy.     PANCREAS: Normal.     SPLEEN: Normal.     ADRENAL GLANDS: Normal.     KIDNEYS/BLADDER: Again seen are multiple calcifications in both   kidneys compatible with combination of vascular calcification and   nonobstructing stones. No ureteric stone or hydronephrosis.     BOWEL: Diffuse colonic wall thickening compatible with   pancolitis. Trace ascites.     LYMPH NODES: Normal.     VASCULATURE: Unremarkable.     PELVIS: Hysterectomy. Small amount of free fluid.     MUSCULOSKELETAL: Lumbar degenerative change.                                                                      IMPRESSION:   1.  Changes of pancolitis. Trace ascites.     2.  Tiny pericardial effusion. Cardiac enlargement. Vascular   calcification.     3.  Multiple calcifications both kidneys compatible with vascular   calcification as well as probable nonobstructing stones. No   ureteric stone or hydronephrosis.     4.  Previous hysterectomy and cholecystectomy.     CONSULTATION ASSESSMENT AND PLAN:    61 year old female with past medical history significant for type   2 DM, s/p  cholecystectomy, CAD, recent NSTEMI in May 2021 s/p   drug eluting stent and bilateral tibial vein clots complicated by   multiple embolic strokes in June 2021 currently on apixaban and   clopidogrel (started during May admission), GI bleeding felt   secondary to small bowel AVM in June 2021, admitted 9/14 with   acute onset diarrhea with associated, rectal bleeding, nausea,   and vomiting. CT scan showing evidence of pancolitis.     1. Pancolitis. Acute onset with leukocytosis and diffuse colitis   most likely related to acute infectious gastroenteritis. C diff   is negative. Enteric panel is pending. Unlikely that this   represents IBD. Aphthous ulcers on small bowel pillcam were   nonspecific and not likely related to Crohn's. No clear evidence   of inflammation/IBD on recent EGD/colon in June. Suspect bleeding   was likely related to the frequency of stooling in the setting of   anticoagulation with clopidogrel and apixaban. HGB has improved   from last admission in June/stable. Imodium may be playing a role   in the decreased stool frequency today.   --Await enteric panel.   --Clear liquids, ADAT.   --Monitor stool output and document any blood.   --No need for acute endoscopic evaluation at this time.   --Monitor HGB and transfuse prn.   --Ok to continue clopidogrel for now in setting of recent ELLIOT.   --Hold apixaban today.     Will review with Dr. Hsieh.     Thank you for asking us to participate in the care of this   patient.      Maddi Martines, PAC  [facility] ([facility])        Adult Type and Screen     Status: None   Result Value Ref Range    ABO/RH(D) O NEG     Antibody Screen Negative Negative    SPECIMEN EXPIRATION DATE [id number]    Enteric Bacteria and Virus Panel by FRANCIS Stool     Status: Normal    Specimen: Per Rectum; Stool   Result Value Ref Range    Campylobacter group Not Detected Not Detected    Salmonella species Not Detected Not Detected    Shigella species Not Detected Not  Detected    Vibrio group Not Detected Not Detected    Rotavirus Not Detected Not Detected    Shiga toxin 1 gene Not Detected Not Detected    Shiga toxin 2 gene Not Detected Not Detected    Norovirus I and II Not Detected Not Detected    Yersinia enterocolitica Not Detected Not Detected    Narrative    Testing performed by multiplexed, qualitative PCR using the [employer] Enteric Pathogens Nucleic Acid Test. Results should not be used as the sole basis for diagnosis, treatment or other patient management decisions. Positive results do not rule out co-infection with other organisms that are not detected by this test and may not be the sole or definitive cause of patient illness. Negative results in the setting of clinical illness compatible with gastroenteritis may be due to infection by pathogens that are not detected by this test or non-infectious causes such as ulcerative colitis, irritable bowel syndrome or Crohn's disease. Note: Shiga toxin producing E. coli (STEC) typically harbor one or both genes that encode for Shiga toxins 1 and 2.   Clostridium difficile toxin B PCR     Status: Normal    Specimen: Per Rectum; Stool   Result Value Ref Range    C Difficile Toxin B by PCR Negative Negative    Narrative    The [employer] Xpert C. difficile Assay, performed on the [employer]ument Systems, is a qualitative in vitro diagnostic test for rapid detection of toxin B gene sequences from unformed (liquid or soft) stool specimens collected from patients suspected of having Clostridioides difficile infection (CDI). The test utilizes automated real-time polymerase chain reaction (PCR) to detect toxin gene sequences associated with toxin producing C. difficile. The Xpert C. difficile Assay is intended as an aid in the diagnosis of CDI.   [place] Draw     Status: None    Narrative    The following orders were created for panel order [place] Draw.  Procedure                               Abnormality          Status                     ---------                               -----------         ------                     Extra Blue Top Tube[[id number]]                              Final result               Extra Red Top Tube[[id number]]                               Final result               Extra Green Top (Lithium...[[id number]]                      Final result               Extra Green Top (Lithium...[[id number]]                      Final result               Extra Purple Top Tube[[id number]]                            Final result               Extra Blood Bank Purple ...[[id number]]                      Final result               Extra Blood Bank Purple ...[[id number]]                      Final result                 Please view results for these tests on the individual orders.   ABO/Rh type and screen     Status: None    Narrative    The following orders were created for panel order ABO/Rh type and screen.  Procedure                               Abnormality         Status                     ---------                               -----------         ------                     Adult Type and Screen[[id number]]                            Edited Result - FINAL        Please view results for these tests on the individual orders.

## 2021-09-15 NOTE — PLAN OF CARE
PRIMARY DIAGNOSIS: GI BLEED    OUTPATIENT/OBSERVATION GOALS TO BE MET BEFORE DISCHARGE  Orthostatic performed: Yes:          Lying Orthostatic BP: 170/66 HR 74        Sitting Orthostatic BP: 146/63 HR 75  1.       Standing Orthostatic BP: 119/62 HR 73    2. Stable Hgb Yes.   Recent Labs   Lab Test 09/15/21  0627 09/15/21  0146 09/14/21  2256   HGB 8.8* 8.4* 9.0*       3. Resolved or declined bleeding episodes: No,  with a moderate amount of bleeding Last episode: 9/14 Pt. Reports in ED.     4. Appropriate testing complete: N/A    5. Cleared for discharge by consultants (if involved): No    6. Safe discharge environment identified: No    Discharge Planner Nurse   Safe discharge environment identified: No  Barriers to discharge: Yes       Entered by: Nilam Antoine 09/15/2021 9:08 AM   Pt. A&Ox4, VSS, but hypertensive since admission 150-160's. Pt. Denies pain. PIV with NS 0.9% at 100 mL. Up with SBA with gait belt. Pt. Is not steady on feet refusing bed alarm. Education provided to patient on importance of use. Pt. Now compliant. NPO. 2 loose stools throughout night, 1st stool was unwitnessed, 2nd loose stool brown/green no blood noted. GI following Pt. Has extensive cardiac hx. And previous GI bleed. Trending Hgb throughout night. Tele in pale SR HR 70's throughout the night. Ortho's positive, denies dizziness or lightheadedness. Type and screen done-need blood consent. Standing orders to transfuse <7. Elquis on hold but Plavix ordered to continue. Hgb 8.4-recheck this AM with orth noted labs. Trending BS Q 4 hours As NPO- 95 at 0400. Nursing to continue to monitor and assess Pt. And provide supportive cares.   Please review provider order for any additional goals.   Nurse to notify provider when observation goals have been met and patient is ready for discharge.

## 2021-09-15 NOTE — CONSULTS
GASTROENTEROLOGY CONSULTATION      Rowan Leiva  [address]  [facility] [address]  61 year old female     Admission Date/Time: 9/14/2021  Primary Care Provider: Brian Ruiz     We were asked to see the patient in consultation by Dr. Watts for evaluation of diarrhea, colitis.    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 CT scan showing evidence of pancolitis.     Patient reports diarrhea started acutely 8 days ago with associated nausea and intermittent vomiting. No associated fevers or chills, hematemesis. She reports having 30+ liquid stools a day. She does not see very well and could not tell if the stools were bloody. She had her 81 year old mother look at the stool 2 days ago and it was not bloody, however yesterday she notes the stools were bright red and bloody and she had a syncopal episode and every time she tried to take any PO she had diarrhea. Yesterday, she took a total of 6 Imodium and the day before 2 or 3 tablets. She thinks this helped with the frequency. She had associated abdominal soreness and yesterday a sharp mid abdominal pain that resolved with BM.     Of note, in June while admitted she did have evidence of GI bleeding leading to normal EGD and subsequent colonoscopy that showed evidence of red clotted blood throughout the colon (majority in ascending/cecum) and numerous red blood clots in the TI but no active bleeding lesion identified, small bowel source was suspected. Small bowel pillCam was performed during that admission  (6/22) and showed no evidence of active bleeding but angioectasis/AVM was noted in the mid-distal small bowel and a couple not well visualized aphthous ulcers in the mid small bowel. It was felt the AVM was likely the source of the bleeding. She ended up discharging with resumption of apixaban and clopidogrel. HGB during that time did drop to the 5 range but was mid -7s at time of discharge.     Labs this admission show a hemoglobin 10.2 that has decreased to 8.8 this morning with fluids, leukocytosis with WBC 13.1 (now normal), and low platelets 120K, Creatinine elevated 1.99 with normal BUN, and electrolytes were normal. Alk phos elevated 371 with mildly elevated ALT 63, normal bili, AST, and lipase. CRP elevated 37.  C diff was negative. Enteric panel is pending.     CT abd/pelvis showed evidence of pancolitis with trace ascites.     She had only 2 stools overnight. Initial was reported by patient as slightly blood but the second witnessed by nursing staff was a smear and nonbloody. No stools yet this morning.      PAST MEDICAL HISTORY:  Patient Active Problem List    Diagnosis Date Noted     Hematochezia 09/14/2021     Priority: Medium     Pancolitis (H) 09/14/2021     Priority: Medium     Anticoagulated 09/14/2021     Priority: Medium     Near syncope 09/14/2021     Priority: Medium     Deep vein thrombosis (DVT) of proximal vein of both lower extremities (H) 08/10/2021     Priority: Medium     Stage 3b chronic kidney disease 08/06/2021     Priority: Medium     Anemia, iron deficiency 07/21/2021     Priority: Medium     Iron malabsorption 07/21/2021     Priority: Medium     Anemia 06/17/2021     Priority: Medium     GI bleed 06/17/2021     Priority: Medium     Chronic renal insufficiency 06/17/2021     Priority: Medium     Melena 06/17/2021     Priority: Medium     Added automatically from request for surgery [id number]       Anemia due to blood loss, acute 06/17/2021     Priority: Medium     Added automatically  from request for surgery [id number]       Colonic hemorrhage 06/17/2021     Priority: Medium     Added automatically from request for surgery [id number]       Blurred vision 06/05/2021     Priority: Medium     Cerebellar stroke, acute (H) 06/05/2021     Priority: Medium     NSTEMI (non-ST elevated myocardial infarction) (H) 05/30/2021     Priority: Medium     Hyperlipidemia      Priority: Medium     Coronary artery disease      Priority: Medium     Stents-2011       Myocardial infarction (H)      Priority: Medium     anterior       Hypertension      Priority: Medium     Hypothyroidism      Priority: Medium     Advanced directives, counseling/discussion 11/05/2012     Priority: Medium     Patient states has Advance Directive and will bring in a copy to clinic. 11/5/2012        Thrombocytopenia (H) 11/04/2012     Priority: Medium     Mild intermittent asthma      Priority: Medium     minimal symptoms, albuterol use 2x/year       Mixed hyperlipidemia 06/04/2003     Priority: Medium     Tobacco use disorder 08/22/2002     Priority: Medium     Personal history of malignant neoplasm of ovary      Priority: Medium     Malignant neoplasm of thyroid gland (H)      Priority: Medium     papillary carcinoma; resection 6/00       Diabetes mellitus, type 2 (H) 01/01/2000     Priority: Medium     Problem list name updated by automated process. Provider to review       Type 2 diabetes mellitus with hyperglycemia, with long-term current use of insulin (H) 2000     Priority: Medium          ROS: A comprehensive ten point review of systems was negative aside from those in mentioned in the HPI.       MEDICATIONS:   Prior to Admission medications    Medication Sig Start Date End Date Taking? Authorizing Provider   albuterol (PROAIR HFA/PROVENTIL HFA/VENTOLIN HFA) 108 (90 Base) MCG/ACT inhaler Inhale 2 puffs into the lungs every 6 hours 7/15/21  Yes Brian Ruiz MD   apixaban ANTICOAGULANT (ELIQUIS) 5 MG tablet Take 1 tablet (5 mg) by  mouth 2 times daily 21  Yes Ebony Stein PA-C   atorvastatin (LIPITOR) 40 MG tablet Take 1 tablet (40 mg) by mouth daily 21  Yes Brian Ruiz MD   carvedilol (COREG) 12.5 MG tablet Take 1 tablet (12.5 mg) by mouth 2 times daily 21  Yes Brian Ruiz MD   clopidogrel (PLAVIX) 75 MG tablet Take 1 tablet (75 mg) by mouth daily 21  Yes Brian Ruiz MD   insulin glargine (LANTUS PEN) 100 UNIT/ML pen Inject 12 Units Subcutaneous At Bedtime  Patient taking differently: Inject 22 Units Subcutaneous At Bedtime  21  Yes Brian Ruiz MD   insulin lispro (HUMALOG KWIKPEN) 100 UNIT/ML (1 unit dial) KWIKPEN ADMINISTER UP TO 25 UNITS UNDER THE SKIN WITH EACH MEAL   Yes Unknown, Entered By History   levothyroxine (SYNTHROID/LEVOTHROID) 150 MCG tablet Take 150 mcg by mouth daily   Yes Reported, Patient   nitroGLYcerin (NITROSTAT) 0.4 MG sublingual tablet For chest pain place 1 tablet under the tongue every 5 minutes for 3 doses. If symptoms persist 5 minutes after 1st dose call 911. 21  Yes Viktoria Mcdonough PA-C   pantoprazole (PROTONIX) 40 MG EC tablet Take 1 tablet (40 mg) by mouth daily 7/15/21  Yes Brian Ruiz MD   LANCETS REGULAR MISC use twice a day for blood sugar 02           ALLERGIES:   Allergies   Allergen Reactions     No Known Drug Allergies         SOCIAL HISTORY:  Social History     Tobacco Use     Smoking status: Current Every Day Smoker     Packs/day: 0.50     Years: 24.00     Pack years: 12.00     Types: Cigarettes     Smokeless tobacco: Former User     Tobacco comment: 12 cigarettes daily   Vaping Use     Vaping Use: Never used   Substance Use Topics     Alcohol use: No     Alcohol/week: 0.0 standard drinks     Drug use: No        FAMILY HISTORY:  Family History   Problem Relation Age of Onset     Blood Disease Sister         Hepatitis B-alive     Cancer Maternal Aunt         bronchial tubes, neck-     Cancer Maternal  Uncle         glands in neck-     Cancer Maternal Grandfather         lungs-     Diabetes Father              Heart Disease Father         2 heartattacks-     Diabetes Sister              Diabetes Other         -cousins     Diabetes Paternal Grandmother              Diabetes Paternal Grandfather              Diabetes Paternal Aunt              Hypertension Sister         alive     Thyroid Disease Sister         both sisters-alive     Thyroid Disease Other         niece-alive        PHYSICAL EXAM:   BP (!) 166/63 (BP Location: Left arm)   Pulse 74   Temp 98.8  F (37.1  C) (Oral)   Resp 16   LMP 2000   SpO2 90%      PHYSICAL EXAM:  General: alert, oriented, NAD  SKIN: no suspicious lesions, rashes, jaundice, or spider angiomas  HEAD: Normocephalic. No masses, lesions, tenderness or abnormalities  NECK: Neck supple. No adenopathy. Thyroid symmetric, normal size.  EYES: No scleral icterus  ENT: ENT exam normal, no neck nodes or sinus tenderness  RESPIRATORY: negative, Good diaphragmatic excursion. Lungs clear  CARDIOVASCULAR: negative, PMI normal. No lifts, heaves, or thrills. RRR. No murmurs, clicks gallops or rub  GASTROINTESTINAL: +BS, soft, mild diffuse tenderness, ND, no HSM, no masses/guarding/rebound  JOINT/EXTREMITIES: extremities normal- no gross deformities noted, gait normal and normal muscle tone  NEURO: Reflexes grossly normal and symmetric. Sensation grossly WNL.  PSYCH: no abnormal anxiety/depression  LYMPH: No anterior cervical, posterior cervical, or supraclavicular adenopathy     LABS:  I reviewed the patient's new clinical lab test results.   Recent Labs   Lab Test 09/15/21  0627 09/15/21  0146 21  2256 21  1711 21  1711 21  0907 21  0907 21  0723 21  1804 21  0705 21  1354 20  1358 04/10/15  1023   WBC 10.6  --   --   --  13.1*  --  10.5   < > 12.6*   < > 10.3    < > 11.6*   HGB 8.8* 8.4* 9.0*   < > 10.2*   < > 7.7*   < > 5.3*   < > 8.5*   < > 15.5   MCV 78  --   --   --  79  --  82   < > 83   < > 79   < > 80   PLT 83*  --   --   --  120*  --  144*   < > 56*   < > 53*   < > 139*   INR  --   --   --   --   --   --   --   --  1.95*  --  1.80*  --  0.93    < > = values in this interval not displayed.     Recent Labs   Lab Test 09/15/21  0627 09/14/21 1711 08/04/21  0907    138 139   POTASSIUM 3.1* 3.8 4.1   CHLORIDE 112* 109 110*   CO2 23 27 24   BUN 26 29 29   ANIONGAP 6 2* 5   BERNY 8.1* 8.8 8.9     Recent Labs   Lab Test 09/14/21  1711 08/30/21  0910 06/22/21  0804 06/17/21  2305 06/17/21  1804 06/17/21  1804 05/31/21  0626 05/30/21  2355 04/04/20  1340   ALBUMIN 3.1*  --  2.6*  --   --  2.5*   < >  --   --    BILITOTAL 0.3  --  0.3  --   --  0.2   < >  --   --    ALT 69* 11 13  --    < > 8   < >  --   --    AST 43  --  13  --   --  5   < >  --   --    ALKPHOS 371*  --  97  --   --  96   < >  --   --    PROTEIN  --   --   --  100*  --   --   --  300* 300*   LIPASE 70*  --   --   --   --   --   --   --   --     < > = values in this interval not displayed.        IMAGING  I personally reviewed the patient's new imaging results.     EXAM: CT ABDOMEN W/O CONTRAST  LOCATION: [facility]  DATE/TIME: 9/14/2021 5:53 PM     INDICATION: Abdominal pain with bloody diarrhea.  COMPARISON: 06/06/2021.  TECHNIQUE: CT scan of the abdomen was performed without IV contrast. Multiplanar reformats were obtained. Dose reduction techniques were used.   CONTRAST: None.     FINDINGS:    LOWER CHEST: Coronary artery calcification.     HEPATOBILIARY: Cholecystectomy.     PANCREAS: Normal.     SPLEEN: Normal.     ADRENAL GLANDS: Normal.     KIDNEYS/BLADDER: Again seen are multiple calcifications in both kidneys compatible with combination of vascular calcification and nonobstructing stones. No ureteric stone or hydronephrosis.     BOWEL: Diffuse colonic wall thickening  compatible with pancolitis. Trace ascites.     LYMPH NODES: Normal.     VASCULATURE: Unremarkable.     PELVIS: Hysterectomy. Small amount of free fluid.     MUSCULOSKELETAL: Lumbar degenerative change.                                                                      IMPRESSION:   1.  Changes of pancolitis. Trace ascites.     2.  Tiny pericardial effusion. Cardiac enlargement. Vascular calcification.     3.  Multiple calcifications both kidneys compatible with vascular calcification as well as probable nonobstructing stones. No ureteric stone or hydronephrosis.     4.  Previous hysterectomy and cholecystectomy.     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 CT scan showing evidence of pancolitis.     1. Pancolitis. Acute onset with leukocytosis and diffuse colitis most likely related to acute infectious gastroenteritis. C diff is negative. Enteric panel is pending. Unlikely that this represents IBD. Aphthous ulcers on small bowel pillcam were nonspecific and not likely related to Crohn's. No clear evidence of inflammation/IBD on recent EGD/colon in June. Suspect bleeding was likely related to the frequency of stooling in the setting of anticoagulation with clopidogrel and apixaban. HGB has improved from last admission in June/stable. Imodium may be playing a role in the decreased stool frequency today.   --Await enteric panel.   --Clear liquids, ADAT.   --Monitor stool output and document any blood.   --No need for acute endoscopic evaluation at this time.   --Monitor  HGB and transfuse prn.   --Ok to continue clopidogrel for now in setting of recent ELLIOT.   --Hold apixaban today.     Will review with Dr. Hsieh.     Thank you for asking us to participate in the care of this patient.      Maddi Martines, PAC  [facility]

## 2021-09-15 NOTE — PROGRESS NOTES
PRIMARY DIAGNOSIS: GI BLEED    OUTPATIENT/OBSERVATION GOALS TO BE MET BEFORE DISCHARGE  Orthostatic performed: Yes:          Lying Orthostatic BP: 170/66         Sitting Orthostatic BP: 146/63   1.       Standing Orthostatic BP: 119/62     2. Stable Hgb Yes.   Recent Labs   Lab Test 09/15/21  0627 09/15/21  0146 09/14/21  2256   HGB 8.8* 8.4* 9.0*       3. Resolved or declined bleeding episodes: Yes, last BM overnight smear without blood present Last episode: 9/15     4. Appropriate testing complete: Yes    5. Cleared for discharge by consultants (if involved): Yes    6. Safe discharge environment identified: Yes    Discharge Planner Nurse   Safe discharge environment identified: Yes  Barriers to discharge: Yes       Entered by: Margi Talbert 09/15/2021 11:50 AM    VSS. Tender abdomen, otherwise denies pain. BS active x4, last stool overnight, nonbloody/smear. IVF infusing. Starting clear liquids. GI following. Will continue to monitor.       Please review provider order for any additional goals.   Nurse to notify provider when observation goals have been met and patient is ready for discharge.

## 2021-09-15 NOTE — PROGRESS NOTES
Care Management Discharge Note    Discharge Date: 09/16/2021       Discharge Disposition:  Home    Handoff Referral Completed: Yes    Additional Information:  Pt identified as a Service Bundle #3. No needs or assessment needed at this time. Please consult CM/SW  if discharge needs should arise.      Viktoria Mosher, RN      Viktoria Mosher RN Case Manager  Inpatient Care Coordination  [facility]   [phone]

## 2021-09-16 ENCOUNTER — PATIENT OUTREACH (OUTPATIENT)
Dept: CARE COORDINATION | Facility: CLINIC | Age: 61
End: 2021-09-16

## 2021-09-16 NOTE — PROGRESS NOTES
Pt has decided to leave AMA. SLIME Hoff notified. Explained risks of leaving AMA. Paperwork signed by pt.

## 2021-09-16 NOTE — PROGRESS NOTES
Clinic Care Coordination Contact  [facility]/Voicemail       Clinical Data: Care Coordinator Outreach  Outreach attempted x 1.  Left message on patient's voicemail with call back information and requested return call.    Chart Review:  Referral from IP handoff. Admitted for diarrhea and bloody stools. Left AMA.    Plan:  Care Coordinator will try to reach patient again in 1-2 business days.    CHRISTOPHER Bustos  Clinic Care Coordination  [facility] : [place], [place], and [place]  Phone: [phone]    ______________________  Next Outreach:  09/17/21

## 2021-09-17 ENCOUNTER — PATIENT OUTREACH (OUTPATIENT)
Dept: NURSING | Facility: CLINIC | Age: 61
End: 2021-09-17
Payer: COMMERCIAL

## 2021-09-17 NOTE — PROGRESS NOTES
Clinic Care Coordination Contact  [facility]: Post-Discharge Note  SITUATION                                                      Admission:    Admission Date: 09/14/21   Reason for Admission: diarrhea and bloody stools  Discharge:   Discharge Date: 09/15/21  Discharge Diagnosis: left AMA    BACKGROUND                                                      She comes in tonight with 8 days of profuse diarrhea and now with bloody stools further complicated by an episode of syncope and admitted on 9/14/2021 for same.      She has poor baseline vision so can't really tell is her BMs are always bloody.  In any case, she started having diarrhea 8 days ago, every 10-20 minutes with associated gas pain.  She denies fevers but has felt chilled.  No other abdominal pain.  She's been vomiting every other day, typically only once and in the am.  She states it's just been stomach acid and her  has inspected them and they are not bloody.  Yesterday she became concerned that her stools might be bloody and had her mom inspect them and she felt they were brown without any e/o blood.  Also yesterday while in the laundry room to collect something she felt VERY weak and the next thing she knew she was on the ground.  She has no recollection of how she got there, but did not hurt her head or sustain any injuries.  She then felt nauseated but was too weak to stand so just vomited while sitting up.  Things juli settled down after that and she was able to finish out her day without any further episodes. She states she felt like she did when her hgb was low. She denies any vision changes/headache.     ASSESSMENT      Enrollment  Primary Care Care Coordination Status: Potential    Discharge Assessment  How are you doing now that you are home?: Unsure  How are your symptoms? (Red Flag symptoms escalate to triage hotline per guidelines): Unchanged  Do you feel your condition is stable enough to be safe at home until your provider  visit?: Yes  Does the patient have their discharge instructions? : No - Review discharge instructions  Were you started on any new medications or were there changes to any of your previous medications? : No  Does the patient have all of their medications?: Yes  Do you have questions regarding any of your medications? : Yes (see comment)  Do you have all of your needed medical supplies or equipment (DME)?  (i.e. oxygen tank, CPAP, cane, etc.): No - What equipment or supplies are needed?  Discharge follow-up appointment scheduled within 14 calendar days? : No              Care Management   Community Health Worker Initial Outreach    CHW Initial Information Gathering:  Referral Source: IP Handoff  Preferred Hospital: [facility]  [phone]  Current living arrangement:: Not Assessed  No PCP office visit in Past Year: No  CHW Additional Questions  If ED/Hospital discharge, follow-up appointment scheduled as recommended?: N/A  Medication changes made following ED/Hospital discharge?: N/A  MyChart active?: Yes    Patient accepts CC: Yes. Patient scheduled for assessment with Nicole Green CC RN, on 09/20/21 at 10AM. Patient noted desire to discuss CC.     Spoke with CHRISTOPHER Donahue introduce self and intent of call regarding IP handoff.  Patient states that she does have a question of what she should do now since she's not admitted, patient states that she was put on blood thinners which had been helping.  Is now wondering if and how she can continue on blood thinners and who she needs to speak with.  CHW expressed, since patient did leave AMA, unfortunately the MD did not clarify next steps, however, CHW suggested following up with PCP.  CHW offered assessment with CC RN to assist with next steps and guidance, which patient agreed.    PLAN                                                      Outpatient Plan:  N/A    Future Appointments   Date Time Provider Department Center   9/20/2021 10:00 AM RI  [facility] OTTO RIVERO   10/5/2021  2:00 PM CS PULMONARY FUNCTION [facility] CS   10/5/2021  3:00 PM Karen Champagne MD [facility] CS   10/11/2021  1:30 PM SH PIV LAB [facility] ANGELLA   10/11/2021  2:20 PM Lexa Miller MD [facility]         For any urgent concerns, please contact our 24 hour nurse triage line: [phone] ([phone])       CHRISTOPHER Bustos  Clinic Care Coordination  [facility] Clinics : [place], [place], and [place]  Phone: [phone]

## 2021-09-18 ENCOUNTER — HOSPITAL ENCOUNTER (EMERGENCY)
Facility: CLINIC | Age: 61
Discharge: HOME | End: 2021-09-18
Attending: EMERGENCY MEDICINE | Admitting: EMERGENCY MEDICINE
Payer: COMMERCIAL

## 2021-09-18 VITALS
RESPIRATION RATE: 18 BRPM | HEART RATE: 70 BPM | OXYGEN SATURATION: 98 % | DIASTOLIC BLOOD PRESSURE: 60 MMHG | SYSTOLIC BLOOD PRESSURE: 138 MMHG | TEMPERATURE: 98 F

## 2021-09-18 DIAGNOSIS — K52.9 COLITIS: ICD-10-CM

## 2021-09-18 DIAGNOSIS — K62.5 RECTAL BLEEDING: ICD-10-CM

## 2021-09-18 LAB
ABO/RH(D): NORMAL
ALBUMIN SERPL-MCNC: 2.7 G/DL (ref 3.4–5)
ALP SERPL-CCNC: 197 U/L (ref 40–150)
ALT SERPL W P-5'-P-CCNC: 25 U/L (ref 0–50)
ANION GAP SERPL CALCULATED.3IONS-SCNC: 6 MMOL/L (ref 3–14)
ANTIBODY SCREEN: NEGATIVE
AST SERPL W P-5'-P-CCNC: 15 U/L (ref 0–45)
BASOPHILS # BLD AUTO: 0 10E3/UL (ref 0–0.2)
BASOPHILS NFR BLD AUTO: 0 %
BILIRUB SERPL-MCNC: 0.2 MG/DL (ref 0.2–1.3)
BUN SERPL-MCNC: 23 MG/DL (ref 7–30)
CALCIUM SERPL-MCNC: 8.5 MG/DL (ref 8.5–10.1)
CHLORIDE BLD-SCNC: 110 MMOL/L (ref 94–109)
CO2 SERPL-SCNC: 26 MMOL/L (ref 20–32)
CREAT SERPL-MCNC: 1.74 MG/DL (ref 0.52–1.04)
EOSINOPHIL # BLD AUTO: 0.4 10E3/UL (ref 0–0.7)
EOSINOPHIL NFR BLD AUTO: 4 %
ERYTHROCYTE [DISTWIDTH] IN BLOOD BY AUTOMATED COUNT: 15.6 % (ref 10–15)
GFR SERPL CREATININE-BSD FRML MDRD: 32 ML/MIN/1.73M2
GLUCOSE BLD-MCNC: 136 MG/DL (ref 70–99)
HCO3 BLDV-SCNC: 24 MMOL/L (ref 21–28)
HCT VFR BLD AUTO: 30.3 % (ref 35–47)
HGB BLD-MCNC: 9.3 G/DL (ref 11.7–15.7)
IMM GRANULOCYTES # BLD: 0.1 10E3/UL
IMM GRANULOCYTES NFR BLD: 0 %
LACTATE BLD-SCNC: 0.5 MMOL/L
LYMPHOCYTES # BLD AUTO: 1.4 10E3/UL (ref 0.8–5.3)
LYMPHOCYTES NFR BLD AUTO: 12 %
MCH RBC QN AUTO: 24 PG (ref 26.5–33)
MCHC RBC AUTO-ENTMCNC: 30.7 G/DL (ref 31.5–36.5)
MCV RBC AUTO: 78 FL (ref 78–100)
MONOCYTES # BLD AUTO: 0.6 10E3/UL (ref 0–1.3)
MONOCYTES NFR BLD AUTO: 5 %
NEUTROPHILS # BLD AUTO: 8.8 10E3/UL (ref 1.6–8.3)
NEUTROPHILS NFR BLD AUTO: 79 %
NRBC # BLD AUTO: 0 10E3/UL
NRBC BLD AUTO-RTO: 0 /100
PCO2 BLDV: 42 MM HG (ref 40–50)
PH BLDV: 7.36 [PH] (ref 7.32–7.43)
PLATELET # BLD AUTO: 122 10E3/UL (ref 150–450)
PO2 BLDV: 25 MM HG (ref 25–47)
POTASSIUM BLD-SCNC: 3.4 MMOL/L (ref 3.4–5.3)
PROT SERPL-MCNC: 7 G/DL (ref 6.8–8.8)
RBC # BLD AUTO: 3.88 10E6/UL (ref 3.8–5.2)
SAO2 % BLDV: 42 % (ref 94–100)
SODIUM SERPL-SCNC: 142 MMOL/L (ref 133–144)
SPECIMEN EXPIRATION DATE: NORMAL
WBC # BLD AUTO: 11.2 10E3/UL (ref 4–11)

## 2021-09-18 PROCEDURE — 85025 COMPLETE CBC W/AUTO DIFF WBC: CPT | Performed by: EMERGENCY MEDICINE

## 2021-09-18 PROCEDURE — 80053 COMPREHEN METABOLIC PANEL: CPT | Performed by: EMERGENCY MEDICINE

## 2021-09-18 PROCEDURE — 86901 BLOOD TYPING SEROLOGIC RH(D): CPT | Performed by: EMERGENCY MEDICINE

## 2021-09-18 PROCEDURE — 36415 COLL VENOUS BLD VENIPUNCTURE: CPT | Performed by: EMERGENCY MEDICINE

## 2021-09-18 PROCEDURE — 82803 BLOOD GASES ANY COMBINATION: CPT

## 2021-09-18 PROCEDURE — 99283 EMERGENCY DEPT VISIT LOW MDM: CPT

## 2021-09-18 ASSESSMENT — ENCOUNTER SYMPTOMS
ABDOMINAL PAIN: 1
DIARRHEA: 1
BLOOD IN STOOL: 1

## 2021-09-18 NOTE — ED TRIAGE NOTES
Diarrhea for the past 10 days, stopped taking elaquis.    Reports have blood in stool. Seen in ED the past couple days for same c/o. Now c/o nausea, vomiting and abd pain     ABC intact

## 2021-09-18 NOTE — ED PROVIDER NOTES
History   Chief Complaint:  Diarrhea       The history is provided by the patient.      Rowan Leiva is a 61 year old female on Eliquis with recent history of pancolitis, DVT, cerebellar stroke, anemia, NSTEMI, CKD, and type 2 diabetes who presents for evaluation of diarrhea. Rowan was admitted to [facility] on  for pancolitis after one week of diarrhea with blood in the stool. She was taken off Eliquis. She left AMA on  because she did not feel there was a plan for her care and the bleeding had stopped. Since then she continues to have diarrhea and developed blood in the stool again last night. She has had 30 episode of diarrhea yesterday with about 7 episodes that were completely blood. She reports associated 8/10 abdominal pain just before a bowel movement.    [facility] ED - 21:  CT Abdomen w/o IV contrast:   1.  Changes of pancolitis. Trace ascites.   2.  Tiny pericardial effusion. Cardiac enlargement. Vascular calcification.   3.  Multiple calcifications both kidneys compatible with vascular calcification as well as probable nonobstructing stones. No ureteric stone or hydronephrosis.   4.  Previous hysterectomy and cholecystectomy.  Per radiology.    Review of Systems   Gastrointestinal: Positive for abdominal pain, blood in stool and diarrhea.   All other systems reviewed and are negative.        Allergies:  No Known Drug Allergies    Medications:  Apixaban   Atorvastatin   Carvedilol   Clopidogrel   Insulin glargine   Insulin lispro   Levothyroxine   Nitroglycerin    Pantoprazole     Past Medical History:     Cancer of thyroid  Cerebellar stroke   Coronary artery disease  DVT  Hyperlipidemia  Hypertension  Hypothyroidism  Iron deficiency anemia   Mild intermittent asthma  NSTEMI   Ovarian cancer   Pancolitis   Pulmonary nodule  Stage 3b CKD  Thrombocytopenia  Tobacco use disorder  Type 2 diabetes     Past Surgical History:    Bladder surgery   section  Tubal ligation  Total  abdominal hysterectomy  Cholecystectomy  Colonoscopy  Heart catheterization with possible intervention  PCI stent drug eluting  Cystoscopy  Esophagoscopy, gastroscopy, duodenoscopy, combined  Thyroidectomy    Family History:    Hepatitis B  Cancer  Diabetes  Heart disease  Hypertension  Thyroid disease    Social History:  Presents with family  PCP: Brian Ruiz     Physical Exam     Patient Vitals for the past 24 hrs:   BP Temp Pulse Resp SpO2   09/18/21 0543 138/60 -- 70 18 98 %   09/18/21 0242 (!) 151/63 98  F (36.7  C) 68 18 98 %       Physical Exam  Constitutional:  Oriented to person, place, and time. Well appearing.   HENT:   Head:    Normocephalic.   Mouth/Throat:   Oropharynx is clear and moist.   Eyes:    EOM are normal. Pupils are equal, round, and reactive to light.   Neck:    Neck supple.   Cardiovascular:  Normal rate, regular rhythm and normal heart sounds.      Exam reveals no gallop and no friction rub.       No murmur heard.  Pulmonary/Chest:  Effort normal and breath sounds normal.      No respiratory distress. No wheezes. No rales.      No reproducible chest wall pain.  Abdominal:   Soft. No distension. No tenderness. No rebound and no guarding.      Dried blood noted in her perirectal area.      She has rectal stool with no gross blood in her rectum.   Musculoskeletal:  Normal range of motion.   Neurological:   Alert and oriented to person, place, and time.           Moves all 4 extremities spontaneously    Skin:    No rash noted. No pallor.        Emergency Department Course     Laboratory:  CBC: WBC 11.2(H), HGB 9.3(L), (L)   CMP: Chloride 110(H), Glucose 136(H), Alkaline Phosphatase 197(H), Albumin 2.7(L), GFR 32(L), o/w WNL (Creatinine 1.74(H))     iStat gases lactate venous: Lactw 0.5, Bicarbonate 24, O2 Sat 42(L). PCO2 42, pH 7.36, pO2 25    ABO/Rh type and screen: O negative, Antibody Screen: Negative    Emergency Department Course:    Reviewed:  I reviewed nursing notes,  vitals and past medical history    Assessments:  0514 I obtained history and examined the patient as noted above.   0534 I rechecked the patient and explained findings.     Disposition:  The patient was discharged to home.       Impression & Plan     Medical Decision Making:  Rowan Leiva is a 61 year old female who was just admitted and left against medical advice for pancolitis on CT and lower GI bleeding. She was on Eliquis which was discontinued and she has not been taking. She is here out of concern for continued diarrhea and rectal bleeding. Fortunately she is otherwise vitally stable here with no active bleeding per rectum with yellow stools and no gross blood. Her lab work is otherwise reassuring. Her hemoglobin has increased to 9.3 from 8.4 on 9/15. Negative lactic acid. I see no utility in repeating CT imaging as we know that she already has pancolitis. I highly doubt this is infectious or ischemic. With negative lactic acid and no pain on percussion of benign abdomen. She had a previous C diff culture that was taken with her last admission. I did offer repeat admission for further GI consultation and consideration for colonoscopy. Patient states that she wanted repeat lab work to check for anemia but would otherwise prefer to follow up in the outpatient setting with GI and did not want to be admitted. With reassuring vitals and blood work I do believe that this can be closely followed in the outpatient setting. I discussed with her to have a low threshold for retuning with further bleeding, feeling light-headed, passing out, worsening abdominal pain, fever, or any other symptoms or concerns.        Diagnosis:    ICD-10-CM    1. Colitis  K52.9    2. Rectal bleeding  K62.5        Scribe Disclosure:  Kimberly MATTHEWS, am serving as a scribe at 5:07 AM on 9/18/2021 to document services personally performed by Zack Dominique MD based on my observations and the provider's statements to me.               Zack Dominique MD  09/18/21 0603

## 2021-09-20 ENCOUNTER — PATIENT OUTREACH (OUTPATIENT)
Dept: NURSING | Facility: CLINIC | Age: 61
End: 2021-09-20
Attending: PHYSICIAN ASSISTANT
Payer: COMMERCIAL

## 2021-09-20 ENCOUNTER — PATIENT OUTREACH (OUTPATIENT)
Dept: CARE COORDINATION | Facility: CLINIC | Age: 61
End: 2021-09-20

## 2021-09-20 ENCOUNTER — TELEPHONE (OUTPATIENT)
Dept: INTERNAL MEDICINE | Facility: CLINIC | Age: 61
End: 2021-09-20

## 2021-09-20 DIAGNOSIS — D50.9 IRON DEFICIENCY ANEMIA, UNSPECIFIED IRON DEFICIENCY ANEMIA TYPE: ICD-10-CM

## 2021-09-20 ASSESSMENT — ACTIVITIES OF DAILY LIVING (ADL): DEPENDENT_IADLS:: INDEPENDENT

## 2021-09-20 NOTE — TELEPHONE ENCOUNTER
PT asking for follow up today. No openings.   See her ED note. Her HGB increased. Has Pancolitis.   She declined admit.   Was having Bloody stools, and abdominal pain.   She needs to see GI.     Please advise. She could see Dr Traore tomorrow at the soonest.     Lab Results   Component Value Date    HGB 9.3 09/18/2021    HGB 8.4 09/15/2021    HGB 7.6 07/08/2021    HGB 7.3 06/28/2021

## 2021-09-20 NOTE — TELEPHONE ENCOUNTER
Reason for Call:  Other appointment    Detailed comments: patient called and would like to see any provider at [facility] today.  Patient was seen at [facility] for internal bleeding and diarrhea for 11 days.  Patient triaged to be seen today.  Please contact patient.  Thank you.    Phone Number Patient can be reached at: Home number on file [phone] (home)    Best Time: chelo    Can we leave a detailed message on this number? YES    Call taken on 9/20/2021 at 10:09 AM by Sanjuanita Osborne

## 2021-09-20 NOTE — TELEPHONE ENCOUNTER
Hgb is improving.   Recommend to see for follow up first available.   Needs to see GI. Does she have referral?

## 2021-09-21 ENCOUNTER — VIRTUAL VISIT (OUTPATIENT)
Dept: PHARMACY | Facility: CLINIC | Age: 61
End: 2021-09-21
Attending: INTERNAL MEDICINE
Payer: COMMERCIAL

## 2021-09-21 DIAGNOSIS — I63.9 CEREBELLAR STROKE, ACUTE (H): ICD-10-CM

## 2021-09-21 DIAGNOSIS — K92.1 GASTROINTESTINAL HEMORRHAGE WITH MELENA: Primary | ICD-10-CM

## 2021-09-21 DIAGNOSIS — Z79.4 TYPE 2 DIABETES MELLITUS WITH HYPERGLYCEMIA, WITH LONG-TERM CURRENT USE OF INSULIN (H): ICD-10-CM

## 2021-09-21 DIAGNOSIS — E11.65 TYPE 2 DIABETES MELLITUS WITH HYPERGLYCEMIA, WITH LONG-TERM CURRENT USE OF INSULIN (H): ICD-10-CM

## 2021-09-21 DIAGNOSIS — I25.10 CORONARY ARTERY DISEASE INVOLVING NATIVE CORONARY ARTERY OF NATIVE HEART WITHOUT ANGINA PECTORIS: ICD-10-CM

## 2021-09-21 DIAGNOSIS — E89.0 POSTABLATIVE HYPOTHYROIDISM: ICD-10-CM

## 2021-09-21 DIAGNOSIS — J45.20 MILD INTERMITTENT ASTHMA, UNSPECIFIED WHETHER COMPLICATED: ICD-10-CM

## 2021-09-21 DIAGNOSIS — K51.00 PANCOLITIS (H): ICD-10-CM

## 2021-09-21 PROCEDURE — 99606 MTMS BY PHARM EST 15 MIN: CPT | Performed by: PHARMACIST

## 2021-09-21 PROCEDURE — 99607 MTMS BY PHARM ADDL 15 MIN: CPT | Performed by: PHARMACIST

## 2021-09-21 RX ORDER — INSULIN LISPRO 100 [IU]/ML
10 INJECTION, SOLUTION INTRAVENOUS; SUBCUTANEOUS
COMMUNITY
Start: 2021-09-21 | End: 2022-09-13

## 2021-09-21 NOTE — PROGRESS NOTES
Medication Therapy Management (MTM) Encounter    ASSESSMENT:                            Medication Adherence/Access: No issues identified    GI Bleed/Colitis/Diarrhea: GI bleed and diarrhea resolved. Would be beneficial to verify if and when patient will be restarting Eliquis on 2.5 or 5mg dose.    Type 2 Diabetes:  Stable. Only 1 noted low that was successfully treated.    CAD/Stroke: Stable.    COPD: Stable.    Hypothyroidism: Stable. Still noticing some symptoms (cold, fatigued) but has noticed improvement since June. Wait until next TSH check before assessing efficacy.    PLAN:                            1. MTM will contact Dr. Miller to verify Eliquis dose.    Follow-up: 10/11/21      SUBJECTIVE/OBJECTIVE:                          Rowan Leiva is a 61 year old female called for a transitions of care visit. She was discharged from [facility] on 9/16 for GI bleed.      Reason for visit: ADAM visit for GI bleed    Allergies/ADRs: None  Past Medical History: Reviewed in chart  Tobacco: She reports that she has been smoking cigarettes. She has a 12.00 pack-year smoking history. She has quit using smokeless tobacco.   Tobacco Cessation Action Plan: Currently using patch    Medication Adherence/Access: Forgotten medications twice that she recalls.    GI Bleed/Colitis/Diarrhea: Discharged 9/18. Feeling better - Sunday morning bleeding and diarrhea stopped. Stopped Eliquis 9/15 during hospital stay. Concerned about restarting Eliquis - patient noted changing dose was discussed during hospital stay but does not know if that was done. Still taking pantoprazole 40mg daily for stomach protection. Was told she needs referral for GI clinic and waiting to be scheduled with primary care provider.    Rowan was admitted to [facility] on 9/14 for pancolitis after one week of diarrhea with blood in the stool. She was taken off Eliquis. She left A on 9/16 because she did not feel there was a plan for her care and the bleeding had  stopped. Returned to ED on 9/18 due to continued diarrhea and developed blood in the stool.  Today reports no symptoms of diarrhea or blood in stool since Sunday 9/19.    Lab Results   Component Value Date    RBC 3.88 09/18/2021    RBC 2.77 06/25/2021     Lab Results   Component Value Date    HGB 9.3 09/18/2021    HGB 7.6 07/08/2021     Lab Results   Component Value Date    HCT 30.3 09/18/2021    HCT 23.4 06/25/2021     No components found for: MCT  Lab Results   Component Value Date    MCV 78 09/18/2021    MCV 85 06/25/2021       Type 2 Diabetes:  Pt currently taking Lantus 22 units at bedtime, Humalog 10 with each meal (adjusts on her own) - Did not take much insulin last week due to not eating. Previously on Metformin and Glipizide. Had significant diarrhea with metformin - could not tolerate. Glipizide discontinued at a previous hospitalization.  Pt does not report problems taking medications regularly.  SMBG: Monitors at home.  Ranges: Typically 100s, had high of 142, last night was in 60s and 70s - treated with jello  Frequency of hypoglycemia? Not often  Last foot exam was: Needs appointment  Immunizations: Seasonal Flu? - Aware they are available and will get one  Aspirin: No  Statin: Yes    Lab Results   Component Value Date    A1C 7.0 09/14/2021    A1C 7.9 05/31/2021       CAD/Stroke: Atorvastatin 40mg daily, Clopidogrel 75mg daily and Carvedilol 12.5mg twice daily. No concerns with side effects. Does not check BP at home. Collapsed last week - attributable to blood loss and not eating. Nitroglycerin on hand.    BP Readings from Last 3 Encounters:   09/18/21 138/60   09/15/21 (!) 162/65   08/30/21 124/60       LDL Cholesterol Calculated   Date Value Ref Range Status   08/30/2021 78 <=100 mg/dL Final     Comment:     Age 0-19 years:  Desirable: 0-110 mg/dL   Borderline high: 110-129 mg/dL   High: >= 130 mg/dL    Age 20 years and older:  Desirable: <100mg/dL  Above desirable: 100-129 mg/dL   Borderline high:  130-159 mg/dL   High: 160-189 mg/dL   Very high: >= 190 mg/dL   05/31/2021 105 (H) <100 mg/dL Final     Comment:     Above desirable:  100-129 mg/dl  Borderline High:  130-159 mg/dL  High:             160-189 mg/dL  Very high:       >189 mg/dl         COPD: Using albuterol 4x monthly. Effective when needed.  No concerns.    Hypothyroidism: Levothyroxine 150mcg daily - Feeling cold and tired lately, unsure if due to thyroid or GI bleed. Energy level improving since June but does still feel fatigued.    TSH   Date Value Ref Range Status   05/31/2021 0.41 0.40 - 4.00 mU/L Final     ----------------  Post Discharge Medication Reconciliation Status: discharge medications reconciled, continue medications without change.    I spent 15 minutes with this patient today. I offer these suggestions for consideration by Dr. Miller. A copy of the visit note was provided to the patient's primary care provider.    The patient was sent via [employer] a summary of these recommendations.     Emmie Bar , Pharm D  [phone] (phone)  Medication Therapy Management Pharmacist     Telemedicine Visit Details  Type of service:  Telephone visit  Start Time: 9:00 AM  End Time: 9:15 AM  Originating Location (patient location): [place]  Distant Location (provider location):  [facility]     Medication Therapy Recommendations  GI bleed    Current Medication: apixaban ANTICOAGULANT (ELIQUIS) 5 MG tablet   Rationale: Undesirable effect - Adverse medication event - Safety   Recommendation: Decrease Dose   Status: Contact Provider - Awaiting Response   Note: Contact Dr. Miller to determine if dose will remain at 5mg or decrease to 2.5mg for Eliquis restart after GI bleed.

## 2021-09-21 NOTE — PATIENT INSTRUCTIONS
Recommendations from today's MTM visit:                                                       1.   I will contact Dr. Miller to verify if you should restart Eliquis and if so what dose.    Follow-up: Return in about 20 days (around 10/11/2021) for Medication Therapy Management.    It was great to speak with you today.  I value your experience and would be very thankful for your time with providing feedback on our clinic survey. You may receive a survey via email or text message in the next few days.     To schedule another MTM appointment, please call the clinic directly or you may call the MTM scheduling line at [phone] or toll-free at [phone].     My Clinical Pharmacist's contact information:                                                      Please feel free to contact me with any questions or concerns you have.      Emmie Bar , Pharm D  [phone] (phone)  Medication Therapy Management Pharmacist

## 2021-09-22 NOTE — DISCHARGE SUMMARY
Patient left against medical advice, please refer to nursing notes prior to discharge. Refer to progress note from earlier today about hospital course up to that point.     Caryn Peterson PA-C

## 2021-09-23 ENCOUNTER — OFFICE VISIT (OUTPATIENT)
Dept: INTERNAL MEDICINE | Facility: CLINIC | Age: 61
End: 2021-09-23
Payer: COMMERCIAL

## 2021-09-23 ENCOUNTER — TELEPHONE (OUTPATIENT)
Dept: PHARMACY | Facility: CLINIC | Age: 61
End: 2021-09-23

## 2021-09-23 VITALS
BODY MASS INDEX: 28.17 KG/M2 | SYSTOLIC BLOOD PRESSURE: 114 MMHG | TEMPERATURE: 97.4 F | HEART RATE: 71 BPM | HEIGHT: 64 IN | WEIGHT: 165 LBS | DIASTOLIC BLOOD PRESSURE: 59 MMHG | OXYGEN SATURATION: 99 %

## 2021-09-23 DIAGNOSIS — R19.7 DIARRHEA OF PRESUMED INFECTIOUS ORIGIN: Primary | ICD-10-CM

## 2021-09-23 DIAGNOSIS — Z09 HOSPITAL DISCHARGE FOLLOW-UP: ICD-10-CM

## 2021-09-23 DIAGNOSIS — Z79.4 TYPE 2 DIABETES MELLITUS WITHOUT COMPLICATION, WITH LONG-TERM CURRENT USE OF INSULIN (H): ICD-10-CM

## 2021-09-23 DIAGNOSIS — E11.9 TYPE 2 DIABETES MELLITUS WITHOUT COMPLICATION, WITH LONG-TERM CURRENT USE OF INSULIN (H): ICD-10-CM

## 2021-09-23 DIAGNOSIS — N18.31 CHRONIC RENAL IMPAIRMENT, STAGE 3A (H): ICD-10-CM

## 2021-09-23 LAB
BASOPHILS # BLD AUTO: 0 10E3/UL (ref 0–0.2)
BASOPHILS NFR BLD AUTO: 0 %
EOSINOPHIL # BLD AUTO: 0.4 10E3/UL (ref 0–0.7)
EOSINOPHIL NFR BLD AUTO: 4 %
ERYTHROCYTE [DISTWIDTH] IN BLOOD BY AUTOMATED COUNT: 15.6 % (ref 10–15)
HCT VFR BLD AUTO: 32 % (ref 35–47)
HGB BLD-MCNC: 9.9 G/DL (ref 11.7–15.7)
IMM GRANULOCYTES # BLD: 0 10E3/UL
IMM GRANULOCYTES NFR BLD: 0 %
LYMPHOCYTES # BLD AUTO: 1.2 10E3/UL (ref 0.8–5.3)
LYMPHOCYTES NFR BLD AUTO: 12 %
MCH RBC QN AUTO: 24.4 PG (ref 26.5–33)
MCHC RBC AUTO-ENTMCNC: 30.9 G/DL (ref 31.5–36.5)
MCV RBC AUTO: 79 FL (ref 78–100)
MONOCYTES # BLD AUTO: 0.4 10E3/UL (ref 0–1.3)
MONOCYTES NFR BLD AUTO: 4 %
NEUTROPHILS # BLD AUTO: 7.7 10E3/UL (ref 1.6–8.3)
NEUTROPHILS NFR BLD AUTO: 80 %
NRBC # BLD AUTO: 0 10E3/UL
NRBC BLD AUTO-RTO: 0 /100
PLATELET # BLD AUTO: 129 10E3/UL (ref 150–450)
RBC # BLD AUTO: 4.05 10E6/UL (ref 3.8–5.2)
WBC # BLD AUTO: 9.8 10E3/UL (ref 4–11)

## 2021-09-23 PROCEDURE — 36415 COLL VENOUS BLD VENIPUNCTURE: CPT | Performed by: INTERNAL MEDICINE

## 2021-09-23 PROCEDURE — 80053 COMPREHEN METABOLIC PANEL: CPT | Performed by: INTERNAL MEDICINE

## 2021-09-23 PROCEDURE — 85025 COMPLETE CBC W/AUTO DIFF WBC: CPT | Performed by: INTERNAL MEDICINE

## 2021-09-23 PROCEDURE — 99495 TRANSJ CARE MGMT MOD F2F 14D: CPT | Performed by: INTERNAL MEDICINE

## 2021-09-23 RX ORDER — SACCHAROMYCES BOULARDII 250 MG
250 CAPSULE ORAL 2 TIMES DAILY
Qty: 30 CAPSULE | Refills: 0 | Status: SHIPPED | OUTPATIENT
Start: 2021-09-23 | End: 2021-10-04

## 2021-09-23 ASSESSMENT — MIFFLIN-ST. JEOR: SCORE: 1308.44

## 2021-09-23 NOTE — PROGRESS NOTES
Assessment & Plan     Diarrhea of presumed infectious origin  Reassess stool for infection   Refer to GI  Monitor CBC   Use Imodium and probiotic     - Enteric Bacteria and Virus Panel by FRANCIS Stool  - Clostridium difficile Toxin B PCR  - Ova and Parasites ([employer])  - Cryptosporidium/Giardia Immunoassay  - saccharomyces boulardii (FLORASTOR) 250 MG capsule; Take 1 capsule (250 mg) by mouth 2 times daily  - CBC with platelets and differential  - Comprehensive metabolic panel (BMP + Alb, Alk Phos, ALT, AST, Total. Bili, TP)  - Adult Gastro Ref - Consult Only; Future    Hospital discharge follow-up  Improved , but still symptomatic, blood in the stools resolved. Will keep off Eliquis     Type 2 diabetes mellitus without complication, with long-term current use of insulin (H)  Decrease dose of fast acting insulin until improved oral intake     Chronic renal impairment, stage 3a  Monitor renal function                See Patient Instructions    Return in about 1 month (around 10/23/2021) for Routine Visit.    Brian Ruiz MD  [facility] JOSE RAMON Donahue is a 61 year old who presents for the following health issues     HPI       Hospital Follow-up Visit:    Hospital/Nursing Home/IP Rehab Facility: [facility] - ER Visit on 09/18 ( Colitis, rectal bleeding,diarrhea)  Date of Admission: 09/14/21  Date of Discharge: 09/15/21  Reason(s) for Admission: Iron deficiency anemia, Pancolitis      Was your hospitalization related to COVID-19? No   Problems taking medications regularly:  None  Medication changes since discharge: Stopped Eliquis  Problems adhering to non-medication therapy:  None    Summary of hospitalization:  [facility] discharge summary reviewed  Diagnostic Tests/Treatments reviewed.  Follow up needed: none  Other Healthcare Providers Involved in Patient s Care:         None  Update since discharge: improved. Post Discharge Medication  "Reconciliation: discharge medications reconciled, continue medications without change.  Plan of care communicated with patient          Patient is seen for a follow up visit.  Recently seen in ED for bloody diarrhea.   Found to have colitis on CT. Lab work negative for enteric bacteria, viruses, c diff toxin.   Has h/o anemia, no worsening of her Hgb noted. Had slightly elevated WBC count. No fever.   Still has diarrhea with 15 BMs daily.   No recurrent blood in the stools. Stopped Eliquis - was on AC for history of DVT.   Has abdominal pain, nausea, throwing up.   Has H/O DM. On diet , exercise and insulin. Blood sugars are controlled. No parestesias. Has had frequent hypoglycemias.  Has h/o ischemic heart disease, asymptomatic regarding chest pains, SOB,palpitations. Has good compliance with treatment, diet and exercise.  Has h/o CRF. Monitoring BP, BG, medications, avoiding OTC NSAIDs. Needs periodic recheck of kidney function.      Review of Systems   Constitutional, HEENT, cardiovascular, pulmonary, gi and gu systems are negative, except as otherwise noted.      Objective    /59 (BP Location: Right arm, Patient Position: Sitting, Cuff Size: Adult Large)   Pulse 71   Temp 97.4  F (36.3  C) (Oral)   Ht 1.626 m (5' 4\")   Wt 74.8 kg (165 lb)   LMP 05/01/2000   SpO2 99%   BMI 28.32 kg/m    Body mass index is 28.32 kg/m .  Physical Exam   GENERAL: healthy, alert and no distress  EYES: Eyes grossly normal to inspection, PERRL and conjunctivae and sclerae normal  HENT: ear canals and TM's normal, nose and mouth without ulcers or lesions  NECK: no adenopathy, no asymmetry, masses, or scars and thyroid normal to palpation  RESP: lungs clear to auscultation - no rales, rhonchi or wheezes  CV: regular rate and rhythm, normal S1 S2, no S3 or S4, no murmur, click or rub, no peripheral edema and peripheral pulses strong  ABDOMEN: soft,  no hepatosplenomegaly, no masses and bowel sounds normal, diffuse palpatory " tenderness, abdominal bruit present   MS: no gross musculoskeletal defects noted, no edema  SKIN: no suspicious lesions or rashes  NEURO: Normal strength and tone, mentation intact and speech normal    Admission on 09/18/2021, Discharged on 09/18/2021   Component Date Value Ref Range Status     Sodium 09/18/2021 142  133 - 144 mmol/L Final     Potassium 09/18/2021 3.4  3.4 - 5.3 mmol/L Final     Chloride 09/18/2021 110* 94 - 109 mmol/L Final     Carbon Dioxide (CO2) 09/18/2021 26  20 - 32 mmol/L Final     Anion Gap 09/18/2021 6  3 - 14 mmol/L Final     Urea Nitrogen 09/18/2021 23  7 - 30 mg/dL Final     Creatinine 09/18/2021 1.74* 0.52 - 1.04 mg/dL Final     Calcium 09/18/2021 8.5  8.5 - 10.1 mg/dL Final     Glucose 09/18/2021 136* 70 - 99 mg/dL Final     Alkaline Phosphatase 09/18/2021 197* 40 - 150 U/L Final     AST 09/18/2021 15  0 - 45 U/L Final     ALT 09/18/2021 25  0 - 50 U/L Final     Protein Total 09/18/2021 7.0  6.8 - 8.8 g/dL Final     Albumin 09/18/2021 2.7* 3.4 - 5.0 g/dL Final     Bilirubin Total 09/18/2021 0.2  0.2 - 1.3 mg/dL Final     GFR Estimate 09/18/2021 32* >60 mL/min/1.73m2 Final    As of July 11, 2021, eGFR is calculated by the CKD-EPI creatinine equation, without race adjustment. eGFR can be influenced by muscle mass, exercise, and diet. The reported eGFR is an estimation only and is only applicable if the renal function is stable.     ABO/RH(D) 09/18/2021 O NEG   Final     Antibody Screen 09/18/2021 Negative  Negative Final     SPECIMEN EXPIRATION DATE 09/18/2021 [id number]   Final     WBC Count 09/18/2021 11.2* 4.0 - 11.0 10e3/uL Final     RBC Count 09/18/2021 3.88  3.80 - 5.20 10e6/uL Final     Hemoglobin 09/18/2021 9.3* 11.7 - 15.7 g/dL Final     Hematocrit 09/18/2021 30.3* 35.0 - 47.0 % Final     MCV 09/18/2021 78  78 - 100 fL Final     MCH 09/18/2021 24.0* 26.5 - 33.0 pg Final     MCHC 09/18/2021 30.7* 31.5 - 36.5 g/dL Final     RDW 09/18/2021 15.6* 10.0 - 15.0 % Final     Platelet  Count 09/18/2021 122* 150 - 450 10e3/uL Final     % Neutrophils 09/18/2021 79  % Final     % Lymphocytes 09/18/2021 12  % Final     % Monocytes 09/18/2021 5  % Final     % Eosinophils 09/18/2021 4  % Final     % Basophils 09/18/2021 0  % Final     % Immature Granulocytes 09/18/2021 0  % Final     NRBCs per 100 WBC 09/18/2021 0  <1 /100 Final     Absolute Neutrophils 09/18/2021 8.8* 1.6 - 8.3 10e3/uL Final     Absolute Lymphocytes 09/18/2021 1.4  0.8 - 5.3 10e3/uL Final     Absolute Monocytes 09/18/2021 0.6  0.0 - 1.3 10e3/uL Final     Absolute Eosinophils 09/18/2021 0.4  0.0 - 0.7 10e3/uL Final     Absolute Basophils 09/18/2021 0.0  0.0 - 0.2 10e3/uL Final     Absolute Immature Granulocytes 09/18/2021 0.1* <=0.0 10e3/uL Final     Absolute NRBCs 09/18/2021 0.0  10e3/uL Final     Lactic Acid POCT 09/18/2021 0.5  <=2.0 mmol/L Final     Bicarbonate Venous POCT 09/18/2021 24  21 - 28 mmol/L Final     O2 Sat, Venous POCT 09/18/2021 42* 94 - 100 % Final     pCO2V Venous POCT 09/18/2021 42  40 - 50 mm Hg Final     pH Venous POCT 09/18/2021 7.36  7.32 - 7.43 Final     pO2 Venous POCT 09/18/2021 25  25 - 47 mm Hg Final

## 2021-09-23 NOTE — TELEPHONE ENCOUNTER
----- Message from Lexa Miller MD sent at 9/21/2021  8:22 PM CDT -----  Regarding: RE: Eliquis  I will discuss when I see her in clinic. Please, have her see me sooner than 10/11/21.    bk  ----- Message -----  From: Emmie Bar, [facility]  Sent: 9/21/2021  10:27 AM CDT  To: Lexa Miller MD  Subject: Shahida                                          Hi Dr. Miller    I met with Rowan today to review her medication following her recent admission.  Her Eliquis was discontinued while she was on inpatient.  She had questions today about when she should restart it and at what dose.    She is scheduled to see you on 10/11.  If you have recommendations for her prior to that visit please let myself or Rowan know.    Thank you    Emmie Bar , Pharm D  [phone] (phone)  Medication Therapy Management Pharmacist

## 2021-09-23 NOTE — TELEPHONE ENCOUNTER
Called patient to follow-up on recommendation from Dr. Miller.  Left message.  Also sent MyChart note.

## 2021-09-28 NOTE — PROGRESS NOTES
Patient is scheduled for a PFT new appointment with Dr. Champagne on 10/5/2021. Referring diagnosis is shortness of breath. Placed orders for PFT. Patient had chest ct on 6/6/2021.   Kathleen Feldman RN

## 2021-10-04 DIAGNOSIS — R19.7 DIARRHEA OF PRESUMED INFECTIOUS ORIGIN: ICD-10-CM

## 2021-10-04 RX ORDER — NICOTINE 14MG/24HR
PATCH, TRANSDERMAL 24 HOURS TRANSDERMAL
Qty: 30 CAPSULE | Refills: 0 | Status: SHIPPED | OUTPATIENT
Start: 2021-10-04 | End: 2022-09-13

## 2021-10-04 NOTE — TELEPHONE ENCOUNTER
Routing refill request to provider for review/approval because:  Drug not on the [facility] refill protocol     Last fill was for 2 weeks only as medication is taken bid

## 2021-10-06 ENCOUNTER — ONCOLOGY VISIT (OUTPATIENT)
Dept: ONCOLOGY | Facility: CLINIC | Age: 61
End: 2021-10-06
Attending: INTERNAL MEDICINE
Payer: COMMERCIAL

## 2021-10-06 ENCOUNTER — LAB (OUTPATIENT)
Dept: INFUSION THERAPY | Facility: CLINIC | Age: 61
End: 2021-10-06
Attending: INTERNAL MEDICINE
Payer: COMMERCIAL

## 2021-10-06 VITALS
DIASTOLIC BLOOD PRESSURE: 78 MMHG | RESPIRATION RATE: 16 BRPM | BODY MASS INDEX: 27.69 KG/M2 | OXYGEN SATURATION: 100 % | SYSTOLIC BLOOD PRESSURE: 150 MMHG | WEIGHT: 162.2 LBS | TEMPERATURE: 97.9 F | HEART RATE: 73 BPM | HEIGHT: 64 IN

## 2021-10-06 DIAGNOSIS — N18.30 CHRONIC RENAL IMPAIRMENT, STAGE 3 (MODERATE), UNSPECIFIED WHETHER STAGE 3A OR 3B CKD (H): ICD-10-CM

## 2021-10-06 DIAGNOSIS — D64.9 ANEMIA, UNSPECIFIED TYPE: ICD-10-CM

## 2021-10-06 DIAGNOSIS — D69.6 THROMBOCYTOPENIA (H): ICD-10-CM

## 2021-10-06 DIAGNOSIS — R91.8 ABNORMAL CT SCAN, LUNG: Primary | ICD-10-CM

## 2021-10-06 LAB
ERYTHROCYTE [DISTWIDTH] IN BLOOD BY AUTOMATED COUNT: 15.4 % (ref 10–15)
HCT VFR BLD AUTO: 33.1 % (ref 35–47)
HGB BLD-MCNC: 10.2 G/DL (ref 11.7–15.7)
MCH RBC QN AUTO: 23.7 PG (ref 26.5–33)
MCHC RBC AUTO-ENTMCNC: 30.8 G/DL (ref 31.5–36.5)
MCV RBC AUTO: 77 FL (ref 78–100)
PLATELET # BLD AUTO: 128 10E3/UL (ref 150–450)
RBC # BLD AUTO: 4.3 10E6/UL (ref 3.8–5.2)
WBC # BLD AUTO: 9.4 10E3/UL (ref 4–11)

## 2021-10-06 PROCEDURE — 99214 OFFICE O/P EST MOD 30 MIN: CPT | Performed by: INTERNAL MEDICINE

## 2021-10-06 PROCEDURE — G0463 HOSPITAL OUTPT CLINIC VISIT: HCPCS

## 2021-10-06 PROCEDURE — 85027 COMPLETE CBC AUTOMATED: CPT | Performed by: INTERNAL MEDICINE

## 2021-10-06 PROCEDURE — 36415 COLL VENOUS BLD VENIPUNCTURE: CPT

## 2021-10-06 ASSESSMENT — MIFFLIN-ST. JEOR: SCORE: 1295.73

## 2021-10-06 ASSESSMENT — PAIN SCALES - GENERAL: PAINLEVEL: NO PAIN (0)

## 2021-10-06 NOTE — LETTER
"    10/6/2021         RE: Rowan Leiva  [address]  [facility] [address]        Dear Colleague,    Thank you for referring your patient, Rowan Leiva, to the [facility]. Please see a copy of my visit note below.    Oncology Rooming Note    October 6, 2021 1:14 PM   Rowan Leiva is a 61 year old female who presents for:    Chief Complaint   Patient presents with     Oncology Clinic Visit     Initial Vitals: LMP 05/01/2000  Estimated body mass index is 28.32 kg/m  as calculated from the following:    Height as of 9/23/21: 1.626 m (5' 4\").    Weight as of 9/23/21: 74.8 kg (165 lb). There is no height or weight on file to calculate BSA.  Data Unavailable Comment: Data Unavailable   Patient's last menstrual period was 05/01/2000.  Allergies reviewed: Yes  Medications reviewed: Yes    Medications: Medication refills not needed today.  Pharmacy name entered into [employer]:    [id number] - [place], [place] - [address]  [id number] - [place], [place] - [address] AT [place] [address] & [facility] [address]    Clinical concerns:  doctor was notified.      Ritu Heck MA              HEMATOLOGY HISTORY: Ms. Leiva is a female with anemia and thrombocytopenia and DVT.  1. On 05/30/2021:   -WBC of 13.5, hemoglobin of 11.7 and platelet of 90.  -Creatinine of 1.87.  2. Coronary angiogram on 05/31/2021 revealed stenosis of multiple vessels.  Stents were placed.  3. CT abdomen and pelvis on 05/31/2021 reveals extensive bilateral renal vascular calcification.    -Lower extremity ultrasound on 05/31/2021 reveals bilateral posterior tibial vein DVT.   -Because of chest pain and hemoptysis, CT chest angiogram was planned, but could not be done because of elevated creatinine.  Patient had a V/Q scan done today, which was nondiagnostic.  4. HIT negative on 06/01/2021.   5. EGD on 06/18/2021 was normal.    -Colonoscopy on 06/19/2021 revealed red/clotted blood " throughout the entire colon.  This was likely from a small bowel bleed.    -Patient had video endoscopy done.  6. On 06/14/2021, iron of 20 and saturation index of 11%.  -On 06/17/2021, hemoglobin electrophoresis is normal.  7. On 09/14/2021, lower extremity ultrasound is negative for any thrombosis.    SUBJECTIVE:  Ms. Leiva is a 61-year-old female with multiple medical problems including coronary artery disease, chronic kidney disease, and bilateral lower extremity DVT.  DVT was diagnosed on on 05/31/2021.  She likely had a pulmonary embolism.  CT chest angiogram could not be done because of elevated creatinine.  The patient was on Eliquis.     For last 5 weeks, she has been having severe diarrhea.  She had 15-20 loose bowel movements a day.  Sometimes there is blood in it.  Because of that, Eliquis is on hold.  She is going to see a gastroenterologist next week.     The patient says that she feels weak.  No headache.  Some lightheadedness.  No chest pain. No shortness of breath at rest.  Some nausea.  At times, she has some vomiting.  No urinary complaint.  The patient said that blood in the stool is intermittent.     The patient has had EGD, colonoscopy and also capsule endoscopy done by GI.     Last CT chest had revealed some abnormality.  She was supposed to see a pulmonologist.  She has not seen them.     The patient was recently in the hospital on 09/14/2021 because of nausea, vomiting and diarrhea. CT abdomen and pelvis had revealed pancolitis.  Lower extremity ultrasound was negative for any thrombosis.     PHYSICAL EXAMINATION:    GENERAL:  She is alert and oriented x 3.  Not in any distress.  VITAL SIGNS:  Reviewed.   Rest of the systems not examined.     ASSESSMENT:     1.  A 61-year-old female with unprovoked bilateral lower extremity DVT.  Anticoagulation on hold because of blood in the stool.  2.  Diarrhea  3.  Blood in the stool.  4.  Normocytic anemia secondary to renal disease and anemia of  chronic disease.  5.  Thrombocytopenia.  6.  Diarrhea  7.  Abnormal CT chest.     PLAN:     1.  I had a long discussion with the patient.  Discussed regarding her thrombosis.  Eliquis is on hold.  She will continue to hold Eliquis until she has been evaluated by GI and bleed has resolved.  She is agreeable for it. I explained to the patient that repeat ultrasound was negative for any DVT.  Because of that, I am okay with her holding the anticoagulation.  We will plan on resuming it in the next few weeks depending on how her GI bleed does.     2.  Previous CT of chest revealed abnormality.  She has not seen the pulmonologist.  We will get a CT chest, noncontrast.    3.  The patient has anemia.  We will continue to monitor.  She will be transfused for hemoglobin below 8.  Because of cardiac abnormality, we will plan on keeping her hemoglobin above 8.    4.  She had few questions, which were all answered.  I will see her after CT chest.     TOTAL FACE-TO-FACE TIME SPENT:  25 minutes, more than 50% of the time spent in counseling and coordination of care.    This office note has been dictated.          Again, thank you for allowing me to participate in the care of your patient.        Sincerely,        Lexa Miller MD

## 2021-10-06 NOTE — PROGRESS NOTES
"Oncology Rooming Note    October 6, 2021 1:14 PM   Rowan Leiva is a 61 year old female who presents for:    Chief Complaint   Patient presents with     Oncology Clinic Visit     Initial Vitals: LMP 05/01/2000  Estimated body mass index is 28.32 kg/m  as calculated from the following:    Height as of 9/23/21: 1.626 m (5' 4\").    Weight as of 9/23/21: 74.8 kg (165 lb). There is no height or weight on file to calculate BSA.  Data Unavailable Comment: Data Unavailable   Patient's last menstrual period was 05/01/2000.  Allergies reviewed: Yes  Medications reviewed: Yes    Medications: Medication refills not needed today.  Pharmacy name entered into [employer]:    [id number] [place] [place] [facility] [address] [id number] [place], [place] - [address] AT [place] [address] & [facility] [address]    Clinical concerns:  doctor was notified.      Ritu Heck MA            "

## 2021-10-06 NOTE — PATIENT INSTRUCTIONS
1. CT chest in 2 weeks at [facility].  2. Hold eliquis until bleeding resolves.  3. See GI.  4. See me after CT scan.

## 2021-10-06 NOTE — PROGRESS NOTES
Medical Assistant Note:  Rowan Leiva presents today for blood draw.    Patient seen by provider today: Yes: jose maria.   present during visit today: Not Applicable.    Concerns: No Concerns.    Procedure:  Lab draw site: rac, Needle type: bf, Gauge: 23.    Post Assessment:  Labs drawn without difficulty: Yes.    Discharge Plan:  Departure Mode: Ambulatory.    Face to Face Time: 5 min  .    Kandace Pacheco, CMA

## 2021-10-07 LAB
ALBUMIN SERPL-MCNC: 2.9 G/DL (ref 3.4–5)
ALP SERPL-CCNC: 129 U/L (ref 40–150)
ALT SERPL W P-5'-P-CCNC: 14 U/L (ref 0–50)
ANION GAP SERPL CALCULATED.3IONS-SCNC: 7 MMOL/L (ref 3–14)
AST SERPL W P-5'-P-CCNC: 13 U/L (ref 0–45)
BILIRUB SERPL-MCNC: 0.2 MG/DL (ref 0.2–1.3)
BUN SERPL-MCNC: 20 MG/DL (ref 7–30)
CALCIUM SERPL-MCNC: 8.6 MG/DL (ref 8.5–10.1)
CHLORIDE BLD-SCNC: 108 MMOL/L (ref 94–109)
CO2 SERPL-SCNC: 24 MMOL/L (ref 20–32)
CREAT SERPL-MCNC: 1.72 MG/DL (ref 0.52–1.04)
GFR SERPL CREATININE-BSD FRML MDRD: 32 ML/MIN/1.73M2
GLUCOSE BLD-MCNC: 159 MG/DL (ref 70–99)
POTASSIUM BLD-SCNC: 3.1 MMOL/L (ref 3.4–5.3)
PROT SERPL-MCNC: 6.5 G/DL (ref 6.8–8.8)
SODIUM SERPL-SCNC: 139 MMOL/L (ref 133–144)

## 2021-10-07 NOTE — RESULT ENCOUNTER NOTE
Dear Ms. Leiva,    CBC is stable. WBC is normal. Hemoglobin is mildly low at 10.2. Platelet is mildly low at 128.    Please, call me with any questions.    Lexa Miller MD

## 2021-10-10 NOTE — PROGRESS NOTES
HEMATOLOGY HISTORY: Ms. Leiva is a female with anemia and thrombocytopenia and DVT.  1. On 05/30/2021:   -WBC of 13.5, hemoglobin of 11.7 and platelet of 90.  -Creatinine of 1.87.  2. Coronary angiogram on 05/31/2021 revealed stenosis of multiple vessels.  Stents were placed.  3. CT abdomen and pelvis on 05/31/2021 reveals extensive bilateral renal vascular calcification.    -Lower extremity ultrasound on 05/31/2021 reveals bilateral posterior tibial vein DVT.   -Because of chest pain and hemoptysis, CT chest angiogram was planned, but could not be done because of elevated creatinine.  Patient had a V/Q scan done today, which was nondiagnostic.  4. HIT negative on 06/01/2021.   5. EGD on 06/18/2021 was normal.    -Colonoscopy on 06/19/2021 revealed red/clotted blood throughout the entire colon.  This was likely from a small bowel bleed.    -Patient had video endoscopy done.  6. On 06/14/2021, iron of 20 and saturation index of 11%.  -On 06/17/2021, hemoglobin electrophoresis is normal.  7. On 09/14/2021, lower extremity ultrasound is negative for any thrombosis.    SUBJECTIVE:  Ms. Leiva is a 61-year-old female with multiple medical problems including coronary artery disease, chronic kidney disease, and bilateral lower extremity DVT.  DVT was diagnosed on on 05/31/2021.  She likely had a pulmonary embolism.  CT chest angiogram could not be done because of elevated creatinine.  The patient was on Eliquis.     For last 5 weeks, she has been having severe diarrhea.  She had 15-20 loose bowel movements a day.  Sometimes there is blood in it.  Because of that, Eliquis is on hold.  She is going to see a gastroenterologist next week.     The patient says that she feels weak.  No headache.  Some lightheadedness.  No chest pain. No shortness of breath at rest.  Some nausea.  At times, she has some vomiting.  No urinary complaint.  The patient said that blood in the stool is intermittent.     The patient has had EGD,  colonoscopy and also capsule endoscopy done by GI.     Last CT chest had revealed some abnormality.  She was supposed to see a pulmonologist.  She has not seen them.     The patient was recently in the hospital on 09/14/2021 because of nausea, vomiting and diarrhea. CT abdomen and pelvis had revealed pancolitis.  Lower extremity ultrasound was negative for any thrombosis.     PHYSICAL EXAMINATION:    GENERAL:  She is alert and oriented x 3.  Not in any distress.  VITAL SIGNS:  Reviewed.   Rest of the systems not examined.     ASSESSMENT:     1.  A 61-year-old female with unprovoked bilateral lower extremity DVT.  Anticoagulation on hold because of blood in the stool.  2.  Diarrhea  3.  Blood in the stool.  4.  Normocytic anemia secondary to renal disease and anemia of chronic disease.  5.  Thrombocytopenia.  6.  Diarrhea  7.  Abnormal CT chest.     PLAN:     1.  I had a long discussion with the patient.  Discussed regarding her thrombosis.  Eliquis is on hold.  She will continue to hold Eliquis until she has been evaluated by GI and bleed has resolved.  She is agreeable for it. I explained to the patient that repeat ultrasound was negative for any DVT.  Because of that, I am okay with her holding the anticoagulation.  We will plan on resuming it in the next few weeks depending on how her GI bleed does.     2.  Previous CT of chest revealed abnormality.  She has not seen the pulmonologist.  We will get a CT chest, noncontrast.    3.  The patient has anemia.  We will continue to monitor.  She will be transfused for hemoglobin below 8.  Because of cardiac abnormality, we will plan on keeping her hemoglobin above 8.    4.  She had few questions, which were all answered.  I will see her after CT chest.     TOTAL FACE-TO-FACE TIME SPENT:  25 minutes, more than 50% of the time spent in counseling and coordination of care.

## 2021-10-15 ENCOUNTER — TRANSFERRED RECORDS (OUTPATIENT)
Dept: HEALTH INFORMATION MANAGEMENT | Facility: CLINIC | Age: 61
End: 2021-10-15
Payer: COMMERCIAL

## 2021-10-15 ENCOUNTER — MEDICAL CORRESPONDENCE (OUTPATIENT)
Dept: HEALTH INFORMATION MANAGEMENT | Facility: CLINIC | Age: 61
End: 2021-10-15
Payer: COMMERCIAL

## 2021-10-23 ENCOUNTER — HEALTH MAINTENANCE LETTER (OUTPATIENT)
Age: 61
End: 2021-10-23

## 2021-11-12 ENCOUNTER — HOSPITAL ENCOUNTER (OUTPATIENT)
Dept: CT IMAGING | Facility: CLINIC | Age: 61
Discharge: HOME | End: 2021-11-12
Attending: INTERNAL MEDICINE | Admitting: INTERNAL MEDICINE
Payer: COMMERCIAL

## 2021-11-12 DIAGNOSIS — R91.8 ABNORMAL CT SCAN, LUNG: ICD-10-CM

## 2021-11-12 PROCEDURE — 71250 CT THORAX DX C-: CPT

## 2021-11-17 ENCOUNTER — VIRTUAL VISIT (OUTPATIENT)
Dept: ONCOLOGY | Facility: CLINIC | Age: 61
End: 2021-11-17
Attending: INTERNAL MEDICINE
Payer: COMMERCIAL

## 2021-11-17 DIAGNOSIS — D69.6 THROMBOCYTOPENIA (H): Primary | ICD-10-CM

## 2021-11-17 PROCEDURE — 99213 OFFICE O/P EST LOW 20 MIN: CPT | Mod: GT | Performed by: INTERNAL MEDICINE

## 2021-11-17 RX ORDER — FLASH GLUCOSE SCANNING READER
EACH MISCELLANEOUS
COMMUNITY
Start: 2021-10-19 | End: 2022-09-13

## 2021-11-17 RX ORDER — CHOLESTYRAMINE 4 G/9G
POWDER, FOR SUSPENSION ORAL
COMMUNITY
Start: 2021-10-27 | End: 2022-09-13

## 2021-11-17 NOTE — LETTER
11/17/2021         RE: Rowan Leiva  [address]  [facility] [address]        Dear Colleague,    Thank you for referring your patient, Rowan Leiva, to the [facility]. Please see a copy of my visit note below.    Rowan is a 61 year old who is being evaluated via a billable video visit.  Rowan is visually impaired and has trouble accessing the video, if she does not join the video please call her instead.     How would you like to obtain your AVS? MyChart  If the video visit is dropped, the invitation should be resent by: Text to cell phone: [phone]  Will anyone else be joining your video visit? No          HEMATOLOGY HISTORY: Ms. Leiva is a female with anemia and thrombocytopenia and DVT.  1. On 05/30/2021:   -WBC of 13.5, hemoglobin of 11.7 and platelet of 90.  -Creatinine of 1.87.  2. Coronary angiogram on 05/31/2021 revealed stenosis of multiple vessels.  Stents were placed.  3. CT abdomen and pelvis on 05/31/2021 reveals extensive bilateral renal vascular calcification.    -Lower extremity ultrasound on 05/31/2021 reveals bilateral posterior tibial vein DVT.   -Because of chest pain and hemoptysis, CT chest angiogram was planned, but could not be done because of elevated creatinine.  Patient had a V/Q scan done today, which was nondiagnostic.  4. HIT negative on 06/01/2021.   5. EGD on 06/18/2021 was normal.    -Colonoscopy on 06/19/2021 revealed red/clotted blood throughout the entire colon.  This was likely from a small bowel bleed.    -Patient had video endoscopy done.  6. On 06/14/2021, iron of 20 and saturation index of 11%.  -On 06/17/2021, hemoglobin electrophoresis is normal.  7. On 09/14/2021, lower extremity ultrasound is negative for any thrombosis.    SUBJECTIVE:  Ms. Leiva is a 61-year-old female with anemia, thrombocytopenia and DVT.  She likely also had PE.  She was on anticoagulation.  That is on hold because of blood in the stool and also other  GI problems.     The patient has been having abdominal bloating.  She also has some diarrhea.  Sometimes her stools are dark.  She is following with a gastroenterologist.     REVIEW OF SYSTEMS:  The patient has some fatigue.  No headache or dizziness.  No chest pain.  No fever or chills.  All other review of systems negative.     PHYSICAL EXAMINATION:    She is alert, oriented x 3.  She is not in distress.  Rest of systems not examined.     LABS:  On 10/06/2021, hemoglobin of 10.2.     ASSESSMENT:    1.  A 61-year-old female with unprovoked bilateral lower extremity deep venous thrombosis on 05/31/2021.  Anticoagulation discontinued because of gastrointestinal bleed.  2.  Diarrhea, improving.  3.  Abdominal bloating/pain.  4.  Thrombocytopenia.  5.  Abnormal CT chest.     PLAN:    1.  CT scan done on 11/12/2021 was reviewed by me.  I explained to her that her previous interstitial infiltrates have all resolved.  She was happy to know that.  2.  Discussed regarding her thrombosis.  She is not on anticoagulation.  Discussed regarding resuming anticoagulation.  The patient is very hesitant.  She is worried that she will have GI bleed.  At this time, she does not want to resume anticoagulation.  She may consider after all her GI workup has been completed.  3.  The patient has anemia and thrombocytopenia.  We will recheck CBC.  4.  She had few questions, which were all answered.  Depending on the labs tomorrow, we will decide regarding followup.     TOTAL VIDEO VISIT TIME:  25 minutes.  Time was spent in today's visit, review of chart/investigations today and documentation.    This office note has been dictated.          Again, thank you for allowing me to participate in the care of your patient.        Sincerely,        Lexa Miller MD

## 2021-11-17 NOTE — LETTER
11/17/2021         RE: Rowan Leiva  [address]  [facility] [address]        Dear Colleague,    Thank you for referring your patient, Rowan Leiva, to the [facility]. Please see a copy of my visit note below.    Rowan is a 61 year old who is being evaluated via a billable video visit.  Rowan is visually impaired and has trouble accessing the video, if she does not join the video please call her instead.     How would you like to obtain your AVS? MyChart  If the video visit is dropped, the invitation should be resent by: Text to cell phone: [phone]  Will anyone else be joining your video visit? No          HEMATOLOGY HISTORY: Ms. Leiva is a female with anemia and thrombocytopenia and DVT.  1. On 05/30/2021:   -WBC of 13.5, hemoglobin of 11.7 and platelet of 90.  -Creatinine of 1.87.  2. Coronary angiogram on 05/31/2021 revealed stenosis of multiple vessels.  Stents were placed.  3. CT abdomen and pelvis on 05/31/2021 reveals extensive bilateral renal vascular calcification.    -Lower extremity ultrasound on 05/31/2021 reveals bilateral posterior tibial vein DVT.   -Because of chest pain and hemoptysis, CT chest angiogram was planned, but could not be done because of elevated creatinine.  Patient had a V/Q scan done today, which was nondiagnostic.  4. HIT negative on 06/01/2021.   5. EGD on 06/18/2021 was normal.    -Colonoscopy on 06/19/2021 revealed red/clotted blood throughout the entire colon.  This was likely from a small bowel bleed.    -Patient had video endoscopy done.  6. On 06/14/2021, iron of 20 and saturation index of 11%.  -On 06/17/2021, hemoglobin electrophoresis is normal.  7. On 09/14/2021, lower extremity ultrasound is negative for any thrombosis.    SUBJECTIVE:  Ms. Leiva is a 61-year-old female with anemia, thrombocytopenia and DVT.  She likely also had PE.  She was on anticoagulation.  That is on hold because of blood in the stool and also other  GI problems.     The patient has been having abdominal bloating.  She also has some diarrhea.  Sometimes her stools are dark.  She is following with a gastroenterologist.     REVIEW OF SYSTEMS:  The patient has some fatigue.  No headache or dizziness.  No chest pain.  No fever or chills.  All other review of systems negative.     PHYSICAL EXAMINATION:    She is alert, oriented x 3.  She is not in distress.  Rest of systems not examined.     LABS:  On 10/06/2021, hemoglobin of 10.2.     ASSESSMENT:    1.  A 61-year-old female with unprovoked bilateral lower extremity deep venous thrombosis on 05/31/2021.  Anticoagulation discontinued because of gastrointestinal bleed.  2.  Diarrhea, improving.  3.  Abdominal bloating/pain.  4.  Thrombocytopenia.  5.  Abnormal CT chest.     PLAN:    1.  CT scan done on 11/12/2021 was reviewed by me.  I explained to her that her previous interstitial infiltrates have all resolved.  She was happy to know that.  2.  Discussed regarding her thrombosis.  She is not on anticoagulation.  Discussed regarding resuming anticoagulation.  The patient is very hesitant.  She is worried that she will have GI bleed.  At this time, she does not want to resume anticoagulation.  She may consider after all her GI workup has been completed.  3.  The patient has anemia and thrombocytopenia.  We will recheck CBC.  4.  She had few questions, which were all answered.  Depending on the labs tomorrow, we will decide regarding followup.     TOTAL VIDEO VISIT TIME:  25 minutes.  Time was spent in today's visit, review of chart/investigations today and documentation.    This office note has been dictated.          Again, thank you for allowing me to participate in the care of your patient.        Sincerely,        Lexa Miller MD

## 2021-11-17 NOTE — PROGRESS NOTES
Rowan is a 61 year old who is being evaluated via a billable video visit.  Rowan is visually impaired and has trouble accessing the video, if she does not join the video please call her instead.     How would you like to obtain your AVS? MyChart  If the video visit is dropped, the invitation should be resent by: Text to cell phone: [phone]  Will anyone else be joining your video visit? No

## 2021-11-18 ENCOUNTER — LAB (OUTPATIENT)
Dept: LAB | Facility: CLINIC | Age: 61
End: 2021-11-18
Payer: COMMERCIAL

## 2021-11-18 DIAGNOSIS — R19.7 DIARRHEA OF PRESUMED INFECTIOUS ORIGIN: Primary | ICD-10-CM

## 2021-11-18 DIAGNOSIS — D62 ANEMIA DUE TO BLOOD LOSS, ACUTE: ICD-10-CM

## 2021-11-18 LAB
CRP SERPL-MCNC: 8.3 MG/L (ref 0–8)
ERYTHROCYTE [SEDIMENTATION RATE] IN BLOOD BY WESTERGREN METHOD: 41 MM/HR (ref 0–30)
HGB BLD-MCNC: 10.3 G/DL (ref 11.7–15.7)

## 2021-11-18 PROCEDURE — 85652 RBC SED RATE AUTOMATED: CPT

## 2021-11-18 PROCEDURE — 86140 C-REACTIVE PROTEIN: CPT

## 2021-11-18 PROCEDURE — 99000 SPECIMEN HANDLING OFFICE-LAB: CPT

## 2021-11-18 PROCEDURE — 85018 HEMOGLOBIN: CPT

## 2021-11-18 PROCEDURE — 36415 COLL VENOUS BLD VENIPUNCTURE: CPT

## 2021-11-18 PROCEDURE — 86316 IMMUNOASSAY TUMOR OTHER: CPT | Mod: 90

## 2021-11-21 LAB — CGA SERPL-MCNC: 1246 NG/ML

## 2021-11-21 NOTE — PROGRESS NOTES
HEMATOLOGY HISTORY: Ms. Leiva is a female with anemia and thrombocytopenia and DVT.  1. On 05/30/2021:   -WBC of 13.5, hemoglobin of 11.7 and platelet of 90.  -Creatinine of 1.87.  2. Coronary angiogram on 05/31/2021 revealed stenosis of multiple vessels.  Stents were placed.  3. CT abdomen and pelvis on 05/31/2021 reveals extensive bilateral renal vascular calcification.    -Lower extremity ultrasound on 05/31/2021 reveals bilateral posterior tibial vein DVT.   -Because of chest pain and hemoptysis, CT chest angiogram was planned, but could not be done because of elevated creatinine.  Patient had a V/Q scan done today, which was nondiagnostic.  4. HIT negative on 06/01/2021.   5. EGD on 06/18/2021 was normal.    -Colonoscopy on 06/19/2021 revealed red/clotted blood throughout the entire colon.  This was likely from a small bowel bleed.    -Patient had video endoscopy done.  6. On 06/14/2021, iron of 20 and saturation index of 11%.  -On 06/17/2021, hemoglobin electrophoresis is normal.  7. On 09/14/2021, lower extremity ultrasound is negative for any thrombosis.    SUBJECTIVE:  Ms. Leiva is a 61-year-old female with anemia, thrombocytopenia and DVT.  She likely also had PE.  She was on anticoagulation.  That is on hold because of blood in the stool and also other GI problems.     The patient has been having abdominal bloating.  She also has some diarrhea.  Sometimes her stools are dark.  She is following with a gastroenterologist.     REVIEW OF SYSTEMS:  The patient has some fatigue.  No headache or dizziness.  No chest pain.  No fever or chills.  All other review of systems negative.     PHYSICAL EXAMINATION:    She is alert, oriented x 3.  She is not in distress.  Rest of systems not examined.     LABS:  On 10/06/2021, hemoglobin of 10.2.     ASSESSMENT:    1.  A 61-year-old female with unprovoked bilateral lower extremity deep venous thrombosis on 05/31/2021.  Anticoagulation discontinued because of  gastrointestinal bleed.  2.  Diarrhea, improving.  3.  Abdominal bloating/pain.  4.  Thrombocytopenia.  5.  Abnormal CT chest.     PLAN:    1.  CT scan done on 11/12/2021 was reviewed by me.  I explained to her that her previous interstitial infiltrates have all resolved.  She was happy to know that.  2.  Discussed regarding her thrombosis.  She is not on anticoagulation.  Discussed regarding resuming anticoagulation.  The patient is very hesitant.  She is worried that she will have GI bleed.  At this time, she does not want to resume anticoagulation.  She may consider after all her GI workup has been completed.  3.  The patient has anemia and thrombocytopenia.  We will recheck CBC.  4.  She had few questions, which were all answered.  Depending on the labs tomorrow, we will decide regarding followup.     TOTAL VIDEO VISIT TIME:  25 minutes.  Time was spent in today's visit, review of chart/investigations today and documentation.

## 2021-11-22 ENCOUNTER — TRANSFERRED RECORDS (OUTPATIENT)
Dept: HEALTH INFORMATION MANAGEMENT | Facility: CLINIC | Age: 61
End: 2021-11-22
Payer: COMMERCIAL

## 2021-12-18 ENCOUNTER — HEALTH MAINTENANCE LETTER (OUTPATIENT)
Age: 61
End: 2021-12-18

## 2021-12-26 DIAGNOSIS — I63.9 CEREBELLAR STROKE, ACUTE (H): ICD-10-CM

## 2021-12-28 RX ORDER — APIXABAN 5 MG/1
TABLET, FILM COATED ORAL
Qty: 180 TABLET | Refills: 1 | Status: SHIPPED | OUTPATIENT
Start: 2021-12-28 | End: 2022-09-13

## 2021-12-28 NOTE — TELEPHONE ENCOUNTER
Provider approval needed     CBC RESULTS: Recent Labs   Lab Test 11/18/21  1512 10/06/21  1307   WBC  --  9.4   RBC  --  4.30   HGB 10.3* 10.2*   HCT  --  33.1*   MCV  --  77*   MCH  --  23.7*   MCHC  --  30.8*   RDW  --  15.4*   PLT  --  128*

## 2022-01-18 DIAGNOSIS — J44.9 CHRONIC OBSTRUCTIVE PULMONARY DISEASE, UNSPECIFIED COPD TYPE (H): ICD-10-CM

## 2022-01-20 RX ORDER — ALBUTEROL SULFATE 90 UG/1
2 AEROSOL, METERED RESPIRATORY (INHALATION) EVERY 6 HOURS
Qty: 18 G | Refills: 3 | Status: SHIPPED | OUTPATIENT
Start: 2022-01-20 | End: 2023-07-07

## 2022-03-26 DIAGNOSIS — I21.4 NSTEMI (NON-ST ELEVATED MYOCARDIAL INFARCTION) (H): ICD-10-CM

## 2022-03-28 RX ORDER — CLOPIDOGREL BISULFATE 75 MG/1
TABLET ORAL
Qty: 90 TABLET | Refills: 3 | OUTPATIENT
Start: 2022-03-28

## 2022-03-28 RX ORDER — CARVEDILOL 12.5 MG/1
TABLET ORAL
Qty: 180 TABLET | Refills: 3 | OUTPATIENT
Start: 2022-03-28

## 2022-03-28 RX ORDER — ATORVASTATIN CALCIUM 40 MG/1
TABLET, FILM COATED ORAL
Qty: 90 TABLET | Refills: 3 | OUTPATIENT
Start: 2022-03-28

## 2022-03-28 NOTE — TELEPHONE ENCOUNTER
Clopidogrel 75 mg  Medication refills available at requesting pharmacy. Request denied.    Carvedilol 12.5 mg  Medication refills available at requesting pharmacy. Request denied.    Atorvastatin 40 mg  Medication refills available at requesting pharmacy. Request denied.

## 2022-04-09 ENCOUNTER — HEALTH MAINTENANCE LETTER (OUTPATIENT)
Age: 62
End: 2022-04-09

## 2022-04-12 ENCOUNTER — OFFICE VISIT (OUTPATIENT)
Dept: CARDIOLOGY | Facility: CLINIC | Age: 62
End: 2022-04-12
Payer: COMMERCIAL

## 2022-04-12 VITALS
SYSTOLIC BLOOD PRESSURE: 139 MMHG | BODY MASS INDEX: 29.19 KG/M2 | HEART RATE: 75 BPM | WEIGHT: 171 LBS | DIASTOLIC BLOOD PRESSURE: 77 MMHG | HEIGHT: 64 IN

## 2022-04-12 DIAGNOSIS — N18.4 CHRONIC KIDNEY DISEASE, STAGE 4 (SEVERE) (H): ICD-10-CM

## 2022-04-12 DIAGNOSIS — I25.10 CORONARY ARTERY DISEASE INVOLVING NATIVE CORONARY ARTERY OF NATIVE HEART WITHOUT ANGINA PECTORIS: Primary | ICD-10-CM

## 2022-04-12 DIAGNOSIS — E11.21 TYPE 2 DIABETES MELLITUS WITH DIABETIC NEPHROPATHY, WITH LONG-TERM CURRENT USE OF INSULIN (H): ICD-10-CM

## 2022-04-12 DIAGNOSIS — I67.9 CEREBROVASCULAR DISEASE: ICD-10-CM

## 2022-04-12 DIAGNOSIS — I10 ESSENTIAL HYPERTENSION: ICD-10-CM

## 2022-04-12 DIAGNOSIS — Z79.4 TYPE 2 DIABETES MELLITUS WITH DIABETIC NEPHROPATHY, WITH LONG-TERM CURRENT USE OF INSULIN (H): ICD-10-CM

## 2022-04-12 PROCEDURE — 99213 OFFICE O/P EST LOW 20 MIN: CPT | Performed by: INTERNAL MEDICINE

## 2022-04-12 NOTE — PROGRESS NOTES
Service Date: 04/12/2022    HISTORY OF PRESENT ILLNESS:  Rowan Leiva, a 62-year-old woman with coronary artery disease, diabetes mellitus, chronic kidney disease, atherosclerotic peripheral vascular disease ( history of embolic strokes) , anemia and thrombocytopenia, was seen today at your request for followup.    Ms. Leiva suffered an MI in 07/2011 at age 51.  She underwent implantation of drug-eluting stents in her LAD and her right coronary artery shortly after the MI in 2011.    In 05/2021, she suffered a pulmonary embolus, deep venous thrombosis and sustained a non-ST-segment elevation MI.  Coronary angiography demonstrated diffuse moderate atheromatous change involving the circumflex, right coronary and mid LAD.  There was focal 90% narrowing in the distal LAD and a drug-eluting stent was placed with good angiographic results.  Because of the multiple thrombotic events, there was concern the patient might have HIT.    Subsequent testing was negative for HIT, although the patient reports that she had received her COVID vaccination about 2 or 3 weeks before the onset of these events.    The patient was then readmitted in early June 2021  for complaints of blurred vision.  MRI showed multiple acute and subacute ischemic infarcts concerning for embolic events.  Monitoring showed no evidence of atrial fibrillation.  JOSEPHINE showed no evidence of significant intracardiac shunt.  The patient was continued on apixaban and clopidogrel. Fortunately all neurologic deficits have resolved with no sequale.    She was again rehospitalized in mid June 2021 for anemia, discovered during a routine visit with her primary care physician.  She received 3 units of packed red blood cells because of a hemoglobin of 5.4.  Although she had black tarry stools, EGD and colonoscopy were both negative for an active bleeding site.    The patient remained free of any further cardiovascular events, but in 09/2021 began to develop diarrhea and  was briefly hospitalized. .  She left against medical advice, but C. diff cultures were negative.  The patient began to take cholestyramine, and her diarrhea resolved.  The patient now states she has gained about 20 pounds and her stools have returned to normal.  She had been scheduled for repeat colonoscopy, which has not been yet performed.  The patient has been very hesitant to consider going back on anticoagulation and has remained on clopidogrel alone.      She was last seen by her hematologist/oncologist in November 11/2021.  At that time, there had been discussion about possibly resuming anticoagulation if her GI testing proved negative.    Presently, the patient is free of chest, arm, neck, jaw or back discomfort with exertion.  She has been compliant with her current medical therapy.    PAST MEDICAL HISTORY:    1.  Coronary artery disease.  a.  Previous myocardial infarction 07/2011.  b.  History of stent implantation in right coronary and LAD.  c.  Non-ST-segment elevation MI 2021.  Status post implantation of new drug-eluting stent in distal LAD.  Diffuse in-stent moderate restenosis in other coronary vessels with no high-grade narrowing.  Echo showing ejection fraction of 55%-60% with anterior wall hypokinesis.  2.  Diabetes mellitus.  3.  Hypertension.  4.  Dyslipidemia.  5.  History of embolic strokes 6/2021, monitoring shows normal sinus rhythm.  No intracardiac shunt on JOSEPHINE, neurologic deficits resolved.  6.  History of diarrhea, resolved with cholestyramine.  Awaiting further GI evaluation.  7.  Anemia/thrombocytopenia, followed by Hematology/Oncology.  8. Chronic kidney disease Cr 1.72    PHYSICAL EXAMINATION:    GENERAL:  Exam today demonstrates a pleasant, cooperative 62-year-old woman.  VITAL SIGNS:  Blood pressure is 130/77, heart rate 75, height is 1.63 meters her weight is 70 kg.  RESPIRATORY:  Lungs are clear to percussion and auscultation.  CARDIOVASCULAR:  Shows a normal S1 with a normal  S2.  There is no S3.  There is no murmur, rub or click.  Her pulses are full and symmetric.    MEDICATIONS:    1.  Atorvastatin 40 mg daily.  2.  Carvedilol 12.5 mg b.i.d.  3.  Cholestyramine packets.  4.  Clopidogrel 75 mg daily.  5.  Insulin.  6.  Levothyroxine 150 mcg daily.  7.  Pantoprazole.    The patient had been prescribed apixaban, which she is not taking.    LABORATORY STUDIES:  Most recent cholesterol profile showed an LDL of 78.  Most recent creatinine is 1.72.  Most recent hemoglobin is 10.3.    ASSESSMENT:  Ms. Leiva is  free of cardiovascular symptoms on current medical therapy.  I am uncertain whether she will take anticoagulation long term.  It may have been thrombotic events were provoked by her COVID vaccine, but there has been no other explanation given for her DVT and her pulmonary embolus.  At present, I would advise that she  remain on current medical therapy.  We will leave the decision as to whether anticoagulation should be started to her hematologist/oncologist.    RECOMMENDATIONS:    1.  Continue present therapy.  2.  Avoidance of tobacco use.  3.  Followup visit with me in about 1 year, earlier if any new questions.  4.  Followup of creatinine with primary care physician.    We greatly appreciate the opportunity to care for your patient, Edvin Leiva.    cc:   Brian Ruiz MD  [facility]  [address]  [place] [address]     Wolf Brumfield MD        D: 2022   T: 2022   MT: ISABEL    Name:     EDVIN LEIVA  MRN:      [id number]        Account:      [id number]   :      1960           Service Date: 2022       Document: [id number]

## 2022-04-12 NOTE — PROGRESS NOTES
HISTORY OF PRESENT ILLNESS:  Diarrhea has resolved on cholestyramine. Has gained weight. ? Related to vaccine. Vision difficuty     2kids 3 grandkids  real estate.solar and wind pro    Mom thyroid CA mom fallopian tube CA. Dad with CAD old MI    Orders this Visit:  No orders of the defined types were placed in this encounter.    No orders of the defined types were placed in this encounter.    There are no discontinued medications.    No diagnosis found.    CURRENT MEDICATIONS:  Current Outpatient Medications   Medication Sig Dispense Refill     albuterol (PROAIR HFA/PROVENTIL HFA/VENTOLIN HFA) 108 (90 Base) MCG/ACT inhaler Inhale 2 puffs into the lungs every 6 hours 18 g 3     atorvastatin (LIPITOR) 40 MG tablet Take 1 tablet (40 mg) by mouth daily 90 tablet 3     carvedilol (COREG) 12.5 MG tablet Take 1 tablet (12.5 mg) by mouth 2 times daily 180 tablet 3     clopidogrel (PLAVIX) 75 MG tablet Take 1 tablet (75 mg) by mouth daily 90 tablet 3     insulin glargine (LANTUS PEN) 100 UNIT/ML pen Inject 22 Units Subcutaneous At Bedtime       insulin lispro (HUMALOG KWIKPEN) 100 UNIT/ML (1 unit dial) KWIKPEN 10 Units 3 times daily (before meals)       LANCETS REGULAR MISC use twice a day for blood sugar 2 boxes 0     levothyroxine (SYNTHROID/LEVOTHROID) 150 MCG tablet Take 150 mcg by mouth daily       nitroGLYcerin (NITROSTAT) 0.4 MG sublingual tablet For chest pain place 1 tablet under the tongue every 5 minutes for 3 doses. If symptoms persist 5 minutes after 1st dose call 911. 30 tablet 0     Saccharomyces boulardii (PROBIOTIC) 250 MG CAPS TAKE 1 CAPSULE(250 MG) BY MOUTH TWICE DAILY 30 capsule 0     cholestyramine (QUESTRAN) 4 g packet USE 1 PACKET BY ORAL ROUTE EVERY DAY DISSOLVED IN 2-6 OUNCES OF WATER OR NON CARBONATED BEVERAGE BEFORE MEALS (Patient not taking: Reported on 4/12/2022)       Continuous Blood Gluc  ([employer] LARA 14 DAY READER) GAEL  (Patient not taking: Reported on  "4/12/2022)       ELIQUIS ANTICOAGULANT 5 MG tablet TAKE 1 TABLET(5 MG) BY MOUTH TWICE DAILY (Patient not taking: Reported on 4/12/2022) 180 tablet 1     pantoprazole (PROTONIX) 40 MG EC tablet Take 1 tablet (40 mg) by mouth daily (Patient not taking: Reported on 4/12/2022) 90 tablet 1       ALLERGIES     Allergies   Allergen Reactions     No Known Drug Allergies        PAST MEDICAL, SURGICAL, FAMILY, SOCIAL HISTORY:  History was reviewed and updated as needed, see medical record.    Review of Systems:  A 12-point review of systems was completed, see medical record for detailed review of systems information.    Physical Exam:  Vitals: /77   Pulse 75   Ht 1.626 m (5' 4\")   Wt 77.6 kg (171 lb)   LMP 05/01/2000   BMI 29.35 kg/m      Constitutional:           Skin:           Head:           Eyes:           ENT:           Neck:           Chest:           Cardiac:                    Abdomen:           Vascular:                                        Extremities and Back:           Neurological:           ASSESSMENT: stable. CKD needs follow-up question regarding apixaban     RECOMMENDATIONS:  Follow-up in one year        Recent Lab Results:  LIPID RESULTS:  Lab Results   Component Value Date    CHOL 153 08/30/2021    CHOL 191 05/31/2021    HDL 32 (L) 08/30/2021    HDL 29 (L) 05/31/2021    LDL 78 08/30/2021     (H) 05/31/2021    TRIG 217 (H) 08/30/2021    TRIG 285 (H) 05/31/2021    CHOLHDLRATIO 6.7 01/08/2015       LIVER ENZYME RESULTS:  Lab Results   Component Value Date    AST 13 09/23/2021    AST 13 06/22/2021    ALT 14 09/23/2021    ALT 13 06/22/2021       CBC RESULTS:  Lab Results   Component Value Date    WBC 9.4 10/06/2021    WBC 9.5 06/25/2021    RBC 4.30 10/06/2021    RBC 2.77 (L) 06/25/2021    HGB 10.3 (L) 11/18/2021    HGB 7.6 (LL) 07/08/2021    HCT 33.1 (L) 10/06/2021    HCT 23.4 (L) 06/25/2021    MCV 77 (L) 10/06/2021    MCV 85 06/25/2021    MCH 23.7 (L) 10/06/2021    MCH 26.7 06/25/2021    " MCHC 30.8 (L) 10/06/2021    MCHC 31.6 06/25/2021    RDW 15.4 (H) 10/06/2021    RDW 14.9 06/25/2021     (L) 10/06/2021    PLT 69 (L) 06/25/2021       BMP RESULTS:  Lab Results   Component Value Date     09/23/2021     06/22/2021    POTASSIUM 3.1 (L) 09/23/2021    POTASSIUM 3.7 06/22/2021    CHLORIDE 108 09/23/2021    CHLORIDE 109 06/22/2021    CO2 24 09/23/2021    CO2 26 06/22/2021    ANIONGAP 7 09/23/2021    ANIONGAP 6 06/22/2021     (H) 09/23/2021    GLC 82 06/22/2021    BUN 20 09/23/2021    BUN 17 06/22/2021    CR 1.72 (H) 09/23/2021    CR 1.78 (H) 07/08/2021    GFRESTIMATED 32 (L) 09/23/2021    GFRESTIMATED 24 (L) 09/14/2021    GFRESTIMATED 31 (L) 07/08/2021    GFRESTBLACK 35 (L) 07/08/2021    BERNY 8.6 09/23/2021    BERNY 8.1 (L) 06/22/2021        A1C RESULTS:  Lab Results   Component Value Date    A1C 7.0 (H) 09/14/2021    A1C 7.9 (H) 05/31/2021       INR RESULTS:  Lab Results   Component Value Date    INR 1.95 (H) 06/17/2021    INR 1.80 (H) 06/05/2021       We greatly appreciate the opportunity to be involved in the care of your patient, Rowan Leiva.    Sincerely,  Wolf Brumfield MD      CC  Referred Self, MD  No address on file

## 2022-04-12 NOTE — LETTER
4/12/2022    Brian Ruiz MD  [address] [facility] [address]    RE: Rowan Leiva       Dear Colleague,     I had the pleasure of seeing Rowan Leiva in the [facility].  HISTORY OF PRESENT ILLNESS:  Diarrhea has resolved on cholestyramine. Has gained weight. ? Related to vaccine. 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 Dad with CAD old MI    Orders this Visit:  No orders of the defined types were placed in this encounter.    No orders of the defined types were placed in this encounter.    There are no discontinued medications.    No diagnosis found.    CURRENT MEDICATIONS:  Current Outpatient Medications   Medication Sig Dispense Refill     albuterol (PROAIR HFA/PROVENTIL HFA/VENTOLIN HFA) 108 (90 Base) MCG/ACT inhaler Inhale 2 puffs into the lungs every 6 hours 18 g 3     atorvastatin (LIPITOR) 40 MG tablet Take 1 tablet (40 mg) by mouth daily 90 tablet 3     carvedilol (COREG) 12.5 MG tablet Take 1 tablet (12.5 mg) by mouth 2 times daily 180 tablet 3     clopidogrel (PLAVIX) 75 MG tablet Take 1 tablet (75 mg) by mouth daily 90 tablet 3     insulin glargine (LANTUS PEN) 100 UNIT/ML pen Inject 22 Units Subcutaneous At Bedtime       insulin lispro (HUMALOG KWIKPEN) 100 UNIT/ML (1 unit dial) KWIKPEN 10 Units 3 times daily (before meals)       LANCETS REGULAR MISC use twice a day for blood sugar 2 boxes 0     levothyroxine (SYNTHROID/LEVOTHROID) 150 MCG tablet Take 150 mcg by mouth daily       nitroGLYcerin (NITROSTAT) 0.4 MG sublingual tablet For chest pain place 1 tablet under the tongue every 5 minutes for 3 doses. If symptoms persist 5 minutes after 1st dose call 911. 30 tablet 0     Saccharomyces boulardii (PROBIOTIC) 250 MG CAPS TAKE 1 CAPSULE(250 MG) BY MOUTH TWICE DAILY 30 capsule 0     cholestyramine (QUESTRAN) 4 g packet USE 1 PACKET BY ORAL ROUTE EVERY DAY DISSOLVED IN 2-6  "OUNCES OF WATER OR NON CARBONATED BEVERAGE BEFORE MEALS (Patient not taking: Reported on 4/12/2022)       Continuous Blood Gluc  (FREESTYLE LARA 14 DAY READER) GAEL  (Patient not taking: Reported on 4/12/2022)       ELIQUIS ANTICOAGULANT 5 MG tablet TAKE 1 TABLET(5 MG) BY MOUTH TWICE DAILY (Patient not taking: Reported on 4/12/2022) 180 tablet 1     pantoprazole (PROTONIX) 40 MG EC tablet Take 1 tablet (40 mg) by mouth daily (Patient not taking: Reported on 4/12/2022) 90 tablet 1       ALLERGIES     Allergies   Allergen Reactions     No Known Drug Allergies        PAST MEDICAL, SURGICAL, FAMILY, SOCIAL HISTORY:  History was reviewed and updated as needed, see medical record.    Review of Systems:  A 12-point review of systems was completed, see medical record for detailed review of systems information.    Physical Exam:  Vitals: /77   Pulse 75   Ht 1.626 m (5' 4\")   Wt 77.6 kg (171 lb)   LMP 05/01/2000   BMI 29.35 kg/m      Constitutional:           Skin:           Head:           Eyes:           ENT:           Neck:           Chest:           Cardiac:                    Abdomen:           Vascular:                                        Extremities and Back:           Neurological:           ASSESSMENT: stable. CKD needs follow-up question regarding apixaban     RECOMMENDATIONS:  Follow-up in one year        Recent Lab Results:  LIPID RESULTS:  Lab Results   Component Value Date    CHOL 153 08/30/2021    CHOL 191 05/31/2021    HDL 32 (L) 08/30/2021    HDL 29 (L) 05/31/2021    LDL 78 08/30/2021     (H) 05/31/2021    TRIG 217 (H) 08/30/2021    TRIG 285 (H) 05/31/2021    CHOLHDLRATIO 6.7 01/08/2015       LIVER ENZYME RESULTS:  Lab Results   Component Value Date    AST 13 09/23/2021    AST 13 06/22/2021    ALT 14 09/23/2021    ALT 13 06/22/2021       CBC RESULTS:  Lab Results   Component Value Date    WBC 9.4 10/06/2021    WBC 9.5 06/25/2021    RBC 4.30 10/06/2021    RBC 2.77 (L) 06/25/2021 "    HGB 10.3 (L) 11/18/2021    HGB 7.6 (LL) 07/08/2021    HCT 33.1 (L) 10/06/2021    HCT 23.4 (L) 06/25/2021    MCV 77 (L) 10/06/2021    MCV 85 06/25/2021    MCH 23.7 (L) 10/06/2021    MCH 26.7 06/25/2021    MCHC 30.8 (L) 10/06/2021    MCHC 31.6 06/25/2021    RDW 15.4 (H) 10/06/2021    RDW 14.9 06/25/2021     (L) 10/06/2021    PLT 69 (L) 06/25/2021       BMP RESULTS:  Lab Results   Component Value Date     09/23/2021     06/22/2021    POTASSIUM 3.1 (L) 09/23/2021    POTASSIUM 3.7 06/22/2021    CHLORIDE 108 09/23/2021    CHLORIDE 109 06/22/2021    CO2 24 09/23/2021    CO2 26 06/22/2021    ANIONGAP 7 09/23/2021    ANIONGAP 6 06/22/2021     (H) 09/23/2021    GLC 82 06/22/2021    BUN 20 09/23/2021    BUN 17 06/22/2021    CR 1.72 (H) 09/23/2021    CR 1.78 (H) 07/08/2021    GFRESTIMATED 32 (L) 09/23/2021    GFRESTIMATED 24 (L) 09/14/2021    GFRESTIMATED 31 (L) 07/08/2021    GFRESTBLACK 35 (L) 07/08/2021    BERNY 8.6 09/23/2021    BERNY 8.1 (L) 06/22/2021        A1C RESULTS:  Lab Results   Component Value Date    A1C 7.0 (H) 09/14/2021    A1C 7.9 (H) 05/31/2021       INR RESULTS:  Lab Results   Component Value Date    INR 1.95 (H) 06/17/2021    INR 1.80 (H) 06/05/2021       We greatly appreciate the opportunity to be involved in the care of your patient, Rowan NATACHA Leiva.    Sincerely,  Wolf Brumfield MD      CC  Referred Self      Thank you for allowing me to participate in the care of your patient.      Sincerely,     Wolf Brumfield MD     [facility]

## 2022-07-14 ENCOUNTER — MYC REFILL (OUTPATIENT)
Dept: INTERNAL MEDICINE | Facility: CLINIC | Age: 62
End: 2022-07-14

## 2022-07-14 DIAGNOSIS — Z79.4 TYPE 2 DIABETES MELLITUS WITH HYPERGLYCEMIA, WITH LONG-TERM CURRENT USE OF INSULIN (H): ICD-10-CM

## 2022-07-14 DIAGNOSIS — E11.65 TYPE 2 DIABETES MELLITUS WITH HYPERGLYCEMIA, WITH LONG-TERM CURRENT USE OF INSULIN (H): ICD-10-CM

## 2022-07-15 ENCOUNTER — TRANSFERRED RECORDS (OUTPATIENT)
Dept: INTERNAL MEDICINE | Facility: CLINIC | Age: 62
End: 2022-07-15

## 2022-07-15 DIAGNOSIS — I25.10 CORONARY ARTERY DISEASE INVOLVING NATIVE CORONARY ARTERY OF NATIVE HEART WITHOUT ANGINA PECTORIS: ICD-10-CM

## 2022-07-15 LAB
CHOLESTEROL (EXTERNAL): 178 MG/DL (ref 100–199)
CREATININE (EXTERNAL): 2.09 MG/DL (ref 0.57–1)
GFR ESTIMATED (EXTERNAL): 27 ML/MIN/1.7
GLUCOSE (EXTERNAL): 284 MG/DL (ref 65–99)
HDLC SERPL-MCNC: 28 MG/DL
LDL CHOLESTEROL (EXTERNAL): 88 MG/DL (ref 0–99)
POTASSIUM (EXTERNAL): 4.6 MMOL/L (ref 3.5–5.2)
TRIGLYCERIDES (EXTERNAL): 374 MG/DL (ref 0–149)

## 2022-07-15 RX ORDER — NITROGLYCERIN 0.4 MG/1
TABLET SUBLINGUAL
Qty: 25 TABLET | Refills: 2 | Status: SHIPPED | OUTPATIENT
Start: 2022-07-15 | End: 2023-11-30

## 2022-07-18 DIAGNOSIS — I21.4 NSTEMI (NON-ST ELEVATED MYOCARDIAL INFARCTION) (H): ICD-10-CM

## 2022-07-18 NOTE — TELEPHONE ENCOUNTER
Requested Prescriptions   Pending Prescriptions Disp Refills     atorvastatin (LIPITOR) 40 MG tablet  Last Written Prescription Date:  06/28/21  Last Fill Quantity: 90,  # refills: 3   Last office visit: 9/23/2021 with prescribing provider:  09/23/21   Future Office Visit:           90 tablet 3     Sig: Take 1 tablet (40 mg) by mouth daily       There is no refill protocol information for this order        carvedilol (COREG) 12.5 MG tablet  Last Written Prescription Date:  066/28/21  Last Fill Quantity: 180,  # refills: 3   Last office visit: 9/23/2021 with prescribing provider:  09/23/21   Future Office Visit:             180 tablet 3     Sig: Take 1 tablet (12.5 mg) by mouth 2 times daily       There is no refill protocol information for this order

## 2022-07-19 RX ORDER — ATORVASTATIN CALCIUM 40 MG/1
40 TABLET, FILM COATED ORAL DAILY
Qty: 90 TABLET | Refills: 1 | Status: SHIPPED | OUTPATIENT
Start: 2022-07-19 | End: 2022-12-30

## 2022-07-19 RX ORDER — CARVEDILOL 12.5 MG/1
12.5 TABLET ORAL 2 TIMES DAILY
Qty: 180 TABLET | Refills: 1 | Status: SHIPPED | OUTPATIENT
Start: 2022-07-19 | End: 2022-12-30

## 2022-07-19 NOTE — TELEPHONE ENCOUNTER
mychart message sent stating patient is due for appt.  Prescription approved per [facility] Refill Protocol.

## 2022-07-27 DIAGNOSIS — I21.4 NSTEMI (NON-ST ELEVATED MYOCARDIAL INFARCTION) (H): ICD-10-CM

## 2022-07-29 RX ORDER — CLOPIDOGREL BISULFATE 75 MG/1
75 TABLET ORAL DAILY
Qty: 90 TABLET | Refills: 3 | Status: ON HOLD | OUTPATIENT
Start: 2022-07-29 | End: 2022-09-15

## 2022-07-29 NOTE — TELEPHONE ENCOUNTER
Pending Prescriptions:                       Disp   Refills    clopidogrel (PLAVIX) 75 MG tablet          90 tab*3        Sig: Take 1 tablet (75 mg) by mouth daily    Routing refill request to provider for review/approval because:  Needs provider review

## 2022-07-30 ENCOUNTER — HEALTH MAINTENANCE LETTER (OUTPATIENT)
Age: 62
End: 2022-07-30

## 2022-09-13 ENCOUNTER — HOSPITAL ENCOUNTER (OUTPATIENT)
Facility: CLINIC | Age: 62
Setting detail: OBSERVATION
Discharge: HOME | End: 2022-09-15
Attending: EMERGENCY MEDICINE | Admitting: INTERNAL MEDICINE
Payer: COMMERCIAL

## 2022-09-13 ENCOUNTER — APPOINTMENT (OUTPATIENT)
Dept: ULTRASOUND IMAGING | Facility: CLINIC | Age: 62
End: 2022-09-13
Attending: EMERGENCY MEDICINE
Payer: COMMERCIAL

## 2022-09-13 ENCOUNTER — APPOINTMENT (OUTPATIENT)
Dept: CT IMAGING | Facility: CLINIC | Age: 62
End: 2022-09-13
Attending: EMERGENCY MEDICINE
Payer: COMMERCIAL

## 2022-09-13 DIAGNOSIS — I21.4 NSTEMI (NON-ST ELEVATED MYOCARDIAL INFARCTION) (H): ICD-10-CM

## 2022-09-13 DIAGNOSIS — D69.6 THROMBOCYTOPENIA (H): ICD-10-CM

## 2022-09-13 DIAGNOSIS — R10.9 FLANK PAIN: ICD-10-CM

## 2022-09-13 DIAGNOSIS — R19.7 DIARRHEA, UNSPECIFIED TYPE: ICD-10-CM

## 2022-09-13 DIAGNOSIS — N17.9 ACUTE KIDNEY INJURY (H): ICD-10-CM

## 2022-09-13 LAB
ABO/RH(D): NORMAL
ALBUMIN SERPL BCG-MCNC: 3.2 G/DL (ref 3.5–5.2)
ALBUMIN UR-MCNC: 200 MG/DL
ALP SERPL-CCNC: 123 U/L (ref 35–104)
ALT SERPL W P-5'-P-CCNC: 16 U/L (ref 10–35)
ANION GAP SERPL CALCULATED.3IONS-SCNC: 13 MMOL/L (ref 7–15)
ANTIBODY SCREEN: NEGATIVE
APPEARANCE UR: CLEAR
APTT PPP: 51 SECONDS (ref 22–38)
AST SERPL W P-5'-P-CCNC: 28 U/L (ref 10–35)
ATRIAL RATE - MUSE: 67 BPM
BACTERIA #/AREA URNS HPF: ABNORMAL /HPF
BASOPHILS # BLD AUTO: 0 10E3/UL (ref 0–0.2)
BASOPHILS NFR BLD AUTO: 0 %
BILIRUB DIRECT SERPL-MCNC: <0.2 MG/DL (ref 0–0.3)
BILIRUB SERPL-MCNC: 0.2 MG/DL
BILIRUB UR QL STRIP: NEGATIVE
BUN SERPL-MCNC: 36.1 MG/DL (ref 8–23)
CALCIUM SERPL-MCNC: 8.5 MG/DL (ref 8.8–10.2)
CHLORIDE SERPL-SCNC: 102 MMOL/L (ref 98–107)
COLOR UR AUTO: ABNORMAL
CREAT SERPL-MCNC: 2.55 MG/DL (ref 0.51–0.95)
DEPRECATED HCO3 PLAS-SCNC: 21 MMOL/L (ref 22–29)
DIASTOLIC BLOOD PRESSURE - MUSE: NORMAL MMHG
EOSINOPHIL # BLD AUTO: 0.1 10E3/UL (ref 0–0.7)
EOSINOPHIL NFR BLD AUTO: 1 %
ERYTHROCYTE [DISTWIDTH] IN BLOOD BY AUTOMATED COUNT: 14.6 % (ref 10–15)
GFR SERPL CREATININE-BSD FRML MDRD: 21 ML/MIN/1.73M2
GLUCOSE BLDC GLUCOMTR-MCNC: 210 MG/DL (ref 70–99)
GLUCOSE SERPL-MCNC: 197 MG/DL (ref 70–99)
GLUCOSE UR STRIP-MCNC: 100 MG/DL
HCT VFR BLD AUTO: 34.5 % (ref 35–47)
HGB BLD-MCNC: 10.5 G/DL (ref 11.7–15.7)
HGB UR QL STRIP: ABNORMAL
HOLD SPECIMEN: NORMAL
HOLD SPECIMEN: NORMAL
IMM GRANULOCYTES # BLD: 0 10E3/UL
IMM GRANULOCYTES NFR BLD: 0 %
INR PPP: 0.99 (ref 0.85–1.15)
INTERPRETATION ECG - MUSE: NORMAL
KETONES UR STRIP-MCNC: NEGATIVE MG/DL
LACTATE SERPL-SCNC: 1 MMOL/L (ref 0.7–2)
LEUKOCYTE ESTERASE UR QL STRIP: NEGATIVE
LYMPHOCYTES # BLD AUTO: 1 10E3/UL (ref 0.8–5.3)
LYMPHOCYTES NFR BLD AUTO: 23 %
MCH RBC QN AUTO: 25.5 PG (ref 26.5–33)
MCHC RBC AUTO-ENTMCNC: 30.4 G/DL (ref 31.5–36.5)
MCV RBC AUTO: 84 FL (ref 78–100)
MONOCYTES # BLD AUTO: 0.5 10E3/UL (ref 0–1.3)
MONOCYTES NFR BLD AUTO: 10 %
NEUTROPHILS # BLD AUTO: 3 10E3/UL (ref 1.6–8.3)
NEUTROPHILS NFR BLD AUTO: 66 %
NITRATE UR QL: NEGATIVE
NRBC # BLD AUTO: 0 10E3/UL
NRBC BLD AUTO-RTO: 0 /100
P AXIS - MUSE: 49 DEGREES
PH UR STRIP: 6 [PH] (ref 5–7)
PLATELET # BLD AUTO: 21 10E3/UL (ref 150–450)
POTASSIUM SERPL-SCNC: 4.1 MMOL/L (ref 3.4–5.3)
PR INTERVAL - MUSE: 138 MS
PROT SERPL-MCNC: 6.8 G/DL (ref 6.4–8.3)
QRS DURATION - MUSE: 78 MS
QT - MUSE: 422 MS
QTC - MUSE: 445 MS
R AXIS - MUSE: 22 DEGREES
RBC # BLD AUTO: 4.11 10E6/UL (ref 3.8–5.2)
RBC URINE: 2 /HPF
SARS-COV-2 RNA RESP QL NAA+PROBE: POSITIVE
SODIUM SERPL-SCNC: 136 MMOL/L (ref 136–145)
SP GR UR STRIP: 1.01 (ref 1–1.03)
SPECIMEN EXPIRATION DATE: NORMAL
SQUAMOUS EPITHELIAL: 1 /HPF
SYSTOLIC BLOOD PRESSURE - MUSE: NORMAL MMHG
T AXIS - MUSE: 112 DEGREES
TROPONIN T SERPL HS-MCNC: 35 NG/L
TROPONIN T SERPL HS-MCNC: 42 NG/L
UROBILINOGEN UR STRIP-MCNC: NORMAL MG/DL
VENTRICULAR RATE- MUSE: 67 BPM
WBC # BLD AUTO: 4.6 10E3/UL (ref 4–11)
WBC URINE: 1 /HPF

## 2022-09-13 PROCEDURE — 82248 BILIRUBIN DIRECT: CPT | Performed by: EMERGENCY MEDICINE

## 2022-09-13 PROCEDURE — 81001 URINALYSIS AUTO W/SCOPE: CPT | Performed by: EMERGENCY MEDICINE

## 2022-09-13 PROCEDURE — 96361 HYDRATE IV INFUSION ADD-ON: CPT

## 2022-09-13 PROCEDURE — 82310 ASSAY OF CALCIUM: CPT | Performed by: EMERGENCY MEDICINE

## 2022-09-13 PROCEDURE — 96372 THER/PROPH/DIAG INJ SC/IM: CPT | Performed by: INTERNAL MEDICINE

## 2022-09-13 PROCEDURE — 85730 THROMBOPLASTIN TIME PARTIAL: CPT | Performed by: EMERGENCY MEDICINE

## 2022-09-13 PROCEDURE — C9803 HOPD COVID-19 SPEC COLLECT: HCPCS

## 2022-09-13 PROCEDURE — 83010 ASSAY OF HAPTOGLOBIN QUANT: CPT | Performed by: INTERNAL MEDICINE

## 2022-09-13 PROCEDURE — 99285 EMERGENCY DEPT VISIT HI MDM: CPT | Mod: 25

## 2022-09-13 PROCEDURE — 120N000001 HC R&B MED SURG/OB

## 2022-09-13 PROCEDURE — 86850 RBC ANTIBODY SCREEN: CPT | Performed by: EMERGENCY MEDICINE

## 2022-09-13 PROCEDURE — 86901 BLOOD TYPING SEROLOGIC RH(D): CPT | Performed by: EMERGENCY MEDICINE

## 2022-09-13 PROCEDURE — 74176 CT ABD & PELVIS W/O CONTRAST: CPT

## 2022-09-13 PROCEDURE — 96360 HYDRATION IV INFUSION INIT: CPT

## 2022-09-13 PROCEDURE — 85610 PROTHROMBIN TIME: CPT | Performed by: EMERGENCY MEDICINE

## 2022-09-13 PROCEDURE — 250N000012 HC RX MED GY IP 250 OP 636 PS 637: Performed by: INTERNAL MEDICINE

## 2022-09-13 PROCEDURE — 36415 COLL VENOUS BLD VENIPUNCTURE: CPT | Performed by: EMERGENCY MEDICINE

## 2022-09-13 PROCEDURE — 93005 ELECTROCARDIOGRAM TRACING: CPT

## 2022-09-13 PROCEDURE — 85004 AUTOMATED DIFF WBC COUNT: CPT | Performed by: EMERGENCY MEDICINE

## 2022-09-13 PROCEDURE — 250N000013 HC RX MED GY IP 250 OP 250 PS 637: Performed by: INTERNAL MEDICINE

## 2022-09-13 PROCEDURE — 83605 ASSAY OF LACTIC ACID: CPT | Performed by: EMERGENCY MEDICINE

## 2022-09-13 PROCEDURE — U0005 INFEC AGEN DETEC AMPLI PROBE: HCPCS | Performed by: EMERGENCY MEDICINE

## 2022-09-13 PROCEDURE — 76770 US EXAM ABDO BACK WALL COMP: CPT

## 2022-09-13 PROCEDURE — 99223 1ST HOSP IP/OBS HIGH 75: CPT | Mod: AI | Performed by: INTERNAL MEDICINE

## 2022-09-13 PROCEDURE — 258N000003 HC RX IP 258 OP 636: Performed by: INTERNAL MEDICINE

## 2022-09-13 PROCEDURE — 258N000003 HC RX IP 258 OP 636: Performed by: EMERGENCY MEDICINE

## 2022-09-13 PROCEDURE — 84484 ASSAY OF TROPONIN QUANT: CPT | Performed by: EMERGENCY MEDICINE

## 2022-09-13 RX ORDER — ATORVASTATIN CALCIUM 40 MG/1
40 TABLET, FILM COATED ORAL DAILY
Status: DISCONTINUED | OUTPATIENT
Start: 2022-09-14 | End: 2022-09-15 | Stop reason: HOSPADM

## 2022-09-13 RX ORDER — NITROGLYCERIN 0.4 MG/1
0.4 TABLET SUBLINGUAL EVERY 5 MIN PRN
Status: DISCONTINUED | OUTPATIENT
Start: 2022-09-13 | End: 2022-09-15 | Stop reason: HOSPADM

## 2022-09-13 RX ORDER — ONDANSETRON 2 MG/ML
4 INJECTION INTRAMUSCULAR; INTRAVENOUS EVERY 6 HOURS PRN
Status: DISCONTINUED | OUTPATIENT
Start: 2022-09-13 | End: 2022-09-13

## 2022-09-13 RX ORDER — DEXTROSE MONOHYDRATE 25 G/50ML
25-50 INJECTION, SOLUTION INTRAVENOUS
Status: DISCONTINUED | OUTPATIENT
Start: 2022-09-13 | End: 2022-09-15 | Stop reason: HOSPADM

## 2022-09-13 RX ORDER — INSULIN LISPRO 100 [IU]/ML
18 INJECTION, SOLUTION INTRAVENOUS; SUBCUTANEOUS
COMMUNITY

## 2022-09-13 RX ORDER — ONDANSETRON 4 MG/1
4 TABLET, ORALLY DISINTEGRATING ORAL EVERY 6 HOURS PRN
Status: DISCONTINUED | OUTPATIENT
Start: 2022-09-13 | End: 2022-09-15 | Stop reason: HOSPADM

## 2022-09-13 RX ORDER — ONDANSETRON 2 MG/ML
4 INJECTION INTRAMUSCULAR; INTRAVENOUS EVERY 6 HOURS PRN
Status: DISCONTINUED | OUTPATIENT
Start: 2022-09-13 | End: 2022-09-15 | Stop reason: HOSPADM

## 2022-09-13 RX ORDER — ACETAMINOPHEN 325 MG/1
650 TABLET ORAL EVERY 4 HOURS PRN
Status: DISCONTINUED | OUTPATIENT
Start: 2022-09-13 | End: 2022-09-15 | Stop reason: HOSPADM

## 2022-09-13 RX ORDER — POLYETHYLENE GLYCOL 3350 17 G
4 POWDER IN PACKET (EA) ORAL
Status: DISCONTINUED | OUTPATIENT
Start: 2022-09-13 | End: 2022-09-15 | Stop reason: HOSPADM

## 2022-09-13 RX ORDER — ACETAMINOPHEN 650 MG/1
650 SUPPOSITORY RECTAL EVERY 4 HOURS PRN
Status: DISCONTINUED | OUTPATIENT
Start: 2022-09-13 | End: 2022-09-15 | Stop reason: HOSPADM

## 2022-09-13 RX ORDER — ALBUTEROL SULFATE 90 UG/1
1-2 AEROSOL, METERED RESPIRATORY (INHALATION) EVERY 6 HOURS PRN
Status: DISCONTINUED | OUTPATIENT
Start: 2022-09-13 | End: 2022-09-15 | Stop reason: HOSPADM

## 2022-09-13 RX ORDER — NICOTINE POLACRILEX 4 MG
15-30 LOZENGE BUCCAL
Status: DISCONTINUED | OUTPATIENT
Start: 2022-09-13 | End: 2022-09-15 | Stop reason: HOSPADM

## 2022-09-13 RX ORDER — NICOTINE 21 MG/24HR
1 PATCH, TRANSDERMAL 24 HOURS TRANSDERMAL DAILY
Status: DISCONTINUED | OUTPATIENT
Start: 2022-09-13 | End: 2022-09-15 | Stop reason: HOSPADM

## 2022-09-13 RX ORDER — CLOPIDOGREL BISULFATE 75 MG/1
75 TABLET ORAL DAILY
Status: DISCONTINUED | OUTPATIENT
Start: 2022-09-14 | End: 2022-09-15 | Stop reason: HOSPADM

## 2022-09-13 RX ORDER — SODIUM CHLORIDE 9 MG/ML
INJECTION, SOLUTION INTRAVENOUS CONTINUOUS
Status: DISCONTINUED | OUTPATIENT
Start: 2022-09-13 | End: 2022-09-15 | Stop reason: HOSPADM

## 2022-09-13 RX ORDER — SODIUM CHLORIDE 9 MG/ML
INJECTION, SOLUTION INTRAVENOUS CONTINUOUS
Status: DISCONTINUED | OUTPATIENT
Start: 2022-09-13 | End: 2022-09-14

## 2022-09-13 RX ORDER — HYDRALAZINE HYDROCHLORIDE 20 MG/ML
10 INJECTION INTRAMUSCULAR; INTRAVENOUS EVERY 4 HOURS PRN
Status: DISCONTINUED | OUTPATIENT
Start: 2022-09-13 | End: 2022-09-15 | Stop reason: HOSPADM

## 2022-09-13 RX ORDER — CARVEDILOL 12.5 MG/1
12.5 TABLET ORAL 2 TIMES DAILY
Status: DISCONTINUED | OUTPATIENT
Start: 2022-09-13 | End: 2022-09-15 | Stop reason: HOSPADM

## 2022-09-13 RX ORDER — LEVOTHYROXINE SODIUM 150 UG/1
150 TABLET ORAL DAILY
Status: DISCONTINUED | OUTPATIENT
Start: 2022-09-14 | End: 2022-09-15 | Stop reason: HOSPADM

## 2022-09-13 RX ADMIN — NICOTINE 1 PATCH: 21 PATCH, EXTENDED RELEASE TRANSDERMAL at 21:17

## 2022-09-13 RX ADMIN — SODIUM CHLORIDE: 9 INJECTION, SOLUTION INTRAVENOUS at 21:17

## 2022-09-13 RX ADMIN — CARVEDILOL 12.5 MG: 12.5 TABLET, FILM COATED ORAL at 21:17

## 2022-09-13 RX ADMIN — SODIUM CHLORIDE 1000 ML: 9 INJECTION, SOLUTION INTRAVENOUS at 11:05

## 2022-09-13 RX ADMIN — INSULIN GLARGINE 28 UNITS: 100 INJECTION, SOLUTION SUBCUTANEOUS at 22:11

## 2022-09-13 RX ADMIN — SODIUM CHLORIDE 1000 ML: 9 INJECTION, SOLUTION INTRAVENOUS at 09:25

## 2022-09-13 ASSESSMENT — ENCOUNTER SYMPTOMS
CHILLS: 1
FEVER: 0
VOMITING: 1
HEADACHES: 1
FATIGUE: 1
FLANK PAIN: 1
ABDOMINAL PAIN: 1
DIZZINESS: 1
BLOOD IN STOOL: 0
DIARRHEA: 1

## 2022-09-13 ASSESSMENT — ACTIVITIES OF DAILY LIVING (ADL)
ADLS_ACUITY_SCORE: 37
CHANGE_IN_FUNCTIONAL_STATUS_SINCE_ONSET_OF_CURRENT_ILLNESS/INJURY: NO
VISION_MANAGEMENT: GLASSES
CONCENTRATING,_REMEMBERING_OR_MAKING_DECISIONS_DIFFICULTY: NO
ADLS_ACUITY_SCORE: 37
TOILETING_ISSUES: NO
ADLS_ACUITY_SCORE: 22
ADLS_ACUITY_SCORE: 22
WEAR_GLASSES_OR_BLIND: YES
DRESSING/BATHING_DIFFICULTY: NO
FALL_HISTORY_WITHIN_LAST_SIX_MONTHS: NO
ADLS_ACUITY_SCORE: 22
ADLS_ACUITY_SCORE: 37
ADLS_ACUITY_SCORE: 37
WALKING_OR_CLIMBING_STAIRS_DIFFICULTY: NO
DOING_ERRANDS_INDEPENDENTLY_DIFFICULTY: NO
DIFFICULTY_EATING/SWALLOWING: NO

## 2022-09-13 NOTE — H&P
[facility]    History and Physical  Hospitalist       Date of Admission:  9/13/2022  Date of Service (when I saw the patient): 09/13/22    Assessment & Plan   Rowan Leiva is a 62 year old female patient with past medical history of diabetes mellitus type 2, hypertension, hyperlipidemia, hypothyroidism, coronary artery disease status post stent placement, thrombocytopenia, history of malignant neoplasm of thyroid gland requiring resection in 2000, who came to emergency room with a complaint of left flank pain.  Patient states that she has been having intermittent diarrhea since last Wednesday.  She also states that she had abdominal discomfort and left flank pain.  She denies dysuria, urgency or frequency.  She has no cough or shortness of breath.  She also has no fever.  She was seen at urgent care yesterday and had a urinalysis which was negative.  She was advised to come to emergency room.  Her vital signs were checked and showed temperature 97.3, pulse 62, blood pressure 81/67, oxygen saturation 93% on room.  Lab workup showed sodium 136, potassium 3.1, creatinine 2.55, BUN 36.1.  AST 16, AST 28.  WBC 4.6, hemoglobin 10.5, platelets 21.  Lactic acid 1.0, troponin T high-sensitivity 35.  C. difficile toxin B PCR negative.  Enteric bacteria panel negative  CT of the abdomen/pelvis without contrast showed no evidence of urolithiasis or hydronephrosis.  Ultrasound of the kidneys is also negative for hydronephrosis.    Diarrhea likely due to acute gastroenteritis  Patient has been having intermittent diarrhea since last Wednesday.  She has associated abdominal bloating and left flank pain.  CT of the abdomen negative for evidence of acute intra-abdominal abnormality.  No kidney stones.  Started on IV fluids in the ER.  We will continue IV fluid.  We will also put her on antiemetics as needed    Thrombocytopenia, chronic  Prior history of DVT  HITnegative in 6/2021.  Patient states that she  has been receiving transfusions in the past.  She follows with hematology as outpatient.  Platelets 21,000 today.   Will monitor CBC.  Consider hematology consult as inpatient.  No evidence of bleeding.  No indication for blood transfusion at this moment.    Acute kidney injury on chronic kidney disease likely due to diarrhea  Baseline creatinine on 9/23/2021 was 1.72.  Creatinine 2.55 today.  Patient was started on IV fluids in the ER.  We will continue normal saline at 100 mill per hour    Diabetes mellitus type 2, insulin requiring  Blood sugar 197.  Will resume Lantus insulin and scheduled NovoLog at home dose.  We will also put on sliding scale.  Will monitor blood sugar and adjust insulin dose as needed    History of embolic stroke: We will continue statin and Plavix    Hyperlipidemia: We will resume statin    History of coronary artery disease status post stent placement in LAD  We will resume Plavix, atorvastatin, Coreg, nitroglycerin    Hypothyroidism: We will resume Synthroid      DVT Prophylaxis: Pneumatic Compression Devices  Code Status: Full Code    Disposition: Expected discharge in 1-2 days     Wendy Torres MD    Primary Care Physician   Brian Ruiz    Chief Complaint   Abdominal/flank pain and bloating    History is obtained from the patient    History of Present Illness   Rowan Leiva is a 62 year old female patient with past medical history of diabetes mellitus type 2, hypertension, hyperlipidemia, hypothyroidism, coronary artery disease status post stent placement, thrombocytopenia, history of malignant neoplasm of thyroid gland requiring resection in 2000, who came to emergency room with a complaint of left flank pain.  Patient states that she has been having intermittent diarrhea since last Wednesday.  She also states that she had abdominal discomfort and left flank pain.  She denies dysuria, urgency or frequency.  She has no cough or shortness of breath.  She also has no fever.   She was seen at urgent care yesterday and had a urinalysis which was negative.  She was advised to come to emergency room.  On arrival to emergency room today, her vital signs were checked and showed temperature 97.3, pulse 62, blood pressure 81/67, oxygen saturation 93% on room.  Laboratory work-up showed sodium 136, potassium 3.1, creatinine 2.55, BUN 36.1.  AST 16, AST 28.  WBC 4.6, hemoglobin 10.5, platelets 21.  Lactic acid 1.0, troponin T high-sensitivity 35.  CT of the abdomen/pelvis without contrast showed no evidence of urolithiasis or hydronephrosis.  Ultrasound of the kidneys is also negative for hydronephrosis.  In emergency room, she was given IV fluid bolus.    Past Medical History    I have reviewed this patient's medical history and updated it with pertinent information if needed.   Past Medical History:   Diagnosis Date     Cancer of thyroid (H)      Chest pain      Coronary artery disease     -     HX OF OVARIAN MALIGNANCY      Hyperlipidemia      Hypertension      Hypothyroidism      Malignant neoplasm of thyroid gland (H)     papillary carcinoma; resection      Mild intermittent asthma     minimal symptoms, albuterol use 2x/year     Myocardial infarction (H)     anterior     Pulmonary nodule      Thrombocytopenia (H)      Tobacco use disorder      Type II or unspecified type diabetes mellitus without mention of complication, not stated as uncontrolled        Past Surgical History   I have reviewed this patient's surgical history and updated it with pertinent information if needed.  Past Surgical History:   Procedure Laterality Date     BLADDER SURGERY       C ANESTH, SECTION       CHOLECYSTECTOMY       COLONOSCOPY N/A 2021    Procedure: COLONOSCOPY;  Surgeon: Red Tracey DO;  Location: [facility]     CV HEART CATHETERIZATION WITH POSSIBLE INTERVENTION N/A 2021    Procedure: Heart Catheterization with Possible Intervention;  Surgeon:  Wolf Brumfield MD;  Location:  HEART CARDIAC CATH LAB     CV PCI STENT DRUG ELUTING N/A 5/31/2021    Procedure: Percutaneous Coronary Intervention Stent Drug Eluting;  Surgeon: Wolf Brumfield MD;  Location: [facility] CARDIAC CATH LAB     CYSTOSCOPY  12/30/2014    Procedure: CYSTOSCOPY;  Surgeon: Sabino Jamil MD;  Location: [facility]     ESOPHAGOSCOPY, GASTROSCOPY, DUODENOSCOPY (EGD), COMBINED N/A 6/18/2021    Procedure: ESOPHAGOGASTRODUODENOSCOPY (EGD);  Surgeon: Jean Hsieh MD;  Location:  GI     HC THYROIDECTOMY  6/2000    papillary thyroid carcinoma - Endocrine     HEART CATH, ANGIOPLASTY  7/13/11    2.5 X 18 mm & 2.25 X 18 mm Xience ELLIOT to Mid LAD     HEART CATH, ANGIOPLASTY  7/29/11    Staged-3.0 X 23 mm Xience ELLIOT to Mid RAC     SLING TRANSVAGINAL N/A 12/30/2014    Procedure: SLING TRANSVAGINAL;  Surgeon: Sabino Jamil MD;  Location: [facility] LIGATE FALLOPIAN TUBE  1987    Tubal Ligation     [facility] TOTAL ABDOM HYSTERECTOMY  2001    ovarian cancer (low grade) - Heme/Onc       Prior to Admission Medications   Prior to Admission Medications   Prescriptions Last Dose Informant Patient Reported? Taking?   LANCETS REGULAR MISC  Self No No   Sig: use twice a day for blood sugar   albuterol (PROAIR HFA/PROVENTIL HFA/VENTOLIN HFA) 108 (90 Base) MCG/ACT inhaler More than a month at Unknown time  No Yes   Sig: Inhale 2 puffs into the lungs every 6 hours   Patient taking differently: Inhale 1-2 puffs into the lungs every 6 hours as needed   atorvastatin (LIPITOR) 40 MG tablet 9/13/2022 at am  No Yes   Sig: Take 1 tablet (40 mg) by mouth daily   carvedilol (COREG) 12.5 MG tablet 9/13/2022 at x 1  No Yes   Sig: Take 1 tablet (12.5 mg) by mouth 2 times daily   clopidogrel (PLAVIX) 75 MG tablet 9/13/2022 at am  No Yes   Sig: Take 1 tablet (75 mg) by mouth daily   insulin glargine (LANTUS PEN) 100 UNIT/ML pen Past Week at Unknown time  Yes Yes   Sig: Inject 28 Units Subcutaneous At Bedtime    insulin lispro (HUMALOG KWIKPEN) 100 UNIT/ML (1 unit dial) KWIKPEN Past Week at Unknown time  Yes Yes   Sig: Inject 18 Units Subcutaneous 3 times daily (before meals)   levothyroxine (SYNTHROID/LEVOTHROID) 150 MCG tablet 2022 at am  Yes Yes   Sig: Take 150 mcg by mouth daily   nitroGLYcerin (NITROSTAT) 0.4 MG sublingual tablet More than a month at Unknown time  No Yes   Sig: For chest pain place 1 tablet under the tongue every 5 minutes for 3 doses. If symptoms persist 5 minutes after 1st dose call 911.      Facility-Administered Medications: None     Allergies   Allergies   Allergen Reactions     No Known Drug Allergies        Social History   I have reviewed this patient's social history and updated it with pertinent information if needed. Rowan Leiva  reports that she has been smoking cigarettes. She has a 12.00 pack-year smoking history. She has quit using smokeless tobacco. She reports that she does not drink alcohol and does not use drugs.    Family History   I have reviewed this patient's family history and updated it with pertinent information if needed.   Family History   Problem Relation Age of Onset     Blood Disease Sister         Hepatitis B-alive     Cancer Maternal Aunt         bronchial tubes, neck-     Cancer Maternal Uncle         glands in neck-     Cancer Maternal Grandfather         lungs-     Diabetes Father              Heart Disease Father         2 heartattacks-     Diabetes Sister              Diabetes Other         -cousins     Diabetes Paternal Grandmother              Diabetes Paternal Grandfather              Diabetes Paternal Aunt              Hypertension Sister         alive     Thyroid Disease Sister         both sisters-alive     Thyroid Disease Other         niece-alive       Review of Systems   The 10 point Review of Systems is negative other than noted in the HPI or here. Abdominal pain,  diarrhea    Physical Exam   Temp: 97.3  F (36.3  C) Temp src: Temporal BP: (!) 167/68 Pulse: 61   Resp: 18 SpO2: 99 % O2 Device: None (Room air)    Vital Signs with Ranges  Temp:  [97.3  F (36.3  C)] 97.3  F (36.3  C)  Pulse:  [61-65] 61  Resp:  [18] 18  BP: ()/(55-69) 167/68  SpO2:  [93 %-100 %] 99 %  0 lbs 0 oz    GEN:  Alert, oriented x 3, appears comfortable, NAD.  HEENT:  Normocephalic/atraumatic, no scleral icterus, no nasal discharge, mouth moist.  CV:  Regular rate and rhythm, no murmur or JVD.  S1 + S2 noted, no S3 or S4.  LUNGS:  Clear to auscultation bilaterally without rales/rhonchi/wheezing/retractions.  Symmetric chest rise on inhalation noted.  ABD:  Active bowel sounds, soft, non-tender/non-distended.  No rebound/guarding/rigidity.  EXT:  No edema or cyanosis.  Hands/feet warm to touch with good signs of peripheral perfusion.  No joint synovitis noted.  SKIN:  Dry to touch, no exanthems noted in the visualized areas.  NEURO:  Symmetric muscle strength, sensation to touch grossly intact.  No new focal deficits appreciated.    Data   Data reviewed today:  I personally reviewed    Recent Labs   Lab 09/13/22  0917   WBC 4.6   HGB 10.5*   MCV 84   PLT 21*   INR 0.99      POTASSIUM 4.1   CHLORIDE 102   CO2 21*   BUN 36.1*   CR 2.55*   ANIONGAP 13   BERNY 8.5*   *   ALBUMIN 3.2*   PROTTOTAL 6.8   BILITOTAL 0.2   ALKPHOS 123*   ALT 16   AST 28       Recent Results (from the past 24 hour(s))   CT Abdomen Pelvis w/o Contrast    Narrative    CT ABDOMEN AND PELVIS WITHOUT CONTRAST  9/13/2022 11:19 AM    CLINICAL HISTORY: Left-sided flank pain, possible stone vs  diverticulitis.    TECHNIQUE: CT scan of the abdomen and pelvis was performed without IV  contrast. Multiplanar reformats were obtained. Dose reduction  techniques were used.  CONTRAST: None.    COMPARISON: CT abdomen and pelvis on 9/14/2021.    FINDINGS:   LOWER CHEST: Trace bibasilar pleural fluid. This appears stable. Small  stable  pericardial effusion.    HEPATOBILIARY: Cholecystectomy. No acute liver abnormality.    PANCREAS: Normal.    SPLEEN: Normal.    ADRENAL GLANDS: Normal.    KIDNEYS/BLADDER: No hydronephrosis. No acute renal abnormality. No  visible bladder stone. Bilateral renal vascular calcifications. Small  intrarenal stones are a possibility.    BOWEL: No acute abnormality. Normal appendix. No acute inflammatory  change. No diverticulitis is seen at this time.    LYMPH NODES: Normal.    VASCULATURE: Diffuse calcifications.    PELVIC ORGANS: Small free pelvic fluid. Absent uterus.    OTHER: None.    MUSCULOSKELETAL: Spine degenerative changes are mild.      Impression    IMPRESSION:   1.  No obstructing urolithiasis or hydronephrosis. No bladder stone  identified. There may be a few tiny bilateral intrarenal stones and/or  renal vascular calcifications.  2.  Stable trace bibasilar pleural fluid and trace pericardial fluid.  3.  Small free pelvic fluid is stable as well.   US Renal Complete w Duplex Complete    Narrative    EXAMINATION: US RENAL COMPLETE WITH DOPPLER COMPLETE, 9/13/2022 1:51  PM     COMPARISON: Same date CT.    HISTORY: Left flank pain    TECHNIQUE: The kidneys and bladder were scanned in the standard  fashion with specialized ultrasound transducer(s) using.  Color/spectral Doppler techniques are also used for evaluation of  renal vasculature.    FINDINGS:    GRAYSCALE:    Right kidney: Measures 13.4 cm in length. Parenchyma is of normal  thickness and echogenicity. No focal mass. No hydronephrosis.    Left kidney: Measures 11.5 cm in length. Parenchyma is of normal  thickness and echogenicity. No focal mass. No hydronephrosis.     Bladder: Unremarkable.    DOPPLER:    Right: Normal velocities within the right renal artery (less than 200  cm/s). Normal intraparenchymal waveforms with slightly elevated  resistive indices (0.81-0.90) of doubtful clinical significance. Renal  vein is patent.    Left : Normal  velocities within the left renal artery (less than 200  cm/s). Normal intraparenchymal waveforms with slightly elevated  resistive indices (0.75-0.81) of doubtful clinical significance. Renal  vein is patent.      Impression    IMPRESSION:  1.  Normal grayscale appearance of the kidneys. No hydronephrosis.  2.  No sonographic evidence of renal artery stenosis.    MELI MURCIA MD         SYSTEM ID:  [id number]

## 2022-09-13 NOTE — PHARMACY-ADMISSION MEDICATION HISTORY
Admission medication history interview status for this patient is complete. See [facility] admission navigator for allergy information, prior to admission medications and immunization status.     Medication history interview done, indicate source(s): Patient  Medication history resources (including written lists, pill bottles, clinic record):None  Pharmacy: [facility] [place]    Changes made to PTA medication list:  Added: none  Changed: lantus (22 units --> 28 units), humalog (10 units --> 18 units), albuterol (uses PRN)  Reported as Not Taking: none  Removed: cholestyramine, apixaban, pantoprazole, probiotic    Actions taken by pharmacist (provider contacted, etc):None   Additional medication history information:None  Medication reconciliation/reorder completed by provider prior to medication history?  no     For patients on insulin therapy:   Do you use sliding scale insulin based on blood sugars? no  What is your pre-meal insulin coverage?  18 units  Do you typically eat three meals a day? yes  How many times do you check your blood glucose per day? 3x/day  How many episodes of hypoglycemia do you typically have per month? 3 in last week (not eating much)  Do you have a Continuous Glucose Monitor (CGM)?  no    Prior to Admission medications    Medication Sig Last Dose Taking? Auth Provider Long Term End Date   albuterol (PROAIR HFA/PROVENTIL HFA/VENTOLIN HFA) 108 (90 Base) MCG/ACT inhaler Inhale 2 puffs into the lungs every 6 hours  Patient taking differently: Inhale 1-2 puffs into the lungs every 6 hours as needed More than a month at Unknown time Yes Brian Ruiz MD Yes    atorvastatin (LIPITOR) 40 MG tablet Take 1 tablet (40 mg) by mouth daily 9/13/2022 at am Yes Brian Ruiz MD Yes    carvedilol (COREG) 12.5 MG tablet Take 1 tablet (12.5 mg) by mouth 2 times daily 9/13/2022 at x 1 Yes Brian Ruiz MD Yes    clopidogrel (PLAVIX) 75 MG tablet Take 1 tablet (75 mg) by mouth daily 9/13/2022 at am  Yes Brian Ruiz MD Yes    insulin glargine (LANTUS PEN) 100 UNIT/ML pen Inject 28 Units Subcutaneous At Bedtime Past Week at Unknown time Yes Unknown, Entered By History No    insulin lispro (HUMALOG KWIKPEN) 100 UNIT/ML (1 unit dial) KWIKPEN Inject 18 Units Subcutaneous 3 times daily (before meals) Past Week at Unknown time Yes Unknown, Entered By History No    levothyroxine (SYNTHROID/LEVOTHROID) 150 MCG tablet Take 150 mcg by mouth daily 9/13/2022 at am Yes Reported, Patient     nitroGLYcerin (NITROSTAT) 0.4 MG sublingual tablet For chest pain place 1 tablet under the tongue every 5 minutes for 3 doses. If symptoms persist 5 minutes after 1st dose call 911. More than a month at Unknown time Yes Wolf Brumfield MD Yes

## 2022-09-13 NOTE — ED NOTES
[facility]  ED Nurse Handoff Report    Rowan Leiva is a 62 year old female   ED Chief complaint: Flank Pain and Bloated  . ED Diagnosis:   Final diagnoses:   Acute kidney injury (H)   Thrombocytopenia (H)   Diarrhea, unspecified type   Flank pain     Allergies:   Allergies   Allergen Reactions     No Known Drug Allergies        Code Status: Full Code  Activity level - Baseline/Home:  Independent. Activity Level - Current:   Stand by Assist. Lift room needed: No. Bariatric: No   Needed: No   Isolation: No. Infection: Not Applicable.     Vital Signs:   Vitals:    09/13/22 1135 09/13/22 1140 09/13/22 1230 09/13/22 1235   BP:   (!) 180/69    Pulse:   62    Resp:       Temp:       TempSrc:       SpO2: 98% 98% 98% 99%       Cardiac Rhythm:  ,      Pain level:    Patient confused: Yes. Patient Falls Risk: No.   Elimination Status: Has voided   Patient Report - Initial Complaint: Flank pain. Focused Assessment: bloating, flank pain   Tests Performed: CT, US. Blood work. Abnormal Results: Ct, labs.   Treatments provided: fluid  Family Comments:   OBS brochure/video discussed/provided to patient:  N/A  ED Medications:   Medications   0.9% sodium chloride BOLUS (0 mLs Intravenous Stopped 9/13/22 1105)     Followed by   sodium chloride 0.9% infusion (has no administration in time range)   0.9% sodium chloride BOLUS (0 mLs Intravenous Stopped 9/13/22 1330)     Drips infusing:  No  For the majority of the shift, the patient's behavior Green. Interventions performed were n/a.    Sepsis treatment initiated: No     Patient tested for COVID 19 prior to admission: YES    ED Nurse Name/Phone Number: Arabella Alvarenga RN,   1:30 PM      RECEIVING UNIT ED HANDOFF REVIEW    Above ED Nurse Handoff Report was reviewed: Yes  Reviewed by: Suleman Cotton RN on September 13, 2022 at 3:42 PM

## 2022-09-13 NOTE — ED PROVIDER NOTES
History   Chief Complaint:  Flank Pain and Bloated       HPI   Rowan Leiva is a 62 year old female with history of GI bleed, type II diabetes, CAD, NSTEMI anticoagulated on Eliquis, cerebellar stroke, hypertension, ovarian cancer s/p total hysterectomy, thyroid cancer s/p thyroidectomy who presents with flank pain. The patient reports onset of left flank pain, left sided abdominal pain, diarrhea, headache, chills and fatigue 4 days ago. She states her stool has been watery but she has not noticed bright red blood or dark stools. She was seen at urgent care yesterday and had a urinalysis done with results below. They could not do a CT as it was to busy. She also reports some associated dizziness. She denies fevers.     Review of Systems   Constitutional: Positive for chills and fatigue. Negative for fever.   Gastrointestinal: Positive for abdominal pain, diarrhea and vomiting. Negative for blood in stool.   Genitourinary: Positive for flank pain.   Neurological: Positive for dizziness and headaches.   All other systems reviewed and are negative.    Allergies:  The patient has no known allergies.     Medications:  Levothyroxine  Albuterol   Eliquis  Lipitor  Coreg  Plavix  Lantus  Humalog  Protonix    Past Medical History:     Type II diabetes  Tobacco use disorder  Hyperlipidemia  Asthma  Thrombocytopenia  Hyperlipidemia  CAD  MI  Hypertension  Hypothyroidism  NSTEMI  Cerebellar stroke  Melena   Anemia  GI bleed  Colonic hemorrhage   CKD stage 3   DVT  Hematochezia  pancolitis   Ovarian cancer   Thyroid cancer     Past Surgical History:    Bladder surgery    Cholecystectomy   Colonoscopy   CV heart catheterization   CV PCI stent drug eluting  Cystoscopy   EGD   Thyroidectomy   Heart cath, angioplasty x 2   Sling transvaginal   Tubal ligation  Total abdominal hysterectomy      Family History:    Father: diabetes, MI    Social History:  The patient presents to the ED with her   PCP: Joseph  Brian CASTLE     Physical Exam     Patient Vitals for the past 24 hrs:   BP Temp Temp src Pulse Resp SpO2   09/13/22 1235 -- -- -- -- -- 99 %   09/13/22 1230 (!) 180/69 -- -- 62 -- 98 %   09/13/22 1140 -- -- -- -- -- 98 %   09/13/22 1135 -- -- -- -- -- 98 %   09/13/22 1120 (!) 153/58 -- -- 65 -- 98 %   09/13/22 1110 -- -- -- -- -- 99 %   09/13/22 1100 (!) 162/66 -- -- 62 -- 97 %   09/13/22 0901 (!) 73/55 -- -- -- -- 98 %   09/13/22 0859 (!) 81/67 97.3  F (36.3  C) Temporal 62 18 93 %       Physical Exam  Vitals: reviewed by me  General: Pt seen on hospitals, [facility], cooperative, and alert to conversation  Eyes: Tracking well, clear conjunctiva BL  ENT: MMM, midline trachea.   Lungs: No tachypnea, no accessory muscle use. No respiratory distress.  Lungs are clear to auscultation bilaterally  CV: Rate as above, normal S1-S2, no additional heart sounds noted  Abd: Soft, non tender, no guarding, no rebound. Non distended.  Does have slight costovertebral angle tenderness on the left side.  MSK: no joint effusion.  No evidence of trauma  Skin: No rash  Neuro: Clear speech and no facial droop.  Psych: Not RIS, no e/o AH/VH      Emergency Department Course   ECG  ECG taken at 0911,   NSR  Possible anterior infarct, age undetermined  T wave abnormality, consider lateral ischemia  Abnormal ECG   Rate 67 bpm. IL interval 138 ms. QRS duration 78 ms. QT/QTc 422/445 ms. P-R-T axes 49 22 112.    Imaging:  CT Abdomen Pelvis w/o Contrast   Preliminary Result   IMPRESSION:    1.  No obstructing urolithiasis or hydronephrosis. No bladder stone   identified. There may be a few tiny bilateral intrarenal stones and/or   renal vascular calcifications.   2.  Stable trace bibasilar pleural fluid and trace pericardial fluid.   3.  Small free pelvic fluid is stable as well.      US Renal Complete w Duplex Complete    (Results Pending)     Report per radiology    Laboratory:  Labs Ordered and Resulted from Time of ED Arrival to Time of  ED Departure   BASIC METABOLIC PANEL - Abnormal       Result Value    Creatinine 2.55 (*)     Sodium 136      Potassium 4.1      Urea Nitrogen 36.1 (*)     Chloride 102      Carbon Dioxide (CO2) 21 (*)     Anion Gap 13      Glucose 197 (*)     GFR Estimate 21 (*)     Calcium 8.5 (*)    CBC WITH PLATELETS AND DIFFERENTIAL - Abnormal    WBC Count 4.6      RBC Count 4.11      Hemoglobin 10.5 (*)     Hematocrit 34.5 (*)     MCV 84      MCH 25.5 (*)     MCHC 30.4 (*)     RDW 14.6      Platelet Count 21 (*)     % Neutrophils 66      % Lymphocytes 23      % Monocytes 10      % Eosinophils 1      % Basophils 0      % Immature Granulocytes 0      NRBCs per 100 WBC 0      Absolute Neutrophils 3.0      Absolute Lymphocytes 1.0      Absolute Monocytes 0.5      Absolute Eosinophils 0.1      Absolute Basophils 0.0      Absolute Immature Granulocytes 0.0      Absolute NRBCs 0.0     TROPONIN T, HIGH SENSITIVITY - Abnormal    Troponin T, High Sensitivity 42 (*)    HEPATIC FUNCTION PANEL - Abnormal    Protein Total 6.8      Albumin 3.2 (*)     Bilirubin Total 0.2      Alkaline Phosphatase 123 (*)     AST 28      ALT 16      Bilirubin Direct <0.20     PARTIAL THROMBOPLASTIN TIME - Abnormal    aPTT 51 (*)    ROUTINE UA WITH MICROSCOPIC REFLEX TO CULTURE - Abnormal    Color Urine Light Yellow      Appearance Urine Clear      Glucose Urine 100  (*)     Bilirubin Urine Negative      Ketones Urine Negative      Specific Gravity Urine 1.010      Blood Urine Small (*)     pH Urine 6.0      Protein Albumin Urine 200  (*)     Urobilinogen Urine Normal      Nitrite Urine Negative      Leukocyte Esterase Urine Negative      Bacteria Urine Few (*)     RBC Urine 2      WBC Urine 1      Squamous Epithelials Urine 1     TROPONIN T, HIGH SENSITIVITY - Abnormal    Troponin T, High Sensitivity 35 (*)    LACTIC ACID WHOLE BLOOD - Normal    Lactic Acid 1.0     INR - Normal    INR 0.99     COVID-19 VIRUS (CORONAVIRUS) BY PCR   TYPE AND SCREEN, ADULT     ABO/RH(D) O NEG      Antibody Screen Negative      SPECIMEN EXPIRATION DATE [id number]     ABO/RH TYPE AND SCREEN      Emergency Department Course:           Reviewed:  I reviewed nursing notes, vitals, past medical history and Care Everywhere    Assessments:  0919 I obtained history and examined the patient as noted above.   1052 Updated on patient's platelet count.   1210 I rechecked the patient and explained findings.     Consults:  1317 I spoke with Dr. Torres of the Hospitalist service, regarding patient's presentation, findings, and plan of care.     Interventions:  0925 NS, 1 L, IV  1105 NS, 1 L, IV    Disposition:  The patient was admitted to the hospital under the care of Dr. Torres.     Impression & Plan   Medical Decision Making:  This is a pleasant 62-year-old female who presents the emergency room with appears to be acute kidney injury, possibly due to dehydration from her diarrhea, and also thrombocytopenia.  Regarding her acute kidney injury, I did see a CT scan recently that showed severe calcifications in the renal artery, so I am pursuing renal ischemia as well with ultrasound, since I cannot give contrast with her GFR.  She is also receiving IV fluids which should help with her renal function if she is dehydrated.  Secondly, she does have thrombocytopenia, and this is something she is had in the past, but no clear diagnosis was made.  We will hold off on transfusion right now because she is asymptomatic and is not bleeding.  She will be monitored very carefully, and admitted to the care of Dr. Torres of the hospitalist team.  Patient is okay with this plan, all of her questions were answered, and she will be monitored very carefully till the next inpatient bed is available.    Diagnosis:    ICD-10-CM    1. Acute kidney injury (H)  N17.9    2. Thrombocytopenia (H)  D69.6    3. Diarrhea, unspecified type  R19.7    4. Flank pain  R10.9        Scribe Disclosure:  I, Kemar De Los Santos, am serving as  a scribe at 9:05 AM on 9/13/2022 to document services personally performed by Wolf Evangelista MD based on my observations and the provider's statements to me.          Wolf Evangelista MD  09/13/22 1504

## 2022-09-13 NOTE — ED TRIAGE NOTES
Pt seen past weekend for L flank pain and vomiting at . Unable to do CT at time, still having pain and now bloating. Diarrhea and HA past weekend. ABC intact.

## 2022-09-14 LAB
ANION GAP SERPL CALCULATED.3IONS-SCNC: 12 MMOL/L (ref 7–15)
APTT PPP: 45 SECONDS (ref 22–38)
BASOPHILS # BLD AUTO: 0 10E3/UL (ref 0–0.2)
BASOPHILS # BLD AUTO: 0 10E3/UL (ref 0–0.2)
BASOPHILS NFR BLD AUTO: 0 %
BASOPHILS NFR BLD AUTO: 0 %
BUN SERPL-MCNC: 29.2 MG/DL (ref 8–23)
CALCIUM SERPL-MCNC: 7.7 MG/DL (ref 8.8–10.2)
CHLORIDE SERPL-SCNC: 108 MMOL/L (ref 98–107)
CREAT SERPL-MCNC: 2.01 MG/DL (ref 0.51–0.95)
DAT POLY: NEGATIVE
DEPRECATED HCO3 PLAS-SCNC: 19 MMOL/L (ref 22–29)
EOSINOPHIL # BLD AUTO: 0.1 10E3/UL (ref 0–0.7)
EOSINOPHIL # BLD AUTO: 0.1 10E3/UL (ref 0–0.7)
EOSINOPHIL NFR BLD AUTO: 3 %
EOSINOPHIL NFR BLD AUTO: 3 %
ERYTHROCYTE [DISTWIDTH] IN BLOOD BY AUTOMATED COUNT: 14.5 % (ref 10–15)
ERYTHROCYTE [DISTWIDTH] IN BLOOD BY AUTOMATED COUNT: 14.5 % (ref 10–15)
FERRITIN SERPL-MCNC: 528 NG/ML (ref 11–328)
FIBRINOGEN PPP-MCNC: 509 MG/DL (ref 170–490)
FOLATE SERPL-MCNC: 9.1 NG/ML (ref 4.6–34.8)
GFR SERPL CREATININE-BSD FRML MDRD: 28 ML/MIN/1.73M2
GLUCOSE BLDC GLUCOMTR-MCNC: 106 MG/DL (ref 70–99)
GLUCOSE BLDC GLUCOMTR-MCNC: 167 MG/DL (ref 70–99)
GLUCOSE SERPL-MCNC: 82 MG/DL (ref 70–99)
HCT VFR BLD AUTO: 28.6 % (ref 35–47)
HCT VFR BLD AUTO: 28.8 % (ref 35–47)
HGB BLD-MCNC: 9.1 G/DL (ref 11.7–15.7)
HGB BLD-MCNC: 9.1 G/DL (ref 11.7–15.7)
HOLD SPECIMEN: NORMAL
IMM GRANULOCYTES # BLD: 0 10E3/UL
IMM GRANULOCYTES # BLD: 0 10E3/UL
IMM GRANULOCYTES NFR BLD: 0 %
IMM GRANULOCYTES NFR BLD: 1 %
INR PPP: 1.01 (ref 0.85–1.15)
IRON BINDING CAPACITY (ROCHE): 163 UG/DL (ref 240–430)
IRON SATN MFR SERPL: 24 % (ref 15–46)
IRON SERPL-MCNC: 39 UG/DL (ref 37–145)
LACTATE SERPL-SCNC: 0.6 MMOL/L (ref 0.7–2)
LDH SERPL L TO P-CCNC: 181 U/L (ref 0–250)
LYMPHOCYTES # BLD AUTO: 0.9 10E3/UL (ref 0.8–5.3)
LYMPHOCYTES # BLD AUTO: 1.1 10E3/UL (ref 0.8–5.3)
LYMPHOCYTES NFR BLD AUTO: 27 %
LYMPHOCYTES NFR BLD AUTO: 28 %
MCH RBC QN AUTO: 25.6 PG (ref 26.5–33)
MCH RBC QN AUTO: 26.1 PG (ref 26.5–33)
MCHC RBC AUTO-ENTMCNC: 31.6 G/DL (ref 31.5–36.5)
MCHC RBC AUTO-ENTMCNC: 31.8 G/DL (ref 31.5–36.5)
MCV RBC AUTO: 81 FL (ref 78–100)
MCV RBC AUTO: 82 FL (ref 78–100)
MONOCYTES # BLD AUTO: 0.4 10E3/UL (ref 0–1.3)
MONOCYTES # BLD AUTO: 0.4 10E3/UL (ref 0–1.3)
MONOCYTES NFR BLD AUTO: 11 %
MONOCYTES NFR BLD AUTO: 11 %
NEUTROPHILS # BLD AUTO: 1.9 10E3/UL (ref 1.6–8.3)
NEUTROPHILS # BLD AUTO: 2.2 10E3/UL (ref 1.6–8.3)
NEUTROPHILS NFR BLD AUTO: 58 %
NEUTROPHILS NFR BLD AUTO: 58 %
NRBC # BLD AUTO: 0 10E3/UL
NRBC # BLD AUTO: 0 10E3/UL
NRBC BLD AUTO-RTO: 0 /100
NRBC BLD AUTO-RTO: 0 /100
PLATELET # BLD AUTO: 14 10E3/UL (ref 150–450)
PLATELET # BLD AUTO: 16 10E3/UL (ref 150–450)
POTASSIUM SERPL-SCNC: 3.6 MMOL/L (ref 3.4–5.3)
RBC # BLD AUTO: 3.49 10E6/UL (ref 3.8–5.2)
RBC # BLD AUTO: 3.56 10E6/UL (ref 3.8–5.2)
RETICS # AUTO: 0.04 10E6/UL (ref 0.03–0.1)
RETICS/RBC NFR AUTO: 1.2 % (ref 0.5–2)
SODIUM SERPL-SCNC: 139 MMOL/L (ref 136–145)
SPECIMEN EXPIRATION DATE: NORMAL
VIT B12 SERPL-MCNC: 489 PG/ML (ref 232–1245)
WBC # BLD AUTO: 3.2 10E3/UL (ref 4–11)
WBC # BLD AUTO: 3.7 10E3/UL (ref 4–11)

## 2022-09-14 PROCEDURE — 85014 HEMATOCRIT: CPT | Performed by: INTERNAL MEDICINE

## 2022-09-14 PROCEDURE — 250N000013 HC RX MED GY IP 250 OP 250 PS 637: Performed by: INTERNAL MEDICINE

## 2022-09-14 PROCEDURE — 83605 ASSAY OF LACTIC ACID: CPT | Performed by: INTERNAL MEDICINE

## 2022-09-14 PROCEDURE — 96374 THER/PROPH/DIAG INJ IV PUSH: CPT

## 2022-09-14 PROCEDURE — 85730 THROMBOPLASTIN TIME PARTIAL: CPT | Performed by: INTERNAL MEDICINE

## 2022-09-14 PROCEDURE — 250N000011 HC RX IP 250 OP 636: Performed by: INTERNAL MEDICINE

## 2022-09-14 PROCEDURE — 36415 COLL VENOUS BLD VENIPUNCTURE: CPT | Performed by: INTERNAL MEDICINE

## 2022-09-14 PROCEDURE — 82962 GLUCOSE BLOOD TEST: CPT | Mod: 91

## 2022-09-14 PROCEDURE — 96376 TX/PRO/DX INJ SAME DRUG ADON: CPT

## 2022-09-14 PROCEDURE — 85610 PROTHROMBIN TIME: CPT | Performed by: INTERNAL MEDICINE

## 2022-09-14 PROCEDURE — 83550 IRON BINDING TEST: CPT | Performed by: INTERNAL MEDICINE

## 2022-09-14 PROCEDURE — 85045 AUTOMATED RETICULOCYTE COUNT: CPT | Performed by: INTERNAL MEDICINE

## 2022-09-14 PROCEDURE — 82728 ASSAY OF FERRITIN: CPT | Performed by: INTERNAL MEDICINE

## 2022-09-14 PROCEDURE — 96361 HYDRATE IV INFUSION ADD-ON: CPT

## 2022-09-14 PROCEDURE — 82746 ASSAY OF FOLIC ACID SERUM: CPT | Performed by: INTERNAL MEDICINE

## 2022-09-14 PROCEDURE — 86880 COOMBS TEST DIRECT: CPT | Performed by: INTERNAL MEDICINE

## 2022-09-14 PROCEDURE — 82607 VITAMIN B-12: CPT | Performed by: INTERNAL MEDICINE

## 2022-09-14 PROCEDURE — 99225 PR SUBSEQUENT OBSERVATION CARE,LEVEL II: CPT | Performed by: INTERNAL MEDICINE

## 2022-09-14 PROCEDURE — 85018 HEMOGLOBIN: CPT | Performed by: INTERNAL MEDICINE

## 2022-09-14 PROCEDURE — 80048 BASIC METABOLIC PNL TOTAL CA: CPT | Performed by: INTERNAL MEDICINE

## 2022-09-14 PROCEDURE — 85384 FIBRINOGEN ACTIVITY: CPT | Performed by: INTERNAL MEDICINE

## 2022-09-14 PROCEDURE — 99221 1ST HOSP IP/OBS SF/LOW 40: CPT | Mod: TEL | Performed by: INTERNAL MEDICINE

## 2022-09-14 PROCEDURE — G0378 HOSPITAL OBSERVATION PER HR: HCPCS

## 2022-09-14 PROCEDURE — 83615 LACTATE (LD) (LDH) ENZYME: CPT | Performed by: INTERNAL MEDICINE

## 2022-09-14 RX ORDER — DEXAMETHASONE 4 MG/1
40 TABLET ORAL DAILY
Status: DISCONTINUED | OUTPATIENT
Start: 2022-09-14 | End: 2022-09-15 | Stop reason: HOSPADM

## 2022-09-14 RX ADMIN — CARVEDILOL 12.5 MG: 12.5 TABLET, FILM COATED ORAL at 08:00

## 2022-09-14 RX ADMIN — NICOTINE 1 PATCH: 21 PATCH, EXTENDED RELEASE TRANSDERMAL at 08:00

## 2022-09-14 RX ADMIN — DEXAMETHASONE 40 MG: 4 TABLET ORAL at 18:49

## 2022-09-14 RX ADMIN — CLOPIDOGREL BISULFATE 75 MG: 75 TABLET ORAL at 08:00

## 2022-09-14 RX ADMIN — LEVOTHYROXINE SODIUM 150 MCG: 150 TABLET ORAL at 08:00

## 2022-09-14 RX ADMIN — CARVEDILOL 12.5 MG: 12.5 TABLET, FILM COATED ORAL at 22:44

## 2022-09-14 RX ADMIN — HYDRALAZINE HYDROCHLORIDE 10 MG: 20 INJECTION INTRAMUSCULAR; INTRAVENOUS at 21:52

## 2022-09-14 RX ADMIN — HYDRALAZINE HYDROCHLORIDE 10 MG: 20 INJECTION INTRAMUSCULAR; INTRAVENOUS at 01:27

## 2022-09-14 RX ADMIN — ATORVASTATIN CALCIUM 40 MG: 40 TABLET, FILM COATED ORAL at 08:00

## 2022-09-14 ASSESSMENT — ACTIVITIES OF DAILY LIVING (ADL)
ADLS_ACUITY_SCORE: 22

## 2022-09-14 NOTE — PROGRESS NOTES
[facility]    Hospitalist Progress Note  Provider : Wendy Torres MD, MD  Date of Service (when I saw the patient): 09/14/2022    Assessment & Plan   Rowan Leiva is a 62 year old female patient with past medical history of diabetes mellitus type 2, hypertension, hyperlipidemia, hypothyroidism, coronary artery disease status post stent placement, thrombocytopenia, history of malignant neoplasm of thyroid gland requiring resection in 2000, who came to emergency room with a complaint of left flank pain.  Patient states that she has been having intermittent diarrhea since last Wednesday.  She also states that she had abdominal discomfort and left flank pain.  She denies dysuria, urgency or frequency.  She has no cough or shortness of breath.  She also has no fever.  She was seen at urgent care yesterday and had a urinalysis which was negative.  She was advised to come to emergency room.  Her vital signs were checked and showed temperature 97.3, pulse 62, blood pressure 81/67, oxygen saturation 93% on room.  Lab workup showed sodium 136, potassium 3.1, creatinine 2.55, BUN 36.1.  AST 16, AST 28.  WBC 4.6, hemoglobin 10.5, platelets 21.  Lactic acid 1.0, troponin T high-sensitivity 35.  C. difficile toxin B PCR negative.  Enteric bacteria panel negative  CT of the abdomen/pelvis without contrast showed no evidence of urolithiasis or hydronephrosis.  Ultrasound of the kidneys is also negative for hydronephrosis.     Diarrhea likely due to acute gastroenteritis  -Patient has been having intermittent diarrhea since last Wednesday. She has associated abdominal bloating and left flank pain.  CT of the abdomen negative for evidence of acute intra-abdominal abnormality.  No kidney stones.  Started on IV fluids in the ER.  We will continue IV fluid.  We will also put her on antiemetics as needed     Thrombocytopenia, chronic  Prior history of DVT  -HITnegative in 6/2021.  -Patient states that  she has been receiving transfusions in the past.  She follows with hematology as outpatient.  -Platelets down from 21,000 to 14,000 today.   -Will monitor CBC. Hematology consulted for further evaluation and recommendations.  -No need for blood transfusion at at this moment.      Acute kidney injury on chronic kidney disease likely due to diarrhea  Baseline creatinine on 9/23/2021 was 1.72.  Creatinine 2.55 today.  Patient was started on IV fluids in the ER.  We will continue normal saline at 100 mill per hour     Diabetes mellitus type 2, insulin requiring  Blood sugar 197.  Continue  Lantus insulin and scheduled NovoLog at home dose.  We will also put on sliding scale.  Will monitor blood sugar and adjust insulin dose as needed    Covid 19-+: No cough, SOB or hypoxia. No need for dexamethasone or remdesivir at this time     History of embolic stroke: continue statin and Plavix     Hyperlipidemia: Continue statin     History of coronary artery disease status post stent placement in LAD  Continue  Plavix, atorvastatin, Coreg, nitroglycerin     Hypothyroidism: Continue Synthroid     DVT Prophylaxis: Pneumatic Compression Devices  Code Status: Full Code    Disposition: Expected discharge in 1-2 days    Wendy Torres MD    Interval History   Patient seen and examined. He stated that she feeling much better. Abdominal pain and diarrhea resolved.     -Data reviewed today: I reviewed all new labs and imaging results over the last 24 hours. I personally reviewed    Physical Exam   Temp: 97.6  F (36.4  C) Temp src: Temporal BP: 120/53 Pulse: 72   Resp: 16 SpO2: 98 % O2 Device: None (Room air)    There were no vitals filed for this visit.  Vital Signs with Ranges  Temp:  [96.5  F (35.8  C)-98.1  F (36.7  C)] 97.6  F (36.4  C)  Pulse:  [61-72] 72  Resp:  [16-18] 16  BP: (120-188)/(47-69) 120/53  SpO2:  [97 %-100 %] 98 %  I/O last 3 completed shifts:  In: 2000 [IV Piggyback:2000]  Out: -     GEN:  Alert, oriented x 3,  appears comfortable, NAD.  HEENT:  Normocephalic/atraumatic, no scleral icterus, no nasal discharge, mouth moist.  CV:  Regular rate and rhythm, no murmur or JVD.  S1 + S2 noted, no S3 or S4.  LUNGS:  Clear to auscultation bilaterally without rales/rhonchi/wheezing/retractions.  Symmetric chest rise on inhalation noted.  ABD:  Active bowel sounds, soft, non-tender/non-distended.  No rebound/guarding/rigidity.  EXT:  No edema or cyanosis.  Hands/feet warm to touch with good signs of peripheral perfusion.  No joint synovitis noted.  SKIN:  Dry to touch, no exanthems noted in the visualized areas.  NEURO:  Symmetric muscle strength, sensation to touch grossly intact.  No new focal deficits appreciated.    Medications     sodium chloride Stopped (09/14/22 1053)       atorvastatin  40 mg Oral Daily     carvedilol  12.5 mg Oral BID     clopidogrel  75 mg Oral Daily     insulin aspart  18 Units Subcutaneous TID AC     insulin glargine  28 Units Subcutaneous At Bedtime     levothyroxine  150 mcg Oral Daily     nicotine  1 patch Transdermal Daily     nicotine   Transdermal Q8H       Data   Recent Labs   Lab 09/14/22  0725 09/13/22  1721 09/13/22  0917   WBC 3.7*  --  4.6   HGB 9.1*  --  10.5*   MCV 81  --  84   PLT 14*  --  21*   INR  --   --  0.99     --  136   POTASSIUM 3.6  --  4.1   CHLORIDE 108*  --  102   CO2 19*  --  21*   BUN 29.2*  --  36.1*   CR 2.01*  --  2.55*   ANIONGAP 12  --  13   BERNY 7.7*  --  8.5*   GLC 82 210* 197*   ALBUMIN  --   --  3.2*   PROTTOTAL  --   --  6.8   BILITOTAL  --   --  0.2   ALKPHOS  --   --  123*   ALT  --   --  16   AST  --   --  28       Recent Results (from the past 24 hour(s))   US Renal Complete w Duplex Complete    Narrative    EXAMINATION: US RENAL COMPLETE WITH DOPPLER COMPLETE, 9/13/2022 1:51  PM     COMPARISON: Same date CT.    HISTORY: Left flank pain    TECHNIQUE: The kidneys and bladder were scanned in the standard  fashion with specialized ultrasound transducer(s)  using.  Color/spectral Doppler techniques are also used for evaluation of  renal vasculature.    FINDINGS:    GRAYSCALE:    Right kidney: Measures 13.4 cm in length. Parenchyma is of normal  thickness and echogenicity. No focal mass. No hydronephrosis.    Left kidney: Measures 11.5 cm in length. Parenchyma is of normal  thickness and echogenicity. No focal mass. No hydronephrosis.     Bladder: Unremarkable.    DOPPLER:    Right: Normal velocities within the right renal artery (less than 200  cm/s). Normal intraparenchymal waveforms with slightly elevated  resistive indices (0.81-0.90) of doubtful clinical significance. Renal  vein is patent.    Left : Normal velocities within the left renal artery (less than 200  cm/s). Normal intraparenchymal waveforms with slightly elevated  resistive indices (0.75-0.81) of doubtful clinical significance. Renal  vein is patent.      Impression    IMPRESSION:  1.  Normal grayscale appearance of the kidneys. No hydronephrosis.  2.  No sonographic evidence of renal artery stenosis.    MELI MURCIA MD         SYSTEM ID:  [id number]

## 2022-09-14 NOTE — PROVIDER NOTIFICATION
DATE:  9/14/2022   TIME OF RECEIPT FROM LAB:  0900  LAB TEST:  platelets  LAB VALUE:  14,000  RESULTS GIVEN WITH READ-BACK TO (PROVIDER):  Wendy Torres,*  TIME LAB VALUE REPORTED TO PROVIDER:   0857

## 2022-09-14 NOTE — PROVIDER NOTIFICATION
Pt triggered sepsis advisory this am, RN messaged provider to ask if lactic necessary based on vitals WNL and pt upset about another lab draw.

## 2022-09-14 NOTE — PLAN OF CARE
Goal Outcome Evaluation:             Pt is A&OX4. LS shallow, denies SOB.  Sat .98 % on RA.Pt c/o cough and stuffy nose.No N/V. Pt c/o back pain ,but refused to take pain medications, due to Dx: CKD.SBP was 183, per order writer notify MD. PRN Hydralazine was given, rechecked  BP and SBP was-167.pt is Independent in room.possible discharge today.

## 2022-09-14 NOTE — UTILIZATION REVIEW
"  Admission Status; Secondary Review Determination         Under the authority of the Utilization Management Committee, the utilization review process indicated a secondary review on the above patient.  The review outcome is based on review of the medical records, discussions with staff, and applying clinical experience noted on the date of the review.          (x) Observation Status Appropriate - This patient does not meet hospital inpatient criteria and is placed in observation status. If this patient's primary payer is Medicare and was admitted as an inpatient, Condition Code 44 should be used and patient status changed to \"observation\".     RATIONALE FOR DETERMINATION     The severity of illness, intensity of service provided, expected LOS and risk for adverse outcome make the care appropriate for further observation; however, doesn't meet criteria for hospital inpatient admission.   notified of this determination.    The patient is a 62-year-old female admitted on 9/13/2022.  Patient came in to the ED due to abdominal discomfort and left flank pain.  Patient has had intermittent diarrhea for the past week.  She does have a history of thrombocytopenia and her baseline platelet count is 21,000 and is decreased to 14,000 on this admission.  Creatinine is somewhat up with a baseline of 1.72 and was initially 2.55 and is now improved to 2.01.  Patient has a white count of 3.1.  Patient has not had a fever.  Her pulse is normal at 61-70 and O2 sats are stable.  At this time, diagnosis is gastroenteritis.  Likely C. difficile was evaluated although not seeing and current labs.  CT without contrast does not show any hydronephrosis or urinary findings.  COVID PCR test is positive.  Given lack of respiratory distress and otherwise improving renal function and persisting history of thrombocytopenia, recommend switching patient from inpatient status to observation status.  Dr. Morin will be notified of this " recommendation and is encouraged to call me with questions.        The information on this document is developed by the utilization review team in order for the business office to ensure compliance.  This only denotes the appropriateness of proper admission status and does not reflect the quality of care rendered.         The definitions of Inpatient Status and Observation Status used in making the determination above are those provided in the CMS Coverage Manual, Chapter 1 and Chapter 6, section 70.4.      Sincerely,     Darren Hollingsworth MD  Physician Advisor  Utilization Review/ Case Management  [facility].

## 2022-09-14 NOTE — CONSULTS
[facility]    Hematology/Oncology Consult Note      Date of Admission:  9/13/2022  Date of Consultation:  09/14/22  Reason for Consultation: Thrombocytopenia      ASSESSMENT/PLAN:  Rowan Leiva is a 62 year old female with complex medical history including chronic anemia, thrombocytopenia, and history of B/L posterior tibial DVT and presumed PE. She is followed by Hematologist, Dr. Lexa Miller. Patient is admitted now for acute COVID pneumonia and has pancytopenia. Hematology is consulted for this.     1) Acute on chronic anemia and thrombocytopenia  2) Acute leukopenia (normal diff)  3) Acute COVID infection  4) CKD  5) History of B/L DVT/presumed PE recommended to be on eliquis, but held due to concerns for ongoing blood loss and thrombocytopenia  6) HIT negative 6/2021 (she has not received any heparin products this admission)  7) History of CAD and CVA    -Appreciate medicine team management.  -CT A/P is negative for bleed.  -Patient is not iron deficient. Folate/B12 pending.  -, hepatic enzymes normal, fibrinogen 509. JADEN negative. This is not consistent with hemolysis. Haptoglobin and peripheral smear pending.    -Stop plavix for now as platelets are <50K. Will need to resume outpatient once platelets are >50K.  -I reviewed the patient's case with her primary hematologist, Dr. Miller, who is in agreement with trial pulse dexamethasone 40 mg x4 days due to possible concerns for underlying ITP complicated by acute COVID infection/sepsis. I discussed with Dr. Torres who does not see current contraindication for steroid therapy.   -Possible IVIG if possible is not responding to dexamethasone pulse.  -She may require bone marrow biopsy, but this can be done outpatient.    Thank you for the consultation. Please do not hesitate to contact our team with any further questions or concerns.     Lila Gutierrez, DO  Hematology/Oncology  [facility]            HISTORY  "OF PRESENT ILLNESS: Rowan Leiva is a 62 year old female with complex medical history including chronic anemia, thrombocytopenia, and history of B/L posterior tibial DVT and presumed PE. She is followed by Hematologist, Dr. Lexa Miller. Patient is admitted now for acute COVID pneumonia and has pancytopenia. Hematology is consulted for this.     Per record review, patient was hospitalized in June 2021 for acute VTE with anemia/thrombocytopenia. There were concerns for HIT, but this ultimately returned negative. She was transitioned from an argatroban drip to eliquis. She had a colonoscopy in June 2021 showing \"blood/clots throughout the colon.\" EGD was negative. Video capsule had shown apthous ulcer, one AVM, no active bleed.    Per clinic notes, patient is recommended to be on lifelong anticoagulation with eliquis. At one point, there were concerns for ongoing blood loss and eliquis had been held. It appears eliquis has been held since November 2021, at which time, Hb was 10, PLT 128K.    Currently, patient denies recent weight loss, increased fatigue, or gross evidence of bleed. She began to feel unwell with general malaise and sought attention at urgent care. Due to cytopenias, she was told to come to the ED. She is COVID positive. WBCs are in the 3s with normal diff, Hb 9, MCv 82, PLT 14-20K.     Patient denies gross evidence of bleed.      REVIEW OF SYSTEMS:   A 14 point ROS was reviewed with pertinent positives and negatives in the HPI.      MEDICATIONS:  Current Facility-Administered Medications   Medication     acetaminophen (TYLENOL) tablet 650 mg    Or     acetaminophen (TYLENOL) Suppository 650 mg     albuterol (PROVENTIL HFA/VENTOLIN HFA) inhaler     atorvastatin (LIPITOR) tablet 40 mg     carvedilol (COREG) tablet 12.5 mg     [Held by provider] clopidogrel (PLAVIX) tablet 75 mg     glucose gel 15-30 g    Or     dextrose 50 % injection 25-50 mL    Or     glucagon injection 1 mg     hydrALAZINE " (APRESOLINE) injection 10 mg     insulin aspart (NovoLOG) injection (RAPID ACTING)     insulin glargine (LANTUS PEN) injection 28 Units     levothyroxine (SYNTHROID/LEVOTHROID) tablet 150 mcg     melatonin tablet 1 mg     nicotine (COMMIT) lozenge 4 mg     nicotine (NICODERM CQ) 21 MG/24HR 24 hr patch 1 patch     nicotine Patch in Place     nitroGLYcerin (NITROSTAT) sublingual tablet 0.4 mg     ondansetron (ZOFRAN ODT) ODT tab 4 mg    Or     ondansetron (ZOFRAN) injection 4 mg     sodium chloride 0.9% infusion         ALLERGIES:  Allergies   Allergen Reactions     No Known Drug Allergies          PAST MEDICAL HISTORY:  Past Medical History:   Diagnosis Date     Cancer of thyroid (H)      Chest pain      Coronary artery disease     -     HX OF OVARIAN MALIGNANCY      Hyperlipidemia      Hypertension      Hypothyroidism      Malignant neoplasm of thyroid gland (H)     papillary carcinoma; resection      Mild intermittent asthma     minimal symptoms, albuterol use 2x/year     Myocardial infarction (H)     anterior     Pulmonary nodule      Thrombocytopenia (H)      Tobacco use disorder      Type II or unspecified type diabetes mellitus without mention of complication, not stated as uncontrolled          PAST SURGICAL HISTORY:  Past Surgical History:   Procedure Laterality Date     BLADDER SURGERY       C ANESTH, SECTION       CHOLECYSTECTOMY       COLONOSCOPY N/A 2021    Procedure: COLONOSCOPY;  Surgeon: Red Tracey DO;  Location:  GI     CV HEART CATHETERIZATION WITH POSSIBLE INTERVENTION N/A 2021    Procedure: Heart Catheterization with Possible Intervention;  Surgeon: Wolf Brumfield MD;  Location: [facility] CARDIAC CATH LAB     CV PCI STENT DRUG ELUTING N/A 2021    Procedure: Percutaneous Coronary Intervention Stent Drug Eluting;  Surgeon: Wolf Brumfield MD;  Location: [facility] CARDIAC CATH LAB     CYSTOSCOPY  2014     Procedure: CYSTOSCOPY;  Surgeon: Sabino Jamil MD;  Location: [facility]     ESOPHAGOSCOPY, GASTROSCOPY, DUODENOSCOPY (EGD), COMBINED N/A 6/18/2021    Procedure: ESOPHAGOGASTRODUODENOSCOPY (EGD);  Surgeon: Jean Hsieh MD;  Location:  GI     HC THYROIDECTOMY  6/2000    papillary thyroid carcinoma - Endocrine     HEART CATH, ANGIOPLASTY  7/13/11    2.5 X 18 mm & 2.25 X 18 mm Xience ELLIOT to Mid LAD     HEART CATH, ANGIOPLASTY  7/29/11    Staged-3.0 X 23 mm Xience ELLIOT to Mid RAC     SLING TRANSVAGINAL N/A 12/30/2014    Procedure: SLING TRANSVAGINAL;  Surgeon: Sabino Jamil MD;  Location: [facility] LIGATE FALLOPIAN TUBE  1987    Tubal Ligation     ZC TOTAL ABDOM HYSTERECTOMY  2001    ovarian cancer (low grade) - Heme/Onc         SOCIAL HISTORY:  Social History     Socioeconomic History     Marital status:      Spouse name: Clif     Number of children: 2     Years of education: 13     Highest education level: Not on file   Occupational History     Comment: [employer]   Tobacco Use     Smoking status: Current Every Day Smoker     Packs/day: 0.50     Years: 24.00     Pack years: 12.00     Types: Cigarettes     Smokeless tobacco: Former User     Tobacco comment: 12 cigarettes daily   Vaping Use     Vaping Use: Never used   Substance and Sexual Activity     Alcohol use: No     Alcohol/week: 0.0 standard drinks     Drug use: No     Sexual activity: Yes     Partners: Male     Birth control/protection: Female Surgical     Comment: Hysterectomy and tubal ligation   Other Topics Concern      Service Not Asked     Blood Transfusions Not Asked     Caffeine Concern Yes     Comment: 3-4 cans qd     Occupational Exposure Not Asked     Hobby Hazards Not Asked     Sleep Concern Not Asked     Stress Concern Not Asked     Weight Concern Not Asked     Special Diet Not Asked     Back Care Not Asked     Exercise Yes     Bike Helmet Not Asked     Seat Belt Yes     Self-Exams Not Asked     Parent/sibling  "w/ CABG, MI or angioplasty before 65F 55M? Not Asked   Social History Narrative     Not on file     Social Determinants of Health     Financial Resource Strain: Not on file   Food Insecurity: Not on file   Transportation Needs: Not on file   Physical Activity: Not on file   Stress: Not on file   Social Connections: Not on file   Intimate Partner Violence: Not on file   Housing Stability: Not on file         FAMILY HISTORY:  Family History   Problem Relation Age of Onset     Blood Disease Sister         Hepatitis B-alive     Cancer Maternal Aunt         bronchial tubes, neck-     Cancer Maternal Uncle         glands in neck-     Cancer Maternal Grandfather         lungs-     Diabetes Father              Heart Disease Father         2 heartattacks-     Diabetes Sister              Diabetes Other         -cousins     Diabetes Paternal Grandmother              Diabetes Paternal Grandfather              Diabetes Paternal Aunt              Hypertension Sister         alive     Thyroid Disease Sister         both sisters-alive     Thyroid Disease Other         niece-alive         PHYSICAL EXAM:  Vital signs:  Temp: 97.6  F (36.4  C) Temp src: Temporal BP: 120/53 Pulse: 72   Resp: 16 SpO2: 98 % O2 Device: None (Room air)        Estimated body mass index is 29.35 kg/m  as calculated from the following:    Height as of 22: 1.626 m (5' 4\").    Weight as of 22: 77.6 kg (171 lb).    Today's visit is conducted over the phone as patient is in COVID isolation. She is A&Ox4. Speech is fluid and non-pressured. She does not have cough or conversational dyspnea.      LABS:  CBC RESULTS:   Recent Labs   Lab Test 22  1223   WBC 3.2*   RBC 3.49*   HGB 9.1*   HCT 28.6*   MCV 82   MCH 26.1*   MCHC 31.8   RDW 14.5   PLT 16*      Latest Reference Range & Units 22 12:23   MCHC 31.5 - 36.5 g/dL 31.8   RDW 10.0 - 15.0 % 14.5   % Neutrophils % 58 "   % Lymphocytes % 27   % Monocytes % 11   % Eosinophils % 3   % Basophils % 0   Absolute Basophils 0.0 - 0.2 10e3/uL 0.0   Absolute Eosinophils 0.0 - 0.7 10e3/uL 0.1   Absolute Immature Granulocytes <=0.4 10e3/uL 0.0   Absolute Lymphocytes 0.8 - 5.3 10e3/uL 0.9   Absolute Monocytes 0.0 - 1.3 10e3/uL 0.4   % Immature Granulocytes % 1   Absolute Neutrophils 1.6 - 8.3 10e3/uL 1.9   Absolute NRBCs 10e3/uL 0.0   NRBCs per 100 WBC <1 /100 0   % Retic 0.5 - 2.0 % 1.2   Absolute Retic 0.025 - 0.095 10e6/uL 0.042     Recent Labs   Lab Test 09/14/22  0725 09/13/22  1721 09/13/22  0917     --  136   POTASSIUM 3.6  --  4.1   CHLORIDE 108*  --  102   CO2 19*  --  21*   ANIONGAP 12  --  13   GLC 82 210* 197*   BUN 29.2*  --  36.1*   CR 2.01*  --  2.55*   BERNY 7.7*  --  8.5*     Lab Results   Component Value Date    AST 28 09/13/2022    AST 13 06/22/2021     Lab Results   Component Value Date    ALT 16 09/13/2022    ALT 13 06/22/2021     Lab Results   Component Value Date    BILICONJ 0.0 11/05/2012      Lab Results   Component Value Date    BILITOTAL 0.2 09/13/2022    BILITOTAL 0.3 06/22/2021     Lab Results   Component Value Date    ALBUMIN 3.2 09/13/2022    ALBUMIN 2.9 09/23/2021    ALBUMIN 2.6 06/22/2021     Lab Results   Component Value Date    PROTTOTAL 6.8 09/13/2022    PROTTOTAL 6.2 06/22/2021      Lab Results   Component Value Date    ALKPHOS 123 09/13/2022    ALKPHOS 97 06/22/2021           PATHOLOGY:  Peripheral smear- pending    IMAGING:  CT ABDOMEN AND PELVIS WITHOUT CONTRAST  9/13/2022 11:19 AM     CLINICAL HISTORY: Left-sided flank pain, possible stone vs  diverticulitis.     TECHNIQUE: CT scan of the abdomen and pelvis was performed without IV  contrast. Multiplanar reformats were obtained. Dose reduction  techniques were used.  CONTRAST: None.     COMPARISON: CT abdomen and pelvis on 9/14/2021.     FINDINGS:   LOWER CHEST: Trace bibasilar pleural fluid. This appears stable. Small  stable pericardial  effusion.     HEPATOBILIARY: Cholecystectomy. No acute liver abnormality.     PANCREAS: Normal.     SPLEEN: Normal.     ADRENAL GLANDS: Normal.     KIDNEYS/BLADDER: No hydronephrosis. No acute renal abnormality. No  visible bladder stone. Bilateral renal vascular calcifications. Small  intrarenal stones are a possibility.     BOWEL: No acute abnormality. Normal appendix. No acute inflammatory  change. No diverticulitis is seen at this time.     LYMPH NODES: Normal.     VASCULATURE: Diffuse calcifications.     PELVIC ORGANS: Small free pelvic fluid. Absent uterus.     OTHER: None.     MUSCULOSKELETAL: Spine degenerative changes are mild.                                                                      IMPRESSION:   1.  No obstructing urolithiasis or hydronephrosis. No bladder stone  identified. There may be a few tiny bilateral intrarenal stones and/or  renal vascular calcifications.  2.  Stable trace bibasilar pleural fluid and trace pericardial fluid.  3.  Small free pelvic fluid is stable as well.    EXAMINATION: US RENAL COMPLETE WITH DOPPLER COMPLETE, 9/13/2022 1:51  PM      COMPARISON: Same date CT.     HISTORY: Left flank pain     TECHNIQUE: The kidneys and bladder were scanned in the standard  fashion with specialized ultrasound transducer(s) using.  Color/spectral Doppler techniques are also used for evaluation of  renal vasculature.     FINDINGS:     GRAYSCALE:     Right kidney: Measures 13.4 cm in length. Parenchyma is of normal  thickness and echogenicity. No focal mass. No hydronephrosis.     Left kidney: Measures 11.5 cm in length. Parenchyma is of normal  thickness and echogenicity. No focal mass. No hydronephrosis.      Bladder: Unremarkable.     DOPPLER:     Right: Normal velocities within the right renal artery (less than 200  cm/s). Normal intraparenchymal waveforms with slightly elevated  resistive indices (0.81-0.90) of doubtful clinical significance. Renal  vein is patent.     Left : Normal  velocities within the left renal artery (less than 200  cm/s). Normal intraparenchymal waveforms with slightly elevated  resistive indices (0.75-0.81) of doubtful clinical significance. Renal  vein is patent.                                                                      IMPRESSION:  1.  Normal grayscale appearance of the kidneys. No hydronephrosis.  2.  No sonographic evidence of renal artery stenosis.          Thank you for the opportunity to participate in this patient's care.  Please call with any questions.    Lila Gutierrez DO  Hematology/Oncology  [facility]

## 2022-09-14 NOTE — PLAN OF CARE
Temp: 98.1  F (36.7  C) Temp src: Temporal BP: (!) 188/61 Pulse: 62   Resp: 18 SpO2: 100 % O2 Device: None (Room air)      Orientation:  A&O x4  VS: BP is high. Scheduled Coreg given. Pt states this is her baseline.  Pain:  denies pain  Activity:  Independent  Resp:  LS: Diminished.  GI:  WNL  : WNL  Skin:  WNL  Lines: PIV. 100/hr  Diet: CHO  Labs:  CoVid positive.  Plan: Home  Discharge: TBD.

## 2022-09-14 NOTE — PLAN OF CARE
Pt A&O, vitals monitored on RA.  Covid precautions maintained.  Up IND in room.  Pt states nausea, vomiting resolved.  Hoping to discharge home - awaiting hem/onc to round on pt.

## 2022-09-15 VITALS
OXYGEN SATURATION: 98 % | RESPIRATION RATE: 16 BRPM | SYSTOLIC BLOOD PRESSURE: 155 MMHG | TEMPERATURE: 97 F | DIASTOLIC BLOOD PRESSURE: 57 MMHG | HEART RATE: 69 BPM

## 2022-09-15 LAB
ANION GAP SERPL CALCULATED.3IONS-SCNC: 13 MMOL/L (ref 7–15)
BUN SERPL-MCNC: 31.9 MG/DL (ref 8–23)
CALCIUM SERPL-MCNC: 8.5 MG/DL (ref 8.8–10.2)
CHLORIDE SERPL-SCNC: 104 MMOL/L (ref 98–107)
CREAT SERPL-MCNC: 2.03 MG/DL (ref 0.51–0.95)
DEPRECATED HCO3 PLAS-SCNC: 19 MMOL/L (ref 22–29)
ERYTHROCYTE [DISTWIDTH] IN BLOOD BY AUTOMATED COUNT: 14.2 % (ref 10–15)
GFR SERPL CREATININE-BSD FRML MDRD: 27 ML/MIN/1.73M2
GLUCOSE BLDC GLUCOMTR-MCNC: 239 MG/DL (ref 70–99)
GLUCOSE BLDC GLUCOMTR-MCNC: 250 MG/DL (ref 70–99)
GLUCOSE BLDC GLUCOMTR-MCNC: 250 MG/DL (ref 70–99)
GLUCOSE BLDC GLUCOMTR-MCNC: 279 MG/DL (ref 70–99)
GLUCOSE SERPL-MCNC: 262 MG/DL (ref 70–99)
HAPTOGLOB SERPL-MCNC: 161 MG/DL (ref 32–197)
HCT VFR BLD AUTO: 33.3 % (ref 35–47)
HGB BLD-MCNC: 10.7 G/DL (ref 11.7–15.7)
HOLD SPECIMEN: NORMAL
HOLD SPECIMEN: NORMAL
MCH RBC QN AUTO: 25.7 PG (ref 26.5–33)
MCHC RBC AUTO-ENTMCNC: 32.1 G/DL (ref 31.5–36.5)
MCV RBC AUTO: 80 FL (ref 78–100)
PLATELET # BLD AUTO: 31 10E3/UL (ref 150–450)
POTASSIUM SERPL-SCNC: 4.6 MMOL/L (ref 3.4–5.3)
RBC # BLD AUTO: 4.17 10E6/UL (ref 3.8–5.2)
SODIUM SERPL-SCNC: 136 MMOL/L (ref 136–145)
WBC # BLD AUTO: 3.8 10E3/UL (ref 4–11)

## 2022-09-15 PROCEDURE — 250N000012 HC RX MED GY IP 250 OP 636 PS 637: Performed by: INTERNAL MEDICINE

## 2022-09-15 PROCEDURE — 80048 BASIC METABOLIC PNL TOTAL CA: CPT | Performed by: INTERNAL MEDICINE

## 2022-09-15 PROCEDURE — 36415 COLL VENOUS BLD VENIPUNCTURE: CPT | Performed by: INTERNAL MEDICINE

## 2022-09-15 PROCEDURE — 96372 THER/PROPH/DIAG INJ SC/IM: CPT | Performed by: INTERNAL MEDICINE

## 2022-09-15 PROCEDURE — G0378 HOSPITAL OBSERVATION PER HR: HCPCS

## 2022-09-15 PROCEDURE — 250N000013 HC RX MED GY IP 250 OP 250 PS 637: Performed by: INTERNAL MEDICINE

## 2022-09-15 PROCEDURE — 85027 COMPLETE CBC AUTOMATED: CPT | Performed by: INTERNAL MEDICINE

## 2022-09-15 PROCEDURE — 250N000011 HC RX IP 250 OP 636: Performed by: INTERNAL MEDICINE

## 2022-09-15 PROCEDURE — 96376 TX/PRO/DX INJ SAME DRUG ADON: CPT

## 2022-09-15 PROCEDURE — 82962 GLUCOSE BLOOD TEST: CPT

## 2022-09-15 PROCEDURE — 99217 PR OBSERVATION CARE DISCHARGE: CPT | Performed by: INTERNAL MEDICINE

## 2022-09-15 RX ORDER — CLOPIDOGREL BISULFATE 75 MG/1
75 TABLET ORAL DAILY
Qty: 90 TABLET | Refills: 3 | Status: SHIPPED | OUTPATIENT
Start: 2022-09-22 | End: 2023-09-08

## 2022-09-15 RX ADMIN — INSULIN ASPART 18 UNITS: 100 INJECTION, SOLUTION INTRAVENOUS; SUBCUTANEOUS at 09:29

## 2022-09-15 RX ADMIN — DEXAMETHASONE 40 MG: 4 TABLET ORAL at 08:01

## 2022-09-15 RX ADMIN — HYDRALAZINE HYDROCHLORIDE 10 MG: 20 INJECTION INTRAMUSCULAR; INTRAVENOUS at 02:21

## 2022-09-15 RX ADMIN — LEVOTHYROXINE SODIUM 150 MCG: 150 TABLET ORAL at 08:00

## 2022-09-15 RX ADMIN — INSULIN ASPART 18 UNITS: 100 INJECTION, SOLUTION INTRAVENOUS; SUBCUTANEOUS at 12:50

## 2022-09-15 RX ADMIN — HYDRALAZINE HYDROCHLORIDE 10 MG: 20 INJECTION INTRAMUSCULAR; INTRAVENOUS at 08:12

## 2022-09-15 RX ADMIN — CARVEDILOL 12.5 MG: 12.5 TABLET, FILM COATED ORAL at 08:01

## 2022-09-15 RX ADMIN — ATORVASTATIN CALCIUM 40 MG: 40 TABLET, FILM COATED ORAL at 08:00

## 2022-09-15 RX ADMIN — NICOTINE 1 PATCH: 21 PATCH, EXTENDED RELEASE TRANSDERMAL at 08:05

## 2022-09-15 ASSESSMENT — ACTIVITIES OF DAILY LIVING (ADL)
ADLS_ACUITY_SCORE: 22

## 2022-09-15 NOTE — PLAN OF CARE
Goal Outcome Evaluation:         Patient A&Ox4,  LS  shallow, pt c/o cough and stuffy nose.denies pain. SBP  was high and PRN hydralazine was given.  Pt is independent in the room. Blood sugars 250@02 AM and 250 @ 06 AM too.  Hematology  will follow.Covid precautions maintained and nursing will cont. Monitoring.

## 2022-09-15 NOTE — PROVIDER NOTIFICATION
"Patient not found in room at 1500, no personal belongings found in room.   Patient found sitting on bench outside, support staff approached her and patient stated \"tell my nurse and doctor I am ready to go\".   Dr. Torres notified.  Patient did return to room.  Dr. Torres seeing patient at this time.  "

## 2022-09-15 NOTE — PROGRESS NOTES
[facility]    Hospitalist Progress Note  Provider : Wendy Torres MD, MD  Date of Service (when I saw the patient): 09/15/2022    Assessment & Plan   Rowan Leiva is a 62 year old female patient with past medical history of diabetes mellitus type 2, hypertension, hyperlipidemia, hypothyroidism, coronary artery disease status post stent placement, thrombocytopenia, history of malignant neoplasm of thyroid gland requiring resection in 2000, who came to emergency room with a complaint of left flank pain.  Patient states that she has been having intermittent diarrhea since last Wednesday.  She also states that she had abdominal discomfort and left flank pain.  She denies dysuria, urgency or frequency.  She has no cough or shortness of breath.  She also has no fever.  She was seen at urgent care yesterday and had a urinalysis which was negative.  She was advised to come to emergency room.  Her vital signs were checked and showed temperature 97.3, pulse 62, blood pressure 81/67, oxygen saturation 93% on room.  Lab workup showed sodium 136, potassium 3.1, creatinine 2.55, BUN 36.1.  AST 16, AST 28.  WBC 4.6, hemoglobin 10.5, platelets 21.  Lactic acid 1.0, troponin T high-sensitivity 35.  C. difficile toxin B PCR negative.  Enteric bacteria panel negative  CT of the abdomen/pelvis without contrast showed no evidence of urolithiasis or hydronephrosis.  Ultrasound of the kidneys is also negative for hydronephrosis.     Diarrhea likely due to acute gastroenteritis-resolved  -Patient has been having intermittent diarrhea since last Wednesday. She has associated abdominal bloating and left flank pain.  CT of the abdomen negative for evidence of acute intra-abdominal abnormality.  No kidney stones.  -She was given IV fluids. Diarrhea improved.      Thrombocytopenia, chronic  Prior history of DVT  -HITnegative in 6/2021.  -Patient was thrombocytopenic with platelets down to 14K.   -JADEN  negative. LFTs normal. . Fibrinogen 509. No evidence of hemolysis.   -Hem/onc consulted and patient started on dexamethasone 40 mg x 4 days.  -Platelets 31 K today . Will monitor cbc. Appreciate hem/onc input.      Acute kidney injury on chronic kidney disease likely due to diarrhea  -Baseline creatinine on 9/23/2021 was 1.72.  Creatinine 2.55 today.  Patient was started on IV fluids in the ER.  We will continue normal saline at 100 mill per hour     Diabetes mellitus type 2, insulin requiring  -Blood sugar 197.  -Continue  Lantus insulin and scheduled NovoLog at home dose.  We will also put on sliding scale.  Will monitor blood sugar and adjust insulin dose as needed    Covid 19-+: No cough, SOB or hypoxia. No need for dexamethasone or remdesivir at this time     History of embolic stroke: continue statin and Plavix     Hyperlipidemia: Continue statin     History of coronary artery disease status post stent placement in LAD  Continue  Plavix, atorvastatin, Coreg, nitroglycerin     Hypothyroidism: Continue Synthroid     DVT Prophylaxis: Pneumatic Compression Devices  Code Status: Full Code    Disposition: Expected discharge in 1-2 days    Wendy Torres MD    Interval History   Patient seen and examined. He stated that she feeling much better. Abdominal pain and diarrhea resolved.     -Data reviewed today: I reviewed all new labs and imaging results over the last 24 hours. I personally reviewed    Physical Exam   Temp: 97  F (36.1  C) Temp src: Temporal BP: (!) 155/57 Pulse: 69   Resp: 16 SpO2: 98 % O2 Device: None (Room air)    There were no vitals filed for this visit.  Vital Signs with Ranges  Temp:  [96.5  F (35.8  C)-98.2  F (36.8  C)] 97  F (36.1  C)  Pulse:  [67-72] 69  Resp:  [16] 16  BP: (124-191)/(57-71) 155/57  SpO2:  [96 %-99 %] 98 %  No intake/output data recorded.    GEN:  Alert, oriented x 3, appears comfortable, NAD.  HEENT:  Normocephalic/atraumatic, no scleral icterus, no nasal discharge,  mouth moist.  CV:  Regular rate and rhythm, no murmur or JVD.  S1 + S2 noted, no S3 or S4.  LUNGS:  Clear to auscultation bilaterally without rales/rhonchi/wheezing/retractions.  Symmetric chest rise on inhalation noted.  ABD:  Active bowel sounds, soft, non-tender/non-distended.  No rebound/guarding/rigidity.  EXT:  No edema or cyanosis.  Hands/feet warm to touch with good signs of peripheral perfusion.  No joint synovitis noted.  SKIN:  Dry to touch, no exanthems noted in the visualized areas.  NEURO:  Symmetric muscle strength, sensation to touch grossly intact.  No new focal deficits appreciated.    Medications     sodium chloride Stopped (09/14/22 1053)       atorvastatin  40 mg Oral Daily     carvedilol  12.5 mg Oral BID     [Held by provider] clopidogrel  75 mg Oral Daily     dexamethasone  40 mg Oral Daily     insulin aspart  18 Units Subcutaneous TID AC     insulin glargine  28 Units Subcutaneous At Bedtime     levothyroxine  150 mcg Oral Daily     nicotine  1 patch Transdermal Daily     nicotine   Transdermal Q8H       Data   Recent Labs   Lab 09/15/22  1239 09/15/22  0817 09/15/22  0755 09/14/22  1729 09/14/22  1223 09/14/22  0725 09/13/22  1721 09/13/22  0917   WBC  --  3.8*  --   --  3.2* 3.7*  --  4.6   HGB  --  10.7*  --   --  9.1* 9.1*  --  10.5*   MCV  --  80  --   --  82 81  --  84   PLT  --  31*  --   --  16* 14*  --  21*   INR  --   --   --   --   --  1.01  --  0.99   NA  --  136  --   --   --  139  --  136   POTASSIUM  --  4.6  --   --   --  3.6  --  4.1   CHLORIDE  --  104  --   --   --  108*  --  102   CO2  --  19*  --   --   --  19*  --  21*   BUN  --  31.9*  --   --   --  29.2*  --  36.1*   CR  --  2.03*  --   --   --  2.01*  --  2.55*   ANIONGAP  --  13  --   --   --  12  --  13   BERNY  --  8.5*  --   --   --  7.7*  --  8.5*   * 262* 239*   < >  --  82   < > 197*   ALBUMIN  --   --   --   --   --   --   --  3.2*   PROTTOTAL  --   --   --   --   --   --   --  6.8   BILITOTAL  --   --    --   --   --   --   --  0.2   ALKPHOS  --   --   --   --   --   --   --  123*   ALT  --   --   --   --   --   --   --  16   AST  --   --   --   --   --   --   --  28    < > = values in this interval not displayed.       No results found for this or any previous visit (from the past 24 hour(s)).

## 2022-09-15 NOTE — PLAN OF CARE
Patient discharged to home in c/o spouse.  Discharge instructions reviewed with patient including follow up appointments.  Discharge meds to be picked up at outside pharmacy per patient request.  PIV removed.  All personal belongings with patient at discharge.

## 2022-09-15 NOTE — PLAN OF CARE
Goal Outcome Evaluation:    Patient A&Ox4, denies pain. BP's high and Prn hydralazine given and scheduled coreg, rest of VS stable. Is independent in the room. Tolerated a regular diet. Was started on decadron this evening. Blood sugars 106 and 167, patient refused her bedtime lantus. Patient triggered the sepsis protocol and lactic was 0.6. Hem/Onc following.

## 2022-09-15 NOTE — PROGRESS NOTES
Brief Hematology & Oncology Progress Note:    Patient was not examined by me as she is soon to be discharged.      Latest Reference Range & Units 09/15/22 08:17   Sodium 136 - 145 mmol/L 136   Potassium 3.4 - 5.3 mmol/L 4.6   Chloride 98 - 107 mmol/L 104   Carbon Dioxide (CO2) 22 - 29 mmol/L 19 (L)   Urea Nitrogen 8.0 - 23.0 mg/dL 31.9 (H)   Creatinine 0.51 - 0.95 mg/dL 2.03 (H)   GFR Estimate >60 mL/min/1.73m2 27 (L)   Calcium 8.8 - 10.2 mg/dL 8.5 (L)   Anion Gap 7 - 15 mmol/L 13   Glucose 70 - 99 mg/dL 262 (H)   WBC 4.0 - 11.0 10e3/uL 3.8 (L)   Hemoglobin 11.7 - 15.7 g/dL 10.7 (L)   Hematocrit 35.0 - 47.0 % 33.3 (L)   Platelet Count 150 - 450 10e3/uL 31 (LL)   RBC Count 3.80 - 5.20 10e6/uL 4.17   MCV 78 - 100 fL 80   MCH 26.5 - 33.0 pg 25.7 (L)   MCHC 31.5 - 36.5 g/dL 32.1       Platelets improved to 31K today. She has received dexamethasone 40 mg x2 days.     -Discussed with Dr. Torres: continue dexamethasone 40 mg PO once daily for 9/16 and 9/17.  -I will arrange for outpatient CBC and follow up with Dr. Lexa Miller or Lance Nunn in [place] next week.    -Okay to discharge from hematologic perspective if all else is stable.     Lila Gutierrez, DO  Hematology/Oncology  [facility]

## 2022-09-15 NOTE — UTILIZATION REVIEW
Continued stay Observation    Concurrent stay review; Secondary Review Determination        Under the authority of the Utilization Management Committee, the utilization review process indicated a secondary review on the above patient. The review outcome is based on review of the medical records, discussions with staff, and applying clinical experience noted on the date of the review.      (x) Observation Status Appropriate - Concurrent stay review    RATIONALE FOR DETERMINATION  62-year-old female with history of diabetes mellitus type 2, hypertension, coronary artery disease, chronic thrombocytopenia who was admitted to [facility] on 9/13/2022 due to acute gastroenteritis and COVID-19 infection.  Patient does not have any pulmonary symptoms from COVID-19.  Platelet dropped to 14,000 however now improved after she has been started on corticosteroids.  Diarrhea has resolved.  Renal function appears improved and fairly close to her baseline.  Overall she appears to be improving and does not meet inpatient criteria at this time.  Patient is clinically improving and there is no clear indication to change patient's status to inpatient. The severity of illness, intensity of service provided, expected LOS and risk for adverse outcome make the care appropriate for observation.      This document was produced using voice recognition software      The information on this document is developed by the utilization review team in order for the business office to ensure compliance. This only denotes the appropriateness of proper admission status and does not reflect the quality of care rendered.    The definitions of Inpatient Status and Observation Status used in making the determination above are those provided in the CMS Coverage Manual, Chapter 1 and Chapter 6, section 70.4.    Sincerely,      Utilization Review  Physician Advisor  [facility].

## 2022-09-15 NOTE — PROGRESS NOTES
Care Management Discharge Note    Discharge Date: 09/15/2022       Discharge Disposition:  Home    Handoff Referral Completed: Yes    Additional Information:  Pt identified as a Service Bundle #3 with an unplanned readmission risk. No needs or assessment needed at this time. Please consult CM/SW  if discharge needs should arise.      Viktoria Mosher, RN      Viktoria Mosher RN Case Manager  Inpatient Care Coordination  [facility]   [phone]

## 2022-09-16 ENCOUNTER — PATIENT OUTREACH (OUTPATIENT)
Dept: CARE COORDINATION | Facility: CLINIC | Age: 62
End: 2022-09-16

## 2022-09-16 NOTE — PROGRESS NOTES
[facility]    Background: Transitional Care Management program auto-identified and prompting a chart review by [facility] team.    Assessment: Upon chart review, CCR Team member will cancel/close this episode of Transitional Care Management program due to reason below:    Patient declined to answer all  post hospital discharge questions with CCRC team member and disconnected call.    Plan: Transitional Care Management episode closed per reason above.      Nola Akhtar MA  [facility]    *Connected Care Resource Team does NOT follow patient ongoing. Referrals are identified based on internal discharge reports and the outreach is to ensure patient has an understanding of their discharge instructions.

## 2022-09-21 LAB
PATH REPORT.COMMENTS IMP SPEC: NORMAL
PATH REPORT.FINAL DX SPEC: NORMAL
PATH REPORT.MICROSCOPIC SPEC OTHER STN: NORMAL
PATH REPORT.MICROSCOPIC SPEC OTHER STN: NORMAL
PATH REPORT.RELEVANT HX SPEC: NORMAL

## 2022-09-22 ENCOUNTER — LAB (OUTPATIENT)
Dept: INFUSION THERAPY | Facility: CLINIC | Age: 62
End: 2022-09-22
Attending: INTERNAL MEDICINE
Payer: COMMERCIAL

## 2022-09-22 DIAGNOSIS — D69.6 THROMBOCYTOPENIA (H): ICD-10-CM

## 2022-09-22 LAB
ERYTHROCYTE [DISTWIDTH] IN BLOOD BY AUTOMATED COUNT: 14.6 % (ref 10–15)
HCT VFR BLD AUTO: 30.8 % (ref 35–47)
HGB BLD-MCNC: 10.2 G/DL (ref 11.7–15.7)
MCH RBC QN AUTO: 26.6 PG (ref 26.5–33)
MCHC RBC AUTO-ENTMCNC: 33.1 G/DL (ref 31.5–36.5)
MCV RBC AUTO: 80 FL (ref 78–100)
PLATELET # BLD AUTO: 79 10E3/UL (ref 150–450)
RBC # BLD AUTO: 3.84 10E6/UL (ref 3.8–5.2)
WBC # BLD AUTO: 11.6 10E3/UL (ref 4–11)

## 2022-09-22 PROCEDURE — 85027 COMPLETE CBC AUTOMATED: CPT | Performed by: INTERNAL MEDICINE

## 2022-09-22 PROCEDURE — 36415 COLL VENOUS BLD VENIPUNCTURE: CPT

## 2022-09-22 NOTE — PROGRESS NOTES
Medical Assistant Note:  Rowan Leiva presents today for lab draw.    Patient seen by provider today: No.   present during visit today: Not Applicable.    Concerns: No Concerns.    Procedure:  Lab draw site: LAC, Needle type: Buterfly, Gauge: 23.    Post Assessment:  Labs drawn without difficulty: Yes.    Discharge Plan:  Departure Mode: Ambulatory.    Face to Face Time: 4 min.    Shari Schoenberger, CMA

## 2022-09-23 ENCOUNTER — TELEPHONE (OUTPATIENT)
Dept: INTERNAL MEDICINE | Facility: CLINIC | Age: 62
End: 2022-09-23

## 2022-09-23 ENCOUNTER — VIRTUAL VISIT (OUTPATIENT)
Dept: ONCOLOGY | Facility: CLINIC | Age: 62
End: 2022-09-23
Attending: INTERNAL MEDICINE
Payer: COMMERCIAL

## 2022-09-23 DIAGNOSIS — D62 ANEMIA DUE TO BLOOD LOSS, ACUTE: ICD-10-CM

## 2022-09-23 DIAGNOSIS — D69.6 THROMBOCYTOPENIA (H): Primary | ICD-10-CM

## 2022-09-23 PROCEDURE — G0463 HOSPITAL OUTPT CLINIC VISIT: HCPCS | Mod: PN,RTG | Performed by: NURSE PRACTITIONER

## 2022-09-23 PROCEDURE — 99214 OFFICE O/P EST MOD 30 MIN: CPT | Mod: GT | Performed by: NURSE PRACTITIONER

## 2022-09-23 RX ORDER — PANTOPRAZOLE SODIUM 40 MG/1
40 TABLET, DELAYED RELEASE ORAL DAILY
Qty: 90 TABLET | Refills: 1 | Status: SHIPPED | OUTPATIENT
Start: 2022-09-23 | End: 2023-05-23

## 2022-09-23 NOTE — LETTER
9/23/2022         RE: Rowan Leiva  [address]  [place] [address]        Dear Colleague,    Thank you for referring your patient, Rowan Leiva, to the [facility]. Please see a copy of my visit note below.    Rowan is a 62 year old who is being evaluated via a billable video visit.  Currently in [place].    How would you like to obtain your AVS? MyChart  If the video visit is dropped, the invitation should be resent by: Text to cell phone: [phone]  Will anyone else be joining your video visit? No  PENNY Reddy      Video-Visit Details    Video Start Time: 330PM    Type of service:  Video Visit    Video End Time:3:49 PM    Originating Location (pt. Location): Home    Distant Location (provider location):  [facility]     [facility]         Oncology/Hematology Visit Note  Sep 23, 2022    Reason for Visit: follow up of anemia thrombocytopenia and DVT    09/13-09/15-hospitalized for diarrhea acute gastroenteritis she is found to have low platelet count of 21  Patient was given dexamethasone 40 mg p.o. x4 days    Interval History:  Patient reports she is recovering well after hospitalization denies fever chills sweats denies bleeding bruising denies fall or injury.  Denies nausea vomiting diarrhea abdominal pain  Denies shortness of breath chest pain denies edema      Review of Systems:  14 point ROS of systems including Constitutional, Eyes, Respiratory, Cardiovascular, Gastroenterology, Genitourinary, Integumentary, Muscularskeletal, Psychiatric were all negative except for pertinent positives noted in my HPI.    Physical Examination:  Video visit.  Patient answers all questions appropriately no audible wheezing or cough.  Patient in any distress    Laboratory Data:  CBC results reviewed    Assessment and Plan:    Thrombocytopenia  Chronic issue for the patient  With recent COVID infection and gastroenteritis  platelets dropped  she was given dexamethasone 40 mg p.o. x4 days  Platelets improved today 79  Recheck labs next week  Schedule with Dr. 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 At one point, there were concerns for ongoing blood loss and eliquis had been held. It appears eliquis has been held since November 2021    History of embolic stroke  Patient is on statin and Plavix  Okay to restart Plavix now    History of COVID-19  Diagnosed on September 13, 2022      Chronic kidney disease  Continue to monitor    Call clinic with any changes health condition or questions    AKIL Gonzalez CNP  [facility] [place]     Chart documentation with Dragon Voice recognition Software. Although reviewed after completion, some words and grammatical errors may remain.              Again, thank you for allowing me to participate in the care of your patient.        Sincerely,        AKIL Gonzalez CNP

## 2022-09-23 NOTE — TELEPHONE ENCOUNTER
pantoprazole (PROTONIX) EC tablet 40 mg       Last Written Prescription Date:  09/15/21  Last Fill Quantity: ,   # refills:   Last Office Visit: 09/23/21  Future Office visit:    Next 5 appointments (look out 90 days)    Sep 26, 2022  5:00 PM  (Arrive by 4:40 PM)  Provider Visit with Brian Ruiz MD  [facility] ([facility] [place] ) [address] [facility] [id number] [phone]           Routing refill request to provider for review/approval because:  Drug not active on patient's medication list

## 2022-09-23 NOTE — PROGRESS NOTES
Patient instructed to call 911 for new diplopia, sudden onset severe headache or stroke symptoms.     Continue 81mg aspirin daily for suspected TIA    Increased dose of cholesterol med- atorvastatin 40mg daily on discharge    Resume low fat, low cholesterol diet- no extra salt    Follow up carotid artery ultrasound in 1 year    Follow up with neurointerventional team - Dr Hayden Aponte- call for appt after DC Rowan is a 62 year old who is being evaluated via a billable video visit.  Currently in [place].    How would you like to obtain your AVS? MyChart  If the video visit is dropped, the invitation should be resent by: Text to cell phone: [phone]  Will anyone else be joining your video visit? No  PENNY Reddy      Video-Visit Details    Video Start Time: 330PM    Type of service:  Video Visit    Video End Time:3:49 PM    Originating Location (pt. Location): Home    Distant Location (provider location):  [facility]     Platform used for Video Visit: [facility]         Oncology/Hematology Visit Note  Sep 23, 2022    Reason for Visit: follow up of anemia thrombocytopenia and DVT    09/13-09/15-hospitalized for diarrhea acute gastroenteritis she is found to have low platelet count of 21  Patient was given dexamethasone 40 mg p.o. x4 days    Interval History:  Patient reports she is recovering well after hospitalization denies fever chills sweats denies bleeding bruising denies fall or injury.  Denies nausea vomiting diarrhea abdominal pain  Denies shortness of breath chest pain denies edema      Review of Systems:  14 point ROS of systems including Constitutional, Eyes, Respiratory, Cardiovascular, Gastroenterology, Genitourinary, Integumentary, Muscularskeletal, Psychiatric were all negative except for pertinent positives noted in my HPI.    Physical Examination:  Video visit.  Patient answers all questions appropriately no audible wheezing or cough.  Patient in any distress    Laboratory Data:  CBC results reviewed    Assessment and Plan:    Thrombocytopenia  Chronic issue for the patient  With recent COVID infection and gastroenteritis platelets dropped  she was given dexamethasone 40 mg p.o. x4 days  Platelets improved today 79  Recheck labs next week  Schedule with Dr. 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 At one point, there were concerns for ongoing blood loss and eliquis had been held. It appears eliquis has been held since November 2021    History of embolic stroke  Patient is on statin and Plavix  Okay to restart Plavix now    History of COVID-19  Diagnosed on September 13, 2022      Chronic kidney disease  Continue to monitor    Call clinic with any changes health condition or questions    AKIL Gonzalez [facility]     Chart documentation with Dragon Voice recognition Software. Although reviewed after completion, some words and grammatical errors may remain.

## 2022-09-23 NOTE — LETTER
9/23/2022         RE: Rowan Leiva  [address]  [place] [facility] [address]        Dear Colleague,    Thank you for referring your patient, Rowan Leiva, to the [facility]. Please see a copy of my visit note below.    oRwan is a 62 year old who is being evaluated via a billable video visit.  Currently in [place].    How would you like to obtain your AVS? MyChart  If the video visit is dropped, the invitation should be resent by: Text to cell phone: [phone]  Will anyone else be joining your video visit? No  PENNY Reddy      Video-Visit Details    Video Start Time: 330PM    Type of service:  Video Visit    Video End Time:3:49 PM    Originating Location (pt. Location): Home    Distant Location (provider location):  [facility]     [facility]         Oncology/Hematology Visit Note  Sep 23, 2022    Reason for Visit: follow up of anemia thrombocytopenia and DVT    09/13-09/15-hospitalized for diarrhea acute gastroenteritis she is found to have low platelet count of 21  Patient was given dexamethasone 40 mg p.o. x4 days    Interval History:  Patient reports she is recovering well after hospitalization denies fever chills sweats denies bleeding bruising denies fall or injury.  Denies nausea vomiting diarrhea abdominal pain  Denies shortness of breath chest pain denies edema      Review of Systems:  14 point ROS of systems including Constitutional, Eyes, Respiratory, Cardiovascular, Gastroenterology, Genitourinary, Integumentary, Muscularskeletal, Psychiatric were all negative except for pertinent positives noted in my HPI.    Physical Examination:  Video visit.  Patient answers all questions appropriately no audible wheezing or cough.  Patient in any distress    Laboratory Data:  CBC results reviewed    Assessment and Plan:    Thrombocytopenia  Chronic issue for the patient  With recent COVID infection and gastroenteritis  platelets dropped  she was given dexamethasone 40 mg p.o. x4 days  Platelets improved today 79  Recheck labs next week  Schedule with Dr. 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 At one point, there were concerns for ongoing blood loss and eliquis had been held. It appears eliquis has been held since November 2021    History of embolic stroke  Patient is on statin and Plavix  Okay to restart Plavix now    History of COVID-19  Diagnosed on September 13, 2022      Chronic kidney disease  Continue to monitor    Call clinic with any changes health condition or questions    AKIL Gonzalez CNP  [facility]     Chart documentation with Dragon Voice recognition Software. Although reviewed after completion, some words and grammatical errors may remain.              Again, thank you for allowing me to participate in the care of your patient.        Sincerely,        AKIL Gonzalez CNP

## 2022-09-24 NOTE — DISCHARGE SUMMARY
[facility]    Discharge Summary  Hospitalist    Date of Admission:  9/13/2022  Date of Discharge:  9/15/2022  4:48 PM  Discharging Provider: Wendy Torres MD, MD  Date of Service (when I saw the patient): 9/15/2022    Discharge Diagnoses     Diarrhea likely due to acute gastroenteritis-resolved     Thrombocytopenia, chronic  Prior history of DVT      Acute kidney injury on chronic kidney disease likely due to diarrhea     Diabetes mellitus type 2, insulin requiring    Covid 19-+: No cough, SOB or hypoxia.     History of embolic stroke     Hyperlipidemia     History of coronary artery disease status post stent placement in LAD     Hypothyroidism       History of Present Illness   Rowan Leiva is an 62 year old female who presented with diarrhea. Please see hospital course for details.     Hospital Course   Rowan Leiva is a 62 year old female patient with past medical history of diabetes mellitus type 2, hypertension, hyperlipidemia, hypothyroidism, coronary artery disease status post stent placement, thrombocytopenia, history of malignant neoplasm of thyroid gland requiring resection in 2000, who came to emergency room with a complaint of left flank pain.  Patient states that she has been having intermittent diarrhea since last Wednesday.  She also states that she had abdominal discomfort and left flank pain.  She denies dysuria, urgency or frequency.  She has no cough or shortness of breath.  She also has no fever.  She was seen at urgent care yesterday and had a urinalysis which was negative.  She was advised to come to emergency room.  Her vital signs were checked and showed temperature 97.3, pulse 62, blood pressure 81/67, oxygen saturation 93% on room.  Lab workup showed sodium 136, potassium 3.1, creatinine 2.55, BUN 36.1.  AST 16, AST 28.  WBC 4.6, hemoglobin 10.5, platelets 21.  Lactic acid 1.0, troponin T high-sensitivity 35.  C. difficile toxin B PCR negative.   Enteric bacteria panel negative  CT of the abdomen/pelvis without contrast showed no evidence of urolithiasis or hydronephrosis.  Ultrasound of the kidneys is also negative for hydronephrosis.     Diarrhea likely due to acute gastroenteritis-resolved  -Patient has been having intermittent diarrhea since last Wednesday. She has associated abdominal bloating and left flank pain.  CT of the abdomen negative for evidence of acute intra-abdominal abnormality.  No kidney stones.  -She was given IV fluids. Diarrhea improved.      Thrombocytopenia, chronic  Prior history of DVT  -HITnegative in 6/2021.  -Patient was thrombocytopenic with platelets down to 14K.   -JADEN negative. LFTs normal. . Fibrinogen 509. No evidence of hemolysis.   -Hem/onc consulted and patient started on dexamethasone 40 mg x 4 days.  -Platelets 31 K today . Will monitor cbc. Appreciate hem/onc input.   -She was discharged on dexamethasone for 2 more days with a plan to follow up as outpatient.     Acute kidney injury on chronic kidney disease likely due to diarrhea  -Baseline creatinine on 9/23/2021 was 1.72.  Creatinine 2.55 today.  She was given IV fluids during hospital course. Creatine improved to 2.03. Patient wanted to go home. She was advised to follow up as outpatient.     Diabetes mellitus type 2, insulin requiring  -Blood sugar 197.  -Continued  Lantus insulin and scheduled NovoLog at home dose.  She was placed on insulin sliding scale.      Covid 19-+: No cough, SOB or hypoxia. No need for dexamethasone or remdesivir at this time     History of embolic stroke: continued statin and Plavix     Hyperlipidemia: Continued statin     History of coronary artery disease status post stent placement in LAD  Continue  Plavix, atorvastatin, Coreg, nitroglycerin     Hypothyroidism: Continue Synthroid       Significant Results and Procedures   Results for orders placed or performed during the hospital encounter of 09/13/22   CT Abdomen Pelvis w/o  Contrast    Narrative    CT ABDOMEN AND PELVIS WITHOUT CONTRAST  9/13/2022 11:19 AM    CLINICAL HISTORY: Left-sided flank pain, possible stone vs  diverticulitis.    TECHNIQUE: CT scan of the abdomen and pelvis was performed without IV  contrast. Multiplanar reformats were obtained. Dose reduction  techniques were used.  CONTRAST: None.    COMPARISON: CT abdomen and pelvis on 9/14/2021.    FINDINGS:   LOWER CHEST: Trace bibasilar pleural fluid. This appears stable. Small  stable pericardial effusion.    HEPATOBILIARY: Cholecystectomy. No acute liver abnormality.    PANCREAS: Normal.    SPLEEN: Normal.    ADRENAL GLANDS: Normal.    KIDNEYS/BLADDER: No hydronephrosis. No acute renal abnormality. No  visible bladder stone. Bilateral renal vascular calcifications. Small  intrarenal stones are a possibility.    BOWEL: No acute abnormality. Normal appendix. No acute inflammatory  change. No diverticulitis is seen at this time.    LYMPH NODES: Normal.    VASCULATURE: Diffuse calcifications.    PELVIC ORGANS: Small free pelvic fluid. Absent uterus.    OTHER: None.    MUSCULOSKELETAL: Spine degenerative changes are mild.      Impression    IMPRESSION:   1.  No obstructing urolithiasis or hydronephrosis. No bladder stone  identified. There may be a few tiny bilateral intrarenal stones and/or  renal vascular calcifications.  2.  Stable trace bibasilar pleural fluid and trace pericardial fluid.  3.  Small free pelvic fluid is stable as well.    WIN CISNEROS MD         SYSTEM ID:  [id number]   US Renal Complete w Duplex Complete    Narrative    EXAMINATION: US RENAL COMPLETE WITH DOPPLER COMPLETE, 9/13/2022 1:51  PM     COMPARISON: Same date CT.    HISTORY: Left flank pain    TECHNIQUE: The kidneys and bladder were scanned in the standard  fashion with specialized ultrasound transducer(s) using.  Color/spectral Doppler techniques are also used for evaluation of  renal vasculature.    FINDINGS:    GRAYSCALE:    Right kidney:  Measures 13.4 cm in length. Parenchyma is of normal  thickness and echogenicity. No focal mass. No hydronephrosis.    Left kidney: Measures 11.5 cm in length. Parenchyma is of normal  thickness and echogenicity. No focal mass. No hydronephrosis.     Bladder: Unremarkable.    DOPPLER:    Right: Normal velocities within the right renal artery (less than 200  cm/s). Normal intraparenchymal waveforms with slightly elevated  resistive indices (0.81-0.90) of doubtful clinical significance. Renal  vein is patent.    Left : Normal velocities within the left renal artery (less than 200  cm/s). Normal intraparenchymal waveforms with slightly elevated  resistive indices (0.75-0.81) of doubtful clinical significance. Renal  vein is patent.      Impression    IMPRESSION:  1.  Normal grayscale appearance of the kidneys. No hydronephrosis.  2.  No sonographic evidence of renal artery stenosis.    MELI MURCIA MD         SYSTEM ID:  [id number]         Pending Results   None  Code Status   Full Code       Primary Care Physician   Brian Ruiz        Discharge Disposition   Discharged to home  Condition at discharge: Stable    Consultations This Hospital Stay   HEMATOLOGY & ONCOLOGY IP CONSULT    Time Spent on this Encounter   IWendy MD, MD, personally saw the patient today and spent greater than 30 minutes discharging this patient.    Discharge Orders      Primary Care - Care Coordination Referral      Reason for your hospital stay    HERNESTO  Thrombocytopenia     Activity    Your activity upon discharge: activity as tolerated     Follow-up and recommended labs and tests     Follow up with primary care provider, Brian Ruiz, within 7 days  Follow up with hematology next week as scheduled  Hold Plavix until further evaluation by hemmatology     Diet    Follow this diet upon discharge: Orders Placed This Encounter      Moderate Consistent Carb (60 g CHO per Meal) Diet     Discharge Medications    Discharge Medication List as of 9/15/2022  4:01 PM      START taking these medications    Details   dexamethasone 20 MG TABS Take 40 mg by mouth daily for 2 days, Disp-2 tablet, R-0, E-Prescribe         CONTINUE these medications which have CHANGED    Details   clopidogrel (PLAVIX) 75 MG tablet Take 1 tablet (75 mg) by mouth daily, Disp-90 tablet, R-3, E-PrescribeHold plavix until further evaluation by hematology         CONTINUE these medications which have NOT CHANGED    Details   albuterol (PROAIR HFA/PROVENTIL HFA/VENTOLIN HFA) 108 (90 Base) MCG/ACT inhaler Inhale 2 puffs into the lungs every 6 hours, Disp-18 g, R-3, E-PrescribePharmacy may dispense brand covered by insurance (Proair, or proventil or ventolin or generic albuterol inhaler)      atorvastatin (LIPITOR) 40 MG tablet Take 1 tablet (40 mg) by mouth daily, Disp-90 tablet, R-1, E-Prescribe      carvedilol (COREG) 12.5 MG tablet Take 1 tablet (12.5 mg) by mouth 2 times daily, Disp-180 tablet, R-1, E-Prescribe      insulin glargine (LANTUS PEN) 100 UNIT/ML pen Inject 28 Units Subcutaneous At Bedtime, Historical      insulin lispro (HUMALOG KWIKPEN) 100 UNIT/ML (1 unit dial) KWIKPEN Inject 18 Units Subcutaneous 3 times daily (before meals), Historical      levothyroxine (SYNTHROID/LEVOTHROID) 150 MCG tablet Take 150 mcg by mouth daily, Historical      nitroGLYcerin (NITROSTAT) 0.4 MG sublingual tablet For chest pain place 1 tablet under the tongue every 5 minutes for 3 doses. If symptoms persist 5 minutes after 1st dose call 911., Disp-25 tablet, R-2, E-Prescribe      LANCETS REGULAR MISC use twice a day for blood sugar, Disp-2 boxes, R-0, Fax           Allergies   Allergies   Allergen Reactions     No Known Drug Allergies      Data   Most Recent 3 CBC's:Recent Labs   Lab Test 09/22/22  1430 09/15/22  0817 09/14/22  1223   WBC 11.6* 3.8* 3.2*   HGB 10.2* 10.7* 9.1*   MCV 80 80 82   PLT 79* 31* 16*      Most Recent 3 BMP's:  Recent Labs   Lab Test  09/15/22  1239 09/15/22  0817 09/15/22  0755 09/14/22  1729 09/14/22  0725 09/13/22  1721 09/13/22  0917   NA  --  136  --   --  139  --  136   POTASSIUM  --  4.6  --   --  3.6  --  4.1   CHLORIDE  --  104  --   --  108*  --  102   CO2  --  19*  --   --  19*  --  21*   BUN  --  31.9*  --   --  29.2*  --  36.1*   CR  --  2.03*  --   --  2.01*  --  2.55*   ANIONGAP  --  13  --   --  12  --  13   BERNY  --  8.5*  --   --  7.7*  --  8.5*   * 262* 239*   < > 82   < > 197*    < > = values in this interval not displayed.     Most Recent 2 LFT's:  Recent Labs   Lab Test 09/13/22  0917 09/23/21  1049   AST 28 13   ALT 16 14   ALKPHOS 123* 129   BILITOTAL 0.2 0.2     Most Recent INR's and Anticoagulation Dosing History:  Anticoagulation Dose History     Recent Dosing and Labs Latest Ref Rng & Units 11/4/2012 4/10/2015 6/5/2021 6/17/2021 9/13/2022 9/14/2022    INR 0.85 - 1.15 1.10 0.93 1.80(H) 1.95(H) 0.99 1.01        Most Recent 3 Troponin's:  Recent Labs   Lab Test 06/17/21  1804 05/31/21  0626 05/31/21  0326   TROPI 0.038 9.323* 11.693*     Most Recent Cholesterol Panel:  Recent Labs   Lab Test 07/15/22  0839 08/30/21  0910   CHOL  --  153   LDL  --  78   HDL  --  32*   TRIG 374* 217*     Most Recent 6 Bacteria Isolates From Any Culture (See EPIC Reports for Culture Details):  Recent Labs   Lab Test 04/10/15  1402 04/10/15  1108   CULT No Salmonella, Shigella, Campylobacter, E. coli O157, Aeromonas, or Plesiomonas   isolated.   Charge credited Quantity not sufficient     Most Recent TSH, T4 and A1c Labs:  Recent Labs   Lab Test 09/14/21  2256 05/31/21  0326 01/08/15  0000   TSH  --  0.41 0.020   T4  --   --  2.04   A1C 7.0* 7.9*  --

## 2022-09-26 ENCOUNTER — VIRTUAL VISIT (OUTPATIENT)
Dept: INTERNAL MEDICINE | Facility: CLINIC | Age: 62
End: 2022-09-26
Payer: COMMERCIAL

## 2022-09-26 DIAGNOSIS — I25.10 CORONARY ARTERY DISEASE INVOLVING NATIVE CORONARY ARTERY OF NATIVE HEART WITHOUT ANGINA PECTORIS: ICD-10-CM

## 2022-09-26 DIAGNOSIS — Z12.31 VISIT FOR SCREENING MAMMOGRAM: ICD-10-CM

## 2022-09-26 DIAGNOSIS — E11.65 TYPE 2 DIABETES MELLITUS WITH HYPERGLYCEMIA, WITH LONG-TERM CURRENT USE OF INSULIN (H): ICD-10-CM

## 2022-09-26 DIAGNOSIS — Z79.4 TYPE 2 DIABETES MELLITUS WITH HYPERGLYCEMIA, WITH LONG-TERM CURRENT USE OF INSULIN (H): ICD-10-CM

## 2022-09-26 DIAGNOSIS — Z12.4 CERVICAL CANCER SCREENING: ICD-10-CM

## 2022-09-26 DIAGNOSIS — D69.6 THROMBOCYTOPENIA (H): ICD-10-CM

## 2022-09-26 DIAGNOSIS — N18.31 CHRONIC RENAL IMPAIRMENT, STAGE 3A (H): ICD-10-CM

## 2022-09-26 DIAGNOSIS — Z09 HOSPITAL DISCHARGE FOLLOW-UP: Primary | ICD-10-CM

## 2022-09-26 PROCEDURE — 99214 OFFICE O/P EST MOD 30 MIN: CPT | Mod: GT | Performed by: INTERNAL MEDICINE

## 2022-09-26 NOTE — PROGRESS NOTES
"Rowan is a 62 year old who is being evaluated via a billable video visit.      How would you like to obtain your AVS? MyChart  If the video visit is dropped, the invitation should be resent by: Text to cell phone: [phone]  Will anyone else be joining your video visit? No        Assessment & Plan     Hospital discharge follow-up  Improving. No diarrhea, no fever, chills. Keep hydration, monitor lab work  Cont treatment     Type 2 diabetes mellitus with hyperglycemia, with long-term current use of insulin (H)  On insulin, continue treatment, diet     Coronary artery disease involving native coronary artery of native heart without angina pectoris  No anginal symptoms. Cont BB, plavix     Chronic renal impairment, stage 3a (H)  Monitor renal function, keep hydrated     Thrombocytopenia (H)  Monitor lab work, resumed Plavix               Nicotine/Tobacco Cessation:  She reports that she has been smoking cigarettes. She has a 12.00 pack-year smoking history. She has quit using smokeless tobacco.  Nicotine/Tobacco Cessation Plan:   Information offered: Patient not interested at this time      BMI:   Estimated body mass index is 29.35 kg/m  as calculated from the following:    Height as of 4/12/22: 1.626 m (5' 4\").    Weight as of 4/12/22: 77.6 kg (171 lb).       See Patient Instructions    Return in about 4 weeks (around 10/24/2022) for Routine Visit.    Brian Ruiz MD  [facility]    Pillo Donahue is a 62 year old, presenting for the following health issues:  Hospital F/U      \[facility]\""       Hospital Follow-up Visit:    Hospital/Nursing Home/IP Rehab Facility: [facility]  Date of Admission: 09/13/2022  Date of Discharge: 09/15/2022  Reason(s) for Admission: kidney injury,     Was your hospitalization related to COVID-19? YES   How are you feeling today? Better  In the past 24 hours have you had shortness of breath when speaking, walking, or climbing stairs? I " don't have breathing problems  Do you have a cough? Yes, I have a cough but it's not worse  When is the last time you had a fever greater than 100? unk  Are you having any other symptoms? Diarrhea and stuffy and runny nose l, shaking in left hand  Do you have any other stressors you would like to discuss with your provider? No         Was the patient in the ICU or did the patient experience delirium during hospitalization?  No    Problems taking medications regularly:  None  Medication changes since discharge: None  Problems adhering to non-medication therapy:  None    Summary of hospitalization:  [facility] discharge summary reviewed  Diagnostic Tests/Treatments reviewed.  Follow up needed: clinic follow up   Other Healthcare Providers Involved in Patient s Care:         Specialist appointment - oncology   Update since discharge: improved.   Post Medication Reconciliation Status:        Plan of care communicated with patient     Patient is seen for a follow up visit.  .The patient denies a history of acute diarrhea, dehydration with worsened CKD. Had positive COVID test, but no significant respiratory problems.   Treated with fluids, has h/o anemia, thrombocytopenia, diabetes, CAD, DVT.   Consulted with hematology. Plavix was held, then restarted.   Improved clinically. Able to eat, no diarrhea. No bleeding diathesis.   Has h/o HTN. on medical treatment. BP has been controlled. No side effects from medications. No CP, HA, dizziness. good compliance with medications and low salt diet.  Has H/O DM. On diet , exercise and insulin. Blood sugars are controlled. No parestesias. No hypoglycemias.      Review of Systems   Constitutional, HEENT, cardiovascular, pulmonary, gi and gu systems are negative, except as otherwise noted.      Objective    Vitals - Patient Reported  Pain Score: No Pain (0)      Vitals:  No vitals were obtained today due to virtual visit.    Physical Exam   GENERAL: Healthy, alert and  no distress  EYES: Eyes grossly normal to inspection.  No discharge or erythema, or obvious scleral/conjunctival abnormalities.  RESP: No audible wheeze, cough, or visible cyanosis.  No visible retractions or increased work of breathing.    SKIN: Visible skin clear. No significant rash, abnormal pigmentation or lesions.  NEURO: Cranial nerves grossly intact.  Mentation and speech appropriate for age.  PSYCH: Mentation appears normal, affect normal/bright, judgement and insight intact, normal speech and appearance well-groomed.    Lab on 09/22/2022   Component Date Value Ref Range Status     WBC Count 09/22/2022 11.6 (A) 4.0 - 11.0 10e3/uL Final     RBC Count 09/22/2022 3.84  3.80 - 5.20 10e6/uL Final     Hemoglobin 09/22/2022 10.2 (A) 11.7 - 15.7 g/dL Final     Hematocrit 09/22/2022 30.8 (A) 35.0 - 47.0 % Final     MCV 09/22/2022 80  78 - 100 fL Final     MCH 09/22/2022 26.6  26.5 - 33.0 pg Final     MCHC 09/22/2022 33.1  31.5 - 36.5 g/dL Final     RDW 09/22/2022 14.6  10.0 - 15.0 % Final     Platelet Count 09/22/2022 79 (A) 150 - 450 10e3/uL Final               Video-Visit Details    Video Start Time: 5:07 PM    Type of service:  Video Visit    Video End Time:5:19 PM    Originating Location (pt. Location): Home    Distant Location (provider location):  [facility]     Platform used for Video Visit: [email or web address]

## 2022-09-29 ENCOUNTER — LAB (OUTPATIENT)
Dept: LAB | Facility: CLINIC | Age: 62
End: 2022-09-29
Payer: COMMERCIAL

## 2022-09-29 DIAGNOSIS — N18.32 STAGE 3B CHRONIC KIDNEY DISEASE (H): ICD-10-CM

## 2022-09-29 DIAGNOSIS — E11.65 TYPE 2 DIABETES MELLITUS WITH HYPERGLYCEMIA, WITH LONG-TERM CURRENT USE OF INSULIN (H): ICD-10-CM

## 2022-09-29 DIAGNOSIS — Z79.4 TYPE 2 DIABETES MELLITUS WITH HYPERGLYCEMIA, WITH LONG-TERM CURRENT USE OF INSULIN (H): ICD-10-CM

## 2022-09-29 DIAGNOSIS — D69.6 THROMBOCYTOPENIA (H): ICD-10-CM

## 2022-09-29 LAB
BASOPHILS # BLD AUTO: 0 10E3/UL (ref 0–0.2)
BASOPHILS NFR BLD AUTO: 0 %
EOSINOPHIL # BLD AUTO: 0.2 10E3/UL (ref 0–0.7)
EOSINOPHIL NFR BLD AUTO: 2 %
ERYTHROCYTE [DISTWIDTH] IN BLOOD BY AUTOMATED COUNT: 14.4 % (ref 10–15)
HBA1C MFR BLD: 9.1 % (ref 0–5.6)
HCT VFR BLD AUTO: 27.8 % (ref 35–47)
HGB BLD-MCNC: 9.1 G/DL (ref 11.7–15.7)
IMM GRANULOCYTES # BLD: 0 10E3/UL
IMM GRANULOCYTES NFR BLD: 0 %
LYMPHOCYTES # BLD AUTO: 1.8 10E3/UL (ref 0.8–5.3)
LYMPHOCYTES NFR BLD AUTO: 14 %
MCH RBC QN AUTO: 26.2 PG (ref 26.5–33)
MCHC RBC AUTO-ENTMCNC: 32.7 G/DL (ref 31.5–36.5)
MCV RBC AUTO: 80 FL (ref 78–100)
MONOCYTES # BLD AUTO: 0.6 10E3/UL (ref 0–1.3)
MONOCYTES NFR BLD AUTO: 5 %
NEUTROPHILS # BLD AUTO: 10.1 10E3/UL (ref 1.6–8.3)
NEUTROPHILS NFR BLD AUTO: 79 %
PLATELET # BLD AUTO: 132 10E3/UL (ref 150–450)
RBC # BLD AUTO: 3.47 10E6/UL (ref 3.8–5.2)
WBC # BLD AUTO: 12.8 10E3/UL (ref 4–11)

## 2022-09-29 PROCEDURE — 83036 HEMOGLOBIN GLYCOSYLATED A1C: CPT

## 2022-09-29 PROCEDURE — 85025 COMPLETE CBC W/AUTO DIFF WBC: CPT

## 2022-09-29 PROCEDURE — 36415 COLL VENOUS BLD VENIPUNCTURE: CPT

## 2022-09-29 PROCEDURE — 82043 UR ALBUMIN QUANTITATIVE: CPT

## 2022-09-30 LAB
CREAT UR-MCNC: 194 MG/DL
MICROALBUMIN UR-MCNC: 6120 MG/L
MICROALBUMIN/CREAT UR: 3154.64 MG/G CR (ref 0–25)

## 2022-10-09 ENCOUNTER — HEALTH MAINTENANCE LETTER (OUTPATIENT)
Age: 62
End: 2022-10-09

## 2022-10-26 ENCOUNTER — ONCOLOGY VISIT (OUTPATIENT)
Dept: ONCOLOGY | Facility: CLINIC | Age: 62
End: 2022-10-26
Attending: NURSE PRACTITIONER
Payer: COMMERCIAL

## 2022-10-26 DIAGNOSIS — D50.9 IRON DEFICIENCY ANEMIA, UNSPECIFIED IRON DEFICIENCY ANEMIA TYPE: Primary | ICD-10-CM

## 2022-10-26 DIAGNOSIS — D69.6 THROMBOCYTOPENIA (H): ICD-10-CM

## 2022-10-26 LAB
ALBUMIN SERPL-MCNC: 2.8 G/DL (ref 3.4–5)
ALP SERPL-CCNC: 152 U/L (ref 40–150)
ALT SERPL W P-5'-P-CCNC: 14 U/L (ref 0–50)
ANION GAP SERPL CALCULATED.3IONS-SCNC: 5 MMOL/L (ref 3–14)
AST SERPL W P-5'-P-CCNC: 16 U/L (ref 0–45)
BASOPHILS # BLD AUTO: 0.1 10E3/UL (ref 0–0.2)
BASOPHILS NFR BLD AUTO: 1 %
BILIRUB SERPL-MCNC: 0.3 MG/DL (ref 0.2–1.3)
BUN SERPL-MCNC: 34 MG/DL (ref 7–30)
CALCIUM SERPL-MCNC: 8.1 MG/DL (ref 8.5–10.1)
CHLORIDE BLD-SCNC: 106 MMOL/L (ref 94–109)
CO2 SERPL-SCNC: 25 MMOL/L (ref 20–32)
CREAT SERPL-MCNC: 2.13 MG/DL (ref 0.52–1.04)
EOSINOPHIL # BLD AUTO: 0.1 10E3/UL (ref 0–0.7)
EOSINOPHIL NFR BLD AUTO: 1 %
ERYTHROCYTE [DISTWIDTH] IN BLOOD BY AUTOMATED COUNT: 15.4 % (ref 10–15)
FERRITIN SERPL-MCNC: 193 NG/ML (ref 8–252)
GFR SERPL CREATININE-BSD FRML MDRD: 26 ML/MIN/1.73M2
GLUCOSE BLD-MCNC: 266 MG/DL (ref 70–99)
HCT VFR BLD AUTO: 28.7 % (ref 35–47)
HGB BLD-MCNC: 9.3 G/DL (ref 11.7–15.7)
IMM GRANULOCYTES # BLD: 0.1 10E3/UL
IMM GRANULOCYTES NFR BLD: 1 %
IRON SATN MFR SERPL: 20 % (ref 15–46)
IRON SERPL-MCNC: 39 UG/DL (ref 35–180)
LYMPHOCYTES # BLD AUTO: 1.8 10E3/UL (ref 0.8–5.3)
LYMPHOCYTES NFR BLD AUTO: 19 %
MCH RBC QN AUTO: 26.6 PG (ref 26.5–33)
MCHC RBC AUTO-ENTMCNC: 32.4 G/DL (ref 31.5–36.5)
MCV RBC AUTO: 82 FL (ref 78–100)
MONOCYTES # BLD AUTO: 0.5 10E3/UL (ref 0–1.3)
MONOCYTES NFR BLD AUTO: 5 %
NEUTROPHILS # BLD AUTO: 7.2 10E3/UL (ref 1.6–8.3)
NEUTROPHILS NFR BLD AUTO: 73 %
NRBC # BLD AUTO: 0 10E3/UL
NRBC BLD AUTO-RTO: 0 /100
PLATELET # BLD AUTO: 135 10E3/UL (ref 150–450)
POTASSIUM BLD-SCNC: 4.3 MMOL/L (ref 3.4–5.3)
PROT SERPL-MCNC: 6.8 G/DL (ref 6.8–8.8)
RBC # BLD AUTO: 3.5 10E6/UL (ref 3.8–5.2)
SODIUM SERPL-SCNC: 136 MMOL/L (ref 133–144)
TIBC SERPL-MCNC: 197 UG/DL (ref 240–430)
WBC # BLD AUTO: 9.8 10E3/UL (ref 4–11)

## 2022-10-26 PROCEDURE — 80053 COMPREHEN METABOLIC PANEL: CPT | Performed by: INTERNAL MEDICINE

## 2022-10-26 PROCEDURE — 83550 IRON BINDING TEST: CPT | Performed by: INTERNAL MEDICINE

## 2022-10-26 PROCEDURE — G0463 HOSPITAL OUTPT CLINIC VISIT: HCPCS

## 2022-10-26 PROCEDURE — 99214 OFFICE O/P EST MOD 30 MIN: CPT | Performed by: INTERNAL MEDICINE

## 2022-10-26 PROCEDURE — 36415 COLL VENOUS BLD VENIPUNCTURE: CPT | Performed by: INTERNAL MEDICINE

## 2022-10-26 PROCEDURE — 85014 HEMATOCRIT: CPT | Performed by: INTERNAL MEDICINE

## 2022-10-26 PROCEDURE — 82728 ASSAY OF FERRITIN: CPT | Performed by: INTERNAL MEDICINE

## 2022-10-26 NOTE — PROGRESS NOTES
"Oncology Rooming Note    October 26, 2022 2:06 PM   Rowan Lieva is a 62 year old female who presents for:    Chief Complaint   Patient presents with     Oncology Clinic Visit     Initial Vitals: LMP 05/01/2000  Estimated body mass index is 29.35 kg/m  as calculated from the following:    Height as of 4/12/22: 1.626 m (5' 4\").    Weight as of 4/12/22: 77.6 kg (171 lb). There is no height or weight on file to calculate BSA.  Data Unavailable Comment: Data Unavailable   Patient's last menstrual period was 05/01/2000.  Allergies reviewed: Yes  Medications reviewed: Yes    Medications: Medication refills not needed today.  Pharmacy name entered into [facility]:    [id number] [place], [place] - [address] [id number] [place], [place] - [address] AT [facility] [address]    Clinical concerns: no      Nenita Celis CMA            "

## 2022-10-26 NOTE — LETTER
"    10/26/2022         RE: Rowan Leiva  [address]  [place] [address]        Dear Colleague,    Thank you for referring your patient, Rowan Leiva, to the [facility]. Please see a copy of my visit note below.    Oncology Rooming Note    October 26, 2022 2:06 PM   Rowan Leiva is a 62 year old female who presents for:    Chief Complaint   Patient presents with     Oncology Clinic Visit     Initial Vitals: LMP 05/01/2000  Estimated body mass index is 29.35 kg/m  as calculated from the following:    Height as of 4/12/22: 1.626 m (5' 4\").    Weight as of 4/12/22: 77.6 kg (171 lb). There is no height or weight on file to calculate BSA.  Data Unavailable Comment: Data Unavailable   Patient's last menstrual period was 05/01/2000.  Allergies reviewed: Yes  Medications reviewed: Yes    Medications: Medication refills not needed today.  Pharmacy name entered into [facility]:    [id number] [place] [facility] [place], [place] - [address] [address] [id number] [place], [place] - [address] AT [place] [id number] & [facility] [employer]    Clinical concerns: no      Nenita Celis, [facility]              HEMATOLOGY HISTORY: Ms. Leiva is a female with anemia and thrombocytopenia and DVT.  -Anticoagulation was stopped because of blood in the stool and other GI problems.     1. On 05/30/2021:   -WBC of 13.5, hemoglobin of 11.7 and platelet of 90.  -Creatinine of 1.87.  2. Coronary angiogram on 05/31/2021 revealed stenosis of multiple vessels.  Stents were placed.  3. CT abdomen and pelvis on 05/31/2021 reveals extensive bilateral renal vascular calcification.    -Lower extremity ultrasound on 05/31/2021 reveals bilateral posterior tibial vein DVT.   -Because of chest pain and hemoptysis, CT chest angiogram was planned, but could not be done because of elevated creatinine.  Patient had a V/Q scan done today, which was nondiagnostic.  4. HIT negative on 06/01/2021.   5. EGD on 06/18/2021 " was normal.    -Colonoscopy on 06/19/2021 revealed red/clotted blood throughout the entire colon.  This was likely from a small bowel bleed.    -Patient had video endoscopy on 06/22/2021. It revealed gastropathy and possible aphthous ulcer in mid-small bowel. There is angioectasia in small bowel.  6. On 06/14/2021, iron of 20 and saturation index of 11%.  -On 06/17/2021, hemoglobin electrophoresis is normal.  7. On 09/14/2021, lower extremity ultrasound is negative for any thrombosis.     SUBJECTIVE:  Ms. Leiva is a 62-year-old female with anemia, thrombocytopenia and DVT.  She likely also had PE.  She was on anticoagulation. It was stopped because of blood in the stool and also other GI problems. She did not want to resume it.    Overall her condition is stable.  She has mild fatigue.  No headache.  No dizziness.  No chest pain.  No shortness of breath at rest.  No abdominal pain.  No nausea or vomiting.  Appetite overall has been good.  No bleeding. All other review of systems negative.     PHYSICAL EXAMINATION:    She is alert, oriented x 3.  She is not in distress.  Rest of systems not examined.     LABS: CBC, iron and ferritin reviewed.     ASSESSMENT:    1.  A 62-year-old female with unprovoked bilateral lower extremity deep venous thrombosis and likely pulmonary embolism in 2021.  Anticoagulation discontinued because of gastrointestinal bleed.  2.  Normocytic anemia secondary to renal disease and anemia of chronic disease.  3.  Thrombocytopenia, improving.     PLAN:    1.  Patient is stable.  Patient has not had any more thrombosis.  Explained to her that she is at risk of thrombosis as previously she had unprovoked thrombosis.  She will see a physician if she has any chest pain, shortness of breath or pain/swelling/redness in the extremities.    2.  CBC was reviewed.  WBC is normal.  Platelet is better.    Patient has anemia.  This is due to renal disease and anemia of chronic disease.  Patient is at risk  of bleeding as she is on Plavix.  Denies bleeding from any site.  Iron and ferritin are normal.    3.  She had few questions which were all answered.  I will see her in 3 months time with CBC.  Advised her to call us with any questions or concerns.    Total visit time of 30 minutes.  Time spent in today's visit, review of chart/investigations today and documentation today.      Again, thank you for allowing me to participate in the care of your patient.        Sincerely,        Lexa Miller MD

## 2022-10-29 NOTE — PROGRESS NOTES
HEMATOLOGY HISTORY: Ms. Leiva is a female with anemia and thrombocytopenia and DVT.  -Anticoagulation was stopped because of blood in the stool and other GI problems.     1. On 05/30/2021:   -WBC of 13.5, hemoglobin of 11.7 and platelet of 90.  -Creatinine of 1.87.  2. Coronary angiogram on 05/31/2021 revealed stenosis of multiple vessels.  Stents were placed.  3. CT abdomen and pelvis on 05/31/2021 reveals extensive bilateral renal vascular calcification.    -Lower extremity ultrasound on 05/31/2021 reveals bilateral posterior tibial vein DVT.   -Because of chest pain and hemoptysis, CT chest angiogram was planned, but could not be done because of elevated creatinine.  Patient had a V/Q scan done today, which was nondiagnostic.  4. HIT negative on 06/01/2021.   5. EGD on 06/18/2021 was normal.    -Colonoscopy on 06/19/2021 revealed red/clotted blood throughout the entire colon.  This was likely from a small bowel bleed.    -Patient had video endoscopy on 06/22/2021. It revealed gastropathy and possible aphthous ulcer in mid-small bowel. There is angioectasia in small bowel.  6. On 06/14/2021, iron of 20 and saturation index of 11%.  -On 06/17/2021, hemoglobin electrophoresis is normal.  7. On 09/14/2021, lower extremity ultrasound is negative for any thrombosis.     SUBJECTIVE:  Ms. Leiva is a 62-year-old female with anemia, thrombocytopenia and DVT.  She likely also had PE.  She was on anticoagulation. It was stopped because of blood in the stool and also other GI problems. She did not want to resume it.    Overall her condition is stable.  She has mild fatigue.  No headache.  No dizziness.  No chest pain.  No shortness of breath at rest.  No abdominal pain.  No nausea or vomiting.  Appetite overall has been good.  No bleeding. All other review of systems negative.     PHYSICAL EXAMINATION:    She is alert, oriented x 3.  She is not in distress.  Rest of systems not examined.     LABS: CBC, iron and ferritin  reviewed.     ASSESSMENT:    1.  A 62-year-old female with unprovoked bilateral lower extremity deep venous thrombosis and likely pulmonary embolism in 2021.  Anticoagulation discontinued because of gastrointestinal bleed.  2.  Normocytic anemia secondary to renal disease and anemia of chronic disease.  3.  Thrombocytopenia, improving.     PLAN:    1.  Patient is stable.  Patient has not had any more thrombosis.  Explained to her that she is at risk of thrombosis as previously she had unprovoked thrombosis.  She will see a physician if she has any chest pain, shortness of breath or pain/swelling/redness in the extremities.    2.  CBC was reviewed.  WBC is normal.  Platelet is better.    Patient has anemia.  This is due to renal disease and anemia of chronic disease.  Patient is at risk of bleeding as she is on Plavix.  Denies bleeding from any site.  Iron and ferritin are normal.    3.  She had few questions which were all answered.  I will see her in 3 months time with CBC.  Advised her to call us with any questions or concerns.    Total visit time of 30 minutes.  Time spent in today's visit, review of chart/investigations today and documentation today.

## 2022-11-22 ENCOUNTER — PATIENT OUTREACH (OUTPATIENT)
Dept: CARE COORDINATION | Facility: CLINIC | Age: 62
End: 2022-11-22

## 2022-11-22 NOTE — PROGRESS NOTES
Clinical Product Navigator RN reviewed chart; patient on payer product coverage.  Review results: patient noted to be overdue for annual preventive care exam and many recommended health screenings.   Declined support from primary care CC team outreach within the last 3 months, so will not place new CC referral, but will send patient MyChart message with annual preventive care reminder and introduction to Care Navigation.     Ruby Dias RN/Clinical Product Navigator

## 2022-12-26 DIAGNOSIS — I21.4 NSTEMI (NON-ST ELEVATED MYOCARDIAL INFARCTION) (H): ICD-10-CM

## 2022-12-29 NOTE — TELEPHONE ENCOUNTER
Routing refill request to provider for review/approval because:  Labs not current:  ldl  BP Readings from Last 3 Encounters:   09/15/22 (!) 155/57   04/12/22 139/77   10/06/21 (!) 150/78     Christy SIMMONS RN, BSN

## 2022-12-30 RX ORDER — CARVEDILOL 12.5 MG/1
12.5 TABLET ORAL 2 TIMES DAILY
Qty: 180 TABLET | Refills: 1 | Status: SHIPPED | OUTPATIENT
Start: 2022-12-30 | End: 2023-01-25

## 2022-12-30 RX ORDER — ATORVASTATIN CALCIUM 40 MG/1
40 TABLET, FILM COATED ORAL DAILY
Qty: 90 TABLET | Refills: 1 | Status: SHIPPED | OUTPATIENT
Start: 2022-12-30 | End: 2023-07-31

## 2023-01-25 DIAGNOSIS — I21.4 NSTEMI (NON-ST ELEVATED MYOCARDIAL INFARCTION) (H): ICD-10-CM

## 2023-01-25 RX ORDER — CARVEDILOL 12.5 MG/1
TABLET ORAL
Qty: 180 TABLET | Refills: 1 | Status: SHIPPED | OUTPATIENT
Start: 2023-01-25 | End: 2023-08-02

## 2023-01-25 NOTE — TELEPHONE ENCOUNTER
Pending Prescriptions:                       Disp   Refills    carvedilol (COREG) 12.5 MG tablet [Pharmac*180 ta*1        Sig: TAKE 1 TABLET(12.5 MG) BY MOUTH TWICE DAILY    Routing refill request to provider for review/approval because:  BP Readings from Last 3 Encounters:   09/15/22 (!) 155/57   04/12/22 139/77   10/06/21 (!) 150/78

## 2023-02-12 ENCOUNTER — HEALTH MAINTENANCE LETTER (OUTPATIENT)
Age: 63
End: 2023-02-12

## 2023-02-27 DIAGNOSIS — E11.21 TYPE 2 DIABETES MELLITUS WITH DIABETIC NEPHROPATHY, WITH LONG-TERM CURRENT USE OF INSULIN (H): Primary | ICD-10-CM

## 2023-02-27 DIAGNOSIS — Z79.4 TYPE 2 DIABETES MELLITUS WITH DIABETIC NEPHROPATHY, WITH LONG-TERM CURRENT USE OF INSULIN (H): Primary | ICD-10-CM

## 2023-02-28 ENCOUNTER — TRANSFERRED RECORDS (OUTPATIENT)
Dept: HEALTH INFORMATION MANAGEMENT | Facility: CLINIC | Age: 63
End: 2023-02-28
Payer: COMMERCIAL

## 2023-02-28 LAB — RETINOPATHY: POSITIVE

## 2023-02-28 RX ORDER — INSULIN GLARGINE 100 [IU]/ML
INJECTION, SOLUTION SUBCUTANEOUS
Qty: 15 ML | Refills: 1 | Status: SHIPPED | OUTPATIENT
Start: 2023-02-28 | End: 2024-08-29

## 2023-03-21 ENCOUNTER — OFFICE VISIT (OUTPATIENT)
Dept: INTERNAL MEDICINE | Facility: CLINIC | Age: 63
End: 2023-03-21
Payer: COMMERCIAL

## 2023-03-21 VITALS
DIASTOLIC BLOOD PRESSURE: 64 MMHG | HEART RATE: 76 BPM | WEIGHT: 164.8 LBS | RESPIRATION RATE: 14 BRPM | BODY MASS INDEX: 28.13 KG/M2 | TEMPERATURE: 98.1 F | OXYGEN SATURATION: 96 % | SYSTOLIC BLOOD PRESSURE: 132 MMHG | HEIGHT: 64 IN

## 2023-03-21 DIAGNOSIS — E11.65 TYPE 2 DIABETES MELLITUS WITH HYPERGLYCEMIA, WITH LONG-TERM CURRENT USE OF INSULIN (H): ICD-10-CM

## 2023-03-21 DIAGNOSIS — Z87.891 PERSONAL HISTORY OF TOBACCO USE, PRESENTING HAZARDS TO HEALTH: ICD-10-CM

## 2023-03-21 DIAGNOSIS — Z12.31 VISIT FOR SCREENING MAMMOGRAM: ICD-10-CM

## 2023-03-21 DIAGNOSIS — N18.4 CHRONIC KIDNEY DISEASE, STAGE 4 (SEVERE) (H): ICD-10-CM

## 2023-03-21 DIAGNOSIS — D64.9 ANEMIA, UNSPECIFIED TYPE: ICD-10-CM

## 2023-03-21 DIAGNOSIS — Z12.31 ENCOUNTER FOR SCREENING MAMMOGRAM FOR BREAST CANCER: ICD-10-CM

## 2023-03-21 DIAGNOSIS — E89.0 POSTOPERATIVE HYPOTHYROIDISM: ICD-10-CM

## 2023-03-21 DIAGNOSIS — Z00.00 ENCOUNTER FOR PREVENTATIVE ADULT HEALTH CARE EXAMINATION: Primary | ICD-10-CM

## 2023-03-21 DIAGNOSIS — D69.6 THROMBOCYTOPENIA (H): ICD-10-CM

## 2023-03-21 DIAGNOSIS — Z12.4 CERVICAL CANCER SCREENING: ICD-10-CM

## 2023-03-21 DIAGNOSIS — J44.9 CHRONIC OBSTRUCTIVE PULMONARY DISEASE, UNSPECIFIED COPD TYPE (H): ICD-10-CM

## 2023-03-21 DIAGNOSIS — Z79.4 TYPE 2 DIABETES MELLITUS WITH HYPERGLYCEMIA, WITH LONG-TERM CURRENT USE OF INSULIN (H): ICD-10-CM

## 2023-03-21 PROBLEM — I82.4Y3: Status: RESOLVED | Noted: 2021-08-10 | Resolved: 2023-03-21

## 2023-03-21 PROBLEM — K51.00 PANCOLITIS (H): Status: RESOLVED | Noted: 2021-09-14 | Resolved: 2023-03-21

## 2023-03-21 LAB
ALBUMIN SERPL BCG-MCNC: 3.8 G/DL (ref 3.5–5.2)
ALP SERPL-CCNC: 161 U/L (ref 35–104)
ALT SERPL W P-5'-P-CCNC: 13 U/L (ref 10–35)
ANION GAP SERPL CALCULATED.3IONS-SCNC: 11 MMOL/L (ref 7–15)
AST SERPL W P-5'-P-CCNC: 18 U/L (ref 10–35)
BILIRUB SERPL-MCNC: 0.2 MG/DL
BUN SERPL-MCNC: 37.8 MG/DL (ref 8–23)
CALCIUM SERPL-MCNC: 9.2 MG/DL (ref 8.8–10.2)
CHLORIDE SERPL-SCNC: 108 MMOL/L (ref 98–107)
CREAT SERPL-MCNC: 2.15 MG/DL (ref 0.51–0.95)
DEPRECATED HCO3 PLAS-SCNC: 21 MMOL/L (ref 22–29)
ERYTHROCYTE [DISTWIDTH] IN BLOOD BY AUTOMATED COUNT: 14.8 % (ref 10–15)
GFR SERPL CREATININE-BSD FRML MDRD: 25 ML/MIN/1.73M2
GLUCOSE SERPL-MCNC: 177 MG/DL (ref 70–99)
HBA1C MFR BLD: 7.8 % (ref 0–5.6)
HCT VFR BLD AUTO: 35.3 % (ref 35–47)
HGB BLD-MCNC: 11.6 G/DL (ref 11.7–15.7)
MCH RBC QN AUTO: 26.8 PG (ref 26.5–33)
MCHC RBC AUTO-ENTMCNC: 32.9 G/DL (ref 31.5–36.5)
MCV RBC AUTO: 82 FL (ref 78–100)
PLATELET # BLD AUTO: 142 10E3/UL (ref 150–450)
POTASSIUM SERPL-SCNC: 4.8 MMOL/L (ref 3.4–5.3)
PROT SERPL-MCNC: 7 G/DL (ref 6.4–8.3)
RBC # BLD AUTO: 4.33 10E6/UL (ref 3.8–5.2)
SODIUM SERPL-SCNC: 140 MMOL/L (ref 136–145)
T4 FREE SERPL-MCNC: 1.51 NG/DL (ref 0.9–1.7)
TSH SERPL DL<=0.005 MIU/L-ACNC: 0.19 UIU/ML (ref 0.3–4.2)
WBC # BLD AUTO: 11 10E3/UL (ref 4–11)

## 2023-03-21 PROCEDURE — 36415 COLL VENOUS BLD VENIPUNCTURE: CPT | Performed by: INTERNAL MEDICINE

## 2023-03-21 PROCEDURE — 85027 COMPLETE CBC AUTOMATED: CPT | Performed by: INTERNAL MEDICINE

## 2023-03-21 PROCEDURE — 99396 PREV VISIT EST AGE 40-64: CPT | Performed by: INTERNAL MEDICINE

## 2023-03-21 PROCEDURE — 80053 COMPREHEN METABOLIC PANEL: CPT | Performed by: INTERNAL MEDICINE

## 2023-03-21 PROCEDURE — 83036 HEMOGLOBIN GLYCOSYLATED A1C: CPT | Performed by: INTERNAL MEDICINE

## 2023-03-21 PROCEDURE — 84443 ASSAY THYROID STIM HORMONE: CPT | Performed by: INTERNAL MEDICINE

## 2023-03-21 PROCEDURE — 99213 OFFICE O/P EST LOW 20 MIN: CPT | Mod: 25 | Performed by: INTERNAL MEDICINE

## 2023-03-21 PROCEDURE — 84439 ASSAY OF FREE THYROXINE: CPT | Performed by: INTERNAL MEDICINE

## 2023-03-21 RX ORDER — NICOTINE 21 MG/24HR
1 PATCH, TRANSDERMAL 24 HOURS TRANSDERMAL EVERY 24 HOURS
Qty: 30 PATCH | Refills: 0 | Status: SHIPPED | OUTPATIENT
Start: 2023-03-21 | End: 2024-08-29

## 2023-03-21 ASSESSMENT — ASTHMA QUESTIONNAIRES: ACT_TOTALSCORE: 16

## 2023-03-21 ASSESSMENT — PAIN SCALES - GENERAL: PAINLEVEL: NO PAIN (0)

## 2023-03-21 NOTE — PROGRESS NOTES
SUBJECTIVE:   CC: Rowan is an 63 year old who presents for preventive health visit.     Patient has been advised of split billing requirements and indicates understanding: Yes  HPI  Ability to successfully perform activities of daily living: Yes, no assistance needed  Home safety:  none identified   Hearing impairment: no        PROBLEMS TO ADD ON...  Has h/o HTN. on medical treatment. BP has been controlled. No side effects from medications. No CP, HA, dizziness. good compliance with medications and low salt diet.  Has H/O DM. On diet , exercise and insulin. Blood sugars are controlled. No parestesias. No hypoglycemias.  Has history of hypothyroidism post surgery for h/o thyroid cancer. On replacement treatment with Synthroid. No heat /cold intolerance, heart palpitations, weight loss/ gain ,  change in bowel habits.  Patient has anemia of renal disease. Currently symptomatic with fatigue. No significant other symptoms of GI bleed.  Has h/o CRF. Symptoms include fatigue and LE edema. Monitoring BP, BG, medications, avoiding OTC NSAIDs. Needs periodic recheck of kidney function.  Concern for left breast lump, found a month ago. Slightly tender.       Today's PHQ-2 Score:   PHQ-2 ( 1999 [employer]) 3/21/2023   Q1: Little interest or pleasure in doing things 0   Q2: Feeling down, depressed or hopeless 0   PHQ-2 Score 0   PHQ-2 Total Score (12-17 Years)- Positive if 3 or more points; Administer PHQ-A if positive -         Social History     Tobacco Use     Smoking status: Every Day     Packs/day: 0.50     Years: 24.00     Pack years: 12.00     Types: Cigarettes     Smokeless tobacco: Former     Tobacco comments:     12 cigarettes daily   Substance Use Topics     Alcohol use: No     Alcohol/week: 0.0 standard drinks         No flowsheet data found.No flowsheet data found.    Reviewed orders with patient.  Reviewed health maintenance and updated orders accordingly - Yes  Lab work is in process  Labs reviewed in  "EPIC    Breast Cancer Screening:    FHS-7: No flowsheet data found.  click delete button to remove this line now  Mammogram Screening: Recommended mammography every 1-2 years with patient discussion and risk factor consideration  Pertinent mammograms are reviewed under the imaging tab.    History of abnormal Pap smear: Status post benign hysterectomy. Health Maintenance and Surgical History updated.     Reviewed and updated as needed this visit by clinical staff   Tobacco  Allergies  Meds  Problems  Med Hx  Surg Hx  Fam Hx          Reviewed and updated as needed this visit by Provider                     Review of Systems  CONSTITUTIONAL: NEGATIVE for fever, chills, change in weight  INTEGUMENTARY/SKIN: NEGATIVE for worrisome rashes, moles or lesions  EYES: NEGATIVE for vision changes or irritation  ENT: NEGATIVE for ear, mouth and throat problems  RESP: NEGATIVE for significant cough or SOB  BREAST: NEGATIVE for masses, tenderness or discharge  CV: NEGATIVE for chest pain, palpitations or peripheral edema  GI: NEGATIVE for nausea, abdominal pain, heartburn, or change in bowel habits  : NEGATIVE for unusual urinary or vaginal symptoms. No vaginal bleeding.  MUSCULOSKELETAL: NEGATIVE for significant arthralgias or myalgia  NEURO: NEGATIVE for weakness, dizziness or paresthesias  PSYCHIATRIC: NEGATIVE for changes in mood or affect      OBJECTIVE:   /64 (BP Location: Left arm, Cuff Size: Adult Regular)   Pulse 76   Temp 98.1  F (36.7  C) (Oral)   Resp 14   Ht 1.626 m (5' 4\")   Wt 74.8 kg (164 lb 12.8 oz)   LMP 05/01/2000   SpO2 96%   BMI 28.29 kg/m    Physical Exam  GENERAL: healthy, alert and no distress  EYES: Eyes grossly normal to inspection, PERRL and conjunctivae and sclerae normal  HENT: ear canals and TM's normal, nose and mouth without ulcers or lesions  NECK: no adenopathy, no asymmetry, masses, or scars and thyroid normal to palpation  RESP: lungs clear to auscultation - no rales, " rhonchi , +  wheezes  BREAST: normal without masses, tenderness or nipple discharge and no palpable axillary masses or adenopathy, left breast nodule medial lower quadrant   CV: regular rate and rhythm, normal S1 S2, no S3 or S4, no murmur, click or rub, no peripheral edema and peripheral pulses strong  ABDOMEN: soft, nontender, no hepatosplenomegaly, no masses and bowel sounds normal  MS: no gross musculoskeletal defects noted, no edema  SKIN: no suspicious lesions or rashes  NEURO: Normal strength and tone, mentation intact and speech normal  PSYCH: mentation appears normal, affect normal/bright    Diagnostic Test Results:  Labs reviewed in Epic    ASSESSMENT/PLAN:       ICD-10-CM    1. Encounter for preventative adult health care examination  Z00.00 nicotine (NICODERM CQ) 21 MG/24HR 24 hr patch     CBC with platelets     Comprehensive metabolic panel (BMP + Alb, Alk Phos, ALT, AST, Total. Bili, TP)     *MA Screening Digital Bilateral     CT Chest Lung Cancer Scrn Low Dose wo      2. Visit for screening mammogram  Z12.31       3. Cervical cancer screening  Z12.4       4. Postoperative hypothyroidism  E89.0 TSH WITH FREE T4 REFLEX     OFFICE/OUTPT VISIT,EST,LEVL III      5. Type 2 diabetes mellitus with hyperglycemia, with long-term current use of insulin (H)  E11.65 HEMOGLOBIN A1C    Z79.4 Comprehensive metabolic panel (BMP + Alb, Alk Phos, ALT, AST, Total. Bili, TP)     OFFICE/OUTPT VISIT,EST,LEVL III      6. Personal history of tobacco use, presenting hazards to health  Z87.891 nicotine (NICODERM CQ) 21 MG/24HR 24 hr patch     CT Chest Lung Cancer Scrn Low Dose wo      7. Encounter for screening mammogram for breast cancer  Z12.31 *MA Screening Digital Bilateral      8. Anemia, unspecified type  D64.9 CBC with platelets     OFFICE/OUTPT VISIT,EST,LEVL III      9. Chronic obstructive pulmonary disease, unspecified COPD type (H)  J44.9 OFFICE/OUTPT VISIT,EST,LEVL III      10. Chronic kidney disease, stage 4  "(severe) (H)  N18.4 OFFICE/OUTPT VISIT,EST,LEVL III      11. Thrombocytopenia (H)  D69.6 OFFICE/OUTPT VISIT,EST,LEVL III        Assess lab work   Continue treatment   Monitor CBC and renal function   Controlled HTN  Assess DM control   Smoking cessation     Patient has been advised of split billing requirements and indicates understanding: Yes      COUNSELING:  Reviewed preventive health counseling, as reflected in patient instructions       Regular exercise       Healthy diet/nutrition       Vision screening       Hearing screening      BMI:   Estimated body mass index is 28.29 kg/m  as calculated from the following:    Height as of this encounter: 1.626 m (5' 4\").    Weight as of this encounter: 74.8 kg (164 lb 12.8 oz).         She reports that she has been smoking cigarettes. She has a 12.00 pack-year smoking history. She has quit using smokeless tobacco.  Nicotine/Tobacco Cessation Plan:   Pharmacotherapies : Nicotine patch          Brian Ruiz MD  [facility]  "

## 2023-03-21 NOTE — LETTER
March 27, 2023      Rowan Leiva  [address]  [facility] [address]        Dear ,    We are writing to inform you of your test results. Your labs show decreased kidney function with elevated albumin in the urine. Mild anemia. Your diabetic control is improved.   I do recommend follow up with nephrology.       Resulted Orders   TSH WITH FREE T4 REFLEX   Result Value Ref Range    TSH 0.19 (L) 0.30 - 4.20 uIU/mL   HEMOGLOBIN A1C   Result Value Ref Range    Hemoglobin A1C 7.8 (H) 0.0 - 5.6 %      Comment:      Normal <5.7%   Prediabetes 5.7-6.4%    Diabetes 6.5% or higher     Note: Adopted from ADA consensus guidelines.   Albumin Random Urine Quantitative with Creat Ratio   Result Value Ref Range    Creatinine Urine mg/dL 51.5 mg/dL      Comment:      The reference ranges have not been established in urine creatinine. The results should be integrated into the clinical context for interpretation.    Albumin Urine mg/L 1,060.0 mg/L      Comment:      The reference ranges have not been established in urine albumin. The results should be integrated into the clinical context for interpretation.    Albumin Urine mg/g Cr 2,058.25 (H) 0.00 - 25.00 mg/g Cr      Comment:      Microalbuminuria is defined as an albumin:creatinine ratio of 17 to 299 for males and 25 to 299 for females. A ratio of albumin:creatinine of 300 or higher is indicative of overt proteinuria.  Due to biologic variability, positive results should be confirmed by a second, first-morning random or 24-hour timed urine specimen. If there is discrepancy, a third specimen is recommended. When 2 out of 3 results are in the microalbuminuria range, this is evidence for incipient nephropathy and warrants increased efforts at glucose control, blood pressure control, and institution of therapy with an angiotensin-converting-enzyme (ACE) inhibitor (if the patient can tolerate it).     CBC with platelets   Result Value Ref Range    WBC Count 11.0 4.0 -  11.0 10e3/uL    RBC Count 4.33 3.80 - 5.20 10e6/uL    Hemoglobin 11.6 (L) 11.7 - 15.7 g/dL    Hematocrit 35.3 35.0 - 47.0 %    MCV 82 78 - 100 fL    MCH 26.8 26.5 - 33.0 pg    MCHC 32.9 31.5 - 36.5 g/dL    RDW 14.8 10.0 - 15.0 %    Platelet Count 142 (L) 150 - 450 10e3/uL   Comprehensive metabolic panel (BMP + Alb, Alk Phos, ALT, AST, Total. Bili, TP)   Result Value Ref Range    Sodium 140 136 - 145 mmol/L    Potassium 4.8 3.4 - 5.3 mmol/L    Chloride 108 (H) 98 - 107 mmol/L    Carbon Dioxide (CO2) 21 (L) 22 - 29 mmol/L    Anion Gap 11 7 - 15 mmol/L    Urea Nitrogen 37.8 (H) 8.0 - 23.0 mg/dL    Creatinine 2.15 (H) 0.51 - 0.95 mg/dL    Calcium 9.2 8.8 - 10.2 mg/dL    Glucose 177 (H) 70 - 99 mg/dL    Alkaline Phosphatase 161 (H) 35 - 104 U/L    AST 18 10 - 35 U/L    ALT 13 10 - 35 U/L    Protein Total 7.0 6.4 - 8.3 g/dL    Albumin 3.8 3.5 - 5.2 g/dL    Bilirubin Total 0.2 <=1.2 mg/dL    GFR Estimate 25 (L) >60 mL/min/1.73m2      Comment:      eGFR calculated using 2021 CKD-EPI equation.   T4 free   Result Value Ref Range    Free T4 1.51 0.90 - 1.70 ng/dL       If you have any questions or concerns, please call the clinic at the number listed above.       Sincerely,      Brian Ruiz MD        [facility]

## 2023-03-23 PROCEDURE — 82570 ASSAY OF URINE CREATININE: CPT | Performed by: INTERNAL MEDICINE

## 2023-03-23 PROCEDURE — 82043 UR ALBUMIN QUANTITATIVE: CPT | Performed by: INTERNAL MEDICINE

## 2023-03-24 LAB
CREAT UR-MCNC: 51.5 MG/DL
MICROALBUMIN UR-MCNC: 1060 MG/L
MICROALBUMIN/CREAT UR: 2058.25 MG/G CR (ref 0–25)

## 2023-03-30 ENCOUNTER — HOSPITAL ENCOUNTER (OUTPATIENT)
Dept: ULTRASOUND IMAGING | Facility: CLINIC | Age: 63
Discharge: HOME | End: 2023-03-30
Attending: INTERNAL MEDICINE
Payer: COMMERCIAL

## 2023-03-30 ENCOUNTER — HOSPITAL ENCOUNTER (OUTPATIENT)
Dept: MAMMOGRAPHY | Facility: CLINIC | Age: 63
Discharge: HOME | End: 2023-03-30
Attending: INTERNAL MEDICINE
Payer: COMMERCIAL

## 2023-03-30 DIAGNOSIS — N63.24 MASS OF LOWER INNER QUADRANT OF LEFT BREAST: ICD-10-CM

## 2023-03-30 PROCEDURE — 77066 DX MAMMO INCL CAD BI: CPT

## 2023-03-30 PROCEDURE — 76642 ULTRASOUND BREAST LIMITED: CPT | Mod: LT

## 2023-04-25 ENCOUNTER — TRANSFERRED RECORDS (OUTPATIENT)
Dept: HEALTH INFORMATION MANAGEMENT | Facility: CLINIC | Age: 63
End: 2023-04-25
Payer: COMMERCIAL

## 2023-04-25 LAB — RETINOPATHY: POSITIVE

## 2023-05-08 ENCOUNTER — E-VISIT (OUTPATIENT)
Dept: URGENT CARE | Facility: CLINIC | Age: 63
End: 2023-05-08
Payer: COMMERCIAL

## 2023-05-08 DIAGNOSIS — R35.0 URINARY FREQUENCY: Primary | ICD-10-CM

## 2023-05-08 PROCEDURE — 99421 OL DIG E/M SVC 5-10 MIN: CPT | Performed by: PREVENTIVE MEDICINE

## 2023-05-08 NOTE — PATIENT INSTRUCTIONS
Dear Rowan Leiva,     After reviewing your responses, I would like you to come in for a urine test to make sure we treat you correctly. This urine test is to evaluate you for a possible urinary tract infection, and should be scheduled for today or tomorrow. Schedule a Lab Only appointment here.     Lab appointments are not available at most locations on the weekends. If no Lab Only appointment is available, you should be seen in any of our convenient Walk-in or Urgent Care Centers, which can be found on our website here.     You will receive instructions with your results in [facility] once they are available.     If your symptoms worsen, you develop pain in your back or stomach, develop fevers, or are not improving in 5 days, please contact your primary care provider for an appointment or visit a Walk-in or Urgent Care Center to be seen.     Thanks again for choosing us as your health care partner,     Td Chavez MD, MD    Dysuria with Uncertain Cause (Adult)    The urethra is the tube that allows urine to pass out of the body. In a woman, the urethra is the opening above the vagina. In men, the urethra is the opening on the tip of the penis. Dysuria is the feeling of pain or burning in the urethra when passing urine.   Dysuria can be caused by anything that irritates or inflames the urethra. An infection or chemical irritation can cause this reaction. A bladder infection is the most common cause of dysuria in adults. A urine test can diagnose this. A bladder infection needs antibiotic treatment.   Soaps, lotions, colognes, and feminine hygiene products can cause dysuria. So can birth control jellies, creams, and foams. It will go away 1 to 3 days after discontinuing the use of these irritants.   Sexually transmitted infections (STIs), such as chlamydia or gonorrhea, can cause dysuria. Your healthcare provider may take a culture sample. Your provider may start you on antibiotic medicine  before the culture test returns.   In women who have gone through menopause, dysuria can be from dryness in the lining of the urethra. This can be treated with hormones. Dysuria becomes long-term (chronic) when it lasts for weeks or months. You may need to see a specialist (urologist) to diagnose and treat chronic dysuria.   Home care  These home care tips may help:    Don't use any chemicals or products that you think may be causing your symptoms.    If you were given a prescription medicine, take as directed. Take it until it's all used up.    If a culture was taken, don't have sex until you have been told that it is negative. A negative culture means you don't have an infection. Then follow your healthcare provider's advice to treat your condition.  If a culture was done and it is positive:     Both you and your sexual partner may need to be treated. This is true even if your partner has no symptoms.    Contact your healthcare provider or go to an urgent care clinic or the public health department to be looked at and treated.    Don't have sex until both you and your partner have finished all antibiotics and your healthcare provider says you are no longer contagious.    Learn about and use safe sex practices. The safest sex is with a partner who has tested negative and only has sex with you. Condoms can prevent STIs from spreading, but they aren't a guarantee.  Follow-up care  Follow up with your healthcare provider, or as advised. If a culture was taken, you may call as directed for the results. If you have an STI, follow up with your provider or the public health department for a complete STI screening, including HIV testing. For more information, contact CDC-INFO at 945-990-3602.   When to call your healthcare provider   Call your healthcare provider right away if any of these occur:    You aren't better after 3 days of treatment    Fever of 100.4 F (38 C) or higher, or as directed by your healthcare  provider    Back or belly pain that gets worse    You can't urinate because of pain    New discharge from the urethra, vagina, or penis    Painful sores on the penis    Rash or joint pain    Painful lumps (lymph nodes) in the groin    Testicle pain or swelling of the scrotum  Michell last reviewed this educational content on 6/1/2022 2000-2022 [employer]. All rights reserved. This information is not intended as a substitute for professional medical care. Always follow your healthcare professional's instructions.

## 2023-05-10 ENCOUNTER — LAB (OUTPATIENT)
Dept: LAB | Facility: CLINIC | Age: 63
End: 2023-05-10
Payer: COMMERCIAL

## 2023-05-10 DIAGNOSIS — R35.0 URINARY FREQUENCY: ICD-10-CM

## 2023-05-10 LAB
ALBUMIN UR-MCNC: >=300 MG/DL
APPEARANCE UR: CLEAR
BACTERIA #/AREA URNS HPF: ABNORMAL /HPF
BILIRUB UR QL STRIP: NEGATIVE
COLOR UR AUTO: YELLOW
GLUCOSE UR STRIP-MCNC: 100 MG/DL
GRAN CASTS #/AREA URNS LPF: ABNORMAL /LPF
HGB UR QL STRIP: ABNORMAL
KETONES UR STRIP-MCNC: NEGATIVE MG/DL
LEUKOCYTE ESTERASE UR QL STRIP: NEGATIVE
MUCOUS THREADS #/AREA URNS LPF: PRESENT /LPF
NITRATE UR QL: NEGATIVE
PH UR STRIP: 5 [PH] (ref 5–7)
RBC #/AREA URNS AUTO: ABNORMAL /HPF
SP GR UR STRIP: >=1.03 (ref 1–1.03)
SQUAMOUS #/AREA URNS AUTO: ABNORMAL /LPF
UROBILINOGEN UR STRIP-ACNC: 0.2 E.U./DL
WBC #/AREA URNS AUTO: ABNORMAL /HPF

## 2023-05-10 PROCEDURE — 81001 URINALYSIS AUTO W/SCOPE: CPT

## 2023-05-23 ENCOUNTER — NURSE TRIAGE (OUTPATIENT)
Dept: INTERNAL MEDICINE | Facility: CLINIC | Age: 63
End: 2023-05-23

## 2023-05-23 ENCOUNTER — OFFICE VISIT (OUTPATIENT)
Dept: INTERNAL MEDICINE | Facility: CLINIC | Age: 63
End: 2023-05-23
Payer: COMMERCIAL

## 2023-05-23 VITALS
OXYGEN SATURATION: 97 % | HEIGHT: 64 IN | SYSTOLIC BLOOD PRESSURE: 124 MMHG | DIASTOLIC BLOOD PRESSURE: 60 MMHG | BODY MASS INDEX: 27.95 KG/M2 | HEART RATE: 78 BPM | WEIGHT: 163.7 LBS | TEMPERATURE: 98.7 F | RESPIRATION RATE: 12 BRPM

## 2023-05-23 DIAGNOSIS — D69.6 THROMBOCYTOPENIA (H): Primary | ICD-10-CM

## 2023-05-23 DIAGNOSIS — N18.4 CHRONIC KIDNEY DISEASE, STAGE 4 (SEVERE) (H): ICD-10-CM

## 2023-05-23 DIAGNOSIS — E89.0 POSTOPERATIVE HYPOTHYROIDISM: ICD-10-CM

## 2023-05-23 DIAGNOSIS — J44.9 CHRONIC OBSTRUCTIVE PULMONARY DISEASE, UNSPECIFIED COPD TYPE (H): ICD-10-CM

## 2023-05-23 DIAGNOSIS — Z79.4 TYPE 2 DIABETES MELLITUS WITH HYPERGLYCEMIA, WITH LONG-TERM CURRENT USE OF INSULIN (H): ICD-10-CM

## 2023-05-23 DIAGNOSIS — E11.65 TYPE 2 DIABETES MELLITUS WITH HYPERGLYCEMIA, WITH LONG-TERM CURRENT USE OF INSULIN (H): ICD-10-CM

## 2023-05-23 DIAGNOSIS — Z01.818 PREOP GENERAL PHYSICAL EXAM: Primary | ICD-10-CM

## 2023-05-23 DIAGNOSIS — H25.013 CORTICAL AGE-RELATED CATARACT OF BOTH EYES: ICD-10-CM

## 2023-05-23 DIAGNOSIS — E78.2 MIXED HYPERLIPIDEMIA: ICD-10-CM

## 2023-05-23 DIAGNOSIS — I25.10 CORONARY ARTERY DISEASE INVOLVING NATIVE CORONARY ARTERY OF NATIVE HEART WITHOUT ANGINA PECTORIS: ICD-10-CM

## 2023-05-23 LAB
ERYTHROCYTE [DISTWIDTH] IN BLOOD BY AUTOMATED COUNT: 14.3 % (ref 10–15)
HCT VFR BLD AUTO: 32.6 % (ref 35–47)
HGB BLD-MCNC: 10.8 G/DL (ref 11.7–15.7)
MCH RBC QN AUTO: 27.5 PG (ref 26.5–33)
MCHC RBC AUTO-ENTMCNC: 33.1 G/DL (ref 31.5–36.5)
MCV RBC AUTO: 83 FL (ref 78–100)
PLATELET # BLD AUTO: 88 10E3/UL (ref 150–450)
RBC # BLD AUTO: 3.93 10E6/UL (ref 3.8–5.2)
WBC # BLD AUTO: 9.7 10E3/UL (ref 4–11)

## 2023-05-23 PROCEDURE — 36415 COLL VENOUS BLD VENIPUNCTURE: CPT | Performed by: INTERNAL MEDICINE

## 2023-05-23 PROCEDURE — 80048 BASIC METABOLIC PNL TOTAL CA: CPT | Performed by: INTERNAL MEDICINE

## 2023-05-23 PROCEDURE — 99214 OFFICE O/P EST MOD 30 MIN: CPT | Performed by: INTERNAL MEDICINE

## 2023-05-23 PROCEDURE — 85027 COMPLETE CBC AUTOMATED: CPT | Performed by: INTERNAL MEDICINE

## 2023-05-23 ASSESSMENT — PAIN SCALES - GENERAL: PAINLEVEL: NO PAIN (0)

## 2023-05-23 NOTE — PROGRESS NOTES
[place] [address]  [address]  [facility] [address]  Phone: [phone]  Primary Provider: Jonn Ruiz  Pre-op Performing Provider: JONN RUIZ      PREOPERATIVE EVALUATION:  Today's date: 5/23/2023    Rowan Leiva is a 63 year old female who presents for a preoperative evaluation.      5/23/2023     4:16 PM   Additional Questions   Roomed by Roxana MCDONALD   Accompanied by n/a     Surgical Information:  Surgery/Procedure: Cataract Removal  Surgery Location: [facility] / [facility]  Surgeon: Andrew  Surgery Date: 06/14/2023 and 07/06/2023  Time of Surgery: 7 a.m.  Where patient plans to recover: At home with family  Fax number for surgical facility: [phone] and [phone]    Assessment & Plan     The proposed surgical procedure is considered LOW risk.    Preop general physical exam  Cleared for cataract surgery 6/14 and 7/05/2023    Cortical age-related cataract of both eyes  Planned surgical treatment     Chronic kidney disease, stage 4 (severe) (H)  Monitor renal function, follow up with nephrology   - BASIC METABOLIC PANEL  - CBC with platelets    Chronic obstructive pulmonary disease, unspecified COPD type (H)  Smoking cessation advised     Type 2 diabetes mellitus with hyperglycemia, with long-term current use of insulin (H)  Restart Lantus, continue Novolog     Mixed hyperlipidemia  Continue statin     Postoperative hypothyroidism  Continue Synthroid     Coronary artery disease involving native coronary artery of native heart without angina pectoris  No anginal symptoms               - No identified additional risk factors other than previously addressed        Additional Medication Instructions:   - short acting insulin (e.g. regular, lispro, aspart): HOLD on the morning of surgery.     RECOMMENDATION:  APPROVAL GIVEN to proceed with proposed procedure, without further diagnostic evaluation.            Subjective        HPI related to upcoming procedure: scheduled for eye cataract surgeries of both eyes.   No acute complaints, no medication change or new medical conditions.  Has H/O DM. On diet , exercise and insulin. Blood sugars are controlled. No parestesias. No hypoglycemias.  Has h/o HTN. on medical treatment. BP has been controlled. No side effects from medications. No CP, HA, dizziness. good compliance with medications and low salt diet.  Has h/o ischemic heart disease, asymptomatic regarding chest pains, SOB,palpitations. Has good compliance with treatment, diet and exercise.  Has H/O hyperlipidemia. On medical treatment and diet. No side effects. No muscle weakness, myalgias or upset stomach.   Has h/o CRF. Symptoms include fatigue. Monitoring BP, BG, medications, avoiding OTC NSAIDs. Needs periodic recheck of kidney function.          5/23/2023     4:05 PM   Preop Questions   1. Have you ever had a heart attack or stroke? YES -    2. Have you ever had surgery on your heart or blood vessels, such as a stent placement, a coronary artery bypass, or surgery on an artery in your head, neck, heart, or legs? YES -    3. Do you have chest pain with activity? No   4. Do you have a history of  heart failure? YES -    5. Do you currently have a cold, bronchitis or symptoms of other infection? No   6. Do you have a cough, shortness of breath, or wheezing? No   7. Do you or anyone in your family have previous history of blood clots? YES -    8. Do you or does anyone in your family have a serious bleeding problem such as prolonged bleeding following surgeries or cuts? YES -    9. Have you ever had problems with anemia or been told to take iron pills? YES -    10. Have you had any abnormal blood loss such as black, tarry or bloody stools, or abnormal vaginal bleeding? YES -    11. Have you ever had a blood transfusion? YES -    11a. Have you ever had a transfusion reaction? No   12. Are you willing to have a blood transfusion if  it is medically needed before, during, or after your surgery? Yes   13. Have you or any of your relatives ever had problems with anesthesia? YES -    14. Do you have sleep apnea, excessive snoring or daytime drowsiness? No   15. Do you have any artifical heart valves or other implanted medical devices like a pacemaker, defibrillator, or continuous glucose monitor? No   16. Do you have artificial joints? No   17. Are you allergic to latex? No       Health Care Directive:  Patient does not have a Health Care Directive or Living Will: Patient states has Advance Directive and will bring in a copy to clinic.    Preoperative Review of :   reviewed - no record of controlled substances prescribed.      Status of Chronic Conditions:  See problem list for active medical problems.  Problems all longstanding and stable, except as noted/documented.  See ROS for pertinent symptoms related to these conditions.      Review of Systems  CONSTITUTIONAL: NEGATIVE for fever, chills, change in weight  INTEGUMENTARY/SKIN: NEGATIVE for worrisome rashes, moles or lesions  EYES: NEGATIVE for vision changes or irritation  ENT/MOUTH: NEGATIVE for ear, mouth and throat problems  RESP: NEGATIVE for significant cough or SOB  CV: NEGATIVE for chest pain, palpitations or peripheral edema  GI: NEGATIVE for nausea, abdominal pain, heartburn, or change in bowel habits  : NEGATIVE for frequency, dysuria, or hematuria  MUSCULOSKELETAL: NEGATIVE for significant arthralgias or myalgia  NEURO: NEGATIVE for weakness, dizziness or paresthesias  ENDOCRINE: NEGATIVE for temperature intolerance, skin/hair changes  HEME: NEGATIVE for bleeding problems  PSYCHIATRIC: NEGATIVE for changes in mood or affect    Patient Active Problem List    Diagnosis Date Noted     Chronic obstructive pulmonary disease, unspecified COPD type (H) 03/21/2023     Priority: Medium     Chronic kidney disease, stage 4 (severe) (H) 03/21/2023     Priority: Medium     Flank pain  09/13/2022     Priority: Medium     Acute kidney injury (H) 09/13/2022     Priority: Medium     Diarrhea, unspecified type 09/13/2022     Priority: Medium     Hematochezia 09/14/2021     Priority: Medium     Anticoagulated 09/14/2021     Priority: Medium     Near syncope 09/14/2021     Priority: Medium     Stage 3b chronic kidney disease (H) 08/06/2021     Priority: Medium     Anemia, iron deficiency 07/21/2021     Priority: Medium     Iron malabsorption 07/21/2021     Priority: Medium     Anemia 06/17/2021     Priority: Medium     GI bleed 06/17/2021     Priority: Medium     Chronic renal insufficiency 06/17/2021     Priority: Medium     Melena 06/17/2021     Priority: Medium     Added automatically from request for surgery [id number]       Anemia due to blood loss, acute 06/17/2021     Priority: Medium     Added automatically from request for surgery [id number]       Colonic hemorrhage 06/17/2021     Priority: Medium     Added automatically from request for surgery [id number]       Blurred vision 06/05/2021     Priority: Medium     Cerebellar stroke, acute (H) 06/05/2021     Priority: Medium     NSTEMI (non-ST elevated myocardial infarction) (H) 05/30/2021     Priority: Medium     Hyperlipidemia      Priority: Medium     Coronary artery disease      Priority: Medium     Stents-2011       Myocardial infarction (H)      Priority: Medium     anterior       Hypertension      Priority: Medium     Hypothyroidism      Priority: Medium     Advanced directives, counseling/discussion 11/05/2012     Priority: Medium     Patient states has Advance Directive and will bring in a copy to clinic. 11/5/2012        Thrombocytopenia (H) 11/04/2012     Priority: Medium     Mild intermittent asthma      Priority: Medium     minimal symptoms, albuterol use 2x/year       Mixed hyperlipidemia 06/04/2003     Priority: Medium     Tobacco use disorder 08/22/2002     Priority: Medium     Personal history of malignant neoplasm of ovary      Priority:  Medium     Diabetes mellitus, type 2 (H) 2000     Priority: Medium     Problem list name updated by automated process. Provider to review       Type 2 diabetes mellitus with hyperglycemia, with long-term current use of insulin (H)      Priority: Medium      Past Medical History:   Diagnosis Date     Cancer of thyroid (H)      Chest pain      Coronary artery disease     -     HX OF OVARIAN MALIGNANCY      Hyperlipidemia      Hypertension      Hypothyroidism      Malignant neoplasm of thyroid gland (H)     papillary carcinoma; resection      Mild intermittent asthma     minimal symptoms, albuterol use 2x/year     Myocardial infarction (H)     anterior     Pulmonary nodule      Thrombocytopenia (H)      Tobacco use disorder      Type II or unspecified type diabetes mellitus without mention of complication, not stated as uncontrolled      Past Surgical History:   Procedure Laterality Date     BLADDER SURGERY       C ANESTH, SECTION       CHOLECYSTECTOMY       COLONOSCOPY N/A 2021    Procedure: COLONOSCOPY;  Surgeon: Red Tracey DO;  Location:  GI     CV HEART CATHETERIZATION WITH POSSIBLE INTERVENTION N/A 2021    Procedure: Heart Catheterization with Possible Intervention;  Surgeon: Wolf Brumfield MD;  Location:  HEART CARDIAC CATH LAB     CV PCI STENT DRUG ELUTING N/A 2021    Procedure: Percutaneous Coronary Intervention Stent Drug Eluting;  Surgeon: Wolf Brumfield MD;  Location: [facility] CARDIAC CATH LAB     CYSTOSCOPY  2014    Procedure: CYSTOSCOPY;  Surgeon: Sabino Jamil MD;  Location: [facility]     ESOPHAGOSCOPY, GASTROSCOPY, DUODENOSCOPY (EGD), COMBINED N/A 2021    Procedure: ESOPHAGOGASTRODUODENOSCOPY (EGD);  Surgeon: Jean Hsieh MD;  Location:  GI     HC THYROIDECTOMY  2000    papillary thyroid carcinoma - Endocrine     HEART CATH, ANGIOPLASTY  2011    2.5 X 18 mm & 2.25 X 18 mm  Xience ELLIOT to Mid LAD     HEART CATH, ANGIOPLASTY  07/29/2011    Staged-3.0 X 23 mm Xience ELLIOT to Mid RAC     HYSTERECTOMY, PAP NO LONGER INDICATED       SLING TRANSVAGINAL N/A 12/30/2014    Procedure: SLING TRANSVAGINAL;  Surgeon: Sabino Jamil MD;  Location: [facility] LIGATE FALLOPIAN TUBE  1987    Tubal Ligation     [facility] TOTAL ABDOM HYSTERECTOMY  2001    ovarian cancer (low grade) - Heme/Onc     Current Outpatient Medications   Medication Sig Dispense Refill     albuterol (PROAIR HFA/PROVENTIL HFA/VENTOLIN HFA) 108 (90 Base) MCG/ACT inhaler Inhale 2 puffs into the lungs every 6 hours (Patient taking differently: Inhale 1-2 puffs into the lungs every 6 hours as needed) 18 g 3     atorvastatin (LIPITOR) 40 MG tablet Take 1 tablet (40 mg) by mouth daily 90 tablet 1     carvedilol (COREG) 12.5 MG tablet TAKE 1 TABLET(12.5 MG) BY MOUTH TWICE DAILY 180 tablet 1     clopidogrel (PLAVIX) 75 MG tablet Take 1 tablet (75 mg) by mouth daily 90 tablet 3     insulin lispro (HUMALOG KWIKPEN) 100 UNIT/ML (1 unit dial) KWIKPEN Inject 18 Units Subcutaneous 3 times daily (before meals)       LANCETS REGULAR MISC use twice a day for blood sugar 2 boxes 0     LANTUS SOLOSTAR 100 UNIT/ML soln ADMINISTER 22 UNITS UNDER THE SKIN AT BEDTIME 15 mL 1     levothyroxine (SYNTHROID/LEVOTHROID) 150 MCG tablet Take 150 mcg by mouth daily       nicotine (NICODERM CQ) 21 MG/24HR 24 hr patch Place 1 patch onto the skin every 24 hours 30 patch 0     nitroGLYcerin (NITROSTAT) 0.4 MG sublingual tablet For chest pain place 1 tablet under the tongue every 5 minutes for 3 doses. If symptoms persist 5 minutes after 1st dose call 911. 25 tablet 2     pantoprazole (PROTONIX) 40 MG EC tablet Take 1 tablet (40 mg) by mouth daily 90 tablet 1       Allergies   Allergen Reactions     No Known Drug Allergy         Social History     Tobacco Use     Smoking status: Every Day     Packs/day: 0.50     Years: 24.00     Pack years: 12.00     Types: Cigarettes  "    Smokeless tobacco: Former     Tobacco comments:     12 cigarettes daily   Vaping Use     Vaping status: Never Used     Passive vaping exposure: Yes   Substance Use Topics     Alcohol use: No     Alcohol/week: 0.0 standard drinks of alcohol     Family History   Problem Relation Age of Onset     Blood Disease Sister         Hepatitis B-alive     Cancer Maternal Aunt         bronchial tubes, neck-     Cancer Maternal Uncle         glands in neck-     Cancer Maternal Grandfather         lungs-     Diabetes Father              Heart Disease Father         2 heartattacks-     Diabetes Sister              Diabetes Other         -cousins     Diabetes Paternal Grandmother              Diabetes Paternal Grandfather              Diabetes Paternal Aunt              Hypertension Sister         alive     Thyroid Disease Sister         both sisters-alive     Thyroid Disease Other         niece-alive     History   Drug Use No         Objective     /60 (BP Location: Left arm, Cuff Size: Adult Regular)   Pulse 78   Temp 98.7  F (37.1  C) (Tympanic)   Resp 12   Ht 1.626 m (5' 4\")   Wt 74.3 kg (163 lb 11.2 oz)   LMP 2000   SpO2 97%   BMI 28.10 kg/m      Physical Exam    GENERAL APPEARANCE: healthy, alert and no distress     EYES: EOMI, PERRL     HENT: ear canals and TM's normal and nose and mouth without ulcers or lesions     NECK: no adenopathy, no asymmetry, masses, or scars and thyroid normal to palpation     RESP: lungs clear to auscultation - no rales, rhonchi , + mild inspiratory wheezes     CV: regular rates and rhythm, normal S1 S2, no S3 or S4 and no murmur, click or rub     ABDOMEN:  soft, nontender, no HSM or masses and bowel sounds normal     MS: extremities normal- no gross deformities noted, no evidence of inflammation in joints, FROM in all extremities.     SKIN: no suspicious lesions or rashes     NEURO: Normal " strength and tone, sensory exam grossly normal, mentation intact and speech normal     PSYCH: mentation appears normal. and affect normal/bright     LYMPHATICS: No cervical adenopathy    Recent Labs   Lab Test 03/21/23  0830 10/26/22  1404 09/29/22  1544 09/14/22  1223 09/14/22  0725 09/13/22  0917   HGB 11.6* 9.3* 9.1*   < > 9.1* 10.5*   * 135* 132*   < > 14* 21*   INR  --   --   --   --  1.01 0.99    136  --    < > 139 136   POTASSIUM 4.8 4.3  --    < > 3.6 4.1   CR 2.15* 2.13*  --    < > 2.01* 2.55*   A1C 7.8*  --  9.1*  --   --   --     < > = values in this interval not displayed.        Diagnostics:  Labs pending at this time.  Results will be reviewed when available.   No EKG required for low risk surgery (cataract, skin procedure, breast biopsy, etc).    Revised Cardiac Risk Index (RCRI):  The patient has the following serious cardiovascular risks for perioperative complications:   - No serious cardiac risks = 0 points     RCRI Interpretation: 0 points: Class I (very low risk - 0.4% complication rate)           Signed Electronically by: Brian Ruiz MD  Copy of this evaluation report is provided to requesting physician.

## 2023-05-23 NOTE — PATIENT INSTRUCTIONS
Hold Humalog on the morning of surgery   Start Lantus 10 units at bedtime, increase by 2 units every 3 days for blood sugars less than 120 fasting and 150 , 2 hours after eating.

## 2023-05-24 LAB
ANION GAP SERPL CALCULATED.3IONS-SCNC: 11 MMOL/L (ref 7–15)
BUN SERPL-MCNC: 49.9 MG/DL (ref 8–23)
CALCIUM SERPL-MCNC: 9.1 MG/DL (ref 8.8–10.2)
CHLORIDE SERPL-SCNC: 109 MMOL/L (ref 98–107)
CREAT SERPL-MCNC: 2.53 MG/DL (ref 0.51–0.95)
DEPRECATED HCO3 PLAS-SCNC: 21 MMOL/L (ref 22–29)
GFR SERPL CREATININE-BSD FRML MDRD: 21 ML/MIN/1.73M2
GLUCOSE SERPL-MCNC: 156 MG/DL (ref 70–99)
POTASSIUM SERPL-SCNC: 4.6 MMOL/L (ref 3.4–5.3)
SODIUM SERPL-SCNC: 141 MMOL/L (ref 136–145)

## 2023-05-24 NOTE — TELEPHONE ENCOUNTER
Rowan noted her Low Platelet level when she saw her lab results in [facility].    She has been previously hospitalized and given Platelet transfusion in the past due to Low levels. (See ER admit 9/13/22)  She wants to know if she needs to go in.    Denies rectal bleeding, black stools, vomiting blood or vaginal bleeding.    Hx of Chronic Thrombocytopenia    Advised  - ER not advised at this time  - Clinic to evaluate lab results  - Msg would be sent to PCP/clinic    Dinorah Garcia RN  [facility] Nurse Advisors      Reason for Disposition    [1] Follow-up call from patient regarding patient's clinical status AND [2] information NON-URGENT    Protocols used: PCP CALL - NO TRIAGE-A-

## 2023-05-24 NOTE — TELEPHONE ENCOUNTER
Platelets are low, but will not need transfusion , recommend to monitor lab work , recheck in 1 month, or call if bleeding occurs. Recommend also to see hematology for follow up, last seen October 2022.

## 2023-06-07 ENCOUNTER — ONCOLOGY VISIT (OUTPATIENT)
Dept: ONCOLOGY | Facility: CLINIC | Age: 63
End: 2023-06-07
Attending: INTERNAL MEDICINE
Payer: COMMERCIAL

## 2023-06-07 VITALS
WEIGHT: 164.2 LBS | BODY MASS INDEX: 28.18 KG/M2 | RESPIRATION RATE: 18 BRPM | TEMPERATURE: 97.8 F | OXYGEN SATURATION: 100 % | HEART RATE: 72 BPM | DIASTOLIC BLOOD PRESSURE: 77 MMHG | SYSTOLIC BLOOD PRESSURE: 133 MMHG

## 2023-06-07 DIAGNOSIS — D64.9 ANEMIA, UNSPECIFIED TYPE: ICD-10-CM

## 2023-06-07 DIAGNOSIS — D69.6 THROMBOCYTOPENIA (H): Primary | ICD-10-CM

## 2023-06-07 LAB
BASOPHILS # BLD AUTO: 0 10E3/UL (ref 0–0.2)
BASOPHILS NFR BLD AUTO: 0 %
EOSINOPHIL # BLD AUTO: 0.2 10E3/UL (ref 0–0.7)
EOSINOPHIL NFR BLD AUTO: 2 %
ERYTHROCYTE [DISTWIDTH] IN BLOOD BY AUTOMATED COUNT: 13.8 % (ref 10–15)
HCT VFR BLD AUTO: 32.6 % (ref 35–47)
HGB BLD-MCNC: 11 G/DL (ref 11.7–15.7)
HOLD SPECIMEN: NORMAL
HOLD SPECIMEN: NORMAL
IMM GRANULOCYTES # BLD: 0 10E3/UL
IMM GRANULOCYTES NFR BLD: 0 %
LYMPHOCYTES # BLD AUTO: 1.7 10E3/UL (ref 0.8–5.3)
LYMPHOCYTES NFR BLD AUTO: 17 %
MCH RBC QN AUTO: 27.4 PG (ref 26.5–33)
MCHC RBC AUTO-ENTMCNC: 33.7 G/DL (ref 31.5–36.5)
MCV RBC AUTO: 81 FL (ref 78–100)
MONOCYTES # BLD AUTO: 0.5 10E3/UL (ref 0–1.3)
MONOCYTES NFR BLD AUTO: 5 %
NEUTROPHILS # BLD AUTO: 7.5 10E3/UL (ref 1.6–8.3)
NEUTROPHILS NFR BLD AUTO: 76 %
NRBC # BLD AUTO: 0 10E3/UL
NRBC BLD AUTO-RTO: 0 /100
PLATELET # BLD AUTO: 129 10E3/UL (ref 150–450)
RBC # BLD AUTO: 4.01 10E6/UL (ref 3.8–5.2)
WBC # BLD AUTO: 9.9 10E3/UL (ref 4–11)

## 2023-06-07 PROCEDURE — 36415 COLL VENOUS BLD VENIPUNCTURE: CPT | Performed by: INTERNAL MEDICINE

## 2023-06-07 PROCEDURE — 85025 COMPLETE CBC W/AUTO DIFF WBC: CPT | Performed by: INTERNAL MEDICINE

## 2023-06-07 PROCEDURE — G0463 HOSPITAL OUTPT CLINIC VISIT: HCPCS | Performed by: INTERNAL MEDICINE

## 2023-06-07 PROCEDURE — 99215 OFFICE O/P EST HI 40 MIN: CPT | Performed by: INTERNAL MEDICINE

## 2023-06-07 ASSESSMENT — PAIN SCALES - GENERAL: PAINLEVEL: NO PAIN (0)

## 2023-06-07 NOTE — RESULT ENCOUNTER NOTE
Dear Ms. Leiva,    Blood test is better with platelet of 129.    Please, call me with any questions.    Lexa Miller MD

## 2023-06-07 NOTE — PROGRESS NOTES
"Oncology Rooming Note    June 7, 2023 11:10 AM   Rowan Leiva is a 63 year old female who presents for:    Chief Complaint   Patient presents with     Oncology Clinic Visit     Initial Vitals: /77   Pulse 72   Temp 97.8  F (36.6  C) (Oral)   Resp 18   Wt 74.5 kg (164 lb 3.2 oz)   LMP 05/01/2000   SpO2 100%   BMI 28.18 kg/m   Estimated body mass index is 28.18 kg/m  as calculated from the following:    Height as of 5/23/23: 1.626 m (5' 4\").    Weight as of this encounter: 74.5 kg (164 lb 3.2 oz). Body surface area is 1.83 meters squared.  No Pain (0) Comment: Data Unavailable   Patient's last menstrual period was 05/01/2000.  Allergies reviewed: Yes  Medications reviewed: Yes    Medications: Medication refills not needed today.  Pharmacy name entered into [employer]:    [id number] [place], [place] [facility] [address] [id number] [place], [place] - [address] AT [place] [address] & [facility]    Clinical concerns: platelets are low BK was notified.      Shari J. Schoenberger, WILLIAM            "

## 2023-06-07 NOTE — LETTER
"    6/7/2023         RE: Rowan Leiva  [address]  [facility] [address]        Dear Colleague,    Thank you for referring your patient, Rowan Leiva, to the [facility]. Please see a copy of my visit note below.    Oncology Rooming Note    June 7, 2023 11:10 AM   Rowan Leiva is a 63 year old female who presents for:    Chief Complaint   Patient presents with     Oncology Clinic Visit     Initial Vitals: /77   Pulse 72   Temp 97.8  F (36.6  C) (Oral)   Resp 18   Wt 74.5 kg (164 lb 3.2 oz)   LMP 05/01/2000   SpO2 100%   BMI 28.18 kg/m   Estimated body mass index is 28.18 kg/m  as calculated from the following:    Height as of 5/23/23: 1.626 m (5' 4\").    Weight as of this encounter: 74.5 kg (164 lb 3.2 oz). Body surface area is 1.83 meters squared.  No Pain (0) Comment: Data Unavailable   Patient's last menstrual period was 05/01/2000.  Allergies reviewed: Yes  Medications reviewed: Yes    Medications: Medication refills not needed today.  Pharmacy name entered into [facility]:    [id number] [place], [place] - [address] [id number] [place], [place] - [address] [facility] AT [facility] - [address]    Clinical concerns: platelets are low BK was notified.      Shari J. Schoenberger, [facility]              HEMATOLOGY HISTORY: Ms. Leiva is a female with anemia and thrombocytopenia and DVT.  -Anticoagulation was stopped because of blood in the stool and other GI problems.      1. On 05/30/2021:   -WBC of 13.5, hemoglobin of 11.7 and platelet of 90.  -Creatinine of 1.87.  2.  On 05/31/2021, iron of 37 and ferritin of 233.   3.  On 06/01/2021:    -Normal vitamin B12.  -Normal folate.  -SPEP is normal.  -Serum immunofixation reveals possible very small monoclonal IgM kappa.  -IgM of 287.  -UPEP is normal.  -Urine immunofixation was normal.    4. On 06/17/2021, hemoglobin electrophoresis is normal.  5.  On 09/13/2022:  -Haptoglobin is " normal.    -Peripheral blood smear revealed anemia, leukopenia and thrombocytopenia.  No abnormal blood cells.  6. CT abdomen and pelvis on 05/31/2021 reveals extensive bilateral renal vascular calcification.    -Lower extremity ultrasound on 05/31/2021 reveals bilateral posterior tibial vein DVT.   -Because of chest pain and hemoptysis, CT chest angiogram was planned, but could not be done because of elevated creatinine.  Patient had a V/Q scan done today, which was nondiagnostic.  7. EGD on 06/18/2021 was normal.    -Colonoscopy on 06/19/2021 revealed red/clotted blood throughout the entire colon.  This was likely from a small bowel bleed.    -Video endoscopy on 06/22/2021 revealed gastropathy and possible aphthous ulcer in mid-small bowel. There is angioectasia in small bowel.  8. On 09/14/2021, lower extremity ultrasound is negative for any thrombosis.    SUBJECTIVE:  The patient is a 63-year-old female with anemia and thrombocytopenia.  Anemia is secondary to renal disease and anemia of chronic disease.  Cause of thrombocytopenia not clear.  The patient also has a history of DVT.  She was on anticoagulation, which was stopped because of GI bleed.     The patient has had multiple investigations done for cytopenia.  1.  On 05/31/2021:  -Iron of 37 and ferritin of 233.   2.  On 06/01/2021:    -Normal vitamin B12.  -Normal folate.  -SPEP is normal.  -Serum immunofixation reveals possible very small monoclonal IgM kappa.  -IgM of 287.  -UPEP is normal.  -Urine immunofixation was normal.    3.  On 09/13/2022:  -Haptoglobin is normal.    -Peripheral blood smear revealed anemia, leukopenia and thrombocytopenia.  No abnormal blood cells.     The patient recently had CBC done on 05/23/2023.  Platelet was low at 88.  Because of that, she was asked to come back to hematology clinic.     The patient has fatigue.  No worsening.  No headache.  Occasional dizziness.  No chest pain.  No shortness of breath.  No abdominal pain,  nausea or vomiting.  Denies any bleeding.  No easy bruising.  All other review of systems is negative.     PHYSICAL EXAMINATION:    GENERAL:  She is alert, oriented x 3.  Not in distress.  VITAL SIGNS:  Reviewed.   Rest of the systems not examined.     LABORATORY:  CBC was repeated today.  Normal WBC.  Platelet is better at 129.  Hemoglobin is 11.     ASSESSMENT:    1.  A 63-year-old female with chronic thrombocytopenia.  Thrombocytopenia is better.  2.  Normocytic anemia secondary to renal disease and anemia of chronic disease.  3.  History of unprovoked bilateral DVT and likely pulmonary embolism.  Anticoagulation discontinued because of gastrointestinal bleed.  4.  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  5.  Other medical problems including chronic kidney disease and diabetes mellitus.     PLAN:    1.  The patient's overall condition is stable.  She is not having bleeding or bruising complication.     CBC reviewed.  Her thrombocytopenia is better.  Platelet is 129.      The patient's thrombocytopenia is chronic.  At times, thrombocytopenia has severely gotten worse.  The patient's thrombocytopenia could be due to either ITP or MDS.     At this time, we will simply monitor CBC.  If there is worsening thrombocytopenia, will consider bone marrow biopsy.    2.  The patient has normocytic anemia.  This is due to renal disease and anemia of chronic disease. Previously she had iron deficiency.     We will monitor her CBC.  With next lab, we will check iron studies again.  She is at high risk of bleeding as she is on Plavix.  Previously, she had gastrointestinal bleed.     3.  The patient has a history of DVT and possible PE.  Not on anticoagulation because of previous GI bleed. So far, she has been doing well without any evidence of recurrence of thrombosis.    4.  Serum immunofixation had revealed possible monoclonal protein.  We will recheck labs including SPEP, serum immunofixation and free light chain in 6 months.    5.  She is on Plavix.   I advised the patient to keep a watch out for any bleeding.  Advised her to go to emergency room if she has bleeding from any site, black stool or easy bruising.     6.  She had a few questions, which were all answered.  I will see her in 6 months' time with labs.     Total visit time of 40 minutes.  Time spent in today's visit, review of chart/investigations today and documentation today.    This office note has been dictated.          Again, thank you for allowing me to participate in the care of your patient.        Sincerely,        Lexa Miller MD

## 2023-06-09 ENCOUNTER — TRANSFERRED RECORDS (OUTPATIENT)
Dept: HEALTH INFORMATION MANAGEMENT | Facility: CLINIC | Age: 63
End: 2023-06-09
Payer: COMMERCIAL

## 2023-06-19 NOTE — PROGRESS NOTES
HEMATOLOGY HISTORY: Ms. Leiva is a female with anemia and thrombocytopenia and DVT.  -Anticoagulation was stopped because of blood in the stool and other GI problems.      1. On 05/30/2021:   -WBC of 13.5, hemoglobin of 11.7 and platelet of 90.  -Creatinine of 1.87.  2.  On 05/31/2021, iron of 37 and ferritin of 233.   3.  On 06/01/2021:    -Normal vitamin B12.  -Normal folate.  -SPEP is normal.  -Serum immunofixation reveals possible very small monoclonal IgM kappa.  -IgM of 287.  -UPEP is normal.  -Urine immunofixation was normal.    4. On 06/17/2021, hemoglobin electrophoresis is normal.  5.  On 09/13/2022:  -Haptoglobin is normal.    -Peripheral blood smear revealed anemia, leukopenia and thrombocytopenia.  No abnormal blood cells.  6. CT abdomen and pelvis on 05/31/2021 reveals extensive bilateral renal vascular calcification.    -Lower extremity ultrasound on 05/31/2021 reveals bilateral posterior tibial vein DVT.   -Because of chest pain and hemoptysis, CT chest angiogram was planned, but could not be done because of elevated creatinine.  Patient had a V/Q scan done today, which was nondiagnostic.  7. EGD on 06/18/2021 was normal.    -Colonoscopy on 06/19/2021 revealed red/clotted blood throughout the entire colon.  This was likely from a small bowel bleed.    -Video endoscopy on 06/22/2021 revealed gastropathy and possible aphthous ulcer in mid-small bowel. There is angioectasia in small bowel.  8. On 09/14/2021, lower extremity ultrasound is negative for any thrombosis.    SUBJECTIVE:  The patient is a 63-year-old female with anemia and thrombocytopenia.  Anemia is secondary to renal disease and anemia of chronic disease.  Cause of thrombocytopenia not clear.  The patient also has a history of DVT.  She was on anticoagulation, which was stopped because of GI bleed.     The patient has had multiple investigations done for cytopenia.  1.  On 05/31/2021:  -Iron of 37 and ferritin of 233.   2.  On 06/01/2021:     -Normal vitamin B12.  -Normal folate.  -SPEP is normal.  -Serum immunofixation reveals possible very small monoclonal IgM kappa.  -IgM of 287.  -UPEP is normal.  -Urine immunofixation was normal.    3.  On 09/13/2022:  -Haptoglobin is normal.    -Peripheral blood smear revealed anemia, leukopenia and thrombocytopenia.  No abnormal blood cells.     The patient recently had CBC done on 05/23/2023.  Platelet was low at 88.  Because of that, she was asked to come back to hematology clinic.     The patient has fatigue.  No worsening.  No headache.  Occasional dizziness.  No chest pain.  No shortness of breath.  No abdominal pain, nausea or vomiting.  Denies any bleeding.  No easy bruising.  All other review of systems is negative.     PHYSICAL EXAMINATION:    GENERAL:  She is alert, oriented x 3.  Not in distress.  VITAL SIGNS:  Reviewed.   Rest of the systems not examined.     LABORATORY:  CBC was repeated today.  Normal WBC.  Platelet is better at 129.  Hemoglobin is 11.     ASSESSMENT:    1.  A 63-year-old female with chronic thrombocytopenia.  Thrombocytopenia is better.  2.  Normocytic anemia secondary to renal disease and anemia of chronic disease.  3.  History of unprovoked bilateral DVT and likely pulmonary embolism.  Anticoagulation discontinued because of gastrointestinal bleed.  4.  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  5.  Other medical problems including chronic kidney disease and diabetes mellitus.     PLAN:    1.  The patient's overall condition is stable.  She is not having bleeding or bruising complication.     CBC reviewed.  Her thrombocytopenia is better.  Platelet is 129.      The patient's thrombocytopenia is chronic.  At times, thrombocytopenia has severely gotten worse.  The patient's thrombocytopenia could be due to either ITP or MDS.     At this time, we will simply monitor CBC.  If there is worsening thrombocytopenia, will consider bone marrow biopsy.    2.  The patient has normocytic anemia.  This is due to renal  disease and anemia of chronic disease. Previously she had iron deficiency.     We will monitor her CBC.  With next lab, we will check iron studies again.  She is at high risk of bleeding as she is on Plavix.  Previously, she had gastrointestinal bleed.     3.  The patient has a history of DVT and possible PE.  Not on anticoagulation because of previous GI bleed. So far, she has been doing well without any evidence of recurrence of thrombosis.    4.  Serum immunofixation had revealed possible monoclonal protein.  We will recheck labs including SPEP, serum immunofixation and free light chain in 6 months.    5.  She is on Plavix.  I advised the patient to keep a watch out for any bleeding.  Advised her to go to emergency room if she has bleeding from any site, black stool or easy bruising.     6.  She had a few questions, which were all answered.  I will see her in 6 months' time with labs.     Total visit time of 40 minutes.  Time spent in today's visit, review of chart/investigations today and documentation today.

## 2023-07-06 DIAGNOSIS — J44.9 CHRONIC OBSTRUCTIVE PULMONARY DISEASE, UNSPECIFIED COPD TYPE (H): ICD-10-CM

## 2023-07-07 NOTE — TELEPHONE ENCOUNTER
Recent ACT below 20. Provider approval needed.         6/9/2020     8:30 AM 3/21/2023     8:33 AM   ACT Total Scores   ACT TOTAL SCORE (Goal Greater than or Equal to 20) 22 16   In the past 12 months, how many times did you visit the emergency room for your asthma without being admitted to the hospital? 0 0   In the past 12 months, how many times were you hospitalized overnight because of your asthma? 0 0

## 2023-07-09 RX ORDER — ALBUTEROL SULFATE 90 UG/1
2 AEROSOL, METERED RESPIRATORY (INHALATION) EVERY 6 HOURS
Qty: 8.5 G | Refills: 8 | Status: SHIPPED | OUTPATIENT
Start: 2023-07-09

## 2023-07-21 DIAGNOSIS — I21.4 NSTEMI (NON-ST ELEVATED MYOCARDIAL INFARCTION) (H): ICD-10-CM

## 2023-07-26 ENCOUNTER — TRANSFERRED RECORDS (OUTPATIENT)
Dept: HEALTH INFORMATION MANAGEMENT | Facility: CLINIC | Age: 63
End: 2023-07-26
Payer: COMMERCIAL

## 2023-07-26 LAB — RETINOPATHY: POSITIVE

## 2023-07-31 RX ORDER — ATORVASTATIN CALCIUM 40 MG/1
40 TABLET, FILM COATED ORAL DAILY
Qty: 15 TABLET | Refills: 0 | Status: SHIPPED | OUTPATIENT
Start: 2023-07-31 | End: 2023-08-24

## 2023-07-31 NOTE — TELEPHONE ENCOUNTER
Patient has not had any lipid panel done since more than 1 year, needs lipid panel for refills, I have refilled for 2 weeks as covering provider Dr. Díaz is off today

## 2023-07-31 NOTE — TELEPHONE ENCOUNTER
Please see message below.    Routed to covering provider - Dr. Díaz.    Since patient is out of medication, will forward to [facility] provider team.

## 2023-08-03 NOTE — TELEPHONE ENCOUNTER
Attempted to contact patient, but unable to leave voicemail. Sent patient LA[employer] MINUTE NETWORKhart message to set up lab only appointment to update fasting cholesterol. Lipid panel ordered for future lab appointment.     Thank you,  Jose Hale, Triage RN [facility] [place]  4:48 PM 8/3/2023

## 2023-08-19 ENCOUNTER — HEALTH MAINTENANCE LETTER (OUTPATIENT)
Age: 63
End: 2023-08-19

## 2023-08-23 DIAGNOSIS — I21.4 NSTEMI (NON-ST ELEVATED MYOCARDIAL INFARCTION) (H): ICD-10-CM

## 2023-08-24 RX ORDER — ATORVASTATIN CALCIUM 40 MG/1
40 TABLET, FILM COATED ORAL DAILY
Qty: 90 TABLET | Refills: 3 | Status: SHIPPED | OUTPATIENT
Start: 2023-08-24 | End: 2023-11-27

## 2023-09-08 DIAGNOSIS — I21.4 NSTEMI (NON-ST ELEVATED MYOCARDIAL INFARCTION) (H): ICD-10-CM

## 2023-09-08 RX ORDER — CLOPIDOGREL BISULFATE 75 MG/1
75 TABLET ORAL DAILY
Qty: 90 TABLET | Refills: 3 | Status: SHIPPED | OUTPATIENT
Start: 2023-09-08 | End: 2024-09-06

## 2023-10-26 ENCOUNTER — TELEPHONE (OUTPATIENT)
Dept: ONCOLOGY | Facility: CLINIC | Age: 63
End: 2023-10-26

## 2023-10-26 NOTE — TELEPHONE ENCOUNTER
Called patient and left a message requesting a call back to schedule 6 month labs and follow up with Dr Miller in December

## 2023-11-27 DIAGNOSIS — I21.4 NSTEMI (NON-ST ELEVATED MYOCARDIAL INFARCTION) (H): ICD-10-CM

## 2023-11-27 RX ORDER — ATORVASTATIN CALCIUM 40 MG/1
40 TABLET, FILM COATED ORAL DAILY
Qty: 90 TABLET | Refills: 3 | Status: SHIPPED | OUTPATIENT
Start: 2023-11-27 | End: 2024-02-26

## 2023-11-30 DIAGNOSIS — I25.10 CORONARY ARTERY DISEASE INVOLVING NATIVE CORONARY ARTERY OF NATIVE HEART WITHOUT ANGINA PECTORIS: ICD-10-CM

## 2023-11-30 RX ORDER — NITROGLYCERIN 0.4 MG/1
TABLET SUBLINGUAL
Qty: 25 TABLET | Refills: 0 | Status: SHIPPED | OUTPATIENT
Start: 2023-11-30

## 2024-01-06 ENCOUNTER — HEALTH MAINTENANCE LETTER (OUTPATIENT)
Age: 64
End: 2024-01-06

## 2024-01-23 DIAGNOSIS — I21.4 NSTEMI (NON-ST ELEVATED MYOCARDIAL INFARCTION) (H): ICD-10-CM

## 2024-01-23 RX ORDER — CARVEDILOL 12.5 MG/1
TABLET ORAL
Qty: 180 TABLET | Refills: 1 | Status: SHIPPED | OUTPATIENT
Start: 2024-01-23 | End: 2024-08-29

## 2024-02-20 ENCOUNTER — PATIENT OUTREACH (OUTPATIENT)
Dept: CARE COORDINATION | Facility: CLINIC | Age: 64
End: 2024-02-20

## 2024-02-24 DIAGNOSIS — I21.4 NSTEMI (NON-ST ELEVATED MYOCARDIAL INFARCTION) (H): ICD-10-CM

## 2024-02-26 RX ORDER — ATORVASTATIN CALCIUM 40 MG/1
40 TABLET, FILM COATED ORAL DAILY
Qty: 15 TABLET | Refills: 1 | Status: SHIPPED | OUTPATIENT
Start: 2024-02-26 | End: 2024-09-10

## 2024-02-28 ENCOUNTER — TRANSFERRED RECORDS (OUTPATIENT)
Dept: HEALTH INFORMATION MANAGEMENT | Facility: CLINIC | Age: 64
End: 2024-02-28

## 2024-02-28 LAB
ALT SERPL-CCNC: 17 U/L
AST SERPL-CCNC: 22 U/L (ref 14–36)
CHOLESTEROL (EXTERNAL): 178 MG/DL (ref 100–199)
GLUCOSE (EXTERNAL): 202 MG/DL (ref 70–99)
HBA1C MFR BLD: 7.5 %
HDLC SERPL-MCNC: 28 MG/DL
LDL CHOLESTEROL CALCULATED (EXTERNAL): 74 MG/DL (ref 0–99)
POTASSIUM (EXTERNAL): 4.5 MMOL/L (ref 3.5–5.1)
TRIGLYCERIDES (EXTERNAL): 374 MG/DL (ref 0–149)
TSH SERPL-ACNC: <0.02 UIU/ML (ref 0.47–4.68)

## 2024-02-29 ENCOUNTER — PATIENT OUTREACH (OUTPATIENT)
Dept: CARE COORDINATION | Facility: CLINIC | Age: 64
End: 2024-02-29

## 2024-03-05 ENCOUNTER — PATIENT OUTREACH (OUTPATIENT)
Dept: CARE COORDINATION | Facility: CLINIC | Age: 64
End: 2024-03-05

## 2024-03-28 ENCOUNTER — PATIENT OUTREACH (OUTPATIENT)
Dept: CARE COORDINATION | Facility: CLINIC | Age: 64
End: 2024-03-28

## 2024-04-03 ENCOUNTER — TRANSFERRED RECORDS (OUTPATIENT)
Dept: HEALTH INFORMATION MANAGEMENT | Facility: CLINIC | Age: 64
End: 2024-04-03

## 2024-05-13 ENCOUNTER — TELEPHONE (OUTPATIENT)
Dept: INTERNAL MEDICINE | Facility: CLINIC | Age: 64
End: 2024-05-13

## 2024-05-13 NOTE — TELEPHONE ENCOUNTER
Patient asking for help to determine what thyroid cancer she had in 2000. Patient does not recall who diagnosed her or where she went for treatment. Patient is also not able to find any information in her mychart     Patient can be reached at [phone]

## 2024-05-13 NOTE — TELEPHONE ENCOUNTER
Call to pt. Per scanned in reports from 6/7/2000 and 5/1/2000, pt had Papillary Carcinoma.     Mailed copy of scanned report to pt. She agrees with this and had no further questions.

## 2024-05-25 ENCOUNTER — HEALTH MAINTENANCE LETTER (OUTPATIENT)
Age: 64
End: 2024-05-25

## 2024-06-19 ENCOUNTER — TRANSFERRED RECORDS (OUTPATIENT)
Dept: HEALTH INFORMATION MANAGEMENT | Facility: CLINIC | Age: 64
End: 2024-06-19

## 2024-06-19 LAB — RETINOPATHY: POSITIVE

## 2024-08-03 ENCOUNTER — HEALTH MAINTENANCE LETTER (OUTPATIENT)
Age: 64
End: 2024-08-03

## 2024-08-29 ENCOUNTER — OFFICE VISIT (OUTPATIENT)
Dept: INTERNAL MEDICINE | Facility: CLINIC | Age: 64
End: 2024-08-29
Payer: COMMERCIAL

## 2024-08-29 VITALS
HEIGHT: 64 IN | OXYGEN SATURATION: 99 % | TEMPERATURE: 97.7 F | DIASTOLIC BLOOD PRESSURE: 59 MMHG | BODY MASS INDEX: 28.17 KG/M2 | RESPIRATION RATE: 16 BRPM | SYSTOLIC BLOOD PRESSURE: 136 MMHG | WEIGHT: 165 LBS | HEART RATE: 76 BPM

## 2024-08-29 DIAGNOSIS — R42 DIZZINESS: ICD-10-CM

## 2024-08-29 DIAGNOSIS — E11.65 TYPE 2 DIABETES MELLITUS WITH HYPERGLYCEMIA, WITH LONG-TERM CURRENT USE OF INSULIN (H): ICD-10-CM

## 2024-08-29 DIAGNOSIS — Z00.00 PREVENTATIVE HEALTH CARE: Primary | ICD-10-CM

## 2024-08-29 DIAGNOSIS — Z79.4 TYPE 2 DIABETES MELLITUS WITH HYPERGLYCEMIA, WITH LONG-TERM CURRENT USE OF INSULIN (H): ICD-10-CM

## 2024-08-29 DIAGNOSIS — E89.0 POSTOPERATIVE HYPOTHYROIDISM: ICD-10-CM

## 2024-08-29 DIAGNOSIS — Z12.31 VISIT FOR SCREENING MAMMOGRAM: ICD-10-CM

## 2024-08-29 DIAGNOSIS — I63.9 CEREBROVASCULAR ACCIDENT (CVA), UNSPECIFIED MECHANISM (H): ICD-10-CM

## 2024-08-29 PROCEDURE — 99213 OFFICE O/P EST LOW 20 MIN: CPT | Mod: 25 | Performed by: STUDENT IN AN ORGANIZED HEALTH CARE EDUCATION/TRAINING PROGRAM

## 2024-08-29 PROCEDURE — 99396 PREV VISIT EST AGE 40-64: CPT | Performed by: STUDENT IN AN ORGANIZED HEALTH CARE EDUCATION/TRAINING PROGRAM

## 2024-08-29 RX ORDER — CARVEDILOL 6.25 MG/1
TABLET ORAL
COMMUNITY
Start: 2024-08-26

## 2024-08-29 RX ORDER — INSULIN GLARGINE 100 [IU]/ML
10 INJECTION, SOLUTION SUBCUTANEOUS AT BEDTIME
Qty: 6 ML | Refills: 0 | Status: SHIPPED | OUTPATIENT
Start: 2024-08-29 | End: 2024-10-28

## 2024-08-29 SDOH — HEALTH STABILITY: PHYSICAL HEALTH: ON AVERAGE, HOW MANY MINUTES DO YOU ENGAGE IN EXERCISE AT THIS LEVEL?: 0 MIN

## 2024-08-29 SDOH — HEALTH STABILITY: PHYSICAL HEALTH: ON AVERAGE, HOW MANY DAYS PER WEEK DO YOU ENGAGE IN MODERATE TO STRENUOUS EXERCISE (LIKE A BRISK WALK)?: 0 DAYS

## 2024-08-29 ASSESSMENT — SOCIAL DETERMINANTS OF HEALTH (SDOH): HOW OFTEN DO YOU GET TOGETHER WITH FRIENDS OR RELATIVES?: ONCE A WEEK

## 2024-08-29 NOTE — PROGRESS NOTES
"Preventive Care Visit  [facility]  Ilia Mcclellan MD, Internal Medicine  Aug 29, 2024      Assessment & Plan     (Z00.00) Preventative health care  (primary encounter diagnosis)  - Colonoscopy in 2031, cervical cancer screening discontinue due to hysterectomy history  - Discussed diet and exercise modifications  Plan: BASIC METABOLIC PANEL, CBC with platelets,         Lipid panel reflex to direct LDL Fasting, CA         125    (Z12.31) Visit for screening mammogram  Plan: MA Screening Bilateral w/ [facility]    (E11.65,  Z79.4) Type 2 diabetes mellitus with hyperglycemia, with long-term current use of insulin (H)  - Elevated sugars at home using 20-22u sliding scale, no longer on long acting insulin  - Follows endocrine inconsistently  Plan: Albumin Random Urine Quantitative with Creat         Ratio, HEMOGLOBIN A1C, insulin glargine (LANTUS        SOLOSTAR) 100 UNIT/ML pen    (R42) Dizziness  - H/o CVA but has not followed with Neurology  - No new neurological deficits  Plan: Adult Neurology  Referral      (E89.0) Postoperative hypothyroidism  - on LT4 150mcg daily  Plan: Check TSH level        Nicotine/Tobacco Cessation  She reports that she has been smoking cigarettes. She has a 12 pack-year smoking history. She has quit using smokeless tobacco.  Nicotine/Tobacco Cessation Plan  Self help information given to patient      BMI  Estimated body mass index is 28.32 kg/m  as calculated from the following:    Height as of this encounter: 1.626 m (5' 4\").    Weight as of this encounter: 74.8 kg (165 lb).       Counseling  Appropriate preventive services were addressed with this patient via screening, questionnaire, or discussion as appropriate for fall prevention, nutrition, physical activity, Tobacco-use cessation, social engagement, weight loss and cognition.  Checklist reviewing preventive services available has been given to the patient.  Reviewed patient's diet, addressing concerns and/or " questions.   The patient was instructed to see the dentist every 6 months.           Subjective   Rowan is a 64 year old, presenting for the following:  Physical        8/29/2024     1:08 PM   Additional Questions   Roomed by ANN Merrill LPN   Accompanied by self         8/29/2024     1:08 PM   Patient Reported Additional Medications   Patient reports taking the following new medications none       Via the Health Maintenance questionnaire, the patient has reported the following services have been completed -Mammogram: [facility] 2023-06-01, this information has not been sent to the abstraction team.  Health Care Directive  Patient does not have a Health Care Directive or Living Will: Discussed advance care planning with patient; information given to patient to review.    HPI  64F pmh hypothyroidism, type 2 diabetes mellitus insulin-dependent, history of CVA on Plavix, hypertension, and COPD history presents for annual wellness and acute complaints.  Patient reports her insulin requirements at home have increased in the last several weeks.  She is not taking sliding scale insulin 3 times daily, reports taking about 20 to 22 units with each meal.  Previously was on Lantus but this was discontinued due to possible changes to insulin pump.  She recently ran into insurance changes and has had inconsistent medical follow-up.  She also reports dizziness which has been present since her CVA which was diagnosed several years ago.  She reports no new neurological deficits but has not followed a neurologist outpatient.  She briefly followed with physical therapy for short period of time and no longer does so.            8/29/2024   General Health   How would you rate your overall physical health? (!) FAIR   Feel stress (tense, anxious, or unable to sleep) To some extent      (!) STRESS CONCERN      8/29/2024   Nutrition   Three or more servings of calcium each day? (!) NO   Diet: Low salt    Low fat/cholesterol    Diabetic   How  many servings of fruit and vegetables per day? (!) 0-1   How many sweetened beverages each day? 0-1       Multiple values from one day are sorted in reverse-chronological order         8/29/2024   Exercise   Days per week of moderate/strenous exercise 0 days   Average minutes spent exercising at this level 0 min      (!) EXERCISE CONCERN      8/29/2024   Social Factors   Frequency of gathering with friends or relatives Once a week   Worry food won't last until get money to buy more No   Food not last or not have enough money for food? No   Do you have housing? (Housing is defined as stable permanent housing and does not include staying ouside in a car, in a tent, in an abandoned building, in an overnight shelter, or couch-surfing.) Yes   Are you worried about losing your housing? No   Lack of transportation? No   Unable to get utilities (heat,electricity)? No            8/29/2024   Fall Risk   Fallen 2 or more times in the past year? No   Trouble with walking or balance? Yes   Gait Speed Test Interpretation Less than or equal to 5.00 seconds - PASS              8/29/2024   Dental   Dentist two times every year? (!) NO            8/29/2024   TB Screening   Were you born outside of the [place]? No            Today's PHQ-2 Score:       8/29/2024     1:07 PM   PHQ-2 ( 1999 [employer])   Q1: Little interest or pleasure in doing things 1   Q2: Feeling down, depressed or hopeless 1   PHQ-2 Score 2   Q1: Little interest or pleasure in doing things Several days   Q2: Feeling down, depressed or hopeless Several days   PHQ-2 Score 2           8/29/2024   Substance Use   Alcohol more than 3/day or more than 7/wk Not Applicable   Do you use any other substances recreationally? No        Social History     Tobacco Use    Smoking status: Every Day     Current packs/day: 0.50     Average packs/day: 0.5 packs/day for 24.0 years (12.0 ttl pk-yrs)     Types: Cigarettes    Smokeless tobacco: Former    Tobacco comments:     12 cigarettes daily    Vaping Use    Vaping status: Never Used   Substance Use Topics    Alcohol use: No     Alcohol/week: 0.0 standard drinks of alcohol    Drug use: No           2023   LAST FHS-7 RESULTS   1st degree relative breast or ovarian cancer Yes   Any relative bilateral breast cancer No   Any male have breast cancer No   Any ONE woman have BOTH breast AND ovarian cancer Yes   Any woman with breast cancer before 50yrs No   2 or more relatives with breast AND/OR ovarian cancer No   2 or more relatives with breast AND/OR bowel cancer No           Mammogram Screening - Mammogram every 1-2 years updated in Health Maintenance based on mutual decision making        2024   STI Screening   New sexual partner(s) since last STI/HIV test? No        History of abnormal Pap smear: Status post hysterectomy with removal of cervix and no history of CIN2 or greater or cervical cancer. Health Maintenance and Surgical History updated.       ASCVD Risk   The ASCVD Risk score (Bill BARBOSA, et al., 2019) failed to calculate for the following reasons:    The patient has a prior MI or stroke diagnosis           Reviewed and updated as needed this visit by Provider                    Past Medical History:   Diagnosis Date    Cancer of thyroid (H)     Chest pain     Coronary artery disease     -    HX OF OVARIAN MALIGNANCY     Hyperlipidemia     Hypertension     Hypothyroidism     Malignant neoplasm of thyroid gland (H)     papillary carcinoma; resection     Mild intermittent asthma     minimal symptoms, albuterol use 2x/year    Myocardial infarction (H)     anterior    Pulmonary nodule     Thrombocytopenia (H24)     Tobacco use disorder     Type II or unspecified type diabetes mellitus without mention of complication, not stated as uncontrolled      Past Surgical History:   Procedure Laterality Date    BLADDER SURGERY      C ANESTH, SECTION      CHOLECYSTECTOMY      COLONOSCOPY N/A  "06/19/2021    Procedure: COLONOSCOPY;  Surgeon: Red Tracey DO;  Location:  GI    CV HEART CATHETERIZATION WITH POSSIBLE INTERVENTION N/A 05/31/2021    Procedure: Heart Catheterization with Possible Intervention;  Surgeon: Wolf Brumfield MD;  Location:  HEART CARDIAC CATH LAB    CV PCI STENT DRUG ELUTING N/A 05/31/2021    Procedure: Percutaneous Coronary Intervention Stent Drug Eluting;  Surgeon: Wolf Brumfield MD;  Location:  HEART CARDIAC CATH LAB    CYSTOSCOPY  12/30/2014    Procedure: CYSTOSCOPY;  Surgeon: Sabino Jamil MD;  Location: [facility]    ESOPHAGOSCOPY, GASTROSCOPY, DUODENOSCOPY (EGD), COMBINED N/A 06/18/2021    Procedure: ESOPHAGOGASTRODUODENOSCOPY (EGD);  Surgeon: Jean Hsieh MD;  Location:  GI    HC THYROIDECTOMY  06/2000    papillary thyroid carcinoma - Endocrine    HEART CATH, ANGIOPLASTY  07/13/2011    2.5 X 18 mm & 2.25 X 18 mm Xience ELLIOT to Mid LAD    HEART CATH, ANGIOPLASTY  07/29/2011    Staged-3.0 X 23 mm Xience ELLIOT to Mid RAC    HYSTERECTOMY, PAP NO LONGER INDICATED      SLING TRANSVAGINAL N/A 12/30/2014    Procedure: SLING TRANSVAGINAL;  Surgeon: Sabino Jamil MD;  Location: [facility]    Z LIGATE FALLOPIAN TUBE  1987    Tubal Ligation    ZZC TOTAL ABDOM HYSTERECTOMY  2001    ovarian cancer (low grade) - Heme/Onc         Review of Systems  Constitutional, neuro, ENT, endocrine, pulmonary, cardiac, gastrointestinal, genitourinary, musculoskeletal, integument and psychiatric systems are negative, except as otherwise noted.     Objective    Exam  Pulse 76   Temp 97.7  F (36.5  C) (Oral)   Resp 16   Ht 1.626 m (5' 4\")   Wt 74.8 kg (165 lb)   LMP 05/01/2000   SpO2 99%   Breastfeeding No   BMI 28.32 kg/m     Estimated body mass index is 28.32 kg/m  as calculated from the following:    Height as of this encounter: 1.626 m (5' 4\").    Weight as of this encounter: 74.8 kg (165 lb).    Physical Exam  Vitals reviewed.   Constitutional:       Appearance: " Normal appearance.   HENT:      Head: Atraumatic.   Eyes:      Extraocular Movements: Extraocular movements intact.   Cardiovascular:      Rate and Rhythm: Normal rate and regular rhythm.   Pulmonary:      Breath sounds: Normal breath sounds.   Abdominal:      Palpations: Abdomen is soft.   Musculoskeletal:         General: Normal range of motion.      Cervical back: Normal range of motion.   Skin:     General: Skin is warm.   Neurological:      General: No focal deficit present.   Psychiatric:         Mood and Affect: Mood normal.               Signed Electronically by: Ilia Mcclellan MD

## 2024-08-30 ENCOUNTER — LAB (OUTPATIENT)
Dept: LAB | Facility: CLINIC | Age: 64
End: 2024-08-30
Payer: COMMERCIAL

## 2024-08-30 DIAGNOSIS — E11.65 TYPE 2 DIABETES MELLITUS WITH HYPERGLYCEMIA, WITH LONG-TERM CURRENT USE OF INSULIN (H): ICD-10-CM

## 2024-08-30 DIAGNOSIS — Z79.4 TYPE 2 DIABETES MELLITUS WITH HYPERGLYCEMIA, WITH LONG-TERM CURRENT USE OF INSULIN (H): ICD-10-CM

## 2024-08-30 DIAGNOSIS — Z00.00 PREVENTATIVE HEALTH CARE: ICD-10-CM

## 2024-08-30 LAB
ANION GAP SERPL CALCULATED.3IONS-SCNC: 14 MMOL/L (ref 7–15)
BUN SERPL-MCNC: 43.7 MG/DL (ref 8–23)
CALCIUM SERPL-MCNC: 9.6 MG/DL (ref 8.8–10.4)
CANCER AG125 SERPL-ACNC: 17 U/ML
CHLORIDE SERPL-SCNC: 107 MMOL/L (ref 98–107)
CHOLEST SERPL-MCNC: 151 MG/DL
CREAT SERPL-MCNC: 2.75 MG/DL (ref 0.51–0.95)
CREAT UR-MCNC: 75.9 MG/DL
EGFRCR SERPLBLD CKD-EPI 2021: 19 ML/MIN/1.73M2
ERYTHROCYTE [DISTWIDTH] IN BLOOD BY AUTOMATED COUNT: 14.6 % (ref 10–15)
FASTING STATUS PATIENT QL REPORTED: YES
FASTING STATUS PATIENT QL REPORTED: YES
GLUCOSE SERPL-MCNC: 208 MG/DL (ref 70–99)
HBA1C MFR BLD: 8.4 % (ref 0–5.6)
HCO3 SERPL-SCNC: 21 MMOL/L (ref 22–29)
HCT VFR BLD AUTO: 31 % (ref 35–47)
HDLC SERPL-MCNC: 24 MG/DL
HGB BLD-MCNC: 10 G/DL (ref 11.7–15.7)
LDLC SERPL CALC-MCNC: 68 MG/DL
MCH RBC QN AUTO: 26.1 PG (ref 26.5–33)
MCHC RBC AUTO-ENTMCNC: 32.3 G/DL (ref 31.5–36.5)
MCV RBC AUTO: 81 FL (ref 78–100)
MICROALBUMIN UR-MCNC: 1605 MG/L
MICROALBUMIN/CREAT UR: 2114.62 MG/G CR (ref 0–25)
NONHDLC SERPL-MCNC: 127 MG/DL
PLATELET # BLD AUTO: 74 10E3/UL (ref 150–450)
POTASSIUM SERPL-SCNC: 4.3 MMOL/L (ref 3.4–5.3)
RBC # BLD AUTO: 3.83 10E6/UL (ref 3.8–5.2)
SODIUM SERPL-SCNC: 142 MMOL/L (ref 135–145)
T4 FREE SERPL-MCNC: 1.72 NG/DL (ref 0.9–1.7)
TRIGL SERPL-MCNC: 294 MG/DL
TSH SERPL DL<=0.005 MIU/L-ACNC: 0.05 UIU/ML (ref 0.3–4.2)
WBC # BLD AUTO: 9.4 10E3/UL (ref 4–11)

## 2024-08-30 PROCEDURE — 80048 BASIC METABOLIC PNL TOTAL CA: CPT | Performed by: STUDENT IN AN ORGANIZED HEALTH CARE EDUCATION/TRAINING PROGRAM

## 2024-08-30 PROCEDURE — 84439 ASSAY OF FREE THYROXINE: CPT | Performed by: STUDENT IN AN ORGANIZED HEALTH CARE EDUCATION/TRAINING PROGRAM

## 2024-08-30 PROCEDURE — 80061 LIPID PANEL: CPT | Performed by: STUDENT IN AN ORGANIZED HEALTH CARE EDUCATION/TRAINING PROGRAM

## 2024-08-30 PROCEDURE — 84443 ASSAY THYROID STIM HORMONE: CPT | Performed by: STUDENT IN AN ORGANIZED HEALTH CARE EDUCATION/TRAINING PROGRAM

## 2024-08-30 PROCEDURE — 82043 UR ALBUMIN QUANTITATIVE: CPT | Performed by: STUDENT IN AN ORGANIZED HEALTH CARE EDUCATION/TRAINING PROGRAM

## 2024-08-30 PROCEDURE — 83036 HEMOGLOBIN GLYCOSYLATED A1C: CPT | Performed by: STUDENT IN AN ORGANIZED HEALTH CARE EDUCATION/TRAINING PROGRAM

## 2024-08-30 PROCEDURE — 86304 IMMUNOASSAY TUMOR CA 125: CPT | Performed by: STUDENT IN AN ORGANIZED HEALTH CARE EDUCATION/TRAINING PROGRAM

## 2024-08-30 PROCEDURE — 85027 COMPLETE CBC AUTOMATED: CPT | Performed by: STUDENT IN AN ORGANIZED HEALTH CARE EDUCATION/TRAINING PROGRAM

## 2024-08-30 PROCEDURE — 82570 ASSAY OF URINE CREATININE: CPT | Performed by: STUDENT IN AN ORGANIZED HEALTH CARE EDUCATION/TRAINING PROGRAM

## 2024-08-30 PROCEDURE — 36415 COLL VENOUS BLD VENIPUNCTURE: CPT | Performed by: STUDENT IN AN ORGANIZED HEALTH CARE EDUCATION/TRAINING PROGRAM

## 2024-09-05 DIAGNOSIS — I21.4 NSTEMI (NON-ST ELEVATED MYOCARDIAL INFARCTION) (H): ICD-10-CM

## 2024-09-05 RX ORDER — LEVOTHYROXINE SODIUM 137 UG/1
137 TABLET ORAL DAILY
Qty: 90 TABLET | Refills: 0 | Status: SHIPPED | OUTPATIENT
Start: 2024-09-05 | End: 2024-09-05

## 2024-09-05 RX ORDER — LEVOTHYROXINE SODIUM 125 UG/1
125 TABLET ORAL DAILY
Qty: 90 TABLET | Refills: 0 | Status: SHIPPED | OUTPATIENT
Start: 2024-09-05 | End: 2024-12-04

## 2024-09-06 RX ORDER — CLOPIDOGREL BISULFATE 75 MG/1
75 TABLET ORAL DAILY
Qty: 90 TABLET | Refills: 3 | Status: SHIPPED | OUTPATIENT
Start: 2024-09-06

## 2024-09-10 DIAGNOSIS — I21.4 NSTEMI (NON-ST ELEVATED MYOCARDIAL INFARCTION) (H): ICD-10-CM

## 2024-09-10 RX ORDER — ATORVASTATIN CALCIUM 40 MG/1
40 TABLET, FILM COATED ORAL DAILY
Qty: 90 TABLET | Refills: 2 | Status: SHIPPED | OUTPATIENT
Start: 2024-09-10

## 2024-09-11 ENCOUNTER — VIRTUAL VISIT (OUTPATIENT)
Dept: INTERNAL MEDICINE | Facility: CLINIC | Age: 64
End: 2024-09-11
Payer: COMMERCIAL

## 2024-09-11 DIAGNOSIS — E11.65 TYPE 2 DIABETES MELLITUS WITH HYPERGLYCEMIA, WITH LONG-TERM CURRENT USE OF INSULIN (H): Primary | ICD-10-CM

## 2024-09-11 DIAGNOSIS — E89.0 POSTOPERATIVE HYPOTHYROIDISM: ICD-10-CM

## 2024-09-11 DIAGNOSIS — Z79.4 TYPE 2 DIABETES MELLITUS WITH HYPERGLYCEMIA, WITH LONG-TERM CURRENT USE OF INSULIN (H): Primary | ICD-10-CM

## 2024-09-11 DIAGNOSIS — N18.4 CKD (CHRONIC KIDNEY DISEASE) STAGE 4, GFR 15-29 ML/MIN (H): ICD-10-CM

## 2024-09-11 PROCEDURE — 99213 OFFICE O/P EST LOW 20 MIN: CPT | Mod: 95 | Performed by: STUDENT IN AN ORGANIZED HEALTH CARE EDUCATION/TRAINING PROGRAM

## 2024-09-11 RX ORDER — ACYCLOVIR 400 MG/1
TABLET ORAL
COMMUNITY
Start: 2024-04-18

## 2024-09-11 NOTE — PROGRESS NOTES
"Rowan is a 64 year old who is being evaluated via a billable video visit.    What phone number would you like to be contacted at? [phone]  How would you like to obtain your AVS? Fadyt      Assessment & Plan     (E11.65,  Z79.4) Type 2 diabetes mellitus with hyperglycemia, with long-term current use of insulin (H)  (primary encounter diagnosis)  - Blood sugars improved at home on lantus  - Continue diabetic diet  Plan: Follow up in 3 months for A1c, medication refills, and sugar log. We will discuss foot and eye exam at that time.    (N18.4) CKD (chronic kidney disease) stage 4, GFR 15-29 ml/min (H)  - Thought to be 2/2 DM2  - No prior renal biopsy  Plan: Instructed her to call her Nephrologist for follow up. Avoid all nephrotoxic medications, no NSAIDs    (E89.0) Postoperative hypothyroidism  - T4 elevated on LT4 137 mcg  Plan: Sent 125 mcg, will recheck TSH in 3 months          BMI  Estimated body mass index is 28.32 kg/m  as calculated from the following:    Height as of 8/29/24: 1.626 m (5' 4\").    Weight as of 8/29/24: 74.8 kg (165 lb).       Subjective   Rowan is a 64 year old, presenting for the following health issues:  Results      9/11/2024    10:26 AM   Additional Questions   Roomed by Sherin Long   Accompanied by self         9/11/2024    10:26 AM   Patient Reported Additional Medications   Patient reports taking the following new medications no     Via the Health Maintenance questionnaire, the patient has reported the following services have been completed -Mammogram: [facility] 2024-01-31, this information has been sent to the abstraction team.  History of Present Illness       Diabetes:   She presents for follow up of diabetes.  She is checking home blood glucose three times daily.   She checks blood glucose before meals.  Blood glucose is sometimes over 200 and never under 70.  When her blood glucose is low, the patient is asymptomatic for confusion, blurred vision, lethargy " and reports not feeling dizzy, shaky, or weak.   She has no concerns regarding her diabetes at this time.   She is not experiencing numbness or burning in feet, excessive thirst, blurry vision, weight changes or redness, sores or blisters on feet.           Hyperlipidemia:  She presents for follow up of hyperlipidemia.   She is taking medication to lower cholesterol. She is not having myalgia or other side effects to statin medications.    She eats 2-3 servings of fruits and vegetables daily.She consumes 1 sweetened beverage(s) daily.She exercises with enough effort to increase her heart rate 9 or less minutes per day.  She exercises with enough effort to increase her heart rate 3 or less days per week.   She is taking medications regularly.  64F pmh hypothyroidism, type 2 diabetes mellitus insulin-dependent, history of CVA on Plavix, hypertension, and COPD history presents for follow-up on lab work.  Patient reports her blood sugars have improved on Lantus as prescribed at last clinic visit.  She has a prior nephrologist for which she saw earlier this year but she was pending lab workup which she was not able to obtain due to insurance issues.  She only recently reobtained insurance and was able to obtain basic metabolic panel at last visit with me.  She is planning to call her nephrologist for a follow-up.  She reports she was previously on Synthroid 137 mcg daily.  No acute complaints today.          Review of Systems  Constitutional, HEENT, cardiovascular, pulmonary, gi and gu systems are negative, except as otherwise noted.      Objective           Vitals:  No vitals were obtained today due to virtual visit.    Physical Exam   GENERAL: alert and no distress  EYES: Eyes grossly normal to inspection.  No discharge or erythema, or obvious scleral/conjunctival abnormalities.  RESP: No audible wheeze, cough, or visible cyanosis.    SKIN: Visible skin clear. No significant rash, abnormal pigmentation or  lesions.  NEURO: Cranial nerves grossly intact.  Mentation and speech appropriate for age.  PSYCH: Appropriate affect, tone, and pace of words        Video-Visit Details    Type of service:  Video Visit   Originating Location (pt. Location): Home    Distant Location (provider location):  On-site  Platform used for Video Visit: Telephone  Signed Electronically by: Ilia Mcclellan MD

## 2024-09-23 ENCOUNTER — LAB (OUTPATIENT)
Dept: INFUSION THERAPY | Facility: CLINIC | Age: 64
End: 2024-09-23
Attending: INTERNAL MEDICINE
Payer: COMMERCIAL

## 2024-09-23 ENCOUNTER — ONCOLOGY VISIT (OUTPATIENT)
Dept: ONCOLOGY | Facility: CLINIC | Age: 64
End: 2024-09-23
Attending: INTERNAL MEDICINE
Payer: COMMERCIAL

## 2024-09-23 VITALS
DIASTOLIC BLOOD PRESSURE: 72 MMHG | BODY MASS INDEX: 28.32 KG/M2 | WEIGHT: 165 LBS | SYSTOLIC BLOOD PRESSURE: 129 MMHG | HEART RATE: 76 BPM | RESPIRATION RATE: 16 BRPM | OXYGEN SATURATION: 99 %

## 2024-09-23 DIAGNOSIS — D64.9 ANEMIA, UNSPECIFIED TYPE: Primary | ICD-10-CM

## 2024-09-23 DIAGNOSIS — D47.2 MGUS (MONOCLONAL GAMMOPATHY OF UNKNOWN SIGNIFICANCE): ICD-10-CM

## 2024-09-23 LAB
BASOPHILS # BLD AUTO: 0 10E3/UL (ref 0–0.2)
BASOPHILS NFR BLD AUTO: 0 %
EOSINOPHIL # BLD AUTO: 0.3 10E3/UL (ref 0–0.7)
EOSINOPHIL NFR BLD AUTO: 4 %
ERYTHROCYTE [DISTWIDTH] IN BLOOD BY AUTOMATED COUNT: 14.3 % (ref 10–15)
FERRITIN SERPL-MCNC: 223 NG/ML (ref 11–328)
FOLATE SERPL-MCNC: 10.7 NG/ML (ref 4.6–34.8)
HCT VFR BLD AUTO: 30.3 % (ref 35–47)
HGB BLD-MCNC: 9.9 G/DL (ref 11.7–15.7)
IMM GRANULOCYTES # BLD: 0 10E3/UL
IMM GRANULOCYTES NFR BLD: 1 %
IRON BINDING CAPACITY (ROCHE): 167 UG/DL (ref 240–430)
IRON SATN MFR SERPL: 20 % (ref 15–46)
IRON SERPL-MCNC: 33 UG/DL (ref 37–145)
LYMPHOCYTES # BLD AUTO: 1.4 10E3/UL (ref 0.8–5.3)
LYMPHOCYTES NFR BLD AUTO: 20 %
MCH RBC QN AUTO: 26.7 PG (ref 26.5–33)
MCHC RBC AUTO-ENTMCNC: 32.7 G/DL (ref 31.5–36.5)
MCV RBC AUTO: 82 FL (ref 78–100)
MONOCYTES # BLD AUTO: 0.6 10E3/UL (ref 0–1.3)
MONOCYTES NFR BLD AUTO: 9 %
NEUTROPHILS # BLD AUTO: 4.9 10E3/UL (ref 1.6–8.3)
NEUTROPHILS NFR BLD AUTO: 67 %
NRBC # BLD AUTO: 0 10E3/UL
NRBC BLD AUTO-RTO: 0 /100
PLATELET # BLD AUTO: 52 10E3/UL (ref 150–450)
RBC # BLD AUTO: 3.71 10E6/UL (ref 3.8–5.2)
RETICS # AUTO: 0.06 10E6/UL (ref 0.03–0.1)
RETICS/RBC NFR AUTO: 1.7 % (ref 0.5–2)
TOTAL PROTEIN SERUM FOR ELP: 6.4 G/DL (ref 6.4–8.3)
VIT B12 SERPL-MCNC: 529 PG/ML (ref 232–1245)
WBC # BLD AUTO: 7.3 10E3/UL (ref 4–11)

## 2024-09-23 PROCEDURE — 82784 ASSAY IGA/IGD/IGG/IGM EACH: CPT | Performed by: INTERNAL MEDICINE

## 2024-09-23 PROCEDURE — 99213 OFFICE O/P EST LOW 20 MIN: CPT | Performed by: INTERNAL MEDICINE

## 2024-09-23 PROCEDURE — 86334 IMMUNOFIX E-PHORESIS SERUM: CPT | Mod: 26 | Performed by: PATHOLOGY

## 2024-09-23 PROCEDURE — 82607 VITAMIN B-12: CPT | Performed by: INTERNAL MEDICINE

## 2024-09-23 PROCEDURE — 82728 ASSAY OF FERRITIN: CPT | Performed by: INTERNAL MEDICINE

## 2024-09-23 PROCEDURE — 99215 OFFICE O/P EST HI 40 MIN: CPT | Performed by: INTERNAL MEDICINE

## 2024-09-23 PROCEDURE — 85025 COMPLETE CBC W/AUTO DIFF WBC: CPT | Performed by: INTERNAL MEDICINE

## 2024-09-23 PROCEDURE — G2211 COMPLEX E/M VISIT ADD ON: HCPCS | Performed by: INTERNAL MEDICINE

## 2024-09-23 PROCEDURE — 83550 IRON BINDING TEST: CPT | Performed by: INTERNAL MEDICINE

## 2024-09-23 PROCEDURE — 86334 IMMUNOFIX E-PHORESIS SERUM: CPT | Performed by: PATHOLOGY

## 2024-09-23 PROCEDURE — 36415 COLL VENOUS BLD VENIPUNCTURE: CPT | Performed by: INTERNAL MEDICINE

## 2024-09-23 PROCEDURE — 84165 PROTEIN E-PHORESIS SERUM: CPT | Mod: TC | Performed by: PATHOLOGY

## 2024-09-23 PROCEDURE — 82746 ASSAY OF FOLIC ACID SERUM: CPT | Performed by: INTERNAL MEDICINE

## 2024-09-23 PROCEDURE — 85045 AUTOMATED RETICULOCYTE COUNT: CPT | Performed by: INTERNAL MEDICINE

## 2024-09-23 PROCEDURE — 84155 ASSAY OF PROTEIN SERUM: CPT | Performed by: INTERNAL MEDICINE

## 2024-09-23 PROCEDURE — 84165 PROTEIN E-PHORESIS SERUM: CPT | Mod: 26 | Performed by: PATHOLOGY

## 2024-09-23 PROCEDURE — 83521 IG LIGHT CHAINS FREE EACH: CPT | Performed by: INTERNAL MEDICINE

## 2024-09-23 ASSESSMENT — PAIN SCALES - GENERAL: PAINLEVEL: NO PAIN (0)

## 2024-09-23 NOTE — LETTER
9/23/2024      Rowan Leiva  [address]  [facility] [address]      Dear Colleague,    Thank you for referring your patient, Rowan Leiva, to the [facility] [place]. Please see a copy of my visit note below.    HEMATOLOGY HISTORY: Ms. Leiva is a female with anemia and thrombocytopenia and DVT.  -Anticoagulation was stopped because of blood in the stool and other GI problems.      1. On 05/30/2021:   -WBC of 13.5, hemoglobin of 11.7 and platelet of 90.  -Creatinine of 1.87.  2.  On 05/31/2021, iron of 37 and ferritin of 233.   3.  On 06/01/2021:    -Normal vitamin B12.  -Normal folate.  -SPEP is normal.  -Serum immunofixation reveals possible very small monoclonal IgM kappa.  -IgM of 287.  -UPEP is normal.  -Urine immunofixation was normal.    4. On 06/17/2021, hemoglobin electrophoresis is normal.  5.  On 09/13/2022:  -Haptoglobin is normal.    -Peripheral blood smear revealed anemia, leukopenia and thrombocytopenia.  No abnormal blood cells.  6. CT abdomen and pelvis on 05/31/2021 reveals extensive bilateral renal vascular calcification.    -Lower extremity ultrasound on 05/31/2021 reveals bilateral posterior tibial vein DVT.   -Because of chest pain and hemoptysis, CT chest angiogram was planned, but could not be done because of elevated creatinine.  Patient had a V/Q scan done today, which was nondiagnostic.  7. EGD on 06/18/2021 was normal.    -Colonoscopy on 06/19/2021 revealed red/clotted blood throughout the entire colon.  This was likely from a small bowel bleed.    -Video endoscopy on 06/22/2021 revealed gastropathy and possible aphthous ulcer in mid-small bowel. There is angioectasia in small bowel.  8. On 09/14/2021, lower extremity ultrasound is negative for any thrombosis.     SUBJECTIVE: Mrs. Leiva is a 64-year-old female with anemia and thrombocytopenia.  Anemia is secondary to renal disease and anemia of chronic disease. Cause of thrombocytopenia not clear.  The  patient also has a history of DVT.  She was on anticoagulation, which was stopped because of GI bleed.     Patient has generalized weakness. She can do activities of daily living slowly. Gets tired easily doing activities. No headache. Gets dizziness on standing. No chest pain.  No shortness of breath at rest.  No abdominal pain. Occasional nausea and vomiting.  Gets epistaxis 1-2 times a week. Stops with pressure. Gets muscle cramp on and off.     PHYSICAL EXAMINATION:    GENERAL:  She is alert, oriented x 3.  Not in distress.  VITAL SIGNS:  Reviewed.   Rest of the systems not examined.     LABORATORY:  Reviewed.     ASSESSMENT:    1.  A 64-year-old female with chronic thrombocytopenia.  Thrombocytopenia is stable.  2.  Normocytic anemia secondary to epistaxis, renal disease and anemia of chronic disease.  3.  History of unprovoked bilateral DVT and likely pulmonary embolism.  Anticoagulation discontinued because of gastrointestinal bleed.  4.  MGUS.  5.  Other medical problems including CVA, CAD, chronic kidney disease and diabetes mellitus.  6.  Epistaxis.  8.  Chronic smoking.     PLAN:    -Labs today.  -Stop smoking.  -Follow-up in 1 year.    DISCUSSION:  1.  Labs done on 08/30/2024 reviewed.  Explained to her that she continues to be anemic with hemoglobin of 10.  Platelet continues to remain low at 74.    Discussed with her regarding normocytic anemia.  This is secondary to renal disease, anemia of chronic disease and also epistaxis.  She may also have iron deficiency.  Will recheck CBC and iron studies.    Patient has chronic thrombocytopenia.  Cause not clear.  She may be developing primary bone marrow pathology like MDS.  Will check CBC, vitamin B12 and folate.    Complication of cytopenia discussed.  Anemia will cause fatigue.  Thrombocytopenia is mild and should not cause any problem.  Explained to her that most of the bleeding and bruising complication happen when platelet goes below 50.  Her platelet  has been remaining above 50.    2.  Patient gets epistaxis.  Patient is on Plavix.  Explained to her epistaxis is because of that.  She will apply pressure when she gets nosebleed.    3.  Patient has MGUS.  Will recheck labs including SPEP, IgM and free light chain.  Explained to her that risk of progression to myeloma is low at 1 %/year.  Unlikely that it will progress to myeloma in her lifetime.    4.  Patient has history of thrombosis.  Anticoagulation was stopped because of GI bleeding.  She has not had any new thrombosis.    5.  Patient continues to smoke.  Advised her to quit smoking.    6.  She had a few questions which were all answered.  I will see her in 1 year for follow-up.  In between she will see her PCP.    Addendum: Labs done today reveals hemoglobin of 9.9.  Platelet has decreased to 52.  Iron is low at 33.  Normal ferritin and iron saturation index.  Vitamin B12 and folate are normal.  Will start her on oral iron once a day.  Because of worsening thrombocytopenia, will advise her to have CBC checked in 3 months.     Total visit time of 40 minutes.  Time spent in today's visit, review of chart/investigations today and documentation today.         Again, thank you for allowing me to participate in the care of your patient.        Sincerely,        Lexa Miller MD

## 2024-09-23 NOTE — PROGRESS NOTES
HEMATOLOGY HISTORY: Ms. Leiva is a female with anemia and thrombocytopenia and DVT.  -Anticoagulation was stopped because of blood in the stool and other GI problems.      1. On 05/30/2021:   -WBC of 13.5, hemoglobin of 11.7 and platelet of 90.  -Creatinine of 1.87.  2.  On 05/31/2021, iron of 37 and ferritin of 233.   3.  On 06/01/2021:    -Normal vitamin B12.  -Normal folate.  -SPEP is normal.  -Serum immunofixation reveals possible very small monoclonal IgM kappa.  -IgM of 287.  -UPEP is normal.  -Urine immunofixation was normal.    4. On 06/17/2021, hemoglobin electrophoresis is normal.  5.  On 09/13/2022:  -Haptoglobin is normal.    -Peripheral blood smear revealed anemia, leukopenia and thrombocytopenia.  No abnormal blood cells.  6. CT abdomen and pelvis on 05/31/2021 reveals extensive bilateral renal vascular calcification.    -Lower extremity ultrasound on 05/31/2021 reveals bilateral posterior tibial vein DVT.   -Because of chest pain and hemoptysis, CT chest angiogram was planned, but could not be done because of elevated creatinine.  Patient had a V/Q scan done today, which was nondiagnostic.  7. EGD on 06/18/2021 was normal.    -Colonoscopy on 06/19/2021 revealed red/clotted blood throughout the entire colon.  This was likely from a small bowel bleed.    -Video endoscopy on 06/22/2021 revealed gastropathy and possible aphthous ulcer in mid-small bowel. There is angioectasia in small bowel.  8. On 09/14/2021, lower extremity ultrasound is negative for any thrombosis.     SUBJECTIVE: Mrs. Leiva is a 64-year-old female with anemia and thrombocytopenia.  Anemia is secondary to renal disease and anemia of chronic disease. Cause of thrombocytopenia not clear.  The patient also has a history of DVT.  She was on anticoagulation, which was stopped because of GI bleed.     Patient has generalized weakness. She can do activities of daily living slowly. Gets tired easily doing activities. No headache. Gets  dizziness on standing. No chest pain.  No shortness of breath at rest.  No abdominal pain. Occasional nausea and vomiting.  Gets epistaxis 1-2 times a week. Stops with pressure. Gets muscle cramp on and off.     PHYSICAL EXAMINATION:    GENERAL:  She is alert, oriented x 3.  Not in distress.  VITAL SIGNS:  Reviewed.   Rest of the systems not examined.     LABORATORY:  Reviewed.     ASSESSMENT:    1.  A 64-year-old female with chronic thrombocytopenia.  Thrombocytopenia is stable.  2.  Normocytic anemia secondary to epistaxis, renal disease and anemia of chronic disease.  3.  History of unprovoked bilateral DVT and likely pulmonary embolism.  Anticoagulation discontinued because of gastrointestinal bleed.  4.  MGUS.  5.  Other medical problems including CVA, CAD, chronic kidney disease and diabetes mellitus.  6.  Epistaxis.  8.  Chronic smoking.     PLAN:    -Labs today.  -Stop smoking.  -Follow-up in 1 year.    DISCUSSION:  1.  Labs done on 08/30/2024 reviewed.  Explained to her that she continues to be anemic with hemoglobin of 10.  Platelet continues to remain low at 74.    Discussed with her regarding normocytic anemia.  This is secondary to renal disease, anemia of chronic disease and also epistaxis.  She may also have iron deficiency.  Will recheck CBC and iron studies.    Patient has chronic thrombocytopenia.  Cause not clear.  She may be developing primary bone marrow pathology like MDS.  Will check CBC, vitamin B12 and folate.    Complication of cytopenia discussed.  Anemia will cause fatigue.  Thrombocytopenia is mild and should not cause any problem.  Explained to her that most of the bleeding and bruising complication happen when platelet goes below 50.  Her platelet has been remaining above 50.    2.  Patient gets epistaxis.  Patient is on Plavix.  Explained to her epistaxis is because of that.  She will apply pressure when she gets nosebleed.    3.  Patient has MGUS.  Will recheck labs including SPEP,  IgM and free light chain.  Explained to her that risk of progression to myeloma is low at 1 %/year.  Unlikely that it will progress to myeloma in her lifetime.    4.  Patient has history of thrombosis.  Anticoagulation was stopped because of GI bleeding.  She has not had any new thrombosis.    5.  Patient continues to smoke.  Advised her to quit smoking.    6.  She had a few questions which were all answered.  I will see her in 1 year for follow-up.  In between she will see her PCP.    Addendum: Labs done today reveals hemoglobin of 9.9.  Platelet has decreased to 52.  Iron is low at 33.  Normal ferritin and iron saturation index.  Vitamin B12 and folate are normal.  Will start her on oral iron once a day.  Because of worsening thrombocytopenia, will advise her to have CBC checked in 3 months.     Total visit time of 40 minutes.  Time spent in today's visit, review of chart/investigations today and documentation today.

## 2024-09-24 LAB
ALBUMIN SERPL ELPH-MCNC: 3.5 G/DL (ref 3.7–5.1)
ALPHA1 GLOB SERPL ELPH-MCNC: 0.4 G/DL (ref 0.2–0.4)
ALPHA2 GLOB SERPL ELPH-MCNC: 0.8 G/DL (ref 0.5–0.9)
B-GLOBULIN SERPL ELPH-MCNC: 0.7 G/DL (ref 0.6–1)
GAMMA GLOB SERPL ELPH-MCNC: 0.9 G/DL (ref 0.7–1.6)
IGM SERPL-MCNC: 320 MG/DL (ref 35–242)
KAPPA LC FREE SER-MCNC: 7.36 MG/DL (ref 0.33–1.94)
KAPPA LC FREE/LAMBDA FREE SER NEPH: 1.5 {RATIO} (ref 0.26–1.65)
LAMBDA LC FREE SERPL-MCNC: 4.91 MG/DL (ref 0.57–2.63)
LOCATION OF TASK: ABNORMAL
LOCATION OF TASK: NORMAL
M PROTEIN SERPL ELPH-MCNC: 0 G/DL
PROT PATTERN SERPL ELPH-IMP: ABNORMAL
PROT PATTERN SERPL IFE-IMP: NORMAL

## 2024-09-24 NOTE — RESULT ENCOUNTER NOTE
Dear Ms. Leiva,    Blood test reveals few abnormalities.  -Hemoglobin is low at 9.9.  -Platelet is lower at 52.  -Iron is low at 33.  -Vitamin B12 and folate is normal.    Please, start taking over the counter oral iron once a day. Have blood checked again in 3 months.    Please, call me with any questions.    Lexa Miller MD

## 2024-10-01 ENCOUNTER — TELEPHONE (OUTPATIENT)
Dept: ONCOLOGY | Facility: CLINIC | Age: 64
End: 2024-10-01
Payer: COMMERCIAL

## 2024-10-01 NOTE — TELEPHONE ENCOUNTER
----- Message from Lexa Miller sent at 10/1/2024  2:13 PM CDT -----  Regarding: RE: multiple teeth to be extracted  -Hold plavix for 5 days before procedure. Resume 24 hours after the procedure if no bleeding.  -Check CBC 2-3 days before the procedure.    Lexa Miller MD  ----- Message -----  From: Allyson Tyler RN  Sent: 9/30/2024   4:06 PM CDT  To: Lexa Miller MD  Subject: multiple teeth to be extracted                   Hi Dr. Miller,     Patient has a dental appt to look at her teeth and schedule about 18 teeth to be removed for dentures.     She is wanting instructions on holding her PLAVIX prior to the dental proceduer and also her last platelet count on 9/23 was 53.    Please advise and I will call the patient.     Sheeba

## 2024-10-02 ENCOUNTER — TRANSFERRED RECORDS (OUTPATIENT)
Dept: HEALTH INFORMATION MANAGEMENT | Facility: CLINIC | Age: 64
End: 2024-10-02

## 2024-10-05 DIAGNOSIS — E89.0 POSTOPERATIVE HYPOTHYROIDISM: ICD-10-CM

## 2024-10-05 DIAGNOSIS — J44.9 CHRONIC OBSTRUCTIVE PULMONARY DISEASE, UNSPECIFIED COPD TYPE (H): ICD-10-CM

## 2024-10-07 RX ORDER — LEVOTHYROXINE SODIUM 125 UG/1
125 TABLET ORAL DAILY
Qty: 90 TABLET | Refills: 0 | OUTPATIENT
Start: 2024-10-07

## 2024-10-07 RX ORDER — ALBUTEROL SULFATE 90 UG/1
2 INHALANT RESPIRATORY (INHALATION) EVERY 6 HOURS
Qty: 8.5 G | Refills: 2 | Status: SHIPPED | OUTPATIENT
Start: 2024-10-07

## 2024-10-15 DIAGNOSIS — Z85.43 PERSONAL HISTORY OF MALIGNANT NEOPLASM OF OVARY: Primary | ICD-10-CM

## 2024-10-16 ENCOUNTER — LAB (OUTPATIENT)
Dept: LAB | Facility: CLINIC | Age: 64
End: 2024-10-16
Payer: COMMERCIAL

## 2024-10-16 DIAGNOSIS — Z85.43 PERSONAL HISTORY OF MALIGNANT NEOPLASM OF OVARY: ICD-10-CM

## 2024-10-16 LAB
BASOPHILS # BLD AUTO: 0 10E3/UL (ref 0–0.2)
BASOPHILS NFR BLD AUTO: 0 %
EOSINOPHIL # BLD AUTO: 0.3 10E3/UL (ref 0–0.7)
EOSINOPHIL NFR BLD AUTO: 2 %
ERYTHROCYTE [DISTWIDTH] IN BLOOD BY AUTOMATED COUNT: 13.9 % (ref 10–15)
HCT VFR BLD AUTO: 26.2 % (ref 35–47)
HGB BLD-MCNC: 8.7 G/DL (ref 11.7–15.7)
IMM GRANULOCYTES # BLD: 0 10E3/UL
IMM GRANULOCYTES NFR BLD: 0 %
LYMPHOCYTES # BLD AUTO: 1.7 10E3/UL (ref 0.8–5.3)
LYMPHOCYTES NFR BLD AUTO: 14 %
MCH RBC QN AUTO: 26.9 PG (ref 26.5–33)
MCHC RBC AUTO-ENTMCNC: 33.2 G/DL (ref 31.5–36.5)
MCV RBC AUTO: 81 FL (ref 78–100)
MONOCYTES # BLD AUTO: 0.7 10E3/UL (ref 0–1.3)
MONOCYTES NFR BLD AUTO: 5 %
NEUTROPHILS # BLD AUTO: 9.5 10E3/UL (ref 1.6–8.3)
NEUTROPHILS NFR BLD AUTO: 78 %
PLATELET # BLD AUTO: 132 10E3/UL (ref 150–450)
RBC # BLD AUTO: 3.23 10E6/UL (ref 3.8–5.2)
WBC # BLD AUTO: 12.2 10E3/UL (ref 4–11)

## 2024-10-16 PROCEDURE — 36415 COLL VENOUS BLD VENIPUNCTURE: CPT

## 2024-10-16 PROCEDURE — 85025 COMPLETE CBC W/AUTO DIFF WBC: CPT

## 2024-10-17 NOTE — RESULT ENCOUNTER NOTE
Dear Ms. Leiva,    Hemoglobin has decreased to 8.7. Will monitor it. Please, have blood rechecked in 1 month.    Please, call me with any questions.    Lexa Miller MD

## 2024-10-18 DIAGNOSIS — Z79.4 TYPE 2 DIABETES MELLITUS WITH HYPERGLYCEMIA, WITH LONG-TERM CURRENT USE OF INSULIN (H): ICD-10-CM

## 2024-10-18 DIAGNOSIS — E11.65 TYPE 2 DIABETES MELLITUS WITH HYPERGLYCEMIA, WITH LONG-TERM CURRENT USE OF INSULIN (H): ICD-10-CM

## 2024-10-20 RX ORDER — INSULIN GLARGINE 100 [IU]/ML
INJECTION, SOLUTION SUBCUTANEOUS
Qty: 6 ML | Refills: 0 | Status: SHIPPED | OUTPATIENT
Start: 2024-10-20

## 2024-10-22 ENCOUNTER — TELEPHONE (OUTPATIENT)
Dept: INTERNAL MEDICINE | Facility: CLINIC | Age: 64
End: 2024-10-22
Payer: COMMERCIAL

## 2024-10-22 DIAGNOSIS — I10 PRIMARY HYPERTENSION: Primary | ICD-10-CM

## 2024-10-22 RX ORDER — CARVEDILOL 12.5 MG/1
12.5 TABLET ORAL 2 TIMES DAILY WITH MEALS
Qty: 180 TABLET | Refills: 3 | Status: SHIPPED | OUTPATIENT
Start: 2024-10-22

## 2024-10-22 NOTE — TELEPHONE ENCOUNTER
Patient requesting refill of carvedilol 12.5mg.     Reports she was on carvedilol 6.25mg because she was on jardiance as well.    Since she is no longer taking jardiance, she would like to resume carvedilol 12.5mg    Pt does not know why rx is listed as pt reported, has been taking carvedilol for yrs and hasn't stopped. Please advise refill request. Reports been getting from PCP.    Pt would like a callback

## 2024-10-22 NOTE — TELEPHONE ENCOUNTER
Called patient and left message to call the clinic back.      Upon call back, please relay message from provider:  Called in. Monitor BP.

## 2024-10-28 ENCOUNTER — HOSPITAL ENCOUNTER (OUTPATIENT)
Dept: MAMMOGRAPHY | Facility: CLINIC | Age: 64
Discharge: HOME | End: 2024-10-28
Attending: STUDENT IN AN ORGANIZED HEALTH CARE EDUCATION/TRAINING PROGRAM | Admitting: STUDENT IN AN ORGANIZED HEALTH CARE EDUCATION/TRAINING PROGRAM
Payer: COMMERCIAL

## 2024-10-28 DIAGNOSIS — Z12.31 VISIT FOR SCREENING MAMMOGRAM: ICD-10-CM

## 2024-10-28 PROCEDURE — 77063 BREAST TOMOSYNTHESIS BI: CPT

## 2024-10-30 ENCOUNTER — APPOINTMENT (OUTPATIENT)
Dept: CT IMAGING | Facility: CLINIC | Age: 64
End: 2024-10-30
Attending: BEHAVIOR TECHNICIAN
Payer: COMMERCIAL

## 2024-10-30 ENCOUNTER — PATIENT OUTREACH (OUTPATIENT)
Dept: ONCOLOGY | Facility: CLINIC | Age: 64
End: 2024-10-30

## 2024-10-30 ENCOUNTER — HOSPITAL ENCOUNTER (EMERGENCY)
Facility: CLINIC | Age: 64
Discharge: HOME | End: 2024-10-30
Attending: EMERGENCY MEDICINE | Admitting: EMERGENCY MEDICINE
Payer: COMMERCIAL

## 2024-10-30 VITALS
HEART RATE: 62 BPM | BODY MASS INDEX: 28.32 KG/M2 | WEIGHT: 165 LBS | OXYGEN SATURATION: 100 % | RESPIRATION RATE: 18 BRPM | TEMPERATURE: 97.9 F | SYSTOLIC BLOOD PRESSURE: 132 MMHG | DIASTOLIC BLOOD PRESSURE: 88 MMHG

## 2024-10-30 DIAGNOSIS — R91.8 PULMONARY NODULES: Primary | ICD-10-CM

## 2024-10-30 DIAGNOSIS — J18.1 LUNG CONSOLIDATION (H): ICD-10-CM

## 2024-10-30 DIAGNOSIS — R05.9 COUGH: ICD-10-CM

## 2024-10-30 DIAGNOSIS — R91.8 LUNG MASS: ICD-10-CM

## 2024-10-30 LAB
ALBUMIN SERPL BCG-MCNC: 3.7 G/DL (ref 3.5–5.2)
ALP SERPL-CCNC: 136 U/L (ref 40–150)
ALT SERPL W P-5'-P-CCNC: 9 U/L (ref 0–50)
ANION GAP SERPL CALCULATED.3IONS-SCNC: 17 MMOL/L (ref 7–15)
AST SERPL W P-5'-P-CCNC: 16 U/L (ref 0–45)
ATRIAL RATE - MUSE: 69 BPM
BASOPHILS # BLD AUTO: 0.1 10E3/UL (ref 0–0.2)
BASOPHILS NFR BLD AUTO: 0 %
BILIRUB SERPL-MCNC: 0.2 MG/DL
BUN SERPL-MCNC: 36.8 MG/DL (ref 8–23)
CALCIUM SERPL-MCNC: 8.3 MG/DL (ref 8.8–10.4)
CHLORIDE SERPL-SCNC: 103 MMOL/L (ref 98–107)
CREAT SERPL-MCNC: 2.81 MG/DL (ref 0.51–0.95)
D DIMER PPP FEU-MCNC: 0.82 UG/ML FEU (ref 0–0.5)
DIASTOLIC BLOOD PRESSURE - MUSE: NORMAL MMHG
EGFRCR SERPLBLD CKD-EPI 2021: 18 ML/MIN/1.73M2
EOSINOPHIL # BLD AUTO: 0.3 10E3/UL (ref 0–0.7)
EOSINOPHIL NFR BLD AUTO: 3 %
ERYTHROCYTE [DISTWIDTH] IN BLOOD BY AUTOMATED COUNT: 14.4 % (ref 10–15)
FLUAV RNA SPEC QL NAA+PROBE: NEGATIVE
FLUBV RNA RESP QL NAA+PROBE: NEGATIVE
GLUCOSE SERPL-MCNC: 145 MG/DL (ref 70–99)
HCO3 SERPL-SCNC: 18 MMOL/L (ref 22–29)
HCT VFR BLD AUTO: 26.2 % (ref 35–47)
HGB BLD-MCNC: 8.4 G/DL (ref 11.7–15.7)
HOLD SPECIMEN: NORMAL
IMM GRANULOCYTES # BLD: 0.1 10E3/UL
IMM GRANULOCYTES NFR BLD: 1 %
INTERPRETATION ECG - MUSE: NORMAL
LYMPHOCYTES # BLD AUTO: 2 10E3/UL (ref 0.8–5.3)
LYMPHOCYTES NFR BLD AUTO: 17 %
MCH RBC QN AUTO: 26.2 PG (ref 26.5–33)
MCHC RBC AUTO-ENTMCNC: 32.1 G/DL (ref 31.5–36.5)
MCV RBC AUTO: 82 FL (ref 78–100)
MONOCYTES # BLD AUTO: 0.7 10E3/UL (ref 0–1.3)
MONOCYTES NFR BLD AUTO: 6 %
NEUTROPHILS # BLD AUTO: 8.2 10E3/UL (ref 1.6–8.3)
NEUTROPHILS NFR BLD AUTO: 73 %
NRBC # BLD AUTO: 0 10E3/UL
NRBC BLD AUTO-RTO: 0 /100
P AXIS - MUSE: 64 DEGREES
PLATELET # BLD AUTO: 84 10E3/UL (ref 150–450)
POTASSIUM SERPL-SCNC: 3.4 MMOL/L (ref 3.4–5.3)
PR INTERVAL - MUSE: 148 MS
PROT SERPL-MCNC: 6.9 G/DL (ref 6.4–8.3)
QRS DURATION - MUSE: 80 MS
QT - MUSE: 432 MS
QTC - MUSE: 462 MS
R AXIS - MUSE: 33 DEGREES
RBC # BLD AUTO: 3.21 10E6/UL (ref 3.8–5.2)
RSV RNA SPEC NAA+PROBE: NEGATIVE
SARS-COV-2 RNA RESP QL NAA+PROBE: NEGATIVE
SODIUM SERPL-SCNC: 138 MMOL/L (ref 135–145)
SYSTOLIC BLOOD PRESSURE - MUSE: NORMAL MMHG
T AXIS - MUSE: 82 DEGREES
TROPONIN T SERPL HS-MCNC: 36 NG/L
TROPONIN T SERPL HS-MCNC: 36 NG/L
VENTRICULAR RATE- MUSE: 69 BPM
WBC # BLD AUTO: 11.3 10E3/UL (ref 4–11)

## 2024-10-30 PROCEDURE — 96361 HYDRATE IV INFUSION ADD-ON: CPT

## 2024-10-30 PROCEDURE — 85004 AUTOMATED DIFF WBC COUNT: CPT | Performed by: BEHAVIOR TECHNICIAN

## 2024-10-30 PROCEDURE — 82435 ASSAY OF BLOOD CHLORIDE: CPT | Performed by: BEHAVIOR TECHNICIAN

## 2024-10-30 PROCEDURE — 85379 FIBRIN DEGRADATION QUANT: CPT | Performed by: BEHAVIOR TECHNICIAN

## 2024-10-30 PROCEDURE — 36415 COLL VENOUS BLD VENIPUNCTURE: CPT | Performed by: EMERGENCY MEDICINE

## 2024-10-30 PROCEDURE — 99285 EMERGENCY DEPT VISIT HI MDM: CPT | Mod: 25

## 2024-10-30 PROCEDURE — 71250 CT THORAX DX C-: CPT

## 2024-10-30 PROCEDURE — 258N000003 HC RX IP 258 OP 636: Performed by: BEHAVIOR TECHNICIAN

## 2024-10-30 PROCEDURE — 84484 ASSAY OF TROPONIN QUANT: CPT | Performed by: BEHAVIOR TECHNICIAN

## 2024-10-30 PROCEDURE — 96360 HYDRATION IV INFUSION INIT: CPT

## 2024-10-30 PROCEDURE — 84484 ASSAY OF TROPONIN QUANT: CPT | Performed by: EMERGENCY MEDICINE

## 2024-10-30 PROCEDURE — 93005 ELECTROCARDIOGRAM TRACING: CPT

## 2024-10-30 PROCEDURE — 36415 COLL VENOUS BLD VENIPUNCTURE: CPT | Performed by: BEHAVIOR TECHNICIAN

## 2024-10-30 PROCEDURE — 87637 SARSCOV2&INF A&B&RSV AMP PRB: CPT | Performed by: BEHAVIOR TECHNICIAN

## 2024-10-30 RX ORDER — DOXYCYCLINE 100 MG/1
100 CAPSULE ORAL 2 TIMES DAILY
Qty: 14 CAPSULE | Refills: 0 | Status: SHIPPED | OUTPATIENT
Start: 2024-10-30 | End: 2024-11-06

## 2024-10-30 RX ADMIN — SODIUM CHLORIDE 1000 ML: 9 INJECTION, SOLUTION INTRAVENOUS at 08:48

## 2024-10-30 ASSESSMENT — COLUMBIA-SUICIDE SEVERITY RATING SCALE - C-SSRS
1. IN THE PAST MONTH, HAVE YOU WISHED YOU WERE DEAD OR WISHED YOU COULD GO TO SLEEP AND NOT WAKE UP?: NO
6. HAVE YOU EVER DONE ANYTHING, STARTED TO DO ANYTHING, OR PREPARED TO DO ANYTHING TO END YOUR LIFE?: NO
2. HAVE YOU ACTUALLY HAD ANY THOUGHTS OF KILLING YOURSELF IN THE PAST MONTH?: NO

## 2024-10-30 ASSESSMENT — ACTIVITIES OF DAILY LIVING (ADL)
ADLS_ACUITY_SCORE: 0

## 2024-10-30 NOTE — ED PROVIDER NOTES
Emergency Department Note      History of Present Illness     Chief Complaint   Cough      HPI   Rowan Leiva is a 64 year old female with history of thyroid cancer, CKD, CAD, NSTEMI type 2 diabetes, tobacco use disorder who presents to the ED for evaluation of a cough.  Patient reports that she has had an ongoing dry cough for 2 weeks.  2 days ago, she developed right-sided chest pain and hemoptysis.  She denies fever, chills, nausea, vomiting, rhinorrhea, sore throat, or headache.  She was seen at urgent care yesterday and had an x-ray done which showed an unclear right sided lung mass.  She was told to go to the ED for further evaluation and CT scan of her chest.  She endorses current chest pain but no shortness of breath.  Again no fevers today.  She does have a prior cardiac history including NSTEMI with stents placed in the past.  She does take Plavix however, she has been off of it for 10 days due to recent dentures procedure.    Independent Historian   None    Review of External Notes   Reviewed urgent care note from yesterday.  Patient was seen for subacute cough and hemoptysis.  Chest x-ray was notable for rounded opacity in the right upper lobe that is approximately 3.5 x 3.1 cm which could represent a pneumonia.  Recommended further radiographic surveillance.    Past Medical History     Medical History and Problem List   Past Medical History:   Diagnosis Date    Cancer of thyroid (H)     Chest pain     Coronary artery disease     HX OF OVARIAN MALIGNANCY 2001    Hyperlipidemia     Hypertension     Hypothyroidism     Malignant neoplasm of thyroid gland (H) 2000    Mild intermittent asthma 1998    Myocardial infarction (H) 7/11    Pulmonary nodule     Thrombocytopenia (H)     Tobacco use disorder     Type II or unspecified type diabetes mellitus without mention of complication, not stated as uncontrolled 2000       Medications   albuterol (PROAIR HFA/PROVENTIL HFA/VENTOLIN HFA) 108 (90 Base) MCG/ACT  inhaler  atorvastatin (LIPITOR) 40 MG tablet  carvedilol (COREG) 12.5 MG tablet  clopidogrel (PLAVIX) 75 MG tablet  Continuous Glucose Sensor (DEXCOM G7 SENSOR) MISC  doxycycline hyclate (VIBRAMYCIN) 100 MG capsule  insulin lispro (HUMALOG KWIKPEN) 100 UNIT/ML (1 unit dial) KWIKPEN  LANCETS REGULAR MISC  LANTUS SOLOSTAR 100 UNIT/ML soln  levothyroxine (SYNTHROID/LEVOTHROID) 125 MCG tablet  nitroGLYcerin (NITROSTAT) 0.4 MG sublingual tablet        Surgical History   Past Surgical History:   Procedure Laterality Date    BLADDER SURGERY      C ANESTH, SECTION      CHOLECYSTECTOMY      COLONOSCOPY N/A 2021    Procedure: COLONOSCOPY;  Surgeon: Red Tracey DO;  Location:  GI    CV HEART CATHETERIZATION WITH POSSIBLE INTERVENTION N/A 2021    Procedure: Heart Catheterization with Possible Intervention;  Surgeon: Wolf Brumfield MD;  Location:  HEART CARDIAC CATH LAB    CV PCI STENT DRUG ELUTING N/A 2021    Procedure: Percutaneous Coronary Intervention Stent Drug Eluting;  Surgeon: Wolf Brumfield MD;  Location: [facility] CARDIAC CATH LAB    CYSTOSCOPY  2014    Procedure: CYSTOSCOPY;  Surgeon: Sabino Jamil MD;  Location: [facility]    ESOPHAGOSCOPY, GASTROSCOPY, DUODENOSCOPY (EGD), COMBINED N/A 2021    Procedure: ESOPHAGOGASTRODUODENOSCOPY (EGD);  Surgeon: Jean Hsieh MD;  Location:  GI    HC THYROIDECTOMY  2000    papillary thyroid carcinoma - Endocrine    HEART CATH, ANGIOPLASTY  2011    2.5 X 18 mm & 2.25 X 18 mm Xience ELLIOT to Mid LAD    HEART CATH, ANGIOPLASTY  2011    Staged-3.0 X 23 mm Xience ELLIOT to Mid RAC    HYSTERECTOMY, PAP NO LONGER INDICATED      SLING TRANSVAGINAL N/A 2014    Procedure: SLING TRANSVAGINAL;  Surgeon: Sabino Jamil MD;  Location: [facility]    Z LIGATE FALLOPIAN TUBE      Tubal Ligation    ZZC TOTAL ABDOM HYSTERECTOMY      ovarian cancer (low grade) - Heme/Onc       Physical Exam     Patient  Vitals for the past 24 hrs:   BP Temp Pulse Resp SpO2 Weight   10/30/24 1212 132/88 -- 62 18 100 % --   10/30/24 1132 (!) 141/59 -- -- -- 100 % --   10/30/24 0630 (!) 165/58 -- 67 -- 98 % --   10/30/24 0623 (!) 158/63 -- 70 -- 99 % --   10/30/24 0408 (!) 145/63 97.9  F (36.6  C) 76 18 100 % 74.8 kg (165 lb)     Physical Exam  Physical Exam:  General: lying comfortably on hospital bed  Head: normocephalic, atraumatic  Eyes: PERRLA, EOMI  Ears: External ears appear normal.   Nose: no signs of bleeding   Throat: moist mucous membranes  Neck: No JVD  CV: regular rate and rhythm  Pulm: lungs clear to ausculation bilaterally, normal respiratory effort, normal chest expansion with breathing   Abdomen: soft, non-tender, non-distended  MSK: No midline tenderness  Ext: normal range of motion of all extremities. No gross deformities  Skin: warm, dry, no rashes  Neuro: alert and oriented  Psych: Appropriate mood. Cooperative      Diagnostics     Lab Results   Labs Ordered and Resulted from Time of ED Arrival to Time of ED Departure   COMPREHENSIVE METABOLIC PANEL - Abnormal       Result Value    Sodium 138      Potassium 3.4      Carbon Dioxide (CO2) 18 (*)     Anion Gap 17 (*)     Urea Nitrogen 36.8 (*)     Creatinine 2.81 (*)     GFR Estimate 18 (*)     Calcium 8.3 (*)     Chloride 103      Glucose 145 (*)     Alkaline Phosphatase 136      AST 16      ALT 9      Protein Total 6.9      Albumin 3.7      Bilirubin Total 0.2     TROPONIN T, HIGH SENSITIVITY - Abnormal    Troponin T, High Sensitivity 36 (*)    D DIMER QUANTITATIVE - Abnormal    D-Dimer Quantitative 0.82 (*)    CBC WITH PLATELETS AND DIFFERENTIAL - Abnormal    WBC Count 11.3 (*)     RBC Count 3.21 (*)     Hemoglobin 8.4 (*)     Hematocrit 26.2 (*)     MCV 82      MCH 26.2 (*)     MCHC 32.1      RDW 14.4      Platelet Count 84 (*)     % Neutrophils 73      % Lymphocytes 17      % Monocytes 6      % Eosinophils 3      % Basophils 0      % Immature Granulocytes 1       NRBCs per 100 WBC 0      Absolute Neutrophils 8.2      Absolute Lymphocytes 2.0      Absolute Monocytes 0.7      Absolute Eosinophils 0.3      Absolute Basophils 0.1      Absolute Immature Granulocytes 0.1      Absolute NRBCs 0.0     TROPONIN T, HIGH SENSITIVITY - Abnormal    Troponin T, High Sensitivity 36 (*)    INFLUENZA A/B, RSV, & SARS-COV2 PCR - Normal    Influenza A PCR Negative      Influenza B PCR Negative      RSV PCR Negative      SARS CoV2 PCR Negative         Imaging   Chest CT w/o contrast   Final Result   IMPRESSION:    1.  Posterior right upper lobe masslike consolidation (3.3 cm)   probably reflects focal infectious/inflammatory process, however   recommend short-term follow-up contrast enhanced CT chest within 1   month to ensure resolution and exclude underlying mass.   2.  Trace right pleural effusion.      MARTHA RAJAN MD            SYSTEM ID:  [id number]          EKG   ECG  ECG taken at 0809, ECG read at 0811  NSR  Septal infarct, age undetermined  Abnormal ECG     No significant change as compared to prior, dated 9/13/22.  Rate 69 bpm. TX interval 148 ms. QRS duration 80 ms. QT/QTc 432/462 ms. P-R-T axes 64 33 82.       Independent Interpretation   None    ED Course      Medications Administered   Medications   sodium chloride 0.9% BOLUS 1,000 mL (0 mLs Intravenous Stopped 10/30/24 1144)       Procedures   Procedures     Discussion of Management   Radiologist, Dr. Gutierrez.    ED Course   ED Course as of 10/30/24 1259   Wed Oct 30, 2024   0642 I evaluated patient and obtained history.   0815 Discussed with Dr. Gutierrez, radiology. Recommends fluids before and after the CT scan.    1139 Reassessed patient.  Discussed discharge plans.       Additional Documentation  None    Medical Decision Making / Diagnosis     CMS Diagnoses: None    MIPS   CT for PE was ordered because the patient had an abnormal d-dimer.      COLLIN   Rowan Leiva is a 64 year old female who presents to the ED for evaluation  of a cough.  Patient reports ongoing cough for about 2 weeks.  Two days ago, she developed right-sided chest pain and hemoptysis.  She was seen at urgent care yesterday and had an x-ray done which showed a 3.5 x 3.1 cm round opacity in the right upper lobe.  She was told to come to the ED for further evaluation and possible CT scan.  On exam, patient is well-appearing.  Her vitals are reassuring without evidence of fever, tachycardia, or hypoxia.  No notable wheezing or rhonchi on exam.  No evidence of respiratory distress.  EKG shows normal sinus rhythm.  No ischemic changes.  Initial troponin was elevated however, repeat troponin was flat.  Patient's troponin is chronically elevated.  Low suspicion for ACS given reassuring EKG, reassuring vital signs, and stable troponin.  Patient does not PERC negative therefore D-dimer was obtained.  D-dimer was elevated prompting a CT scan.  Given patient's poor kidney function, I had a shared decision-making conversation with the patient about obtaining a CT scan with contrast to evaluate for a blood clot. Patient would like to hold off on CT with contrast at this time given her concern for her poor kidney function. However, she would like a chest CT without contrast  to further evaluate the mass noted on x-ray.  I think this is reasonable. Chest CT showed a posterior right upper lobe masslike consolidation which could reflect a focal infectious/inflammatory process.  Recommendation to follow-up with contrast-enhanced CT chest within a month.  Labs are notable for a mild leukocytosis.  Kidney function is at baseline.  Viral panel was negative for COVID/RSV/influenza.  Given leukocytosis and consolidation on chest CT, will plan to start antibiotics and have her follow-up with pulmonology for consultation and further evaluation regarding possible lung mass.  No evidence of respiratory distress or indications for hospital admission at this time.  Patient is agreeable to starting  antibiotics and follow-up with pulmonology.  Discussed that she should return with worsening shortness of breath, chest pain, fevers, chills, or any other worsening symptoms.  Patient voiced understanding is agreeable plan of care.     Disposition   The patient was discharged.     Diagnosis     ICD-10-CM    1. Lung mass  R91.8 Adult Pulmonary Medicine  Referral      2. Lung consolidation (H)  J18.1       3. Cough  R05.9            Discharge Medications   Discharge Medication List as of 10/30/2024 11:45 AM        START taking these medications    Details   doxycycline hyclate (VIBRAMYCIN) 100 MG capsule Take 1 capsule (100 mg) by mouth 2 times daily for 7 days., Disp-14 capsule, R-0, E-Prescribe               MARTIN Murray Kausar, PA-C  10/30/24 1318       Abram Chong PA-C  10/30/24 1343

## 2024-10-30 NOTE — ED PROVIDER NOTES
Emergency Department Attending Supervision Note  10/30/2024  7:13 AM    I evaluated this patient in conjunction with Abram Chong PA-C  I have participated in the care of the patient and personally performed key elements of the history, exam, and medical decision making.      HPI:   Rowan Leiva is a 64 year old female with a history of thyroid cancer years ago, who smokes 1.5 packs/day, who presents with 2 weeks of cough and runny nose.  She notes some degree of chest discomfort when coughing as well.  She went to urgent care yesterday and had a chest x-ray which was notable for rounded density on the right side of her chest which could have been pneumonia but was also concerning for some kind of mass.  It was recommended the patient have a CT scan.  The patient also notes hemoptysis over the last 2 days, the most recent of which was at noon yesterday and she coughed up blood about the size of a quarter.    Independent Historian:   None    Review of External Notes:   UC note 10/29/24: 2 weeks cough. Denied SOB or CP. Recommended to go to ED for further eval.     EXAM:    Eyes: PEERL, EOMI B/L  Neck: No JVD noted. FROM   Cardiovascular: normal rate, Regular rhythm and normal heart sounds.  No murmur heard. Equal B/L peripheral pulses.  Pulmonary/Chest: Effort normal and breath sounds normal. No respiratory distress. Patient has no wheezes. Patient has no rales.   Gastrointestinal: Soft. There is no tenderness.   Musculoskeletal/Extremities: No pitting edema noted. Normal tone.  Neurological: Alert  Skin: Skin is warm and dry. There is no diaphoresis noted.   Psychiatric: The patient appears calm.      Assessments, Consultations/Discussion of Management or Tests, Independent Image interpretation     ED Course as of 10/30/24 1218   Wed Oct 30, 2024   0642 I evaluated patient and obtained history.   0815 Discussed with Dr. Gutierrez, radiology. Recommends fluids before and after the CT scan.    1139 Reassessed patient.   Discussed discharge plans.     ECG  ECG taken at 0809, ECG read at 0811  NSR  Septal infarct, age undetermined  Abnormal ECG    No significant change as compared to prior, dated 9/13/22.  Rate 69 bpm. RI interval 148 ms. QRS duration 80 ms. QT/QTc 432/462 ms. P-R-T axes 64 33 82.         Optional/Additional Documentation: None     MEDICAL DECISION MAKING/ASSESSMENT AND PLAN:   Rowan Leiva is a 64 year old female presented to the Emergency Department with a complaint of chest pain and URI symptoms. Fortunately the workup in the ED has been unremarkable and at this time I am not concerned for ACS. The EKG shows NSR. The troponin is detectable but stable (which appears consistent with prior evaluations as well).  I doubt ACS..    I considered other possible causes of chest pain including PE (the patient does have a positive D-dimer and CT angiography was considered though after shared decision making discussion with the patient, she would prefer to not have contrast imaging.  After discussion with radiology, given the patient's possible lung mass, a ventilation/perfusion scan would not be as helpful),     Additionally, infection, pneumothorax, aortic dissection, inflammatory conditions (percarditis, etc.) and even more benign causes such as reflux and esophageal motility issues were considered. The physical exam, laboratory, and radiological findings listed above make life threatening conditions less likely.  Given the possibility of lung mass, referral to a lung nodule clinic/pulmonologist was placed in [facility].  We will also treat the patient's findings on imaging with antibiotics as there is still question of infection.  At this time I believe the patient is safe for discharge. I have encouraged close outpatient follow up.     Anticipatory guidance given prior to discharge.         Impression:    ICD-10-CM    1. Lung mass  R91.8 Adult Pulmonary Medicine  Referral      2. Lung consolidation (H)  J18.1        3. Cough  R05.9              DISPOSITION:  LOUISE Sparks, DO  10/30/2024  [facility] EMERGENCY DEPT         Sparks, Santiago Menezes, DO  10/30/24 1218

## 2024-10-30 NOTE — DISCHARGE INSTRUCTIONS
As discussed, the CT showed a mass which could either be inflammatory or infectious.  Malignancy cannot be ruled out at this time.  You will need to follow-up with pulmonology for reassessment nd possible biopsy.  A referral was placed for you.  You should receive a phone call to set up an appointment.  Please take antibiotics as prescribed.  Please follow-up with your primary care provider in a week for reassessment.  Please return to the ED with any new or worsening symptoms.

## 2024-10-30 NOTE — PROGRESS NOTES
New IP (Interventional Pulmonology) referral rec'd.  Chart reviewed.       New Patient: Interventional Pulmonary (Lung nodule) Nurse Navigator Note    Referring provider: Santiago Sparks [facility]    Referred to (specialty): Interventional Pulmonary (Lung nodule)    Requested provider (if applicable): n/a    Date Referral Received: 10/30/2024    Evaluation for :  Lung mass    Clinical History (per Nurse review of records provided):    **BOOK MARKED**    CT CHEST W/O CONTRAST 10/30/2024 8:55 AM     CLINICAL HISTORY: Right lung mass noted on chest x-ray yesterday     TECHNIQUE: CT chest without IV contrast. Multiplanar reformats were  obtained. Dose reduction techniques were used.     CONTRAST: None.     COMPARISON: No prior radiograph available for comparison. CT chest  11/12/2021.      FINDINGS:   LUNGS AND PLEURA: Posterior right upper lobe rounded consolidative  opacity measuring 3.3 x 2.7 cm (4/126). Trace right pleural effusion.  No pneumothorax. Minimal emphysema. Additional small pulmonary nodules  have not significantly changed since 2021 and are likely benign, for  example a right middle lobe 0.4 cm nodule (4/188).     MEDIASTINUM/AXILLAE: Trace pericardial effusion. No lymphadenopathy.     CORONARY ARTERY CALCIFICATION: Severe.     UPPER ABDOMEN: Cholecystectomy. Left renal upper pole calcifications  are likely vascular.     MUSCULOSKELETAL: Degenerative changes of the spine. No aggressive  osseous lesion.                                                                      IMPRESSION:   1.  Posterior right upper lobe masslike consolidation (3.3 cm)  probably reflects focal infectious/inflammatory process, however  recommend short-term follow-up contrast enhanced CT chest within 1  month to ensure resolution and exclude underlying mass.  2.  Trace right pleural effusion.     MARTHA RAJAN MD     Records Location: [facility]     RECORDS NEEDED:   10/29/2024:  CXR--imaging pushed to PACS from [facility]--thank you!!    Additional testing needed prior to consult: CT Chest and PFT's

## 2024-10-30 NOTE — ED TRIAGE NOTES
Pt here with cough, hemoptysis, and pain with inhalation. Pt was seen at  last night and received an xray which stated there was an inch wide mass on her R lung. Pt has been off her blood thinners for about 10 days now and is concerned for a possible blood clot. Hx of stroke and MI with stent placement

## 2024-10-31 ENCOUNTER — PRE VISIT (OUTPATIENT)
Dept: ONCOLOGY | Facility: CLINIC | Age: 64
End: 2024-10-31
Payer: COMMERCIAL

## 2024-11-01 ENCOUNTER — HOSPITAL ENCOUNTER (OUTPATIENT)
Dept: MAMMOGRAPHY | Facility: CLINIC | Age: 64
Discharge: HOME | End: 2024-11-01
Attending: STUDENT IN AN ORGANIZED HEALTH CARE EDUCATION/TRAINING PROGRAM | Admitting: STUDENT IN AN ORGANIZED HEALTH CARE EDUCATION/TRAINING PROGRAM
Payer: COMMERCIAL

## 2024-11-01 DIAGNOSIS — R92.8 ABNORMAL MAMMOGRAM: ICD-10-CM

## 2024-11-01 PROCEDURE — 77065 DX MAMMO INCL CAD UNI: CPT | Mod: RT

## 2024-11-04 PROBLEM — K92.2 GASTROINTESTINAL HEMORRHAGE: Status: ACTIVE | Noted: 2024-11-04

## 2024-11-04 PROBLEM — I10 HYPERTENSION, ESSENTIAL: Status: ACTIVE | Noted: 2024-01-30

## 2024-11-04 PROBLEM — N18.4 CKD (CHRONIC KIDNEY DISEASE) STAGE 4, GFR 15-29 ML/MIN (H): Status: ACTIVE | Noted: 2024-01-30

## 2024-11-04 PROBLEM — F17.210 NICOTINE DEPENDENCE, CIGARETTES, UNCOMPLICATED: Status: ACTIVE | Noted: 2024-11-04

## 2024-11-04 PROBLEM — R19.7 DIARRHEA: Status: ACTIVE | Noted: 2024-11-04

## 2024-11-04 PROBLEM — J44.9 CHRONIC OBSTRUCTIVE PULMONARY DISEASE (H): Status: ACTIVE | Noted: 2023-03-21

## 2024-11-04 RX ORDER — CHOLESTYRAMINE 4 G/9G
POWDER, FOR SUSPENSION ORAL EVERY 24 HOURS
COMMUNITY
Start: 2021-10-15

## 2024-11-04 RX ORDER — ERGOCALCIFEROL 1.25 MG/1
CAPSULE, LIQUID FILLED ORAL
COMMUNITY

## 2024-11-04 NOTE — PROGRESS NOTES
Pre-visit planning and chart review completed.     NEW patient appointment:    11/19 with Dr. Crow Zhang  11/12 CT chest wo contrast (msg to records retrieval team to assist with retrieving CT chest imaging from [id number] that we have reports for, but no imaging).   11/25 PFTs    - On Plavix  - Current smoker.    CE updated. Medications, allergies, problem list, and immunizations reconciled.

## 2024-11-12 ENCOUNTER — HOSPITAL ENCOUNTER (OUTPATIENT)
Dept: MAMMOGRAPHY | Facility: CLINIC | Age: 64
Discharge: HOME | End: 2024-11-12
Attending: STUDENT IN AN ORGANIZED HEALTH CARE EDUCATION/TRAINING PROGRAM
Payer: COMMERCIAL

## 2024-11-12 DIAGNOSIS — R92.8 ABNORMAL MAMMOGRAM: ICD-10-CM

## 2024-11-12 PROCEDURE — 250N000011 HC RX IP 250 OP 636: Performed by: RADIOLOGY

## 2024-11-12 PROCEDURE — 96372 THER/PROPH/DIAG INJ SC/IM: CPT | Performed by: RADIOLOGY

## 2024-11-12 PROCEDURE — 250N000009 HC RX 250: Performed by: RADIOLOGY

## 2024-11-12 PROCEDURE — 88305 TISSUE EXAM BY PATHOLOGIST: CPT | Mod: TC | Performed by: STUDENT IN AN ORGANIZED HEALTH CARE EDUCATION/TRAINING PROGRAM

## 2024-11-12 PROCEDURE — 272N000715 MA STEREOTACTIC BREAST BIOPSY VACUUM RT

## 2024-11-12 PROCEDURE — 999N000065 MA POST PROCEDURE RIGHT

## 2024-11-12 RX ORDER — LIDOCAINE HYDROCHLORIDE 10 MG/ML
1 INJECTION, SOLUTION EPIDURAL; INFILTRATION; INTRACAUDAL; PERINEURAL ONCE
Status: COMPLETED | OUTPATIENT
Start: 2024-11-12 | End: 2024-11-12

## 2024-11-12 RX ORDER — LIDOCAINE HYDROCHLORIDE AND EPINEPHRINE 10; 10 MG/ML; UG/ML
1 INJECTION, SOLUTION INFILTRATION; PERINEURAL ONCE
Status: COMPLETED | OUTPATIENT
Start: 2024-11-12 | End: 2024-11-12

## 2024-11-12 RX ADMIN — LIDOCAINE HYDROCHLORIDE 6 ML: 10 INJECTION, SOLUTION EPIDURAL; INFILTRATION; INTRACAUDAL; PERINEURAL at 13:13

## 2024-11-12 RX ADMIN — LIDOCAINE HYDROCHLORIDE,EPINEPHRINE BITARTRATE 12 ML: 10; .01 INJECTION, SOLUTION INFILTRATION; PERINEURAL at 13:13

## 2024-11-12 NOTE — DISCHARGE INSTRUCTIONS

## 2024-11-14 ENCOUNTER — TELEPHONE (OUTPATIENT)
Dept: MAMMOGRAPHY | Facility: CLINIC | Age: 64
End: 2024-11-14
Payer: COMMERCIAL

## 2024-11-14 LAB
PATH REPORT.COMMENTS IMP SPEC: NORMAL
PATH REPORT.FINAL DX SPEC: NORMAL
PATH REPORT.GROSS SPEC: NORMAL
PATH REPORT.MICROSCOPIC SPEC OTHER STN: NORMAL
PHOTO IMAGE: NORMAL

## 2024-11-14 NOTE — TELEPHONE ENCOUNTER
Pathology report reviewed with our breast radiologist Dr Howard, who confirmed the recent breast imaging is concordant with the final pathology results below.    I phoned Ms Leiva, confirmed her full name, date of birth, and informed patient of her stereotatic Guided right Breast Needle Biopsy (11/12/23) results showing atypica.    Recommended follow up is surgical consult.  Pateint will contact us back once she knows when she would like to schedule.    Patient states no problems or concerns with her biopsy site.   Questions were answered and my phone number given if she has further questions or concerns.  I informed patient I will notify the ordering provider of the results and recommendations for follow up.  Patient verbalized understanding and agrees with the plan of care.     Юлия Azul RN CBCN  Breast Care Nurse Coordinator  [facility]  [phone]

## 2024-11-19 ENCOUNTER — TELEPHONE (OUTPATIENT)
Dept: INTERNAL MEDICINE | Facility: CLINIC | Age: 64
End: 2024-11-19
Payer: COMMERCIAL

## 2024-11-19 ENCOUNTER — VIRTUAL VISIT (OUTPATIENT)
Dept: PULMONOLOGY | Facility: CLINIC | Age: 64
End: 2024-11-19
Attending: EMERGENCY MEDICINE
Payer: COMMERCIAL

## 2024-11-19 VITALS — HEIGHT: 64 IN | BODY MASS INDEX: 28.17 KG/M2 | WEIGHT: 165 LBS

## 2024-11-19 DIAGNOSIS — R91.8 LUNG MASS: ICD-10-CM

## 2024-11-19 ASSESSMENT — PAIN SCALES - GENERAL: PAINLEVEL_OUTOF10: NO PAIN (0)

## 2024-11-19 NOTE — NURSING NOTE
Current patient location: [address]  [facility] [address]    Is the patient currently in the state of [place]? YES    Visit mode:VIDEO    If the visit is dropped, the patient can be reconnected by:VIDEO VISIT: Text to cell phone:   Telephone Information:   Mobile [phone]       Will anyone else be joining the visit? NO  (If patient encounters technical issues they should call [phone]    Are changes needed to the allergy or medication list? Pt stated no changes to allergies and Pt stated no med changes    Are refills needed on medications prescribed by this physician? NO    Rooming Documentation:  Questionnaire(s) not done per department protocol    Reason for visit: Consult    Verito CERVANTESF

## 2024-11-19 NOTE — LETTER
"11/19/2024       RE: Rowan Leiva  [address]  [facility] [address]     Dear Colleague,    Thank you for referring your patient, Rowan Leiva, to the [facility] at [facility]. Please see a copy of my visit note below.    Virtual Visit Details  See note        LUNG NODULE & INTERVENTIONAL PULMONARY CLINIC  CLINICS & SURGERY CENTER, [facility] CANCER [address] [address]  Phone: [phone]  Fax: [phone]    Patient:  Rowan Leiva, Age 64 year old, Date of birth 1960, MRN# [id number]  Date of Visit:  11/19/2024  Referring Provider Santiago Sparks    Reason for Consultation: Lung Mass     The patient has been notified of following:   \"This video visit will be conducted via a call between you and your physician/provider. We have found that certain health care needs can be provided without the need for an in-person physical exam.  This service lets us provide the care you need with a video conversation.  If a prescription is necessary we can send it directly to your pharmacy.  If lab work is needed we can place an order for that and you can then stop by our lab to have the test done at a later time.  Video visits are billed at different rates depending on your insurance coverage.  Please reach out to your insurance provider with any questions.  If during the course of the call the physician/provider feels a video visit is not appropriate, you will not be charged for this service.\"  Patient has given verbal consent for Video visit? Yes  How would you like to obtain your AVS? Please refer to rooming staff note  Patient would like the video invitation sent by: Please refer to rooming staff note   Will anyone else be joining your video visit? Please refer to rooming staff note    Video-Visit Details     Type of " service:  Video Visit  Video Start Time: ***  Video End Time: ***  Provider Name: Crow Zhang MD, MHA   Originating Location (pt. Location): Home  Provider Location: Off campus   Distant Location (provider location): Home/Clinic  Platform used for Video Visit: [facility]/Clara    Assessment and Plan:    1. New multiple pulmonary lung nodule(s). Given the characteristics on current/previous imaging and risk factors; I would classify this to be Intermediate (6-65%) risk for cancer. Culprit nodule is the peripheral wedge shaped mass in RUL. This could be infarct from PE. I will reach out to her PCP to see his thoughts on a contrast CT. Otherwise I have discussed with her the importance to go to the ED if sx worsen.     Billing: I spent a total of 45min spent on date of encounter which includes prep time, visit with the patient and post visit work including documentation and nursing communication     Crow Zhang MD, MHA  Associate Professor of Medicine  Section of Interventional Pulmonology   Division of Pulmonary, Allergy, Critical Care and Sleep Medicine   [facility]  Pager: [phone]   Office: [phone]  Email: [email or web address]    Christa Gonzalez RN   Interventional Pulmonary Care Coordinator   Office: [phone]  Email: [email or web address]     David Dos Santos  Interventional Pulmonary Surgery Scheduler   Office: [phone]  Email: [email or web address]         History:     Rowan eLiva is a 64 year old female with sig h/o for thyroid and ovarian cancer, DVT/PE, DM2, asthma, tobacco disorder who is here for evaluation/followup of Lung Mass.    - Had hemoptysis and chest pain leading to CT chest demonstrating a 3.3cm RUL mass  - Personal hx of cancer: thyroid, ovarian  - Family hx of cancer: no  - Exposure hx: Denies asbestos or radon exposure   - Tobacco hx: Current Smoker, 12pkyr  - My interpretation of the images relevant for this visit includes: no   - My  interpretation of the PFT's relevant for this visit includes: None     Culprit Nodule(s):   Posterior right upper lobe rounded consolidative opacity measuring 3.3 x 2.7 cm (4/126). Trace right pleural effusion. No pneumothorax. Minimal emphysema. Additional small pulmonary nodules  have not significantly changed since  and are likely benign, for example a right middle lobe 0.4 cm nodule (4/188).    Other active medical problems include:   - h/o DVT/PE. Stopped anticoagulation due to GIB    - remote h/o ovarian cancer s/p TAHBSO   - had papillary thyroid cancer s/p thyroidectomy in           Past Medical History:      Past Medical History:   Diagnosis Date     Cancer of thyroid (H)      Chest pain      Coronary artery disease     Stents-     HX OF OVARIAN MALIGNANCY      Hyperlipidemia      Hypertension      Hypothyroidism      Malignant neoplasm of thyroid gland (H)     papillary carcinoma; resection      Mild intermittent asthma     minimal symptoms, albuterol use 2x/year     Myocardial infarction (H)     anterior     Pulmonary nodule      Thrombocytopenia (H)      Tobacco use disorder      Type II or unspecified type diabetes mellitus without mention of complication, not stated as uncontrolled              Past Surgical History:      Past Surgical History:   Procedure Laterality Date     BLADDER SURGERY       C ANESTH, SECTION       CHOLECYSTECTOMY       COLONOSCOPY N/A 2021    Procedure: COLONOSCOPY;  Surgeon: Red Tracey DO;  Location:  GI     CV HEART CATHETERIZATION WITH POSSIBLE INTERVENTION N/A 2021    Procedure: Heart Catheterization with Possible Intervention;  Surgeon: Wolf Brumfield MD;  Location: [facility] CARDIAC CATH LAB     CV PCI STENT DRUG ELUTING N/A 2021    Procedure: Percutaneous Coronary Intervention Stent Drug Eluting;  Surgeon: Wolf Brumfield MD;  Location: [facility] CARDIAC CATH LAB     CYSTOSCOPY   2014    Procedure: CYSTOSCOPY;  Surgeon: Sabino Jamil MD;  Location[facility]     ESOPHAGOSCOPY, GASTROSCOPY, DUODENOSCOPY (EGD), COMBINED N/A 2021    Procedure: ESOPHAGOGASTRODUODENOSCOPY (EGD);  Surgeon: Jean Hsieh MD;  Location:  GI     HC THYROIDECTOMY  2000    papillary thyroid carcinoma - Endocrine     HEART CATH, ANGIOPLASTY  2011    2.5 X 18 mm & 2.25 X 18 mm Xience ELLIOT to Mid LAD     HEART CATH, ANGIOPLASTY  2011    Staged-3.0 X 23 mm Xience ELLIOT to Mid RAC     HYSTERECTOMY, PAP NO LONGER INDICATED       SLING TRANSVAGINAL N/A 2014    Procedure: SLING TRANSVAGINAL;  Surgeon: Sabino Jamil MD;  Location: [facility] LIGATE FALLOPIAN TUBE      Tubal Ligation     ZZC TOTAL ABDOM HYSTERECTOMY      ovarian cancer (low grade) - Heme/Onc            Social History:     Social History     Tobacco Use     Smoking status: Every Day     Current packs/day: 0.50     Average packs/day: 0.5 packs/day for 24.0 years (12.0 ttl pk-yrs)     Types: Cigarettes     Smokeless tobacco: Former     Tobacco comments:     12 cigarettes daily   Substance Use Topics     Alcohol use: No     Alcohol/week: 0.0 standard drinks of alcohol            Family History:     Family History   Problem Relation Age of Onset     Blood Disease Sister         Hepatitis B-alive     Cancer Maternal Aunt         bronchial tubes, neck-     Cancer Maternal Uncle         glands in neck-     Cancer Maternal Grandfather         lungs-     Diabetes Father              Heart Disease Father         2 heartattacks-     Diabetes Sister              Diabetes Other         -cousins     Diabetes Paternal Grandmother              Diabetes Paternal Grandfather              Diabetes Paternal Aunt              Hypertension Sister         alive     Thyroid Disease Sister         both sisters-alive     Thyroid Disease Other          niece-alive             Allergies:    No Known Allergies         Medications:     Current Outpatient Medications   Medication Sig Dispense Refill     albuterol (PROAIR HFA/PROVENTIL HFA/VENTOLIN HFA) 108 (90 Base) MCG/ACT inhaler Inhale 2 puffs into the lungs every 6 hours. 8.5 g 2     atorvastatin (LIPITOR) 40 MG tablet TAKE 1 TABLET(40 MG) BY MOUTH DAILY 90 tablet 2     carvedilol (COREG) 12.5 MG tablet Take 1 tablet (12.5 mg) by mouth 2 times daily (with meals). 180 tablet 3     cholestyramine (QUESTRAN) 4 g packet Take by mouth every 24 hours.       clopidogrel (PLAVIX) 75 MG tablet TAKE 1 TABLET(75 MG) BY MOUTH DAILY 90 tablet 3     Continuous Glucose Sensor (DEXCOM G7 SENSOR) MISC CHANGE SENSOR EVERY 10 DAYS       empagliflozin (JARDIANCE) 10 MG TABS tablet Take 10 mg by mouth daily.       insulin lispro (HUMALOG KWIKPEN) 100 UNIT/ML (1 unit dial) KWIKPEN Inject 18 Units Subcutaneous 3 times daily (before meals)       LANCETS REGULAR MISC use twice a day for blood sugar 2 boxes 0     LANTUS SOLOSTAR 100 UNIT/ML soln ADMINISTER 10 UNITS UNDER THE SKIN AT BEDTIME 6 mL 0     levothyroxine (SYNTHROID/LEVOTHROID) 125 MCG tablet Take 1 tablet (125 mcg) by mouth daily. 90 tablet 0     nitroGLYcerin (NITROSTAT) 0.4 MG sublingual tablet For chest pain place 1 tablet under the tongue every 5 minutes for 3 doses. If symptoms persist 5 minutes after 1st dose call 911. (Patient not taking: Reported on 9/23/2024) 25 tablet 0     vitamin D2 (ERGOCALCIFEROL) 78225 units (1250 mcg) capsule TAKE 1 CAPSULE BY MOUTH 1 TIME EVERY WEEK       No current facility-administered medications for this visit.            Review of Systems:     12-point ROS reviewed and abnormalities stated in the history.         Physical Exam:     Constitutional - looks well, in no apparent distress  Eyes - no redness or discharge  Respiratory -breathing appears comfortable.  No cough.  Skin - No appreciable discoloration or lesions (very limited  exam)  Neurological - No apparent tremors. Speech fluent and articlate  Psychiatric - no signs of delirium or anxiety     Exam limited to that easily identified on a virtual visit. The rest of a comprehensive physical examination is deferred due to PHE (public health emergency) video visit restrictions.         Current Laboratory Data:   All laboratory and imaging data reviewed.           No data to display                                Again, thank you for allowing me to participate in the care of your patient.      Sincerely,    Crow Zhang MD

## 2024-11-19 NOTE — PROGRESS NOTES
"Virtual Visit Details  See note        LUNG NODULE & INTERVENTIONAL PULMONARY CLINIC  [facility], [facility], [facility] [address] [address]  Phone: [phone]  Fax: [phone]    Patient:  Rowan Leiva, Age 64 year old, Date of birth 1960, MRN# [id number]  Date of Visit:  11/19/2024  Referring Provider Santiago Sparks    Reason for Consultation: Lung Mass     The patient has been notified of following:   \"This video visit will be conducted via a call between you and your physician/provider. We have found that certain health care needs can be provided without the need for an in-person physical exam.  This service lets us provide the care you need with a video conversation.  If a prescription is necessary we can send it directly to your pharmacy.  If lab work is needed we can place an order for that and you can then stop by our lab to have the test done at a later time.  Video visits are billed at different rates depending on your insurance coverage.  Please reach out to your insurance provider with any questions.  If during the course of the call the physician/provider feels a video visit is not appropriate, you will not be charged for this service.\"  Patient has given verbal consent for Video visit? Yes  How would you like to obtain your AVS? Please refer to rooming staff note  Patient would like the video invitation sent by: Please refer to rooming staff note   Will anyone else be joining your video visit? Please refer to rooming staff note    Video-Visit Details     Type of service:  Video Visit  Video Start Time: ***  Video End Time: ***  Provider Name: Crow Zhang MD, MHA   Originating Location (pt. Location): Home  Provider Location: Off campus   Distant Location (provider location): Home/Clinic  Platform used for Video Visit: [facility]    Assessment and Plan:    1. New " multiple pulmonary lung nodule(s). Given the characteristics on current/previous imaging and risk factors; I would classify this to be Intermediate (6-65%) risk for cancer. Culprit nodule is the peripheral wedge shaped mass in RUL. This could be infarct from PE. I will reach out to her PCP to see his thoughts on a contrast CT. Otherwise I have discussed with her the importance to go to the ED if sx worsen.     Billing: I spent a total of 45min spent on date of encounter which includes prep time, visit with the patient and post visit work including documentation and nursing communication     Crow Zhang MD, MHA  Associate Professor of Medicine  Section of Interventional Pulmonology   Division of Pulmonary, Allergy, Critical Care and Sleep Medicine   [facility]  Pager: [phone]   Office: [phone]  Email: [email or web address]    Christa Gonzalez RN   Interventional Pulmonary Care Coordinator   Office: [phone]  Email: [email or web address]     David Dos Santos  Interventional Pulmonary Surgery Scheduler   Office: [phone]  Email: [email or web address]         History:     Rowan Leiva is a 64 year old female with sig h/o for thyroid and ovarian cancer, DVT/PE, DM2, asthma, tobacco disorder who is here for evaluation/followup of Lung Mass.    - Had hemoptysis and chest pain leading to CT chest demonstrating a 3.3cm RUL mass  - Personal hx of cancer: thyroid, ovarian  - Family hx of cancer: no  - Exposure hx: Denies asbestos or radon exposure   - Tobacco hx: Current Smoker, 12pkyr  - My interpretation of the images relevant for this visit includes: no   - My interpretation of the PFT's relevant for this visit includes: None     Culprit Nodule(s):   Posterior right upper lobe rounded consolidative opacity measuring 3.3 x 2.7 cm (4/126). Trace right pleural effusion. No pneumothorax. Minimal emphysema. Additional small pulmonary nodules  have not significantly  changed since  and are likely benign, for example a right middle lobe 0.4 cm nodule (4/188).    Other active medical problems include:   - h/o DVT/PE. Stopped anticoagulation due to GIB    - remote h/o ovarian cancer s/p TAHBSO   - had papillary thyroid cancer s/p thyroidectomy in           Past Medical History:      Past Medical History:   Diagnosis Date    Cancer of thyroid (H)     Chest pain     Coronary artery disease     Stents-    HX OF OVARIAN MALIGNANCY     Hyperlipidemia     Hypertension     Hypothyroidism     Malignant neoplasm of thyroid gland (H)     papillary carcinoma; resection     Mild intermittent asthma     minimal symptoms, albuterol use 2x/year    Myocardial infarction (H)     anterior    Pulmonary nodule     Thrombocytopenia (H)     Tobacco use disorder     Type II or unspecified type diabetes mellitus without mention of complication, not stated as uncontrolled              Past Surgical History:      Past Surgical History:   Procedure Laterality Date    BLADDER SURGERY      C ANESTH, SECTION      CHOLECYSTECTOMY      COLONOSCOPY N/A 2021    Procedure: COLONOSCOPY;  Surgeon: Red Tracey DO;  Location:  GI    CV HEART CATHETERIZATION WITH POSSIBLE INTERVENTION N/A 2021    Procedure: Heart Catheterization with Possible Intervention;  Surgeon: Wolf Brumfield MD;  Location: [facility] CARDIAC CATH LAB    CV PCI STENT DRUG ELUTING N/A 2021    Procedure: Percutaneous Coronary Intervention Stent Drug Eluting;  Surgeon: Wolf Brumfield MD;  Location: [facility] CARDIAC CATH LAB    CYSTOSCOPY  2014    Procedure: CYSTOSCOPY;  Surgeon: Sabino Jamil MD;  Location: [facility]    ESOPHAGOSCOPY, GASTROSCOPY, DUODENOSCOPY (EGD), COMBINED N/A 2021    Procedure: ESOPHAGOGASTRODUODENOSCOPY (EGD);  Surgeon: Jean Hsieh MD;  Location:  GI    HC THYROIDECTOMY  2000    papillary thyroid carcinoma - Endocrine     HEART CATH, ANGIOPLASTY  2011    2.5 X 18 mm & 2.25 X 18 mm Xience ELLIOT to Mid LAD    HEART CATH, ANGIOPLASTY  2011    Staged-3.0 X 23 mm Xience ELLIOT to Mid RAC    HYSTERECTOMY, PAP NO LONGER INDICATED      SLING TRANSVAGINAL N/A 2014    Procedure: SLING TRANSVAGINAL;  Surgeon: Sabino Jamil MD;  Location: [facility] LIGATE FALLOPIAN TUBE      Tubal Ligation    [facility] TOTAL ABDOM HYSTERECTOMY      ovarian cancer (low grade) - Heme/Onc            Social History:     Social History     Tobacco Use    Smoking status: Every Day     Current packs/day: 0.50     Average packs/day: 0.5 packs/day for 24.0 years (12.0 ttl pk-yrs)     Types: Cigarettes    Smokeless tobacco: Former    Tobacco comments:     12 cigarettes daily   Substance Use Topics    Alcohol use: No     Alcohol/week: 0.0 standard drinks of alcohol            Family History:     Family History   Problem Relation Age of Onset    Blood Disease Sister         Hepatitis B-alive    Cancer Maternal Aunt         bronchial tubes, neck-    Cancer Maternal Uncle         glands in neck-    Cancer Maternal Grandfather         lungs-    Diabetes Father             Heart Disease Father         2 heartattacks-    Diabetes Sister             Diabetes Other         -cousins    Diabetes Paternal Grandmother             Diabetes Paternal Grandfather             Diabetes Paternal Aunt             Hypertension Sister         alive    Thyroid Disease Sister         both sisters-alive    Thyroid Disease Other         niece-alive             Allergies:    No Known Allergies         Medications:     Current Outpatient Medications   Medication Sig Dispense Refill    albuterol (PROAIR HFA/PROVENTIL HFA/VENTOLIN HFA) 108 (90 Base) MCG/ACT inhaler Inhale 2 puffs into the lungs every 6 hours. 8.5 g 2    atorvastatin (LIPITOR) 40 MG tablet TAKE 1 TABLET(40 MG) BY MOUTH DAILY 90  tablet 2    carvedilol (COREG) 12.5 MG tablet Take 1 tablet (12.5 mg) by mouth 2 times daily (with meals). 180 tablet 3    cholestyramine (QUESTRAN) 4 g packet Take by mouth every 24 hours.      clopidogrel (PLAVIX) 75 MG tablet TAKE 1 TABLET(75 MG) BY MOUTH DAILY 90 tablet 3    Continuous Glucose Sensor (DEXCOM G7 SENSOR) MISC CHANGE SENSOR EVERY 10 DAYS      empagliflozin (JARDIANCE) 10 MG TABS tablet Take 10 mg by mouth daily.      insulin lispro (HUMALOG KWIKPEN) 100 UNIT/ML (1 unit dial) KWIKPEN Inject 18 Units Subcutaneous 3 times daily (before meals)      LANCETS REGULAR MISC use twice a day for blood sugar 2 boxes 0    LANTUS SOLOSTAR 100 UNIT/ML soln ADMINISTER 10 UNITS UNDER THE SKIN AT BEDTIME 6 mL 0    levothyroxine (SYNTHROID/LEVOTHROID) 125 MCG tablet Take 1 tablet (125 mcg) by mouth daily. 90 tablet 0    nitroGLYcerin (NITROSTAT) 0.4 MG sublingual tablet For chest pain place 1 tablet under the tongue every 5 minutes for 3 doses. If symptoms persist 5 minutes after 1st dose call 911. (Patient not taking: Reported on 9/23/2024) 25 tablet 0    vitamin D2 (ERGOCALCIFEROL) 50010 units (1250 mcg) capsule TAKE 1 CAPSULE BY MOUTH 1 TIME EVERY WEEK       No current facility-administered medications for this visit.            Review of Systems:     12-point ROS reviewed and abnormalities stated in the history.         Physical Exam:     Constitutional - looks well, in no apparent distress  Eyes - no redness or discharge  Respiratory -breathing appears comfortable.  No cough.  Skin - No appreciable discoloration or lesions (very limited exam)  Neurological - No apparent tremors. Speech fluent and articlate  Psychiatric - no signs of delirium or anxiety     Exam limited to that easily identified on a virtual visit. The rest of a comprehensive physical examination is deferred due to PHE (public health emergency) video visit restrictions.         Current Laboratory Data:   All laboratory and imaging data reviewed.            No data to display

## 2024-11-19 NOTE — TELEPHONE ENCOUNTER
Patient Quality Outreach    Patient is due for the following:   Diabetes -  A1C, LDL (Fasting), and Foot Exam  Cervical Cancer Screening - PAP Needed      Topic Date Due    Pneumococcal Vaccine (2 of 2 - PCV) 11/20/2007    Zoster (Shingles) Vaccine (1 of 2) Never done    Flu Vaccine (1) 09/01/2024    COVID-19 Vaccine (3 - 2024-25 season) 09/01/2024     COPD Action plan is needed.     Action(s) Taken:   Schedule a office visit for T2DM management,     Type of outreach:    Chart review performed, no outreach needed.    Questions for provider review:    None           Dion Kamara MA

## 2024-11-26 ENCOUNTER — OFFICE VISIT (OUTPATIENT)
Dept: SURGERY | Facility: CLINIC | Age: 64
End: 2024-11-26
Payer: COMMERCIAL

## 2024-11-26 VITALS
BODY MASS INDEX: 28.32 KG/M2 | SYSTOLIC BLOOD PRESSURE: 118 MMHG | RESPIRATION RATE: 16 BRPM | HEART RATE: 74 BPM | DIASTOLIC BLOOD PRESSURE: 60 MMHG | WEIGHT: 165 LBS | OXYGEN SATURATION: 100 %

## 2024-11-26 DIAGNOSIS — N60.81 FLAT EPITHELIAL ATYPIA (FEA) OF RIGHT BREAST: Primary | ICD-10-CM

## 2024-11-26 PROCEDURE — 99203 OFFICE O/P NEW LOW 30 MIN: CPT | Performed by: SURGERY

## 2024-11-26 NOTE — PROGRESS NOTES
[facility]       Assessment:    Rowan is a 64 year old female with 1.3cm of right breast calcifications at  8:00 and 6 cm from the nipple.  Her core needle biopsy reveals flat epithelial atypia.   We discussed that a columnar cell lesion with atypia or FEA Risk of upgrade to DCIS is around 9%    PLAN:  Discussed options for close observation with self breast exams, follow up right mammography and physical exam vs an excisional biopsy.    I recommend the later given the larger span of calcifications on mammogram that were unlikely completely removed with core needle.    We have discussed the indication, alternatives, risks and expected recovery.  Specifically, we have discussed incision, scarring, breast deformity, volume loss, postoperative infection, anesthesia, postoperative restrictions and physical limitations.  We have discussed the recommended interventions and treatments for these complications.  All questions have been answered to the best of my ability.  She is at slightly higher risk of bleeding when restarting plavix.    Rowan elects excisional biopsy.    We will schedule surgery at the patient's convenience.    Recommended time off work postop:  2 days      HPI:  Rowan Leiva is a 64 year old female who was noted to have right breast calcifications on screening mammogram 10/28/24.    She noted a right breast lump last year Location:  lower inner quadrant of the LEFT breast. That was evaluated with mammo/ultrasound in 2023 and no abnormality seen.    Skin rashes, dimpling or nipple changes:  none  Nipple discharge:  bilateral white only with breast compression  Breast pain:  No  Performs self breast exams:  Yes    Previous breast biopsies:  No  Previous cyst aspiration:  No  Previous other breast surgery:  No     Hormonal history:    First menstrual period: 15 years old  First live birth: 22 years old  post menopausal: yes since hysterectomy. She thinks both ovaries were removed. States she  had a problem with her tube twisting.  HRT No  Fertility treatment: No    Family History:  Family history of breast cancer: No  Family history of ovarian cancer: Yes - mom  Family History   Problem Relation Age of Onset    Blood Disease Sister         Hepatitis B-alive    Cancer Maternal Aunt         bronchial tubes, neck-    Cancer Maternal Uncle         glands in neck-    Cancer Maternal Grandfather         lungs-    Diabetes Father             Heart Disease Father         2 heartattacks-    Diabetes Sister             Diabetes Other         -cousins    Diabetes Paternal Grandmother             Diabetes Paternal Grandfather             Diabetes Paternal Aunt             Hypertension Sister         alive    Thyroid Disease Sister         both sisters-alive    Thyroid Disease Other         niece-alive         Work up to date:  Imaging  Recent Results (from the past 744 hours)   MA Screening Bilateral w/ Hussain    Narrative    BILATERAL FULL FIELD DIGITAL SCREENING MAMMOGRAM WITH TOMOSYNTHESIS    Performed on: 10/28/24    Compared to: 2023 and 2009    Technique:  This study was evaluated with the assistance of Computer-Aided   Detection.  Breast Tomosynthesis was used in interpretation.    Findings: The breasts are heterogeneously dense, which may obscure small   masses.    There are possible calcifications in the right breast at the 9 o'clock   position, posterior depth.    The remainder of the breast tissue is unremarkable.  There is no suspicious finding of the left breast.    Impression    IMPRESSION: BI-RADS CATEGORY: 0 - Incomplete - Need additional imaging   evaluation.    RECOMMENDED FOLLOW-UP: Magnification views of the right breast.    The results and recommendations of this examination will be communicated   to the patient and the imaging center will attempt to contact the patient   within 2-3 business days to  schedule follow-up imaging.           Kiran Smith MD     XR CHEST B READ 2 VIEWS    Narrative    For Patients: As a result of the 21st Century Cures Act, medical imaging exams and procedure reports are released immediately into your electronic medical record. You may view this report before your referring provider. If you have questions, please contact your health care provider.    EXAM: XR CHEST 2 VIEWS PA AND LATERAL  LOCATION: [facility]  DATE: 10/29/2024    INDICATION: Subacute Cough Coughing Blood Rib Pain On Right Side  COMPARISON: June 1, 2021    Impression    Rounded opacity in the right upper lobe is approximately 3.5 x 3.1 cm, may be pneumonia. Recommend radiographic surveillance until resolution as round size could indicate other etiologies such as spiculated mass/neoplasm. This will require close follow-up either until resolution or until cross-sectional imaging with CT required, if persists. No pleural effusion or pneumothorax.   Chest CT w/o contrast    Narrative    CT CHEST W/O CONTRAST 10/30/2024 8:55 AM    CLINICAL HISTORY: Right lung mass noted on chest x-ray yesterday    TECHNIQUE: CT chest without IV contrast. Multiplanar reformats were  obtained. Dose reduction techniques were used.    CONTRAST: None.    COMPARISON: No prior radiograph available for comparison. CT chest  11/12/2021.     FINDINGS:   LUNGS AND PLEURA: Posterior right upper lobe rounded consolidative  opacity measuring 3.3 x 2.7 cm (4/126). Trace right pleural effusion.  No pneumothorax. Minimal emphysema. Additional small pulmonary nodules  have not significantly changed since 2021 and are likely benign, for  example a right middle lobe 0.4 cm nodule (4/188).    MEDIASTINUM/AXILLAE: Trace pericardial effusion. No lymphadenopathy.    CORONARY ARTERY CALCIFICATION: Severe.    UPPER ABDOMEN: Cholecystectomy. Left renal upper pole calcifications  are likely vascular.    MUSCULOSKELETAL: Degenerative changes of the  spine. No aggressive  osseous lesion.      Impression    IMPRESSION:   1.  Posterior right upper lobe masslike consolidation (3.3 cm)  probably reflects focal infectious/inflammatory process, however  recommend short-term follow-up contrast enhanced CT chest within 1  month to ensure resolution and exclude underlying mass.  2.  Trace right pleural effusion.    MARTHA RAJAN MD         SYSTEM ID:  [id number]   MA Diagnostic Digital Right    Narrative    MA DIAGNOSTIC DIGITAL RIGHT -  11/1/2024 10:42 AM    HISTORY:  Recalled from screening of 10/28/2024 regarding possible  calcifications at the 9:00 position of the posterior right breast.    COMPARISON:  10/28/2024, 3/30/2023    BREAST DENSITY: The breasts are heterogeneously dense, which may  obscure small masses.    FINDINGS:  Magnification views confirm the presence of the screening  detected calcification cluster at the 8:00 position, 6 cm from the  nipple. The cluster measures approximately 1.3 cm in greatest  dimension. There is no associated mass.      Impression    IMPRESSION: BI-RADS CATEGORY: 4 - Suspicious.    Indeterminate right breast calcifications. Stereotactic core biopsy is  recommended. Results and recommendation for biopsy were discussed with  the patient during her appointment.    RECOMMENDED FOLLOW-UP: Biopsy.        DILSHAD LARA MD         SYSTEM ID:  [id number]   MA Stereotactic Breast Biopsy Vacuum Assist Right    Addendum: 11/19/2024    Addendum for Pathology Results, 11/19/2024 9:33 AM:  Biopsy was performed and initial report was dictated by Dr Prado.  Flat epithelial atypia.  Please see full pathology report for further details.     Results are concordant with imaging findings.     Recommendation: Surgical consultation.    LAN PRADO MD         SYSTEM ID:  [id number]      Narrative    Stereotactic biopsy of the RIGHT breast.    Comparisons: Mammogram dated RIGHT.    HISTORY:    Calcifications requiring biopsy.    FINDINGS:  Procedure, risks, benefits and alternatives were discussed  with the patient and the patient gave written and verbal consent. The  patient was positioned for stereotactic biopsy. Aseptic technique was  utilized. 1% lidocaine was used for skin anesthesia and 15 cc of 1%  lidocaine with epinephrine were utilized for deep tissue anesthesia.   Using stereotactic technique and an 9 gauge vacuum assisted biopsy  needle, multiple specimens were obtained and images were archived.  Less than 5 mL blood loss. Calcifications are seen in the biopsy  specimen.     Location: 8:00 6 cm from the nipple on the RIGHT.  Marker: HydroMark     Pressure was held over this area for approximately 15 minutes. A  dressing was placed and care instructions were discussed with the  patient.    Post biopsy mammogram demonstrates marker deployment.      Impression    IMPRESSION:    Uncomplicated stereotactic vacuum assisted core needle  biopsy of the RIGHT breast.    LAN ELY MD         SYSTEM ID:  [id number]   MA Post Procedure Right    Addendum: 11/19/2024    Addendum for Pathology Results, 11/19/2024 9:33 AM:  Biopsy was performed and initial report was dictated by Dr Ely.  Flat epithelial atypia.  Please see full pathology report for further details.     Results are concordant with imaging findings.     Recommendation: Surgical consultation.    LAN ELY MD         SYSTEM ID:  [id number]      Narrative    Stereotactic biopsy of the RIGHT breast.    Comparisons: Mammogram dated RIGHT.    HISTORY:    Calcifications requiring biopsy.    FINDINGS: Procedure, risks, benefits and alternatives were discussed  with the patient and the patient gave written and verbal consent. The  patient was positioned for stereotactic biopsy. Aseptic technique was  utilized. 1% lidocaine was used for skin anesthesia and 15 cc of 1%  lidocaine with epinephrine were utilized for deep tissue anesthesia.   Using stereotactic technique and an 9 gauge vacuum  assisted biopsy  needle, multiple specimens were obtained and images were archived.  Less than 5 mL blood loss. Calcifications are seen in the biopsy  specimen.     Location: 8:00 6 cm from the nipple on the RIGHT.  Marker: HydroMark     Pressure was held over this area for approximately 15 minutes. A  dressing was placed and care instructions were discussed with the  patient.    Post biopsy mammogram demonstrates marker deployment.      Impression    IMPRESSION:    Uncomplicated stereotactic vacuum assisted core needle  biopsy of the RIGHT breast.    LAN PRADO MD         SYSTEM ID:  [id number]       Percutaneous core needle biopsy:   Final Diagnosis   Right breast, 8:00, 6 cm from nipple, stereotactic-guided needle core biopsy:  -- Columnar cell lesions with atypia (flat epithelial atypia/FEA), usual ductal hyperplasia, and microcyst formation.  -- Associated calcifications identified.         Electronically signed by Keon Dow MD on 11/14/2024 at  2:30 P         Past Medical History:  Past Medical History:   Diagnosis Date    Cancer of thyroid (H)     Chest pain     Coronary artery disease     Stents-2011    HX OF OVARIAN MALIGNANCY 2001    Hyperlipidemia     Hypertension     Hypothyroidism     Malignant neoplasm of thyroid gland (H) 2000    papillary carcinoma; resection 6/00    Mild intermittent asthma 1998    minimal symptoms, albuterol use 2x/year    Myocardial infarction (H) 7/11    anterior    Pulmonary nodule     Thrombocytopenia (H)     Tobacco use disorder     Type II or unspecified type diabetes mellitus without mention of complication, not stated as uncontrolled 2000     Current Outpatient Medications   Medication Sig Dispense Refill    albuterol (PROAIR HFA/PROVENTIL HFA/VENTOLIN HFA) 108 (90 Base) MCG/ACT inhaler Inhale 2 puffs into the lungs every 6 hours. 8.5 g 2    atorvastatin (LIPITOR) 40 MG tablet TAKE 1 TABLET(40 MG) BY MOUTH DAILY 90 tablet 2    carvedilol (COREG) 12.5 MG tablet Take 1  tablet (12.5 mg) by mouth 2 times daily (with meals). 180 tablet 3    clopidogrel (PLAVIX) 75 MG tablet TAKE 1 TABLET(75 MG) BY MOUTH DAILY 90 tablet 3    Continuous Glucose Sensor (DEXCOM G7 SENSOR) MISC CHANGE SENSOR EVERY 10 DAYS      insulin lispro (HUMALOG KWIKPEN) 100 UNIT/ML (1 unit dial) KWIKPEN Inject 18 Units Subcutaneous 3 times daily (before meals)      LANCETS REGULAR MISC use twice a day for blood sugar 2 boxes 0    LANTUS SOLOSTAR 100 UNIT/ML soln ADMINISTER 10 UNITS UNDER THE SKIN AT BEDTIME 6 mL 0    levothyroxine (SYNTHROID/LEVOTHROID) 125 MCG tablet Take 1 tablet (125 mcg) by mouth daily. 90 tablet 0    nitroGLYcerin (NITROSTAT) 0.4 MG sublingual tablet For chest pain place 1 tablet under the tongue every 5 minutes for 3 doses. If symptoms persist 5 minutes after 1st dose call 911. 25 tablet 0    vitamin D2 (ERGOCALCIFEROL) 44965 units (1250 mcg) capsule TAKE 1 CAPSULE BY MOUTH 1 TIME EVERY WEEK       No current facility-administered medications for this visit.         Past Surgical History:  Past Surgical History:   Procedure Laterality Date    BLADDER SURGERY      C ANESTH, SECTION      CHOLECYSTECTOMY      COLONOSCOPY N/A 2021    Procedure: COLONOSCOPY;  Surgeon: Red Tracey DO;  Location:  GI    CV HEART CATHETERIZATION WITH POSSIBLE INTERVENTION N/A 2021    Procedure: Heart Catheterization with Possible Intervention;  Surgeon: Wolf Brumfield MD;  Location: [facility] CARDIAC CATH LAB    CV PCI STENT DRUG ELUTING N/A 2021    Procedure: Percutaneous Coronary Intervention Stent Drug Eluting;  Surgeon: Wolf Brumfield MD;  Location: [facility] CARDIAC CATH LAB    CYSTOSCOPY  2014    Procedure: CYSTOSCOPY;  Surgeon: Sabino Jamil MD;  Location: [facility]    ESOPHAGOSCOPY, GASTROSCOPY, DUODENOSCOPY (EGD), COMBINED N/A 2021    Procedure: ESOPHAGOGASTRODUODENOSCOPY (EGD);  Surgeon: Jean Hsieh MD;  Location:  GI      THYROIDECTOMY  06/2000    papillary thyroid carcinoma - Endocrine    HEART CATH, ANGIOPLASTY  07/13/2011    2.5 X 18 mm & 2.25 X 18 mm Xience ELLIOT to Mid LAD    HEART CATH, ANGIOPLASTY  07/29/2011    Staged-3.0 X 23 mm Xience ELLIOT to Mid RAC    HYSTERECTOMY, PAP NO LONGER INDICATED      SLING TRANSVAGINAL N/A 12/30/2014    Procedure: SLING TRANSVAGINAL;  Surgeon: Sabino Jamil MD;  Location: [facility] LIGATE FALLOPIAN TUBE  1987    Tubal Ligation    Z TOTAL ABDOM HYSTERECTOMY  2001    ovarian cancer (low grade) - Heme/Onc        Social History:  Social History     Tobacco Use    Smoking status: Every Day     Current packs/day: 0.50     Average packs/day: 0.5 packs/day for 24.0 years (12.0 ttl pk-yrs)     Types: Cigarettes    Smokeless tobacco: Former    Tobacco comments:     12 cigarettes daily   Vaping Use    Vaping status: Never Used   Substance Use Topics    Alcohol use: No     Alcohol/week: 0.0 standard drinks of alcohol    Drug use: No         ROS:  The 10 point review of systems is negative other than noted in the HPI and/or below.      PE:  Vitals: /60   Pulse 74   Resp 16   Wt 74.8 kg (165 lb)   LMP 05/01/2000   SpO2 100%   BMI 28.32 kg/m    BMI: Body mass index is 28.32 kg/m .  General appearance: well-nourished, sitting comfortably, no apparent distress  Respirations:  Unlabored  Extremities: Without edema  Neurologic: alert, speech is clear, moves all extremities with good strength  Psychiatric: Mood and affect are appropriate  Skin: Without lesions, rashes, or jaundice  Breast:    Symmetrical with no skin or nipple changes.    Contour is normal.   Parenchyma is extremely dense.   Masses- right, lower inner quadrant - 2 cm diameter in area of ecchymosis, suspect hematoma        This note may have been created using voice recognition software. Undetected word substitutions or other errors may have occurred.   Lila Jaffe MD    Please route or send letter to:  Referring Provider

## 2024-11-26 NOTE — LETTER
November 26, 2024        RE:   Rowan Leiva 1960      Dear Colleague,    Thank you for referring your patient, Rowan Leiva, to [facility]. Please see a copy of my visit note below.    [facility]       Assessment:    Rowan is a 64 year old female with 1.3cm of right breast calcifications at  8:00 and 6 cm from the nipple.  Her core needle biopsy reveals flat epithelial atypia.   We discussed that a columnar cell lesion with atypia or FEA Risk of upgrade to DCIS is around 9%    PLAN:  Discussed options for close observation with self breast exams, follow up right mammography and physical exam vs an excisional biopsy.    I recommend the later given the larger span of calcifications on mammogram that were unlikely completely removed with core needle.    We have discussed the indication, alternatives, risks and expected recovery.  Specifically, we have discussed incision, scarring, breast deformity, volume loss, postoperative infection, anesthesia, postoperative restrictions and physical limitations.  We have discussed the recommended interventions and treatments for these complications.  All questions have been answered to the best of my ability.  She is at slightly higher risk of bleeding when restarting plavix.    Rowan elects excisional biopsy.    We will schedule surgery at the patient's convenience.    Recommended time off work postop:  2 days    HPI:  Rowan Leiva is a 64 year old female who was noted to have right breast calcifications on screening mammogram 10/28/24.    She noted a right breast lump last year Location:  lower inner quadrant of the LEFT breast. That was evaluated with mammo/ultrasound in 2023 and no abnormality seen.    Skin rashes, dimpling or nipple changes:  none  Nipple discharge:  bilateral white only with breast compression  Breast pain:  No  Performs self breast exams:  Yes    Previous breast biopsies:  No  Previous  cyst aspiration:  No  Previous other breast surgery:  No     Hormonal history:    First menstrual period: 15 years old  First live birth: 22 years old  post menopausal: yes since hysterectomy. She thinks both ovaries were removed. States she had a problem with her tube twisting.  HRT No  Fertility treatment: No    Family History:  Family history of breast cancer: No  Family history of ovarian cancer: Yes - mom    Work up to date:  Imaging  Recent Results (from the past 744 hours)   MA Screening Bilateral w/ Hussain    Narrative    BILATERAL FULL FIELD DIGITAL SCREENING MAMMOGRAM WITH TOMOSYNTHESIS    Performed on: 10/28/24    Compared to: 03/30/2023 and 09/17/2009    Technique:  This study was evaluated with the assistance of Computer-Aided   Detection.  Breast Tomosynthesis was used in interpretation.    Findings: The breasts are heterogeneously dense, which may obscure small   masses.    There are possible calcifications in the right breast at the 9 o'clock   position, posterior depth.    The remainder of the breast tissue is unremarkable.  There is no suspicious finding of the left breast.    Impression    IMPRESSION: BI-RADS CATEGORY: 0 - Incomplete - Need additional imaging   evaluation.    RECOMMENDED FOLLOW-UP: Magnification views of the right breast.    The results and recommendations of this examination will be communicated   to the patient and the imaging center will attempt to contact the patient   within 2-3 business days to schedule follow-up imaging.           Kiran Smith MD     XR CHEST B READ 2 VIEWS    Narrative    For Patients: As a result of the 21st Century Cures Act, medical imaging exams and procedure reports are released immediately into your electronic medical record. You may view this report before your referring provider. If you have questions, please contact your health care provider.    EXAM: XR CHEST 2 VIEWS PA AND LATERAL  LOCATION: [facility]  DATE:  10/29/2024    INDICATION: Subacute Cough Coughing Blood Rib Pain On Right Side  COMPARISON: June 1, 2021    Impression    Rounded opacity in the right upper lobe is approximately 3.5 x 3.1 cm, may be pneumonia. Recommend radiographic surveillance until resolution as round size could indicate other etiologies such as spiculated mass/neoplasm. This will require close follow-up either until resolution or until cross-sectional imaging with CT required, if persists. No pleural effusion or pneumothorax.   Chest CT w/o contrast    Narrative    CT CHEST W/O CONTRAST 10/30/2024 8:55 AM    CLINICAL HISTORY: Right lung mass noted on chest x-ray yesterday    TECHNIQUE: CT chest without IV contrast. Multiplanar reformats were  obtained. Dose reduction techniques were used.    CONTRAST: None.    COMPARISON: No prior radiograph available for comparison. CT chest  11/12/2021.     FINDINGS:   LUNGS AND PLEURA: Posterior right upper lobe rounded consolidative  opacity measuring 3.3 x 2.7 cm (4/126). Trace right pleural effusion.  No pneumothorax. Minimal emphysema. Additional small pulmonary nodules  have not significantly changed since 2021 and are likely benign, for  example a right middle lobe 0.4 cm nodule (4/188).    MEDIASTINUM/AXILLAE: Trace pericardial effusion. No lymphadenopathy.    CORONARY ARTERY CALCIFICATION: Severe.    UPPER ABDOMEN: Cholecystectomy. Left renal upper pole calcifications  are likely vascular.    MUSCULOSKELETAL: Degenerative changes of the spine. No aggressive  osseous lesion.      Impression    IMPRESSION:   1.  Posterior right upper lobe masslike consolidation (3.3 cm)  probably reflects focal infectious/inflammatory process, however  recommend short-term follow-up contrast enhanced CT chest within 1  month to ensure resolution and exclude underlying mass.  2.  Trace right pleural effusion.    MARTHA RAJAN MD         SYSTEM ID:  [id number]   MA Diagnostic Digital Right    Narrative    MA  DIAGNOSTIC DIGITAL RIGHT -  11/1/2024 10:42 AM    HISTORY:  Recalled from screening of 10/28/2024 regarding possible  calcifications at the 9:00 position of the posterior right breast.    COMPARISON:  10/28/2024, 3/30/2023    BREAST DENSITY: The breasts are heterogeneously dense, which may  obscure small masses.    FINDINGS:  Magnification views confirm the presence of the screening  detected calcification cluster at the 8:00 position, 6 cm from the  nipple. The cluster measures approximately 1.3 cm in greatest  dimension. There is no associated mass.      Impression    IMPRESSION: BI-RADS CATEGORY: 4 - Suspicious.    Indeterminate right breast calcifications. Stereotactic core biopsy is  recommended. Results and recommendation for biopsy were discussed with  the patient during her appointment.    RECOMMENDED FOLLOW-UP: Biopsy.        DILSHAD LARA MD         SYSTEM ID:  [id number]   MA Stereotactic Breast Biopsy Vacuum Assist Right    Addendum: 11/19/2024    Addendum for Pathology Results, 11/19/2024 9:33 AM:  Biopsy was performed and initial report was dictated by Dr Ely.  Flat epithelial atypia.  Please see full pathology report for further details.     Results are concordant with imaging findings.     Recommendation: Surgical consultation.    LAN ELY MD         SYSTEM ID:  [id number]      Narrative    Stereotactic biopsy of the RIGHT breast.    Comparisons: Mammogram dated RIGHT.    HISTORY:    Calcifications requiring biopsy.    FINDINGS: Procedure, risks, benefits and alternatives were discussed  with the patient and the patient gave written and verbal consent. The  patient was positioned for stereotactic biopsy. Aseptic technique was  utilized. 1% lidocaine was used for skin anesthesia and 15 cc of 1%  lidocaine with epinephrine were utilized for deep tissue anesthesia.   Using stereotactic technique and an 9 gauge vacuum assisted biopsy  needle, multiple specimens were obtained and images were  archived.  Less than 5 mL blood loss. Calcifications are seen in the biopsy  specimen.     Location: 8:00 6 cm from the nipple on the RIGHT.  Marker: HydroMark     Pressure was held over this area for approximately 15 minutes. A  dressing was placed and care instructions were discussed with the  patient.    Post biopsy mammogram demonstrates marker deployment.      Impression    IMPRESSION:    Uncomplicated stereotactic vacuum assisted core needle  biopsy of the RIGHT breast.    LAN ELY MD         SYSTEM ID:  [id number]   MA Post Procedure Right    Addendum: 11/19/2024    Addendum for Pathology Results, 11/19/2024 9:33 AM:  Biopsy was performed and initial report was dictated by Dr Ely.  Flat epithelial atypia.  Please see full pathology report for further details.     Results are concordant with imaging findings.     Recommendation: Surgical consultation.    LAN ELY MD         SYSTEM ID:  [id number]      Narrative    Stereotactic biopsy of the RIGHT breast.    Comparisons: Mammogram dated RIGHT.    HISTORY:    Calcifications requiring biopsy.    FINDINGS: Procedure, risks, benefits and alternatives were discussed  with the patient and the patient gave written and verbal consent. The  patient was positioned for stereotactic biopsy. Aseptic technique was  utilized. 1% lidocaine was used for skin anesthesia and 15 cc of 1%  lidocaine with epinephrine were utilized for deep tissue anesthesia.   Using stereotactic technique and an 9 gauge vacuum assisted biopsy  needle, multiple specimens were obtained and images were archived.  Less than 5 mL blood loss. Calcifications are seen in the biopsy  specimen.     Location: 8:00 6 cm from the nipple on the RIGHT.  Marker: HydroMark     Pressure was held over this area for approximately 15 minutes. A  dressing was placed and care instructions were discussed with the  patient.    Post biopsy mammogram demonstrates marker deployment.      Impression    IMPRESSION:     Uncomplicated stereotactic vacuum assisted core needle  biopsy of the RIGHT breast.    LAN PRADO MD         SYSTEM ID:  [id number]       Percutaneous core needle biopsy:   Final Diagnosis   Right breast, 8:00, 6 cm from nipple, stereotactic-guided needle core biopsy:  -- Columnar cell lesions with atypia (flat epithelial atypia/FEA), usual ductal hyperplasia, and microcyst formation.  -- Associated calcifications identified.         Electronically signed by Keon Dow MD on 11/14/2024 at  2:30 P       ROS:  The 10 point review of systems is negative other than noted in the HPI and/or below.    PE:  Vitals: /60   Pulse 74   Resp 16   Wt 74.8 kg (165 lb)   LMP 05/01/2000   SpO2 100%   BMI 28.32 kg/m    BMI: Body mass index is 28.32 kg/m .  General appearance: well-nourished, sitting comfortably, no apparent distress  Respirations:  Unlabored  Extremities: Without edema  Neurologic: alert, speech is clear, moves all extremities with good strength  Psychiatric: Mood and affect are appropriate  Skin: Without lesions, rashes, or jaundice  Breast:    Symmetrical with no skin or nipple changes.    Contour is normal.   Parenchyma is extremely dense.   Masses- right, lower inner quadrant - 2 cm diameter in area of ecchymosis, suspect hematoma            Again, thank you for allowing me to participate in the care of your patient.      Sincerely,      Lila Jaffe MD

## 2024-11-26 NOTE — PROGRESS NOTES
Breast Health History Form    Do you have any of the following symptoms? (Patient-Rptd) Nipple Discharge     In which breast are you having the symptoms? (Patient-Rptd) Both  Have you had a mammogram? (Patient-Rptd) Yes        Have you ever had a breast cyst drained? (Patient-Rptd) No        Have you had a breast biopsy in the past? (Patient-Rptd) No        Have you ever had Breast Cancer? (Patient-Rptd) No        Is there a history of Breast Cancer in your family? (Patient-Rptd) No     Have you ever had Ovarian Cancer? (Patient-Rptd) No     Is there a history of Ovarian Cancer in your family? (Patient-Rptd) Yes  Relationship: (Patient-Rptd) Mother  Is there a history of Colon Cancer in your family? (Patient-Rptd) No     Is there a history of Uterine Cancer in your family? (Patient-Rptd) No     Any known genetic abnormalities in your family? (Patient-Rptd) No     Summarize your caffeine intake? (Patient-Rptd) Soda daily    Were you born with a uterus? (Patient-Rptd) Yes  What age did your periods begin? (Patient-Rptd) 15  Date your last menstrual period began? (Patient-Rptd) Not sure had a hysterectomy  Number of full term pregnancies? (Patient-Rptd) 2  Your age when your first child was born? (Patient-Rptd) 22  Did you nurse your children? (Patient-Rptd) No  Are you pregnant now? (Patient-Rptd) No  Have you begun Menopause? (Patient-Rptd) Yes     Have you had either ovary removed? (Patient-Rptd) Yes     Have you had an intrauterine device (IUD) placed? (Patient-Rptd) No         Do you have breast implants? (Patient-Rptd) No  Have you used hormone replacement therapy? (Patient-Rptd) No        Have you taken oral contraceptive pills? (Patient-Rptd) Yes  For how many years: (Patient-Rptd) 5  What is your current bra size? (Patient-Rptd) 38 B

## 2024-12-11 DIAGNOSIS — E11.65 TYPE 2 DIABETES MELLITUS WITH HYPERGLYCEMIA, WITH LONG-TERM CURRENT USE OF INSULIN (H): ICD-10-CM

## 2024-12-11 DIAGNOSIS — Z79.4 TYPE 2 DIABETES MELLITUS WITH HYPERGLYCEMIA, WITH LONG-TERM CURRENT USE OF INSULIN (H): ICD-10-CM

## 2024-12-11 DIAGNOSIS — E89.0 POSTOPERATIVE HYPOTHYROIDISM: ICD-10-CM

## 2024-12-11 RX ORDER — LEVOTHYROXINE SODIUM 125 UG/1
125 TABLET ORAL DAILY
Qty: 90 TABLET | Refills: 0 | Status: SHIPPED | OUTPATIENT
Start: 2024-12-11

## 2024-12-11 RX ORDER — INSULIN GLARGINE 100 [IU]/ML
INJECTION, SOLUTION SUBCUTANEOUS
Qty: 6 ML | Refills: 0 | Status: SHIPPED | OUTPATIENT
Start: 2024-12-11 | End: 2024-12-12

## 2024-12-12 ENCOUNTER — VIRTUAL VISIT (OUTPATIENT)
Dept: INTERNAL MEDICINE | Facility: CLINIC | Age: 64
End: 2024-12-12
Payer: COMMERCIAL

## 2024-12-12 DIAGNOSIS — E11.65 TYPE 2 DIABETES MELLITUS WITH HYPERGLYCEMIA, WITH LONG-TERM CURRENT USE OF INSULIN (H): ICD-10-CM

## 2024-12-12 DIAGNOSIS — I26.99 PULMONARY EMBOLISM, OTHER, UNSPECIFIED CHRONICITY, UNSPECIFIED WHETHER ACUTE COR PULMONALE PRESENT (H): Primary | ICD-10-CM

## 2024-12-12 DIAGNOSIS — Z79.4 TYPE 2 DIABETES MELLITUS WITH HYPERGLYCEMIA, WITH LONG-TERM CURRENT USE OF INSULIN (H): ICD-10-CM

## 2024-12-12 RX ORDER — INSULIN GLARGINE 100 [IU]/ML
13 INJECTION, SOLUTION SUBCUTANEOUS AT BEDTIME
Qty: 15 ML | Refills: 2 | Status: SHIPPED | OUTPATIENT
Start: 2024-12-12 | End: 2025-11-23

## 2024-12-12 RX ORDER — INSULIN GLARGINE 100 [IU]/ML
INJECTION, SOLUTION SUBCUTANEOUS
Qty: 6 ML | Refills: 0 | Status: CANCELLED | OUTPATIENT
Start: 2024-12-12

## 2024-12-12 NOTE — PROGRESS NOTES
"Rowan is a 64 year old who is being evaluated via a billable video visit.    How would you like to obtain your AVS? MyChart  If the video visit is dropped, the invitation should be resent by: Text to cell phone: [phone]  Will anyone else be joining your video visit? No      Assessment & Plan     (I26.99) Pulmonary embolism, other, unspecified chronicity, unspecified whether acute cor pulmonale present (H)  (primary encounter diagnosis)  - CT obtained in ER late October when she presented for hemoptysis, was off AC due to bleeding at that time  - CT w/o showed possible lung mass. Discussed with pulmonology and unsure if this is related to PE or new lung mass  - Patient discussed with Nephrologist, risk is of course present but benefit of clarification outweighs risk  Plan: CT Chest w Contrast        We had long discussion regarding pre and post imaging hydration along with renal function check 2-3 days post imaging study    (E11.65,  Z79.4) Type 2 diabetes mellitus with hyperglycemia, with long-term current use of insulin (H)  - Takes 13u at bedtime  - Sugars controlled per patient  Plan: insulin glargine (LANTUS SOLOSTAR) 100 UNIT/ML         pen          BMI  Estimated body mass index is 28.32 kg/m  as calculated from the following:    Height as of 11/19/24: 1.626 m (5' 4\").    Weight as of 11/26/24: 74.8 kg (165 lb).       Subjective   Rowan is a 64 year old, presenting for the following health issues:  Consult (She wants a consultation to discuss about getting a CT scan of her lung. )      12/12/2024     3:01 PM   Additional Questions   Roomed by Lluvia Mendoza MA   Accompanied by Self     HPI   64F pmh hypothyroidism, type 2 diabetes mellitus insulin-dependent, history of CVA on Plavix, hypertension, and COPD presents for CT follow up and medication refills. Patient reports she discussed CT contrast with her Nephrologist and was given recommendations. She would like to move forward with imaging study. She " would also like refills on her long acting insulin.          Review of Systems  Constitutional, neuro, ENT, endocrine, pulmonary, cardiac, gastrointestinal, genitourinary, musculoskeletal, integument and psychiatric systems are negative, except as otherwise noted.      Objective    Vitals - Patient Reported  Weight (Patient Reported): 77.1 kg (170 lb)        Physical Exam   GENERAL: alert and no distress  EYES: Eyes grossly normal to inspection.  No discharge or erythema, or obvious scleral/conjunctival abnormalities.  RESP: No audible wheeze, cough, or visible cyanosis.    SKIN: Visible skin clear. No significant rash, abnormal pigmentation or lesions.  NEURO: Cranial nerves grossly intact.  Mentation and speech appropriate for age.  PSYCH: Appropriate affect, tone, and pace of words        Video-Visit Details    Type of service:  Video Visit   Originating Location (pt. Location): Home    Distant Location (provider location):  On-site  Platform used for Video Visit: Elise  Signed Electronically by: Ilia Mcclellan MD

## 2024-12-20 ENCOUNTER — HOSPITAL ENCOUNTER (OUTPATIENT)
Dept: CT IMAGING | Facility: CLINIC | Age: 64
Discharge: HOME | End: 2024-12-20
Attending: STUDENT IN AN ORGANIZED HEALTH CARE EDUCATION/TRAINING PROGRAM | Admitting: STUDENT IN AN ORGANIZED HEALTH CARE EDUCATION/TRAINING PROGRAM
Payer: COMMERCIAL

## 2024-12-20 DIAGNOSIS — I26.99 PULMONARY EMBOLISM, OTHER, UNSPECIFIED CHRONICITY, UNSPECIFIED WHETHER ACUTE COR PULMONALE PRESENT (H): ICD-10-CM

## 2024-12-20 LAB
CREAT BLD-MCNC: 2.6 MG/DL (ref 0.5–1)
EGFRCR SERPLBLD CKD-EPI 2021: 20 ML/MIN/1.73M2

## 2024-12-20 PROCEDURE — 250N000009 HC RX 250: Performed by: STUDENT IN AN ORGANIZED HEALTH CARE EDUCATION/TRAINING PROGRAM

## 2024-12-20 PROCEDURE — 82565 ASSAY OF CREATININE: CPT

## 2024-12-20 PROCEDURE — 71275 CT ANGIOGRAPHY CHEST: CPT

## 2024-12-20 PROCEDURE — 250N000011 HC RX IP 250 OP 636: Performed by: STUDENT IN AN ORGANIZED HEALTH CARE EDUCATION/TRAINING PROGRAM

## 2024-12-20 RX ORDER — IOPAMIDOL 755 MG/ML
500 INJECTION, SOLUTION INTRAVASCULAR ONCE
Status: COMPLETED | OUTPATIENT
Start: 2024-12-20 | End: 2024-12-20

## 2024-12-20 RX ADMIN — IOPAMIDOL 69 ML: 755 INJECTION, SOLUTION INTRAVENOUS at 13:22

## 2024-12-20 RX ADMIN — SODIUM CHLORIDE 89 ML: 9 INJECTION, SOLUTION INTRAVENOUS at 13:22

## 2024-12-21 ENCOUNTER — HEALTH MAINTENANCE LETTER (OUTPATIENT)
Age: 64
End: 2024-12-21

## 2025-01-03 ENCOUNTER — TELEPHONE (OUTPATIENT)
Dept: INTERNAL MEDICINE | Facility: CLINIC | Age: 65
End: 2025-01-03
Payer: COMMERCIAL

## 2025-01-03 NOTE — TELEPHONE ENCOUNTER
S-(situation): Patient inquiring about Chest CT completed 12/20/24    B-(background): patient was seen VV 12/12/24    A-(assessment): No provider comments, please review CT.     R-(recommendations): Please advise if any follow-up needed regarding CT.     Summer RN 2:22 PM January 3, 2025   [facility] [place]

## 2025-01-06 NOTE — TELEPHONE ENCOUNTER
Call to pt and discussed. Gave her the # for Lung clinic.     [facility] will call you to coordinate your care as prescribed by the provider.  If you don t hear from a representative within 2 business days, please call [phone]; option 5.

## 2025-01-06 NOTE — TELEPHONE ENCOUNTER
The new CT with contrast looks about the same as one in October. The questionable area from prior looks smaller on this new imaging study. Please have her follow up with Pulmonology in clinic. Thank you.

## 2025-01-08 ENCOUNTER — LAB (OUTPATIENT)
Dept: LAB | Facility: CLINIC | Age: 65
End: 2025-01-08
Payer: COMMERCIAL

## 2025-01-08 DIAGNOSIS — D64.9 ANEMIA, UNSPECIFIED TYPE: ICD-10-CM

## 2025-01-08 DIAGNOSIS — E11.9 TYPE 2 DIABETES MELLITUS (H): Primary | ICD-10-CM

## 2025-01-08 DIAGNOSIS — N18.4 CKD (CHRONIC KIDNEY DISEASE) STAGE 4, GFR 15-29 ML/MIN (H): ICD-10-CM

## 2025-01-08 DIAGNOSIS — R91.8 LUNG MASS: Primary | ICD-10-CM

## 2025-01-08 LAB
CREAT UR-MCNC: 135 MG/DL
ERYTHROCYTE [DISTWIDTH] IN BLOOD BY AUTOMATED COUNT: 14.4 % (ref 10–15)
EST. AVERAGE GLUCOSE BLD GHB EST-MCNC: 154 MG/DL
HBA1C MFR BLD: 7 % (ref 0–5.6)
HCT VFR BLD AUTO: 29.9 % (ref 35–47)
HGB BLD-MCNC: 10 G/DL (ref 11.7–15.7)
MCH RBC QN AUTO: 26.6 PG (ref 26.5–33)
MCHC RBC AUTO-ENTMCNC: 33.4 G/DL (ref 31.5–36.5)
MCV RBC AUTO: 80 FL (ref 78–100)
MICROALBUMIN UR-MCNC: 2626 MG/L
MICROALBUMIN/CREAT UR: 1945.19 MG/G CR (ref 0–25)
PLATELET # BLD AUTO: 147 10E3/UL (ref 150–450)
RBC # BLD AUTO: 3.76 10E6/UL (ref 3.8–5.2)
WBC # BLD AUTO: 8.3 10E3/UL (ref 4–11)

## 2025-01-08 PROCEDURE — 83036 HEMOGLOBIN GLYCOSYLATED A1C: CPT

## 2025-01-08 PROCEDURE — 36415 COLL VENOUS BLD VENIPUNCTURE: CPT

## 2025-01-08 PROCEDURE — 82570 ASSAY OF URINE CREATININE: CPT

## 2025-01-08 PROCEDURE — 82043 UR ALBUMIN QUANTITATIVE: CPT

## 2025-01-08 PROCEDURE — 85027 COMPLETE CBC AUTOMATED: CPT

## 2025-01-09 NOTE — RESULT ENCOUNTER NOTE
Dear Ms. Leiva,    Blood counts are stable. Mildly low hemoglobin and platelet. WBC is normal.    Please, call me with any questions.    Lexa Miller MD

## 2025-03-19 DIAGNOSIS — J44.9 CHRONIC OBSTRUCTIVE PULMONARY DISEASE, UNSPECIFIED COPD TYPE (H): ICD-10-CM

## 2025-03-19 RX ORDER — ALBUTEROL SULFATE 90 UG/1
2 INHALANT RESPIRATORY (INHALATION) EVERY 6 HOURS
Qty: 8.5 G | Refills: 2 | Status: SHIPPED | OUTPATIENT
Start: 2025-03-19

## 2025-04-02 ENCOUNTER — TRANSFERRED RECORDS (OUTPATIENT)
Dept: HEALTH INFORMATION MANAGEMENT | Facility: CLINIC | Age: 65
End: 2025-04-02
Payer: COMMERCIAL

## 2025-04-02 ENCOUNTER — DOCUMENTATION ONLY (OUTPATIENT)
Dept: PULMONOLOGY | Facility: CLINIC | Age: 65
End: 2025-04-02
Payer: COMMERCIAL

## 2025-04-02 PROBLEM — R19.7 DIARRHEA: Status: RESOLVED | Noted: 2024-11-04 | Resolved: 2025-04-02

## 2025-04-03 NOTE — NURSING NOTE
Pre-visit planning and chart review completed.     RETURN patient appointment:    4/15 with Dr. Crow Zhang  4/11 CT chest wo contrast    - 3 month follow-up .    CE updated. Medications, allergies, problem list, and immunizations reconciled.

## 2025-04-08 ENCOUNTER — TRANSFERRED RECORDS (OUTPATIENT)
Dept: HEALTH INFORMATION MANAGEMENT | Facility: CLINIC | Age: 65
End: 2025-04-08
Payer: COMMERCIAL

## 2025-04-11 ENCOUNTER — HOSPITAL ENCOUNTER (OUTPATIENT)
Dept: CT IMAGING | Facility: CLINIC | Age: 65
Discharge: HOME | End: 2025-04-11
Attending: INTERNAL MEDICINE | Admitting: INTERNAL MEDICINE
Payer: COMMERCIAL

## 2025-04-11 DIAGNOSIS — R91.8 LUNG MASS: ICD-10-CM

## 2025-04-11 PROCEDURE — 71250 CT THORAX DX C-: CPT

## 2025-04-12 ENCOUNTER — HEALTH MAINTENANCE LETTER (OUTPATIENT)
Age: 65
End: 2025-04-12

## 2025-04-15 ENCOUNTER — VIRTUAL VISIT (OUTPATIENT)
Dept: PULMONOLOGY | Facility: CLINIC | Age: 65
End: 2025-04-15
Attending: INTERNAL MEDICINE
Payer: COMMERCIAL

## 2025-04-15 DIAGNOSIS — R91.8 LUNG MASS: Primary | ICD-10-CM

## 2025-04-15 PROCEDURE — 1126F AMNT PAIN NOTED NONE PRSNT: CPT | Mod: 95 | Performed by: INTERNAL MEDICINE

## 2025-04-15 PROCEDURE — 98007 SYNCH AUDIO-VIDEO EST HI 40: CPT | Performed by: INTERNAL MEDICINE

## 2025-04-15 NOTE — NURSING NOTE
Current patient location: [address]  [facility] [address]    Is the patient currently in the state of [place]? YES    Visit mode: VIDEO    If the visit is dropped, the patient can be reconnected by:VIDEO VISIT: Text to cell phone:   Telephone Information:   Mobile [phone]       Will anyone else be joining the visit? NO  (If patient encounters technical issues they should call [phone]    Are changes needed to the allergy or medication list? Pt stated no changes to allergies and Pt stated no med changes    Are refills needed on medications prescribed by this physician? NO    Rooming Documentation:  Questionnaire(s) completed    Reason for visit: RECHECK    Dash VELASQUEZ

## 2025-04-15 NOTE — LETTER
"4/15/2025       RE: Rowan Leiva  [address]  [facility] [address]     Dear Colleague,    Thank you for referring your patient, Rowan Leiva, to the [facility] at [facility]. Please see a copy of my visit note below.    Virtual Visit Details    Type of service:  Video Visit     See note    Virtual Visit Details  See note        LUNG NODULE & INTERVENTIONAL PULMONARY CLINIC  CLINICS & SURGERY [place], [facility], [facility] CANCER CLINIC  [address] [place] [address]  Phone: [phone]  Fax: [phone]    Patient:  Rowan Leiva, Age 64 year old, Date of birth 1960, MRN# [id number]  Date of Visit:  04/15/2025  Referring Provider Santiago Sparks    Reason for Consultation: Lung Mass     The patient has been notified of following:   \"This video visit will be conducted via a call between you and your physician/provider. We have found that certain health care needs can be provided without the need for an in-person physical exam.  This service lets us provide the care you need with a video conversation.  If a prescription is necessary we can send it directly to your pharmacy.  If lab work is needed we can place an order for that and you can then stop by our lab to have the test done at a later time.  Video visits are billed at different rates depending on your insurance coverage.  Please reach out to your insurance provider with any questions.  If during the course of the call the physician/provider feels a video visit is not appropriate, you will not be charged for this service.\"  Patient has given verbal consent for Video visit? Yes  How would you like to obtain your AVS? Please refer to rooming staff note  Patient would like the video invitation sent by: Please refer to rooming staff note   Will anyone else be joining your video visit? " Please refer to rooming staff note    Video-Visit Details     Type of service:  Video Visit  Video Start Time: 435  Video End Time: 445  Provider Name: Crow Zhang MD, MHA   Originating Location (pt. Location): Home  Provider Location: Off campus   Distant Location (provider location): Home/Clinic  Platform used for Video Visit: [facility]    Assessment and Plan:    1. RUL-mass like lesion is resolving. Cont to follow, next CT in 6mo  2. New 3mm RODNEY nodule, follow-up per above  3. Mosaic attenuation and air trapping. PFT on next visit    Billing: I spent a total of 45min spent on date of encounter which includes prep time, visit with the patient and post visit work including documentation and nursing communication     Crow Zhang MD, MHA  Associate Professor of Medicine  Section of Interventional Pulmonology   Division of Pulmonary, Allergy, Critical Care and Sleep Medicine   [facility]  Pager: [phone]   Office: [phone]  Email: [email or web address]    Christa Gonzalez RN   Interventional Pulmonary Care Coordinator   Office: [phone]  Email: [email or web address]     David Dos Santos  Interventional Pulmonary Surgery Scheduler   Office: [phone]  Email: [email or web address]         History:     Rowan Leiva is a 64 year old female with sig h/o for thyroid and ovarian cancer, DVT/PE, DM2, asthma, tobacco disorder who is here for evaluation/followup of Lung Mass.    - Here for 3mo follow-up.  - Personal hx of cancer: thyroid, ovarian  - Family hx of cancer: no  - Exposure hx: Denies asbestos or radon exposure   - Tobacco hx: Current Smoker, 1ppd, 12pkyr  - My interpretation of the images relevant for this visit includes: no   - My interpretation of the PFT's relevant for this visit includes: None     Culprit Nodule(s):   1.  Decreased prominence of nodular consolidation in the right upper lobe with residual bandlike opacities seen. Findings are  suggestive of improving pneumonia.  2.  New 3 mm pulmonary nodule in the left upper lobe. Other pulmonary nodules appear unchanged.  3.  Subtle patchy groundglass opacities in the lungs with mosaic attenuation appears unchanged and suggestive of air trapping/small airway disease. Superimposed mild atypical pneumonia or pulmonary edema cannot be excluded.   4.  Small to moderate pericardial effusion.  5.  Trace left pleural effusion.     Other active medical problems include:   - h/o DVT/PE. Restarted anticoagulation in 2024.   - remote h/o ovarian cancer s/p TAHBSO   - had papillary thyroid cancer s/p thyroidectomy in           Past Medical History:      Past Medical History:   Diagnosis Date     Cancer of thyroid (H)      Chest pain      Coronary artery disease     Stents-     HX OF OVARIAN MALIGNANCY      Hyperlipidemia      Hypertension      Hypothyroidism      Malignant neoplasm of thyroid gland (H)     papillary carcinoma; resection      Mild intermittent asthma     minimal symptoms, albuterol use 2x/year     Myocardial infarction (H)     anterior     Pulmonary nodule      Thrombocytopenia      Tobacco use disorder      Type II or unspecified type diabetes mellitus without mention of complication, not stated as uncontrolled              Past Surgical History:      Past Surgical History:   Procedure Laterality Date     BLADDER SURGERY       C ANESTH, SECTION       CHOLECYSTECTOMY       COLONOSCOPY N/A 2021    Procedure: COLONOSCOPY;  Surgeon: Red Tracey DO;  Location:  GI     CV HEART CATHETERIZATION WITH POSSIBLE INTERVENTION N/A 2021    Procedure: Heart Catheterization with Possible Intervention;  Surgeon: Wolf Brumfield MD;  Location: [facility] CARDIAC CATH LAB     CV PCI STENT DRUG ELUTING N/A 2021    Procedure: Percutaneous Coronary Intervention Stent Drug Eluting;  Surgeon: Wolf Brumfield MD;  Location: [facility]  CARDIAC CATH LAB     CYSTOSCOPY  2014    Procedure: CYSTOSCOPY;  Surgeon: Sabino Jamil MD;  Location: [facility]     ESOPHAGOSCOPY, GASTROSCOPY, DUODENOSCOPY (EGD), COMBINED N/A 2021    Procedure: ESOPHAGOGASTRODUODENOSCOPY (EGD);  Surgeon: Jean Hsieh MD;  Location:  GI     HC THYROIDECTOMY  2000    papillary thyroid carcinoma - Endocrine     HEART CATH, ANGIOPLASTY  2011    2.5 X 18 mm & 2.25 X 18 mm Xience ELLIOT to Mid LAD     HEART CATH, ANGIOPLASTY  2011    Staged-3.0 X 23 mm Xience ELLIOT to Mid RAC     HYSTERECTOMY, PAP NO LONGER INDICATED       SLING TRANSVAGINAL N/A 2014    Procedure: SLING TRANSVAGINAL;  Surgeon: Sabino Jamil MD;  Location: [facility] LIGATE FALLOPIAN TUBE      Tubal Ligation     ZZC TOTAL ABDOM HYSTERECTOMY      ovarian cancer (low grade) - Heme/Onc            Social History:     Social History     Tobacco Use     Smoking status: Every Day     Current packs/day: 0.50     Average packs/day: 0.5 packs/day for 24.0 years (12.0 ttl pk-yrs)     Types: Cigarettes     Smokeless tobacco: Former     Tobacco comments:     12 cigarettes daily   Substance Use Topics     Alcohol use: No     Alcohol/week: 0.0 standard drinks of alcohol            Family History:     Family History   Problem Relation Age of Onset     Blood Disease Sister         Hepatitis B-alive     Cancer Maternal Aunt         bronchial tubes, neck-     Cancer Maternal Uncle         glands in neck-     Cancer Maternal Grandfather         lungs-     Diabetes Father              Heart Disease Father         2 heartattacks-     Diabetes Sister              Diabetes Other         -cousins     Diabetes Paternal Grandmother              Diabetes Paternal Grandfather              Diabetes Paternal Aunt              Hypertension Sister         alive     Thyroid Disease Sister         both sisters-alive      Thyroid Disease Other         niece-alive             Allergies:    No Known Allergies         Medications:     Current Outpatient Medications   Medication Sig Dispense Refill     albuterol (PROAIR HFA/PROVENTIL HFA/VENTOLIN HFA) 108 (90 Base) MCG/ACT inhaler INHALE 2 PUFFS INTO THE LUNGS EVERY 6 HOURS 8.5 g 2     atorvastatin (LIPITOR) 40 MG tablet TAKE 1 TABLET(40 MG) BY MOUTH DAILY 90 tablet 2     carvedilol (COREG) 12.5 MG tablet Take 1 tablet (12.5 mg) by mouth 2 times daily (with meals). 180 tablet 3     clopidogrel (PLAVIX) 75 MG tablet TAKE 1 TABLET(75 MG) BY MOUTH DAILY 90 tablet 3     Continuous Glucose Sensor (DEXCOM G7 SENSOR) MISC CHANGE SENSOR EVERY 10 DAYS       insulin glargine (LANTUS SOLOSTAR) 100 UNIT/ML pen Inject 13 Units subcutaneously at bedtime. 15 mL 2     insulin lispro (HUMALOG KWIKPEN) 100 UNIT/ML (1 unit dial) KWIKPEN Inject 18 Units Subcutaneous 3 times daily (before meals)       LANCETS REGULAR MISC use twice a day for blood sugar 2 boxes 0     levothyroxine (SYNTHROID/LEVOTHROID) 125 MCG tablet TAKE 1 TABLET(125 MCG) BY MOUTH DAILY 30 tablet 0     nitroGLYcerin (NITROSTAT) 0.4 MG sublingual tablet For chest pain place 1 tablet under the tongue every 5 minutes for 3 doses. If symptoms persist 5 minutes after 1st dose call 911. 25 tablet 0     vitamin D2 (ERGOCALCIFEROL) 98272 units (1250 mcg) capsule TAKE 1 CAPSULE BY MOUTH 1 TIME EVERY WEEK (Patient not taking: Reported on 12/12/2024)       No current facility-administered medications for this visit.            Review of Systems:     12-point ROS reviewed and abnormalities stated in the history.         Physical Exam:     Constitutional - looks well, in no apparent distress  Eyes - no redness or discharge  Respiratory -breathing appears comfortable.  No cough.  Skin - No appreciable discoloration or lesions (very limited exam)  Neurological - No apparent tremors. Speech fluent and articlate  Psychiatric - no signs of delirium or  anxiety     Exam limited to that easily identified on a virtual visit. The rest of a comprehensive physical examination is deferred due to PHE (public health emergency) video visit restrictions.         Current Laboratory Data:   All laboratory and imaging data reviewed.           No data to display                                Again, thank you for allowing me to participate in the care of your patient.      Sincerely,    Crow Zhang MD

## 2025-04-15 NOTE — PROGRESS NOTES
"Virtual Visit Details    Type of service:  Video Visit     See note    Virtual Visit Details  See note        LUNG NODULE & INTERVENTIONAL PULMONARY CLINIC  CLINICS & SURGERY CENTER, [facility], [facility] CANCER [address] [address]  Phone: [phone]  Fax: [phone]    Patient:  Rowan Leiva, Age 64 year old, Date of birth 1960, MRN# [id number]  Date of Visit:  04/15/2025  Referring Provider Santiago Sparks    Reason for Consultation: Lung Mass     The patient has been notified of following:   \"This video visit will be conducted via a call between you and your physician/provider. We have found that certain health care needs can be provided without the need for an in-person physical exam.  This service lets us provide the care you need with a video conversation.  If a prescription is necessary we can send it directly to your pharmacy.  If lab work is needed we can place an order for that and you can then stop by our lab to have the test done at a later time.  Video visits are billed at different rates depending on your insurance coverage.  Please reach out to your insurance provider with any questions.  If during the course of the call the physician/provider feels a video visit is not appropriate, you will not be charged for this service.\"  Patient has given verbal consent for Video visit? Yes  How would you like to obtain your AVS? Please refer to rooming staff note  Patient would like the video invitation sent by: Please refer to rooming staff note   Will anyone else be joining your video visit? Please refer to rooming staff note    Video-Visit Details     Type of service:  Video Visit  Video Start Time: 435  Video End Time: 445  Provider Name: Crow Zhang MD, MHA   Originating Location (pt. Location): Home  Provider Location: Off campus   Distant Location (provider location): Home/Clinic  Platform used " for Video Visit: Elise/Clara    Assessment and Plan:    1. RUL-mass like lesion is resolving. Cont to follow, next CT in 6mo  2. New 3mm RODNEY nodule, follow-up per above  3. Mosaic attenuation and air trapping. PFT on next visit    Billing: I spent a total of 45min spent on date of encounter which includes prep time, visit with the patient and post visit work including documentation and nursing communication     Crow Zhang MD, MHA  Associate Professor of Medicine  Section of Interventional Pulmonology   Division of Pulmonary, Allergy, Critical Care and Sleep Medicine   [facility]  Pager: [phone]   Office: [phone]  Email: [email or web address]    Christa Gonzalez RN   Interventional Pulmonary Care Coordinator   Office: [phone]  Email: [email or web address]     David Dos Santos  Interventional Pulmonary Surgery Scheduler   Office: [phone]  Email: [email or web address]         History:     Rowan Leiva is a 64 year old female with sig h/o for thyroid and ovarian cancer, DVT/PE, DM2, asthma, tobacco disorder who is here for evaluation/followup of Lung Mass.    - Here for 3mo follow-up.  - Personal hx of cancer: thyroid, ovarian  - Family hx of cancer: no  - Exposure hx: Denies asbestos or radon exposure   - Tobacco hx: Current Smoker, 1ppd, 12pkyr  - My interpretation of the images relevant for this visit includes: no   - My interpretation of the PFT's relevant for this visit includes: None     Culprit Nodule(s):   1.  Decreased prominence of nodular consolidation in the right upper lobe with residual bandlike opacities seen. Findings are suggestive of improving pneumonia.  2.  New 3 mm pulmonary nodule in the left upper lobe. Other pulmonary nodules appear unchanged.  3.  Subtle patchy groundglass opacities in the lungs with mosaic attenuation appears unchanged and suggestive of air trapping/small airway disease. Superimposed mild atypical pneumonia or  pulmonary edema cannot be excluded.   4.  Small to moderate pericardial effusion.  5.  Trace left pleural effusion.     Other active medical problems include:   - h/o DVT/PE. Restarted anticoagulation in 2024.   - remote h/o ovarian cancer s/p TAHBSO   - had papillary thyroid cancer s/p thyroidectomy in           Past Medical History:      Past Medical History:   Diagnosis Date    Cancer of thyroid (H)     Chest pain     Coronary artery disease     Stents-    HX OF OVARIAN MALIGNANCY     Hyperlipidemia     Hypertension     Hypothyroidism     Malignant neoplasm of thyroid gland (H)     papillary carcinoma; resection     Mild intermittent asthma     minimal symptoms, albuterol use 2x/year    Myocardial infarction (H)     anterior    Pulmonary nodule     Thrombocytopenia     Tobacco use disorder     Type II or unspecified type diabetes mellitus without mention of complication, not stated as uncontrolled              Past Surgical History:      Past Surgical History:   Procedure Laterality Date    BLADDER SURGERY      C ANESTH, SECTION      CHOLECYSTECTOMY      COLONOSCOPY N/A 2021    Procedure: COLONOSCOPY;  Surgeon: Red Tracey DO;  Location:  GI    CV HEART CATHETERIZATION WITH POSSIBLE INTERVENTION N/A 2021    Procedure: Heart Catheterization with Possible Intervention;  Surgeon: Wolf Brumfield MD;  Location: [facility] CARDIAC CATH LAB    CV PCI STENT DRUG ELUTING N/A 2021    Procedure: Percutaneous Coronary Intervention Stent Drug Eluting;  Surgeon: Wolf Brumfield MD;  Location: [facility] CARDIAC CATH LAB    CYSTOSCOPY  2014    Procedure: CYSTOSCOPY;  Surgeon: Sabino Jamil MD;  Location: [facility]    ESOPHAGOSCOPY, GASTROSCOPY, DUODENOSCOPY (EGD), COMBINED N/A 2021    Procedure: ESOPHAGOGASTRODUODENOSCOPY (EGD);  Surgeon: Jean Hsieh MD;  Location:  GI    HC THYROIDECTOMY  2000    papillary thyroid  carcinoma - Endocrine    HEART CATH, ANGIOPLASTY  2011    2.5 X 18 mm & 2.25 X 18 mm Xience ELLIOT to Mid LAD    HEART CATH, ANGIOPLASTY  2011    Staged-3.0 X 23 mm Xience ELLIOT to Mid RAC    HYSTERECTOMY, PAP NO LONGER INDICATED      SLING TRANSVAGINAL N/A 2014    Procedure: SLING TRANSVAGINAL;  Surgeon: Sabino Jamil MD;  Location: [facility] LIGATE FALLOPIAN TUBE      Tubal Ligation    Z TOTAL ABDOM HYSTERECTOMY      ovarian cancer (low grade) - Heme/Onc            Social History:     Social History     Tobacco Use    Smoking status: Every Day     Current packs/day: 0.50     Average packs/day: 0.5 packs/day for 24.0 years (12.0 ttl pk-yrs)     Types: Cigarettes    Smokeless tobacco: Former    Tobacco comments:     12 cigarettes daily   Substance Use Topics    Alcohol use: No     Alcohol/week: 0.0 standard drinks of alcohol            Family History:     Family History   Problem Relation Age of Onset    Blood Disease Sister         Hepatitis B-alive    Cancer Maternal Aunt         bronchial tubes, neck-    Cancer Maternal Uncle         glands in neck-    Cancer Maternal Grandfather         lungs-    Diabetes Father             Heart Disease Father         2 heartattacks-    Diabetes Sister             Diabetes Other         -cousins    Diabetes Paternal Grandmother             Diabetes Paternal Grandfather             Diabetes Paternal Aunt             Hypertension Sister         alive    Thyroid Disease Sister         both sisters-alive    Thyroid Disease Other         niece-alive             Allergies:    No Known Allergies         Medications:     Current Outpatient Medications   Medication Sig Dispense Refill    albuterol (PROAIR HFA/PROVENTIL HFA/VENTOLIN HFA) 108 (90 Base) MCG/ACT inhaler INHALE 2 PUFFS INTO THE LUNGS EVERY 6 HOURS 8.5 g 2    atorvastatin (LIPITOR) 40 MG tablet TAKE 1 TABLET(40 MG) BY  MOUTH DAILY 90 tablet 2    carvedilol (COREG) 12.5 MG tablet Take 1 tablet (12.5 mg) by mouth 2 times daily (with meals). 180 tablet 3    clopidogrel (PLAVIX) 75 MG tablet TAKE 1 TABLET(75 MG) BY MOUTH DAILY 90 tablet 3    Continuous Glucose Sensor (DEXCOM G7 SENSOR) MISC CHANGE SENSOR EVERY 10 DAYS      insulin glargine (LANTUS SOLOSTAR) 100 UNIT/ML pen Inject 13 Units subcutaneously at bedtime. 15 mL 2    insulin lispro (HUMALOG KWIKPEN) 100 UNIT/ML (1 unit dial) KWIKPEN Inject 18 Units Subcutaneous 3 times daily (before meals)      LANCETS REGULAR MISC use twice a day for blood sugar 2 boxes 0    levothyroxine (SYNTHROID/LEVOTHROID) 125 MCG tablet TAKE 1 TABLET(125 MCG) BY MOUTH DAILY 30 tablet 0    nitroGLYcerin (NITROSTAT) 0.4 MG sublingual tablet For chest pain place 1 tablet under the tongue every 5 minutes for 3 doses. If symptoms persist 5 minutes after 1st dose call 911. 25 tablet 0    vitamin D2 (ERGOCALCIFEROL) 42904 units (1250 mcg) capsule TAKE 1 CAPSULE BY MOUTH 1 TIME EVERY WEEK (Patient not taking: Reported on 12/12/2024)       No current facility-administered medications for this visit.            Review of Systems:     12-point ROS reviewed and abnormalities stated in the history.         Physical Exam:     Constitutional - looks well, in no apparent distress  Eyes - no redness or discharge  Respiratory -breathing appears comfortable.  No cough.  Skin - No appreciable discoloration or lesions (very limited exam)  Neurological - No apparent tremors. Speech fluent and articlate  Psychiatric - no signs of delirium or anxiety     Exam limited to that easily identified on a virtual visit. The rest of a comprehensive physical examination is deferred due to PHE (public health emergency) video visit restrictions.         Current Laboratory Data:   All laboratory and imaging data reviewed.           No data to display

## 2025-04-16 NOTE — ADDENDUM NOTE
Addended by: TAY RAHMAN on: 4/15/2025 08:02 PM     Modules accepted: Orders     Dietary and lifestyle modifications

## 2025-04-29 ENCOUNTER — PATIENT OUTREACH (OUTPATIENT)
Dept: CARE COORDINATION | Facility: CLINIC | Age: 65
End: 2025-04-29
Payer: COMMERCIAL

## 2025-04-29 NOTE — PROGRESS NOTES
Social Work - Distress Screen Intervention  [facility]    Identified Concern and Score from Distress Screenin. How concerned are you about your ability to eat? 0     2. How concerned are you about unintended weight loss or your current weight? 0     3. How concerned are you about feeling depressed or very sad?  5     4. How concerned are you about feeling anxious or very scared?  5     5. Do you struggle with the loss of meaning and april in your life?  Not at all     6. How concerned are you about work and home life issues that may be affected by your cancer?  (!) 8     7. How concerned are you about knowing what resources are available to help you?  5     8. Do you currently have what you would describe as Pent[employer]al or spiritual struggles? Not at all     9. If you want to be contacted by one of our professionals, I can send a message to them right now.  No data recorded     Date of Distress Screen: 4-15-25  Data: At time of last visit, patient scored positive on distress screening.  outreached to patient today to follow up on elevated distress and introduce psychosocial services and support.  Intervention/Education provided:  contacted patient by phone to discuss distress screening results. No answer, Left voicemail message, and Provided  contact information and requested return call    Follow-up Required:  to remain available for support and await return call from patient    Javid Spencer, Casual , for [employer],   [facility]  [phone]

## 2025-05-02 ENCOUNTER — TRANSFERRED RECORDS (OUTPATIENT)
Dept: HEALTH INFORMATION MANAGEMENT | Facility: CLINIC | Age: 65
End: 2025-05-02
Payer: COMMERCIAL

## 2025-05-27 ENCOUNTER — PATIENT OUTREACH (OUTPATIENT)
Dept: INTERNAL MEDICINE | Facility: CLINIC | Age: 65
End: 2025-05-27
Payer: COMMERCIAL

## 2025-05-27 NOTE — TELEPHONE ENCOUNTER
Patient Quality Outreach    Patient is due for the following:   Diabetes -  A1C  IVD  -  Statin      Topic Date Due    Pneumococcal Vaccine (2 of 2 - PCV) 11/20/2007    Zoster (Shingles) Vaccine (1 of 2) Never done    COVID-19 Vaccine (3 - 2024-25 season) 09/01/2024       Action(s) Taken:   No follow up needed at this time.  Patient was seen on 12/12/24. Follow up in a year.    Type of outreach:    Chart review performed, no outreach needed.    Questions for provider review:    None         Lluvia Mendoza MA  Chart routed to None.

## 2025-06-02 ENCOUNTER — PATIENT OUTREACH (OUTPATIENT)
Dept: INTERNAL MEDICINE | Facility: CLINIC | Age: 65
End: 2025-06-02
Payer: COMMERCIAL

## 2025-06-02 NOTE — TELEPHONE ENCOUNTER
Patient Quality Outreach    Patient is due for the following:   Diabetes -  A1C, eGFR, and Statin  IVD  -  Statin      Topic Date Due    Pneumococcal Vaccine (2 of 2 - PCV) 11/20/2007    Zoster (Shingles) Vaccine (1 of 2) Never done    COVID-19 Vaccine (3 - 2024-25 season) 09/01/2024       Action(s) Taken:   Schedule a office visit for diabetes follow up.    Type of outreach:    Sent [employer] message.    Questions for provider review:    None         Lluvia Mendoza MA  Chart routed to None.

## 2025-06-11 DIAGNOSIS — I21.4 NSTEMI (NON-ST ELEVATED MYOCARDIAL INFARCTION) (H): ICD-10-CM

## 2025-06-11 RX ORDER — ATORVASTATIN CALCIUM 40 MG/1
40 TABLET, FILM COATED ORAL DAILY
Qty: 90 TABLET | Refills: 0 | Status: SHIPPED | OUTPATIENT
Start: 2025-06-11

## 2025-06-11 NOTE — TELEPHONE ENCOUNTER
One time refill only. [facility] sent to patient that they are due for an appointment and to call to schedule.      OTTO Garcia

## 2025-07-16 ENCOUNTER — OFFICE VISIT (OUTPATIENT)
Dept: INTERNAL MEDICINE | Facility: CLINIC | Age: 65
End: 2025-07-16
Payer: COMMERCIAL

## 2025-07-16 VITALS
SYSTOLIC BLOOD PRESSURE: 116 MMHG | HEIGHT: 64 IN | WEIGHT: 159 LBS | BODY MASS INDEX: 27.14 KG/M2 | DIASTOLIC BLOOD PRESSURE: 56 MMHG | OXYGEN SATURATION: 99 % | HEART RATE: 77 BPM | RESPIRATION RATE: 20 BRPM | TEMPERATURE: 97.8 F

## 2025-07-16 DIAGNOSIS — I31.39 PERICARDIAL EFFUSION: ICD-10-CM

## 2025-07-16 DIAGNOSIS — I73.9 PAD (PERIPHERAL ARTERY DISEASE): Primary | ICD-10-CM

## 2025-07-16 DIAGNOSIS — E89.0 POSTOPERATIVE HYPOTHYROIDISM: ICD-10-CM

## 2025-07-16 DIAGNOSIS — I10 PRIMARY HYPERTENSION: ICD-10-CM

## 2025-07-16 DIAGNOSIS — N18.4 CKD (CHRONIC KIDNEY DISEASE) STAGE 4, GFR 15-29 ML/MIN (H): ICD-10-CM

## 2025-07-16 LAB
ANION GAP SERPL CALCULATED.3IONS-SCNC: 13 MMOL/L (ref 7–15)
BUN SERPL-MCNC: 36.7 MG/DL (ref 8–23)
CALCIUM SERPL-MCNC: 8.7 MG/DL (ref 8.8–10.4)
CHLORIDE SERPL-SCNC: 106 MMOL/L (ref 98–107)
CREAT SERPL-MCNC: 2.95 MG/DL (ref 0.51–0.95)
EGFRCR SERPLBLD CKD-EPI 2021: 17 ML/MIN/1.73M2
GLUCOSE SERPL-MCNC: 245 MG/DL (ref 70–99)
HCO3 SERPL-SCNC: 19 MMOL/L (ref 22–29)
PHOSPHATE SERPL-MCNC: 4.7 MG/DL (ref 2.5–4.5)
POTASSIUM SERPL-SCNC: 4.4 MMOL/L (ref 3.4–5.3)
SODIUM SERPL-SCNC: 138 MMOL/L (ref 135–145)
T4 FREE SERPL-MCNC: 1.63 NG/DL (ref 0.9–1.7)
TSH SERPL DL<=0.005 MIU/L-ACNC: 0.11 UIU/ML (ref 0.3–4.2)

## 2025-07-16 PROCEDURE — 36415 COLL VENOUS BLD VENIPUNCTURE: CPT | Performed by: STUDENT IN AN ORGANIZED HEALTH CARE EDUCATION/TRAINING PROGRAM

## 2025-07-16 PROCEDURE — 99214 OFFICE O/P EST MOD 30 MIN: CPT | Performed by: STUDENT IN AN ORGANIZED HEALTH CARE EDUCATION/TRAINING PROGRAM

## 2025-07-16 PROCEDURE — 84439 ASSAY OF FREE THYROXINE: CPT | Performed by: STUDENT IN AN ORGANIZED HEALTH CARE EDUCATION/TRAINING PROGRAM

## 2025-07-16 PROCEDURE — 3074F SYST BP LT 130 MM HG: CPT | Performed by: STUDENT IN AN ORGANIZED HEALTH CARE EDUCATION/TRAINING PROGRAM

## 2025-07-16 PROCEDURE — 3078F DIAST BP <80 MM HG: CPT | Performed by: STUDENT IN AN ORGANIZED HEALTH CARE EDUCATION/TRAINING PROGRAM

## 2025-07-16 PROCEDURE — 84443 ASSAY THYROID STIM HORMONE: CPT | Performed by: STUDENT IN AN ORGANIZED HEALTH CARE EDUCATION/TRAINING PROGRAM

## 2025-07-16 PROCEDURE — 80048 BASIC METABOLIC PNL TOTAL CA: CPT | Performed by: STUDENT IN AN ORGANIZED HEALTH CARE EDUCATION/TRAINING PROGRAM

## 2025-07-16 PROCEDURE — 84100 ASSAY OF PHOSPHORUS: CPT | Performed by: STUDENT IN AN ORGANIZED HEALTH CARE EDUCATION/TRAINING PROGRAM

## 2025-07-16 RX ORDER — CARVEDILOL 12.5 MG/1
12.5 TABLET ORAL 2 TIMES DAILY WITH MEALS
Qty: 180 TABLET | Refills: 3 | Status: SHIPPED | OUTPATIENT
Start: 2025-07-16

## 2025-07-16 RX ORDER — CLOPIDOGREL BISULFATE 75 MG/1
75 TABLET ORAL DAILY
Qty: 90 TABLET | Refills: 3 | Status: SHIPPED | OUTPATIENT
Start: 2025-07-16

## 2025-07-16 RX ORDER — ATORVASTATIN CALCIUM 40 MG/1
40 TABLET, FILM COATED ORAL DAILY
Qty: 90 TABLET | Refills: 0 | Status: SHIPPED | OUTPATIENT
Start: 2025-07-16

## 2025-07-16 NOTE — PROGRESS NOTES
"  Assessment & Plan     (I73.9) PAD (peripheral artery disease)  (primary encounter diagnosis)  - Possible interventions planned  - Hydrate before and after to preserve renal function if possible, she is following Nephrology  - Continue statin, plavix for now  Plan: atorvastatin (LIPITOR) 40 MG tablet,         clopidogrel (PLAVIX) 75 MG tablet        Tobacco use cessation    (N18.4) CKD (chronic kidney disease) stage 4, GFR 15-29 ml/min (H)  - Following Nephrology  Plan: Basic metabolic panel  (Ca, Cl, CO2, Creat,         Gluc, K, Na, BUN), Phosphorus    (I10) Primary hypertension  - BP controlled in clinic  Plan: carvedilol (COREG) 12.5 MG tablet    (I31.39) Pericardial effusion  - Noted incidentally on CT chest  - Some cough with laying flat, no chest pain or palpitations  Plan: Echocardiogram Complete    BMI  Estimated body mass index is 27.29 kg/m  as calculated from the following:    Height as of this encounter: 1.626 m (5' 4\").    Weight as of this encounter: 72.1 kg (159 lb).         Pillo Donahue is a 65 year old, presenting for the following health issues:  Diabetes (Follow up.)      7/16/2025     1:41 PM   Additional Questions   Roomed by GILBERTO Teixeira   Accompanied by Self     History of Present Illness       Reason for visit:  Regular check up    She eats 2-3 servings of fruits and vegetables daily.She consumes 1 sweetened beverage(s) daily.She exercises with enough effort to increase her heart rate 9 or less minutes per day.  She exercises with enough effort to increase her heart rate 3 or less days per week.   She is taking medications regularly.          Diabetes Follow-up    How often are you checking your blood sugar? Three times daily  Blood sugar testing frequency justification:  Uncontrolled diabetes  What time of day are you checking your blood sugars (select all that apply)?  Before meals  Have you had any blood sugars above 200?  Yes 280  Have you had any blood sugars below 70?  No  What " "symptoms do you notice when your blood sugar is low?  None  What concerns do you have today about your diabetes? None   Do you have any of these symptoms? (Select all that apply)  Blurry vision      BP Readings from Last 2 Encounters:   07/16/25 116/56   11/26/24 118/60     Hemoglobin A1C (%)   Date Value   01/08/2025 7.0 (H)   08/30/2024 8.4 (H)   05/31/2021 7.9 (H)   04/28/2007 9.7 (H)     LDL Cholesterol Calculated (mg/dL)   Date Value   08/30/2024 68   08/30/2021 78   05/31/2021 105 (H)   01/08/2015 90.2         How many servings of fruits and vegetables do you eat daily?  2-3  On average, how many sweetened beverages do you drink each day (Examples: soda, juice, sweet tea, etc.  Do NOT count diet or artificially sweetened beverages)?   1  How many days per week do you exercise enough to make your heart beat faster? 3 or less  How many minutes a day do you exercise enough to make your heart beat faster? 9 or less  How many days per week do you miss taking your medication? She missed a couple days.  What makes it hard for you to take your medications?  She ran out of medication        Review of Systems  Constitutional, neuro, ENT, endocrine, pulmonary, cardiac, gastrointestinal, genitourinary, musculoskeletal, integument and psychiatric systems are negative, except as otherwise noted.      Objective    /56 (BP Location: Left arm, Patient Position: Sitting, Cuff Size: Adult Large)   Pulse 77   Temp 97.8  F (36.6  C) (Oral)   Resp 20   Ht 1.626 m (5' 4\")   Wt 72.1 kg (159 lb)   LMP 05/01/2000   SpO2 99%   BMI 27.29 kg/m    Body mass index is 27.29 kg/m .  Physical Exam  Vitals reviewed.   Constitutional:       Appearance: Normal appearance.   HENT:      Head: Atraumatic.   Eyes:      Extraocular Movements: Extraocular movements intact.   Cardiovascular:      Rate and Rhythm: Normal rate and regular rhythm.   Pulmonary:      Breath sounds: Normal breath sounds.   Abdominal:      Palpations: Abdomen is " soft.   Musculoskeletal:         General: Normal range of motion.      Cervical back: Normal range of motion.   Skin:     General: Skin is warm.   Neurological:      General: No focal deficit present.   Psychiatric:         Mood and Affect: Mood normal.              Signed Electronically by: Ilia Mcclellan MD

## 2025-07-21 ENCOUNTER — TELEPHONE (OUTPATIENT)
Dept: OTHER | Facility: CLINIC | Age: 65
End: 2025-07-21
Payer: COMMERCIAL

## 2025-07-21 NOTE — TELEPHONE ENCOUNTER
Notes in EPIC reviewed.  Per notes, patient completed bilateral arterial US and ANTHONY US at [facility].  Reports in EPIC.      Routing to MAs to call [facility] to push images to [place].  Route back to [facility] RN triage to process referral.

## 2025-07-21 NOTE — TELEPHONE ENCOUNTER
[facility]    Who is the name of the provider?:  NONE   What is the location you see this provider at/preferred location?: [place]  Person calling / Facility: Rowan Leiva  Phone number:  [phone] (home)   Nurse call back needed:  Yes     Reason for call:  Self referral for Vascular Surgery Provider, requesting [place] location. Has had imaging and consults at [facility] in [place] who recommended surgery but thinks the clinic may have closed since? Patient states her [place]  was able to see information in her chart.     Pharmacy location:     [facility] [address] [place] [facility] [address]  [id number] - [place], [place] - [address] AT [place] [id number] [address]  Outside Imaging: n/a   Can we leave a detailed message on this number?  YES     7/21/2025, 12:45 PM

## 2025-07-24 NOTE — TELEPHONE ENCOUNTER
Self referred for PAD.  Images pushed to PACS, reports sent to [facility].  Office notes uploaded to EPIC.        Previous imaging completed (pertinent to referral):  05/02/25 Bilateral lower extremity ultrasound  05/02/25 Bilateral lower extremity ANTHONY    Routing to scheduling to coordinate the following:  NEW VASCULAR PATIENT consult with Vascular Surgery  Please schedule this at next available      Appt note:  Self referred for PAD.  Images pushed to PACS, reports sent to [facility].  Office notes uploaded to EPIC.

## 2025-07-26 ENCOUNTER — HEALTH MAINTENANCE LETTER (OUTPATIENT)
Age: 65
End: 2025-07-26

## 2025-07-30 ENCOUNTER — PATIENT OUTREACH (OUTPATIENT)
Dept: CARE COORDINATION | Facility: CLINIC | Age: 65
End: 2025-07-30
Payer: COMMERCIAL

## 2025-07-31 ENCOUNTER — HOSPITAL ENCOUNTER (OUTPATIENT)
Dept: CARDIOLOGY | Facility: CLINIC | Age: 65
Discharge: HOME | End: 2025-07-31
Attending: STUDENT IN AN ORGANIZED HEALTH CARE EDUCATION/TRAINING PROGRAM
Payer: COMMERCIAL

## 2025-07-31 DIAGNOSIS — I31.39 PERICARDIAL EFFUSION: ICD-10-CM

## 2025-07-31 PROCEDURE — 93306 TTE W/DOPPLER COMPLETE: CPT

## 2025-08-13 ENCOUNTER — OFFICE VISIT (OUTPATIENT)
Dept: SURGERY | Facility: CLINIC | Age: 65
End: 2025-08-13
Payer: COMMERCIAL

## 2025-08-13 ENCOUNTER — PATIENT OUTREACH (OUTPATIENT)
Dept: CARE COORDINATION | Facility: CLINIC | Age: 65
End: 2025-08-13

## 2025-08-13 VITALS
BODY MASS INDEX: 27.14 KG/M2 | HEIGHT: 64 IN | DIASTOLIC BLOOD PRESSURE: 72 MMHG | WEIGHT: 159 LBS | SYSTOLIC BLOOD PRESSURE: 128 MMHG | HEART RATE: 70 BPM | OXYGEN SATURATION: 99 %

## 2025-08-13 DIAGNOSIS — I73.9 PERIPHERAL ARTERIAL DISEASE: Primary | ICD-10-CM

## 2025-08-13 PROCEDURE — 99203 OFFICE O/P NEW LOW 30 MIN: CPT | Performed by: SURGERY

## 2025-08-13 PROCEDURE — 3074F SYST BP LT 130 MM HG: CPT | Performed by: SURGERY

## 2025-08-13 PROCEDURE — 3078F DIAST BP <80 MM HG: CPT | Performed by: SURGERY

## (undated) DEVICE — MANIFOLD KIT ANGIO AUTOMATED 014613

## (undated) DEVICE — TOTE ANGIO CORP PC15AT SAN32CC83O

## (undated) DEVICE — CATH BALLOON NC EMERGE 2.50X12MM H7493926712250

## (undated) DEVICE — CATH JACKY 5FR 3.5 CURVE 40-5023

## (undated) DEVICE — WIRE GUIDE 0.035"X260CM SAFE-T-J EXCHANGE G00517

## (undated) DEVICE — INTRO GLIDESHEATH SLENDER 6FR 10X45CM 60-1060

## (undated) DEVICE — CATH BALLOON EMERGE 2.25X12MMH7493918912220

## (undated) DEVICE — ENDO SNARE HEXAGONAL ISNARE 2.5X4.0CM W/25GA NDL

## (undated) DEVICE — SLEEVE TR BAND RADIAL COMPRESSION DEVICE 24CM TRB24-REG

## (undated) DEVICE — CATH LAUNCHER 6FR EBU 30 LA6EBU30

## (undated) DEVICE — SMART CAPNOLINE H PLUS, ADULT/INTERMEDIATE O2, LONG

## (undated) DEVICE — GUIDEWIRE VASC 0.014INX180CM RUNTHROUGH 25-1011

## (undated) DEVICE — INFL DVC KIT W/10CC NITRO IN4530

## (undated) DEVICE — DEFIB PRO-PADZ LVP LQD GEL ADULT 8900-2105-01

## (undated) DEVICE — KIT HAND CONTROL ANGIOTOUCH ACIST 65CM AT-P65

## (undated) DEVICE — KIT ENDO TURNOVER/PROCEDURE W/CLEAN A SCOPE LINERS 103888

## (undated) DEVICE — VALVE HEMOSTASIS .096" COPILOT MECH 1003331

## (undated) RX ORDER — FENTANYL CITRATE 50 UG/ML
INJECTION, SOLUTION INTRAMUSCULAR; INTRAVENOUS
Status: DISPENSED
Start: 2021-05-31

## (undated) RX ORDER — VERAPAMIL HYDROCHLORIDE 2.5 MG/ML
INJECTION, SOLUTION INTRAVENOUS
Status: DISPENSED
Start: 2021-05-31

## (undated) RX ORDER — HEPARIN SODIUM 200 [USP'U]/100ML
INJECTION, SOLUTION INTRAVENOUS
Status: DISPENSED
Start: 2021-05-31

## (undated) RX ORDER — LIDOCAINE HYDROCHLORIDE 10 MG/ML
INJECTION, SOLUTION EPIDURAL; INFILTRATION; INTRACAUDAL; PERINEURAL
Status: DISPENSED
Start: 2021-05-31

## (undated) RX ORDER — SIMETHICONE 40MG/0.6ML
SUSPENSION, DROPS(FINAL DOSAGE FORM)(ML) ORAL
Status: DISPENSED
Start: 2021-06-19

## (undated) RX ORDER — FENTANYL CITRATE 50 UG/ML
INJECTION, SOLUTION INTRAMUSCULAR; INTRAVENOUS
Status: DISPENSED
Start: 2021-06-18

## (undated) RX ORDER — HEPARIN SODIUM 1000 [USP'U]/ML
INJECTION, SOLUTION INTRAVENOUS; SUBCUTANEOUS
Status: DISPENSED
Start: 2021-05-31

## (undated) RX ORDER — FENTANYL CITRATE 50 UG/ML
INJECTION, SOLUTION INTRAMUSCULAR; INTRAVENOUS
Status: DISPENSED
Start: 2021-06-19

## (undated) RX ORDER — NITROGLYCERIN 5 MG/ML
VIAL (ML) INTRAVENOUS
Status: DISPENSED
Start: 2021-05-31